# Patient Record
Sex: FEMALE | Race: BLACK OR AFRICAN AMERICAN | Employment: OTHER | URBAN - METROPOLITAN AREA
[De-identification: names, ages, dates, MRNs, and addresses within clinical notes are randomized per-mention and may not be internally consistent; named-entity substitution may affect disease eponyms.]

---

## 2017-01-10 ENCOUNTER — CLINICAL SUPPORT (OUTPATIENT)
Dept: ONCOLOGY | Age: 52
End: 2017-01-10

## 2017-01-10 ENCOUNTER — HOSPITAL ENCOUNTER (OUTPATIENT)
Dept: ONCOLOGY | Age: 52
Discharge: HOME | End: 2017-01-10

## 2017-01-10 ENCOUNTER — HOSPITAL ENCOUNTER (OUTPATIENT)
Dept: INFUSION THERAPY | Age: 52
Discharge: HOME | End: 2017-01-10
Payer: COMMERCIAL

## 2017-01-10 VITALS
RESPIRATION RATE: 16 BRPM | HEART RATE: 79 BPM | DIASTOLIC BLOOD PRESSURE: 88 MMHG | SYSTOLIC BLOOD PRESSURE: 158 MMHG | TEMPERATURE: 98.8 F

## 2017-01-10 DIAGNOSIS — N18.9 ANEMIA IN CKD (CHRONIC KIDNEY DISEASE): ICD-10-CM

## 2017-01-10 DIAGNOSIS — D63.1 ANEMIA IN CKD (CHRONIC KIDNEY DISEASE): ICD-10-CM

## 2017-01-10 DIAGNOSIS — N18.9 ANEMIA IN CKD (CHRONIC KIDNEY DISEASE): Primary | ICD-10-CM

## 2017-01-10 DIAGNOSIS — D63.1 ANEMIA IN CKD (CHRONIC KIDNEY DISEASE): Primary | ICD-10-CM

## 2017-01-10 LAB
BASO+EOS+MONOS # BLD AUTO: 0.3 K/UL (ref 0–2.3)
BASO+EOS+MONOS # BLD AUTO: 7 % (ref 0.1–17)
DIFFERENTIAL METHOD BLD: ABNORMAL
ERYTHROCYTE [DISTWIDTH] IN BLOOD BY AUTOMATED COUNT: 12.4 % (ref 11.5–14.5)
HCT VFR BLD AUTO: 30.8 % (ref 36–48)
HGB BLD-MCNC: 9.6 G/DL (ref 12–16)
LYMPHOCYTES # BLD AUTO: 16 % (ref 14–44)
LYMPHOCYTES # BLD: 0.8 K/UL (ref 1.1–5.9)
MCH RBC QN AUTO: 25.9 PG (ref 25–35)
MCHC RBC AUTO-ENTMCNC: 31.2 G/DL (ref 31–37)
MCV RBC AUTO: 83.2 FL (ref 78–102)
NEUTS SEG # BLD: 3.6 K/UL (ref 1.8–9.5)
NEUTS SEG NFR BLD AUTO: 77 % (ref 40–70)
PLATELET # BLD AUTO: 102 K/UL (ref 140–440)
RBC # BLD AUTO: 3.7 M/UL (ref 4.1–5.1)
WBC # BLD AUTO: 4.7 K/UL (ref 4.5–13)

## 2017-01-10 PROCEDURE — 36415 COLL VENOUS BLD VENIPUNCTURE: CPT

## 2017-01-10 NOTE — PROGRESS NOTES
[facility] Progress Note    Date: January 10, 2017    Name: Partha Jesus    MRN: [id number]         : 1965      Ms. Kendra Estrella arrived in the [facility] today, at 1010, in stable condition, here for Q 2 Week, CBC/Procrit injection. She was assessed and education was provided. Ms. Jodie Paulino vitals were reviewed. Visit Vitals    /88 (BP 1 Location: Left arm, BP Patient Position: At rest;Sitting)    Pulse 79    Temp 98.8 °F (37.1 °C)    Resp 16    Breastfeeding No           CBC was drawn from her right AC, at 1018, without incident. Lab results were obtained and reviewed, and preliminary platelet count was noted to be 102,000. Recent Results (from the past 12 hour(s))   CBC WITH 3 PART DIFF    Collection Time: 01/10/17 10:18 AM   Result Value Ref Range    WBC 4.7 4.5 - 13.0 K/uL    RBC 3.70 (L) 4.10 - 5.10 M/uL    HGB 9.6 (L) 12.0 - 16.0 g/dL    HCT 30.8 (L) 36 - 48 %    MCV 83.2 78 - 102 FL    MCH 25.9 25.0 - 35.0 PG    MCHC 31.2 31 - 37 g/dL    RDW 12.4 11.5 - 14.5 %    NEUTROPHILS 77 (H) 40 - 70 %    MIXED CELLS 7 0.1 - 17 %    LYMPHOCYTES 16 14 - 44 %    ABS. NEUTROPHILS 3.6 1.8 - 9.5 K/UL    ABS. MIXED CELLS 0.3 0.0 - 2.3 K/uL    ABS. LYMPHOCYTES 0.8 (L) 1.1 - 5.9 K/UL    DF AUTOMATED               Procrit was HELD today, per order, for HCT 30.8. Ms. Kendra Estrella tolerated well, and had no complaints. Ms. Kendra Estrella was discharged from [place] [address] in stable condition at 1035. Dolores Bolanosing She is to return on Tuesday, 17, at 1000, for her next appointment, for monthly IVIG, and CBC/Procrit injection.      Blease Curling, RN  January 10, 2017  10:27 AM

## 2017-01-31 ENCOUNTER — HOSPITAL ENCOUNTER (OUTPATIENT)
Dept: INFUSION THERAPY | Age: 52
Discharge: HOME | End: 2017-01-31
Payer: COMMERCIAL

## 2017-01-31 ENCOUNTER — CLINICAL SUPPORT (OUTPATIENT)
Dept: ONCOLOGY | Age: 52
End: 2017-01-31

## 2017-01-31 ENCOUNTER — HOSPITAL ENCOUNTER (OUTPATIENT)
Dept: ONCOLOGY | Age: 52
Discharge: HOME | End: 2017-01-31

## 2017-01-31 VITALS
HEIGHT: 67 IN | RESPIRATION RATE: 16 BRPM | HEART RATE: 73 BPM | SYSTOLIC BLOOD PRESSURE: 133 MMHG | WEIGHT: 205.5 LBS | BODY MASS INDEX: 32.25 KG/M2 | TEMPERATURE: 97.3 F | DIASTOLIC BLOOD PRESSURE: 74 MMHG

## 2017-01-31 DIAGNOSIS — D63.1 ANEMIA IN CKD (CHRONIC KIDNEY DISEASE): ICD-10-CM

## 2017-01-31 DIAGNOSIS — N18.9 ANEMIA IN CKD (CHRONIC KIDNEY DISEASE): Primary | ICD-10-CM

## 2017-01-31 DIAGNOSIS — D63.1 ANEMIA IN CKD (CHRONIC KIDNEY DISEASE): Primary | ICD-10-CM

## 2017-01-31 DIAGNOSIS — N18.9 ANEMIA IN CKD (CHRONIC KIDNEY DISEASE): ICD-10-CM

## 2017-01-31 LAB
BASO+EOS+MONOS # BLD AUTO: 0.3 K/UL (ref 0–2.3)
BASO+EOS+MONOS # BLD AUTO: 6 % (ref 0.1–17)
DIFFERENTIAL METHOD BLD: ABNORMAL
ERYTHROCYTE [DISTWIDTH] IN BLOOD BY AUTOMATED COUNT: 13.5 % (ref 11.5–14.5)
HCT VFR BLD AUTO: 23.9 % (ref 36–48)
HGB BLD-MCNC: 7.6 G/DL (ref 12–16)
LYMPHOCYTES # BLD AUTO: 18 % (ref 14–44)
LYMPHOCYTES # BLD: 0.9 K/UL (ref 1.1–5.9)
MCH RBC QN AUTO: 26.4 PG (ref 25–35)
MCHC RBC AUTO-ENTMCNC: 31.8 G/DL (ref 31–37)
MCV RBC AUTO: 83 FL (ref 78–102)
NEUTS SEG # BLD: 4 K/UL (ref 1.8–9.5)
NEUTS SEG NFR BLD AUTO: 76 % (ref 40–70)
PLATELET # BLD AUTO: 78 K/UL (ref 140–440)
RBC # BLD AUTO: 2.88 M/UL (ref 4.1–5.1)
WBC # BLD AUTO: 5.2 K/UL (ref 4.5–13)

## 2017-01-31 PROCEDURE — 96365 THER/PROPH/DIAG IV INF INIT: CPT

## 2017-01-31 PROCEDURE — 96366 THER/PROPH/DIAG IV INF ADDON: CPT

## 2017-01-31 PROCEDURE — 74011250637 HC RX REV CODE- 250/637: Performed by: INTERNAL MEDICINE

## 2017-01-31 PROCEDURE — 74011250636 HC RX REV CODE- 250/636: Performed by: INTERNAL MEDICINE

## 2017-01-31 PROCEDURE — 99211 OFF/OP EST MAY X REQ PHY/QHP: CPT

## 2017-01-31 PROCEDURE — 96367 TX/PROPH/DG ADDL SEQ IV INF: CPT

## 2017-01-31 PROCEDURE — 96375 TX/PRO/DX INJ NEW DRUG ADDON: CPT

## 2017-01-31 PROCEDURE — 74011000258 HC RX REV CODE- 258: Performed by: INTERNAL MEDICINE

## 2017-01-31 PROCEDURE — 96372 THER/PROPH/DIAG INJ SC/IM: CPT

## 2017-01-31 RX ORDER — ACETAMINOPHEN 325 MG/1
650 TABLET ORAL ONCE
Status: COMPLETED | OUTPATIENT
Start: 2017-01-31 | End: 2017-01-31

## 2017-01-31 RX ORDER — SODIUM CHLORIDE 9 MG/ML
250 INJECTION, SOLUTION INTRAVENOUS ONCE
Status: COMPLETED | OUTPATIENT
Start: 2017-01-31 | End: 2017-01-31

## 2017-01-31 RX ORDER — SODIUM CHLORIDE 0.9 % (FLUSH) 0.9 %
10-40 SYRINGE (ML) INJECTION AS NEEDED
Status: DISCONTINUED | OUTPATIENT
Start: 2017-01-31 | End: 2017-02-04 | Stop reason: HOSPADM

## 2017-01-31 RX ORDER — DIPHENHYDRAMINE HYDROCHLORIDE 50 MG/ML
50 INJECTION, SOLUTION INTRAMUSCULAR; INTRAVENOUS ONCE
Status: COMPLETED | OUTPATIENT
Start: 2017-01-31 | End: 2017-01-31

## 2017-01-31 RX ADMIN — ERYTHROPOIETIN 40000 UNITS: 40000 INJECTION, SOLUTION INTRAVENOUS; SUBCUTANEOUS at 11:55

## 2017-01-31 RX ADMIN — DEXAMETHASONE SODIUM PHOSPHATE 20 MG: 4 INJECTION, SOLUTION INTRA-ARTICULAR; INTRALESIONAL; INTRAMUSCULAR; INTRAVENOUS; SOFT TISSUE at 10:50

## 2017-01-31 RX ADMIN — IMMUNE GLOBULIN INTRAVENOUS (HUMAN) 20 G: 5 INJECTION, SOLUTION INTRAVENOUS at 11:45

## 2017-01-31 RX ADMIN — ACETAMINOPHEN 650 MG: 325 TABLET, FILM COATED ORAL at 10:45

## 2017-01-31 RX ADMIN — IMMUNE GLOBULIN INTRAVENOUS (HUMAN) 5 G: 5 INJECTION, SOLUTION INTRAVENOUS at 13:30

## 2017-01-31 RX ADMIN — DIPHENHYDRAMINE HYDROCHLORIDE 50 MG: 50 INJECTION INTRAMUSCULAR; INTRAVENOUS at 10:45

## 2017-01-31 RX ADMIN — ERYTHROPOIETIN 20000 UNITS: 20000 INJECTION, SOLUTION INTRAVENOUS; SUBCUTANEOUS at 11:55

## 2017-01-31 RX ADMIN — Medication 10 ML: at 10:25

## 2017-01-31 RX ADMIN — SODIUM CHLORIDE 250 ML: 9 INJECTION, SOLUTION INTRAVENOUS at 10:35

## 2017-01-31 NOTE — PROGRESS NOTES
[facility] Progress Note    Date: 2017    Name: Ashok Justice    MRN: [id number]         : 1965      Ms. Franc Richards arrived in the [address] [place] [address] today, at 1010, in stable condition, here for Monthly IVIG infusion, and Q 2 Week, CBC/Procrit injection. She was assessed and education was provided. Ms. Mio Koch vitals were reviewed. Visit Vitals    /86 (BP 1 Location: Left arm, BP Patient Position: At rest;Sitting)    Pulse 93    Temp 99.2 °F (37.3 °C)    Resp 16    Ht 5' 7\" (1.702 m)    Wt 93.2 kg (205 lb 8 oz)    BMI 32.19 kg/m2               PIV was started in her left AC, at 1025, without incident, and a CBC was drawn, per order. Lab results were obtained and reviewed, and were reported to Harley Young NP (especially the low H&H & platelet count results). Order was initially received from [place], to schedule Ms. Aguayo to receive 2 units of blood, however, Ms. Aguayo refused the blood transfusion for now. Ms. Franc Richards stated that she wanted to see first, if the Procrit injection received today, will bring up her H&H. Then she stated if her CBC results have not improved by the time she comes back for Procrit in 2 weeks, then, she will consent to a blood transfusion. Harley Young NP, was made aware of Ms. Aguayo' decision. Ms. Franc Richards voiced no complaints today, of fatigue or shortness of breath, however, she was instructed to report any onset of these symptoms immediately, and she verbalized understanding.          Recent Results (from the past 12 hour(s))   CBC WITH 3 PART DIFF    Collection Time: 17 10:25 AM   Result Value Ref Range    WBC 5.2 4.5 - 13.0 K/uL    RBC 2.88 (L) 4.10 - 5.10 M/uL    HGB 7.6 (L) 12.0 - 16.0 g/dL    HCT 23.9 (L) 36 - 48 %    MCV 83.0 78 - 102 FL    MCH 26.4 25.0 - 35.0 PG    MCHC 31.8 31 - 37 g/dL    RDW 13.5 11.5 - 14.5 %    PLATELET 78 (L) 960 - 440 K/uL    NEUTROPHILS 76 (H) 40 - 70 %    MIXED CELLS 6 0.1 - 17 %    LYMPHOCYTES 18 14 - 44 % ABS. NEUTROPHILS 4.0 1.8 - 9.5 K/UL    ABS. MIXED CELLS 0.3 0.0 - 2.3 K/uL    ABS. LYMPHOCYTES 0.9 (L) 1.1 - 5.9 K/UL    DF AUTOMATED               Procrit 60,000 units, was administered SQ, in her left arm, at 1155,  as ordered, and without incident. Pre-medications consisting of PO Tylenol 650 mg, IV Benadryl 50 mg, and Decadron 20 mg IV, were administered per order, and without incident. IVIG 25 grams (25,000 mg) was administered via the PIV, per order, and without incident. After completion of all ordered medications, the PIV was flushed well per protocol, and then, the PIV was removed and gauze/bandaid was applied. Ms. Aguayo tolerated well, and had no complaints. Ms. Fabiano Lau was discharged from [place] [address] in stable condition at [phone]. She is to return in 2 weeks, on Tuesday, 2-14-17, at 1600,  for her next appointment, for CBC/Procrit injection. And then, she is scheduled to return in 1 month, on Tuesday, 2-28-17, at 1000, for her next dose of IVIG.      Erica Philippe RN  January 31, 2017  11:00 AM

## 2017-02-14 ENCOUNTER — HOSPITAL ENCOUNTER (OUTPATIENT)
Dept: ONCOLOGY | Age: 52
Discharge: HOME | End: 2017-02-14

## 2017-02-14 ENCOUNTER — HOSPITAL ENCOUNTER (OUTPATIENT)
Dept: INFUSION THERAPY | Age: 52
Discharge: HOME | End: 2017-02-14
Payer: COMMERCIAL

## 2017-02-14 ENCOUNTER — CLINICAL SUPPORT (OUTPATIENT)
Dept: ONCOLOGY | Age: 52
End: 2017-02-14

## 2017-02-14 VITALS
HEART RATE: 88 BPM | TEMPERATURE: 98.7 F | DIASTOLIC BLOOD PRESSURE: 69 MMHG | SYSTOLIC BLOOD PRESSURE: 137 MMHG | RESPIRATION RATE: 16 BRPM

## 2017-02-14 DIAGNOSIS — N18.9 ANEMIA IN CKD (CHRONIC KIDNEY DISEASE): Primary | ICD-10-CM

## 2017-02-14 DIAGNOSIS — D63.1 ANEMIA IN CKD (CHRONIC KIDNEY DISEASE): Primary | ICD-10-CM

## 2017-02-14 DIAGNOSIS — D63.1 ANEMIA IN CKD (CHRONIC KIDNEY DISEASE): ICD-10-CM

## 2017-02-14 DIAGNOSIS — N18.9 ANEMIA IN CKD (CHRONIC KIDNEY DISEASE): ICD-10-CM

## 2017-02-14 LAB
BASO+EOS+MONOS # BLD AUTO: 0.4 K/UL (ref 0–2.3)
BASO+EOS+MONOS # BLD AUTO: 7 % (ref 0.1–17)
DIFFERENTIAL METHOD BLD: ABNORMAL
ERYTHROCYTE [DISTWIDTH] IN BLOOD BY AUTOMATED COUNT: 13.9 % (ref 11.5–14.5)
HCT VFR BLD AUTO: 23.8 % (ref 36–48)
HGB BLD-MCNC: 7.5 G/DL (ref 12–16)
LYMPHOCYTES # BLD AUTO: 20 % (ref 14–44)
LYMPHOCYTES # BLD: 1.1 K/UL (ref 1.1–5.9)
MCH RBC QN AUTO: 25.9 PG (ref 25–35)
MCHC RBC AUTO-ENTMCNC: 31.5 G/DL (ref 31–37)
MCV RBC AUTO: 82.1 FL (ref 78–102)
NEUTS SEG # BLD: 4.3 K/UL (ref 1.8–9.5)
NEUTS SEG NFR BLD AUTO: 73 % (ref 40–70)
PLATELET # BLD AUTO: 105 K/UL (ref 140–440)
RBC # BLD AUTO: 2.9 M/UL (ref 4.1–5.1)
WBC # BLD AUTO: 5.8 K/UL (ref 4.5–13)

## 2017-02-14 PROCEDURE — 99211 OFF/OP EST MAY X REQ PHY/QHP: CPT

## 2017-02-14 PROCEDURE — 74011250636 HC RX REV CODE- 250/636: Performed by: INTERNAL MEDICINE

## 2017-02-14 PROCEDURE — 96372 THER/PROPH/DIAG INJ SC/IM: CPT

## 2017-02-14 PROCEDURE — 36415 COLL VENOUS BLD VENIPUNCTURE: CPT

## 2017-02-14 RX ADMIN — ERYTHROPOIETIN 40000 UNITS: 40000 INJECTION, SOLUTION INTRAVENOUS; SUBCUTANEOUS at 16:50

## 2017-02-14 RX ADMIN — ERYTHROPOIETIN 20000 UNITS: 20000 INJECTION, SOLUTION INTRAVENOUS; SUBCUTANEOUS at 16:50

## 2017-02-14 NOTE — PROGRESS NOTES
[facility] Progress Note    Date: 2017    Name: Oumou Dennis    MRN: [id number]         : 1965      Ms. Carola Bhatti arrived in the [facility] today, at [phone], in stable condition, here for Q 2 Week, CBC/Procrit injection. She was assessed and education was provided. Upon assessment today, it was noted that Ms. Aguayo complained of feeling tired, but she contributed it to the fact that she is currently on her menstrual cycle, and has been flowing heavily. Ms. Dorcus Bloch vitals were reviewed. Visit Vitals    /69 (BP 1 Location: Right arm, BP Patient Position: At rest;Sitting)    Pulse 88    Temp 98.7 °F (37.1 °C)    Resp 16    Breastfeeding No             CBC was drawn from her left AC, at 1618, without incident. Lab results were obtained and reviewed, and low H&H result was reported to Paco Melendez NP. Sandy Patel was made aware, that Ms. Carola Bhatti stated that she is not completely opposed to having a blood transfusion, because she has had transfusions in the past, but she is NOT ready to agree to a transfusion yet. Therefore, Sandy Patel suggested that Ms. Carola Bhatti return in 1 week, just to have her CBC checked. However, Ms. Carola Bhatti stated that she would just rather wait, and return in 2 weeks, when she scheduled for her next IVIG infusion, and CBC/Procrit injection. Therefore, Ms. Carola Bhatti was instructed to report to the ED immediately, for any shortness of breath, or worsening symptoms, and she verbalized understanding, and agreed.      Recent Results (from the past 12 hour(s))   CBC WITH 3 PART DIFF    Collection Time: 17  4:18 PM   Result Value Ref Range    WBC 5.8 4.5 - 13.0 K/uL    RBC 2.90 (L) 4.10 - 5.10 M/uL    HGB 7.5 (L) 12.0 - 16.0 g/dL    HCT 23.8 (L) 36 - 48 %    MCV 82.1 78 - 102 FL    MCH 25.9 25.0 - 35.0 PG    MCHC 31.5 31 - 37 g/dL    RDW 13.9 11.5 - 14.5 %    PLATELET 893 (L) 198 - 440 K/uL    NEUTROPHILS 73 (H) 40 - 70 %    MIXED CELLS 7 0.1 - 17 %    LYMPHOCYTES 20 14 - 44 %    ABS. NEUTROPHILS 4.3 1.8 - 9.5 K/UL    ABS. MIXED CELLS 0.4 0.0 - 2.3 K/uL    ABS. LYMPHOCYTES 1.1 1.1 - 5.9 K/UL    DF AUTOMATED               Procrit 60,000 units, was administered SQ, in her right arm, at 1650, as ordered, and without incident. Ms. Aguayo tolerated well, and had no complaints. Ms. Bola Collins was discharged from [place] [address] in stable condition at [phone]. She is to return in 2 weeks, on Tuesday, 2-28-17, at 1000,  for her next appointment, for IVIG infusion, and Q 2 Week CBC/Procrit injection.      Marie Patel RN  February 14, 2017  4:28 PM

## 2017-02-28 ENCOUNTER — CLINICAL SUPPORT (OUTPATIENT)
Dept: ONCOLOGY | Age: 52
End: 2017-02-28

## 2017-02-28 ENCOUNTER — HOSPITAL ENCOUNTER (OUTPATIENT)
Dept: INFUSION THERAPY | Age: 52
Discharge: HOME | End: 2017-02-28
Payer: COMMERCIAL

## 2017-02-28 ENCOUNTER — OFFICE VISIT (OUTPATIENT)
Dept: ONCOLOGY | Age: 52
End: 2017-02-28

## 2017-02-28 ENCOUNTER — HOSPITAL ENCOUNTER (OUTPATIENT)
Dept: ONCOLOGY | Age: 52
Discharge: HOME | End: 2017-02-28

## 2017-02-28 VITALS
TEMPERATURE: 98.2 F | WEIGHT: 205 LBS | HEIGHT: 67 IN | HEART RATE: 76 BPM | SYSTOLIC BLOOD PRESSURE: 125 MMHG | DIASTOLIC BLOOD PRESSURE: 67 MMHG | BODY MASS INDEX: 32.18 KG/M2

## 2017-02-28 VITALS
SYSTOLIC BLOOD PRESSURE: 125 MMHG | TEMPERATURE: 98.2 F | HEIGHT: 67 IN | HEART RATE: 76 BPM | RESPIRATION RATE: 16 BRPM | BODY MASS INDEX: 32.18 KG/M2 | WEIGHT: 205 LBS | DIASTOLIC BLOOD PRESSURE: 67 MMHG

## 2017-02-28 DIAGNOSIS — N18.9 ANEMIA IN CKD (CHRONIC KIDNEY DISEASE): ICD-10-CM

## 2017-02-28 DIAGNOSIS — D63.1 ANEMIA IN CHRONIC KIDNEY DISEASE (CKD): ICD-10-CM

## 2017-02-28 DIAGNOSIS — N18.9 ANEMIA IN CHRONIC KIDNEY DISEASE (CKD): ICD-10-CM

## 2017-02-28 DIAGNOSIS — N92.0 MENORRHAGIA WITH REGULAR CYCLE: ICD-10-CM

## 2017-02-28 DIAGNOSIS — D63.1 ANEMIA IN CKD (CHRONIC KIDNEY DISEASE): Primary | ICD-10-CM

## 2017-02-28 DIAGNOSIS — N18.9 ANEMIA IN CKD (CHRONIC KIDNEY DISEASE): Primary | ICD-10-CM

## 2017-02-28 DIAGNOSIS — D63.1 ANEMIA IN CKD (CHRONIC KIDNEY DISEASE): ICD-10-CM

## 2017-02-28 DIAGNOSIS — D50.0 ANEMIA DUE TO BLOOD LOSS, CHRONIC: ICD-10-CM

## 2017-02-28 DIAGNOSIS — D69.6 THROMBOCYTOPENIA (HCC): Primary | ICD-10-CM

## 2017-02-28 LAB
BASO+EOS+MONOS # BLD AUTO: 0.3 K/UL (ref 0–2.3)
BASO+EOS+MONOS # BLD AUTO: 7 % (ref 0.1–17)
DIFFERENTIAL METHOD BLD: ABNORMAL
ERYTHROCYTE [DISTWIDTH] IN BLOOD BY AUTOMATED COUNT: 14.3 % (ref 11.5–14.5)
HCT VFR BLD AUTO: 25.3 % (ref 36–48)
HGB BLD-MCNC: 7.8 G/DL (ref 12–16)
LYMPHOCYTES # BLD AUTO: 19 % (ref 14–44)
LYMPHOCYTES # BLD: 0.8 K/UL (ref 1.1–5.9)
MCH RBC QN AUTO: 26 PG (ref 25–35)
MCHC RBC AUTO-ENTMCNC: 30.8 G/DL (ref 31–37)
MCV RBC AUTO: 84.3 FL (ref 78–102)
NEUTS SEG # BLD: 3.3 K/UL (ref 1.8–9.5)
NEUTS SEG NFR BLD AUTO: 73 % (ref 40–70)
PLATELET # BLD AUTO: 107 K/UL (ref 140–440)
RBC # BLD AUTO: 3 M/UL (ref 4.1–5.1)
WBC # BLD AUTO: 4.4 K/UL (ref 4.5–13)

## 2017-02-28 PROCEDURE — 74011000258 HC RX REV CODE- 258: Performed by: INTERNAL MEDICINE

## 2017-02-28 PROCEDURE — 96367 TX/PROPH/DG ADDL SEQ IV INF: CPT

## 2017-02-28 PROCEDURE — 96375 TX/PRO/DX INJ NEW DRUG ADDON: CPT

## 2017-02-28 PROCEDURE — 96372 THER/PROPH/DIAG INJ SC/IM: CPT

## 2017-02-28 PROCEDURE — 74011250636 HC RX REV CODE- 250/636: Performed by: INTERNAL MEDICINE

## 2017-02-28 PROCEDURE — 96365 THER/PROPH/DIAG IV INF INIT: CPT

## 2017-02-28 PROCEDURE — 74011250637 HC RX REV CODE- 250/637: Performed by: INTERNAL MEDICINE

## 2017-02-28 PROCEDURE — 96366 THER/PROPH/DIAG IV INF ADDON: CPT

## 2017-02-28 RX ORDER — ACETAMINOPHEN 325 MG/1
650 TABLET ORAL ONCE
Status: COMPLETED | OUTPATIENT
Start: 2017-02-28 | End: 2017-02-28

## 2017-02-28 RX ORDER — DIPHENHYDRAMINE HYDROCHLORIDE 50 MG/ML
50 INJECTION, SOLUTION INTRAMUSCULAR; INTRAVENOUS ONCE
Status: COMPLETED | OUTPATIENT
Start: 2017-02-28 | End: 2017-02-28

## 2017-02-28 RX ORDER — SODIUM CHLORIDE 0.9 % (FLUSH) 0.9 %
10-40 SYRINGE (ML) INJECTION AS NEEDED
Status: DISPENSED | OUTPATIENT
Start: 2017-02-28 | End: 2017-02-28

## 2017-02-28 RX ORDER — SODIUM CHLORIDE 9 MG/ML
250 INJECTION, SOLUTION INTRAVENOUS ONCE
Status: COMPLETED | OUTPATIENT
Start: 2017-02-28 | End: 2017-02-28

## 2017-02-28 RX ADMIN — Medication 10 ML: at 10:20

## 2017-02-28 RX ADMIN — IMMUNE GLOBULIN INTRAVENOUS (HUMAN) 5 G: 5 INJECTION, SOLUTION INTRAVENOUS at 13:30

## 2017-02-28 RX ADMIN — ACETAMINOPHEN 650 MG: 325 TABLET, FILM COATED ORAL at 10:40

## 2017-02-28 RX ADMIN — IMMUNE GLOBULIN INTRAVENOUS (HUMAN) 20 G: 5 INJECTION, SOLUTION INTRAVENOUS at 11:45

## 2017-02-28 RX ADMIN — ERYTHROPOIETIN 40000 UNITS: 40000 INJECTION, SOLUTION INTRAVENOUS; SUBCUTANEOUS at 11:05

## 2017-02-28 RX ADMIN — DEXAMETHASONE SODIUM PHOSPHATE 20 MG: 4 INJECTION, SOLUTION INTRA-ARTICULAR; INTRALESIONAL; INTRAMUSCULAR; INTRAVENOUS; SOFT TISSUE at 10:45

## 2017-02-28 RX ADMIN — ERYTHROPOIETIN 20000 UNITS: 20000 INJECTION, SOLUTION INTRAVENOUS; SUBCUTANEOUS at 11:05

## 2017-02-28 RX ADMIN — SODIUM CHLORIDE 250 ML: 9 INJECTION, SOLUTION INTRAVENOUS at 10:30

## 2017-02-28 RX ADMIN — DIPHENHYDRAMINE HYDROCHLORIDE 50 MG: 50 INJECTION INTRAMUSCULAR; INTRAVENOUS at 10:40

## 2017-02-28 NOTE — PROGRESS NOTES
Hematology/Oncology  Progress Note    Name: Alesha Risk  Date: 2016  : 1965    PCP: Yovany Gunderson MD     Ms. Memo Monsivais is a 54year old female who was seen for management of her underlying anemia. The patient also past chronic thrombocytopenia/ITP. She denies having any recent bruising or bleeding episodes. Current therapy: IVIG monthly, ferrous sulfate BID, and Venofer PRN. Subjective:     Ms. Memo Monsivais is a 46-year-old woman who has anemia and thrombocytopenia. The patient has previously tolerated her intravenous Venofer without significant untoward side effects. She received monthly IVIG and she is tolerating this well. She reports that she is experiencing some fatigue at this time. She has no other new physical complaints or concerns to report. The patient denies having bruising or bleeding. The patient states that her menstrual cycle has improved over the past several months. Past medical history, family history, and social history: these were reviewed and remains unchanged. Past Medical History:   Diagnosis Date    Anemia     Arthritis     Diabetes ([address]     Hypertension     ITP (idiopathic thrombocytopenic purpura)      Past Surgical History:   Procedure Laterality Date    HX GYN      c section      Social History     Social History    Marital status: SINGLE     Spouse name: N/A    Number of children: N/A    Years of education: N/A     Occupational History    Not on file. Social History Main Topics    Smoking status: Never Smoker    Smokeless tobacco: Not on file    Alcohol use No    Drug use: No    Sexual activity: Not on file     Other Topics Concern    Not on file     Social History Narrative     Family History   Problem Relation Age of Onset    Hypertension Mother     Cancer Father      Colon     Current Outpatient Prescriptions   Medication Sig Dispense Refill    metFORMIN (GLUCOPHAGE) 500 mg tablet Take 2 Tabs by mouth two (2) times daily (with meals). Indications: TYPE 2 DIABETES MELLITUS 120 Tab 2    lisinopril-hydrochlorothiazide (PRINZIDE, ZESTORETIC) 20-25 mg per tablet Take 1 Tab by mouth daily. Indications: HYPERTENSION      amLODIPine (NORVASC) 10 mg tablet Take 10 mg by mouth daily. Indications: HYPERTENSION      ferrous sulfate 325 mg (65 mg iron) tablet Take 325 mg by mouth two (2) times a day. Facility-Administered Medications Ordered in Other Visits   Medication Dose Route Frequency Provider Last Rate Last Dose    sodium chloride (NS) flush 10-40 mL  10-40 mL IntraVENous PRN Glendon Hammans, MD   10 mL at 02/28/17 1020    immune globulin 10% infusion 5 g  5 g IntraVENous ONCE TITR Glendon Hammans, MD   Stopped at 02/28/17 1350       Review of Systems  Constitutional: The patient complains of fatigue   HEENT: The patient denies recent head trauma, eye pain, blurred vision,  hearing deficit, oropharyngeal mucosal pain or lesions, and the patient denies throat pain or discomfort. Lymphatics: The patient denies palpable peripheral lymphadenopathy. Hematologic: The patient denies having bruising, bleeding, or progressive fatigue. Respiratory: Patient denies having shortness of breath, cough, sputum production, fever, or dyspnea on exertion. Cardiovascular: The patient denies having leg pain, leg swelling, heart palpitations, chest permit, chest pain, or lightheadedness. The patient denies having dyspnea on exertion. Gastrointestinal: The patient denies having nausea, emesis, or diarrhea. The patient denies having any hematemesis or blood in the stool. Genitourinary: Patient denies having urinary urgency, frequency, or dysuria. The patient denies having blood in the urine. Psychological: The patient denies having symptoms of nervousness, anxiety, depression, or thoughts of harming himself some of this. Skin: Patient denies having skin rashes, skin, ulcerations, or unexplained itching or pruritus.   Musculoskeletal: The patient denies having pain in the joints or bones. Objective:     Visit Vitals    /67    Pulse 76    Temp 98.2 °F (36.8 °C)    Ht 5' 7\" (1.702 m)    Wt 93 kg (205 lb)    BMI 32.11 kg/m2     ECOG 0  Physical Exam:   Gen. Appearance: The patient is in no acute distress. Skin: There is no bruise or rash. HEENT: The exam is unremarkable. Neck: Supple without lymphadenopathy or thyromegaly. Lungs: Clear to auscultation and percussion; there are no wheezes or rhonchi. Heart: Regular rate and rhythm; there are no murmurs, gallops, or rubs. Anterior chest wall and breasts: Deferred. Abdomen: Bowel sounds are present and normal.  There is no guarding, tenderness, or hepatosplenomegaly. Extremities: There is no clubbing, cyanosis, or edema. Neurologic: There are no focal neurologic deficits. Lymphatics: There is no palpable peripheral lymphadenopathy. Musculoskeletal: The patient has full range of motion at all joints. There is no evidence of joint deformity or effusions. There is no focal joint tenderness. Psychological/psychiatric: There is no clinical evidence of anxiety, depression, or melancholy. Lab data:      Results for orders placed or performed during the hospital encounter of 02/28/17   CBC WITH 3 PART DIFF     Status: Abnormal   Result Value Ref Range Status    WBC 4.4 (L) 4.5 - 13.0 K/uL Final    RBC 3.00 (L) 4.10 - 5.10 M/uL Final    HGB 7.8 (L) 12.0 - 16.0 g/dL Final    HCT 25.3 (L) 36 - 48 % Final    MCV 84.3 78 - 102 FL Final    MCH 26.0 25.0 - 35.0 PG Final    MCHC 30.8 (L) 31 - 37 g/dL Final    RDW 14.3 11.5 - 14.5 % Final    PLATELET 301 (L) 837 - 440 K/uL Final    NEUTROPHILS 73 (H) 40 - 70 % Final    MIXED CELLS 7 0.1 - 17 % Final    LYMPHOCYTES 19 14 - 44 % Final    ABS. NEUTROPHILS 3.3 1.8 - 9.5 K/UL Final    ABS. MIXED CELLS 0.3 0.0 - 2.3 K/uL Final    ABS. LYMPHOCYTES 0.8 (L) 1.1 - 5.9 K/UL Final     Comment: Test performed at [place] [address] Location.  Results Reviewed by Medical Director. DF AUTOMATED   Final         Assessment:     1. Thrombocytopenia ([address]    2. Anemia due to blood loss, chronic    3. Anemia in chronic kidney disease (CKD)    4. Menorrhagia with regular cycle      Plan:   Anemia due to blood loss/menorrhagia and chronic kidney disease: the patient has tolerated the intravenous iron deficient with Venofer reasonably well. CBC from today shows that her hemoglobin is currently 7.8 g/dL with hematocrit of 25.3%. We are continuing to provide Procrit every 2 weeks at a dose of 60,000 units. The most recent ferritin level from 12/24/2016 was 32 ng/mL. At this time am recommending that we provide the patient with 4 additional doses of Venofer in an effort to raise her ferritin level above 100 ng/mL. she will be scheduled to begin Venofer 250 mg every 2 weeks starting next week. 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 The platelets are continuing to respond to intravenous gamma globulin. The current CBC shows that her platelet count it is now 107,000. She is scheduled for her next IVIG infusion on 12/27/2016. Menorrhagia (progressive problem) the patient reports that her menstrual flow has significantly increased over the past couple of months. She does have known uterine fibroids. I have recommended that she follow up with the OB/GYN physician and that she should adhere to undergoing a hysterectomy to correct this problem. In the meantime, she will be scheduled for intravenous Venofer and I will see her back in clinic in 8 weeks. The patient will return to clinic for complete reassessment again in 8 weeks.   Orders Placed This Encounter    FERRITIN     Standing Status:   Future     Standing Expiration Date:   0/2/2857    METABOLIC PANEL, COMPREHENSIVE     Standing Status:   Future     Standing Expiration Date:   3/1/2018    IRON PROFILE     Standing Status:   Future     Standing Expiration Date: 3/1/2018       Ruthie Trevizo MD  2/28/2017

## 2017-02-28 NOTE — PROGRESS NOTES
[facility] Progress Note    Date: 2017    Name: Gabriella Mtz    MRN: [id number]         : 1965      Ms. Nuno Severino arrived in the [facility] today, at 1005, in stable condition, here for Monthly IVIG Infusion, and Q 2 Week, CBC/Procrit injection. She was assessed and education was provided. Ms. Kaylah Chang vitals were reviewed. Visit Vitals    /80 (BP 1 Location: Left arm, BP Patient Position: At rest;Sitting)    Pulse 92    Temp 98.4 °F (36.9 °C)    Resp 16    Ht 5' 7\" (1.702 m)    Wt 93 kg (205 lb)    BMI 32.11 kg/m2             PIV was established in her left AC, at 1020, and a CBC was drawn per order, along with an extra lavender top tube, and 2 SST tubes, in preparation for her office visit with Dr. Emerson Angel today, after the completion of this [facility] visit. Lab results were obtained and reviewed. Recent Results (from the past 12 hour(s))   CBC WITH 3 PART DIFF    Collection Time: 17 10:20 AM   Result Value Ref Range    WBC 4.4 (L) 4.5 - 13.0 K/uL    RBC 3.00 (L) 4.10 - 5.10 M/uL    HGB 7.8 (L) 12.0 - 16.0 g/dL    HCT 25.3 (L) 36 - 48 %    MCV 84.3 78 - 102 FL    MCH 26.0 25.0 - 35.0 PG    MCHC 30.8 (L) 31 - 37 g/dL    RDW 14.3 11.5 - 14.5 %    PLATELET 206 (L) 930 - 440 K/uL    NEUTROPHILS 73 (H) 40 - 70 %    MIXED CELLS 7 0.1 - 17 %    LYMPHOCYTES 19 14 - 44 %    ABS. NEUTROPHILS 3.3 1.8 - 9.5 K/UL    ABS. MIXED CELLS 0.3 0.0 - 2.3 K/uL    ABS. LYMPHOCYTES 0.8 (L) 1.1 - 5.9 K/UL    DF AUTOMATED             Low H&H result was reported to Rose Benitez NP. No new orders received. Ms. Nuno Severino still refused the option of a blood transfusion at this time. Procrit 60,000 units, was administered SQ, in her right arm, at 1105, as ordered, and without incident. Pre-medications consisting of PO Tylenol 650 mg, Benadryl 50 mg IV, and Decadron 20 mg IV, were all administered per order, and without incident.            IVIG 25 grams (25,000 mg), was administered per order, and without incident. After completion of the IVIG, the PIV was flushed very well per protocol, and then, the PIV was removed and gauze/bandaid was applied. Ms. Noa Andino tolerated well, and had no complaints. Ms. Noa Andino was discharged from [place] [address] in stable condition at 1430. She is to return in 2 weeks, on Tuesday, 3-14-17, at 1500, for her next appointment, for CBC/Procrit injection. And then, she will return in 1 month, on Tuesday, 3-28-17, at 1000, for her next dose of IVIG.      Ozzie Belle RN  February 28, 2017  10:52 AM

## 2017-02-28 NOTE — MR AVS SNAPSHOT
Visit Information Date & Time Provider Department Dept. Phone Encounter #  
 2/28/2017  2:15 PM Homar Olivares [address] Office [phone] [id number] Follow-up Instructions Return in about 8 weeks (around 4/25/2017). Follow-up and Disposition History Your Appointments 4/25/2017  3:00 PM  
Office Visit with Homar Olivares MD  
[facility] [address] [facility]) Appt Note: OV  
 [facility] [address] [facility] [address] [facility] [address]  
[phone]  
  
   
 [facility] [address] [place] [address] [place] Upcoming Health Maintenance Date Due HEMOGLOBIN A1C Q6M 1965 LIPID PANEL Q1 1965 FOOT EXAM Q1 6/16/1977 MICROALBUMIN Q1 6/16/1977 EYE EXAM RETINAL OR DILATED Q1 6/16/1977 Pneumococcal 19-64 Highest Risk (1 of 3 - PCV13) 6/16/1986 DTaP/Tdap/Td series (1 - Tdap) 6/16/1988 PAP AKA CERVICAL CYTOLOGY 6/16/1988 Allergies as of 2/28/2017  Review Complete On: 2/28/2017 By: Homar Olivares MD  
 No Known Allergies Current Immunizations  Reviewed on 2/28/2017 Name Date Influenza Vaccine 11/15/2016 Reviewed by Valentina Moody RN on 2/28/2017 at 10:48 AM  
You Were Diagnosed With   
  
 Codes Comments Thrombocytopenia [address] [address]    -  Primary ICD-10-CM: D69.6 ICD-9-CM: 287.5 Anemia due to blood loss, chronic     ICD-10-CM: D50.0 ICD-9-CM: 280.0 Anemia in chronic kidney disease (CKD)     ICD-10-CM: N18.9, D63.1 ICD-9-CM: 285.21, 585.9 Menorrhagia with regular cycle     ICD-10-CM: N92.0 ICD-9-CM: 626.2 Vitals BP  
  
  
  
  
  
 125/67 BMI and BSA Data Body Mass Index Body Surface Area  
 32.11 kg/m 2 2.1 m 2 Preferred Pharmacy Pharmacy Name Phone [facility] [address] [phone] Your Updated Medication List  
  
   
This list is accurate as of: 2/28/17  3:55 PM.  Always use your most recent med list.  
 amLODIPine 10 mg tablet Commonly known as:  Mago Milad Take 10 mg by mouth daily. Indications: HYPERTENSION  
  
 ferrous sulfate 325 mg (65 mg iron) tablet Take 325 mg by mouth two (2) times a day. lisinopril-hydroCHLOROthiazide 20-25 mg per tablet Commonly known as:  Peggy Edinger Take 1 Tab by mouth daily. Indications: HYPERTENSION  
  
 metFORMIN 500 mg tablet Commonly known as:  GLUCOPHAGE Take 2 Tabs by mouth two (2) times daily (with meals). Indications: TYPE 2 DIABETES MELLITUS Follow-up Instructions Return in about 8 weeks (around 4/25/2017). To-Do List   
 02/28/2017 Lab:  FERRITIN   
  
 02/28/2017 Lab:  IRON PROFILE   
  
 02/28/2017 Lab:  METABOLIC PANEL, COMPREHENSIVE   
  
 03/14/2017 3:00 PM  
  Appointment with [address] at [place] [address] ([phone])  
  
 03/28/2017 10:00 AM  
  Appointment with [address] at [place] [address] ([phone])  
  
 04/11/2017 4:00 PM  
  Appointment with [address] at [place] [address] ([phone])  
  
 04/25/2017 10:00 AM  
  Appointment with [address] at [place] [address] ([phone]) Introducing [facility] SERVICES! Yolie Johnson introduces [employer] patient portal. Now you can access parts of your medical record, email your doctor's office, and request medication refills online. 1. In your internet browser, go to [email or web address] 2. Click on the First Time User? Click Here link in the Sign In box. You will see the New Member Sign Up page. 3. Enter your [employer] Access Code exactly as it appears below. You will not need to use this code after youve completed the sign-up process. If you do not sign up before the expiration date, you must request a new code. · [employer] Access Code: [email or web address] Expires: 4/24/2017  4:17 PM 
 
 4. Enter the last four digits of your Social Security Number (xxxx) and Date of Birth (mm/dd/yyyy) as indicated and click Submit. You will be taken to the next sign-up page. 5. Create a [employer] ID. This will be your [employer] login ID and cannot be changed, so think of one that is secure and easy to remember. 6. Create a [employer] password. You can change your password at any time. 7. Enter your Password Reset Question and Answer. This can be used at a later time if you forget your password. 8. Enter your e-mail address. You will receive e-mail notification when new information is available in [address] 9. Click Sign Up. You can now view and download portions of your medical record. 10. Click the Download Summary menu link to download a portable copy of your medical information. If you have questions, please visit the Frequently Asked Questions section of the [employer] website. Remember, [employer] is NOT to be used for urgent needs. For medical emergencies, dial 911. Now available from your iPhone and Android! Please provide this summary of care documentation to your next provider. Your primary care clinician is listed as Yovany Gunderson. If you have any questions after today's visit, please call [phone].

## 2017-03-01 LAB
A-G RATIO,AGRAT: 1.3 RATIO (ref 1.1–2.6)
ALBUMIN SERPL-MCNC: 3.8 G/DL (ref 3.5–5)
ALP SERPL-CCNC: 72 U/L (ref 25–115)
ALT SERPL-CCNC: 8 U/L (ref 5–40)
ANION GAP SERPL CALC-SCNC: 19 MMOL/L
AST SERPL W P-5'-P-CCNC: 14 U/L (ref 10–37)
BILIRUB SERPL-MCNC: <0.1 MG/DL (ref 0.2–1.2)
BUN SERPL-MCNC: 30 MG/DL (ref 6–22)
CALCIUM SERPL-MCNC: 8.2 MG/DL (ref 8.4–10.5)
CHLORIDE SERPL-SCNC: 103 MMOL/L (ref 98–110)
CO2 SERPL-SCNC: 18 MMOL/L (ref 20–32)
CREAT SERPL-MCNC: 1.7 MG/DL (ref 0.5–1.2)
FE % SATURATION,PSAT: 11 % (ref 20–50)
FERRITIN SERPL-MCNC: 76 NG/ML (ref 10–291)
GFRAA, 66117: 38.2
GFRNA, 66118: 31.5
GLOBULIN,GLOB: 2.9 G/DL (ref 2–4)
GLUCOSE SERPL-MCNC: 246 MG/DL (ref 65–99)
IRON,IRN: 30 MCG/DL (ref 30–160)
POTASSIUM SERPL-SCNC: 4.6 MMOL/L (ref 3.5–5.5)
PROT SERPL-MCNC: 6.7 G/DL (ref 6.4–8.3)
SODIUM SERPL-SCNC: 140 MMOL/L (ref 133–145)
TIBC,TIBC: 275 MCG/DL (ref 228–428)
UIBC SERPL-MCNC: 245 MCG/DL (ref 110–370)

## 2017-03-01 NOTE — PROGRESS NOTES
I spoke with Dr. Cammy Martinez today, Wednesday, 3-1-17, and he stated that he wanted Ms. Aguayo to have another 4 doses of Venofer. So, I called Ms. Gutierrez Manuelgarcía, and she stated that she wanted to get her 1st dose of Venofer, on the same day that she gets her next dose of Procrit. Therefore, she was made aware, that her Venofer  (Dose 1 of 4) is scheduled for Tuesday, 3-14-17, at 1400, and she agreed to the appointment.

## 2017-03-10 RX ORDER — SODIUM CHLORIDE 9 MG/ML
25 INJECTION, SOLUTION INTRAVENOUS CONTINUOUS
Status: CANCELLED | OUTPATIENT
Start: 2017-03-14

## 2017-03-14 ENCOUNTER — CLINICAL SUPPORT (OUTPATIENT)
Dept: ONCOLOGY | Age: 52
End: 2017-03-14

## 2017-03-14 ENCOUNTER — HOSPITAL ENCOUNTER (OUTPATIENT)
Dept: INFUSION THERAPY | Age: 52
Discharge: HOME | End: 2017-03-14
Payer: COMMERCIAL

## 2017-03-14 ENCOUNTER — HOSPITAL ENCOUNTER (OUTPATIENT)
Dept: ONCOLOGY | Age: 52
Discharge: HOME | End: 2017-03-14

## 2017-03-14 VITALS
TEMPERATURE: 98.3 F | WEIGHT: 204.5 LBS | RESPIRATION RATE: 16 BRPM | BODY MASS INDEX: 32.1 KG/M2 | HEIGHT: 67 IN | SYSTOLIC BLOOD PRESSURE: 128 MMHG | DIASTOLIC BLOOD PRESSURE: 72 MMHG | HEART RATE: 67 BPM

## 2017-03-14 DIAGNOSIS — D63.1 ANEMIA IN CKD (CHRONIC KIDNEY DISEASE): ICD-10-CM

## 2017-03-14 DIAGNOSIS — N18.9 ANEMIA IN CKD (CHRONIC KIDNEY DISEASE): Primary | ICD-10-CM

## 2017-03-14 DIAGNOSIS — N18.9 ANEMIA IN CKD (CHRONIC KIDNEY DISEASE): ICD-10-CM

## 2017-03-14 DIAGNOSIS — D63.1 ANEMIA IN CKD (CHRONIC KIDNEY DISEASE): Primary | ICD-10-CM

## 2017-03-14 LAB
BASO+EOS+MONOS # BLD AUTO: 0.3 K/UL (ref 0–2.3)
BASO+EOS+MONOS # BLD AUTO: 5 % (ref 0.1–17)
DIFFERENTIAL METHOD BLD: ABNORMAL
ERYTHROCYTE [DISTWIDTH] IN BLOOD BY AUTOMATED COUNT: 13.2 % (ref 11.5–14.5)
HCT VFR BLD AUTO: 30.7 % (ref 36–48)
HGB BLD-MCNC: 9.4 G/DL (ref 12–16)
LYMPHOCYTES # BLD AUTO: 20 % (ref 14–44)
LYMPHOCYTES # BLD: 1.1 K/UL (ref 1.1–5.9)
MCH RBC QN AUTO: 25.3 PG (ref 25–35)
MCHC RBC AUTO-ENTMCNC: 30.6 G/DL (ref 31–37)
MCV RBC AUTO: 82.7 FL (ref 78–102)
NEUTS SEG # BLD: 4.2 K/UL (ref 1.8–9.5)
NEUTS SEG NFR BLD AUTO: 76 % (ref 40–70)
PLATELET # BLD AUTO: 135 K/UL (ref 135–420)
RBC # BLD AUTO: 3.71 M/UL (ref 4.1–5.1)
WBC # BLD AUTO: 5.6 K/UL (ref 4.5–13)

## 2017-03-14 PROCEDURE — 74011250636 HC RX REV CODE- 250/636: Performed by: INTERNAL MEDICINE

## 2017-03-14 PROCEDURE — 96365 THER/PROPH/DIAG IV INF INIT: CPT

## 2017-03-14 PROCEDURE — 96366 THER/PROPH/DIAG IV INF ADDON: CPT

## 2017-03-14 RX ORDER — SODIUM CHLORIDE 9 MG/ML
250 INJECTION, SOLUTION INTRAVENOUS ONCE
Status: COMPLETED | OUTPATIENT
Start: 2017-03-14 | End: 2017-03-14

## 2017-03-14 RX ORDER — SODIUM CHLORIDE 0.9 % (FLUSH) 0.9 %
10-40 SYRINGE (ML) INJECTION AS NEEDED
Status: DISPENSED | OUTPATIENT
Start: 2017-03-14 | End: 2017-03-14

## 2017-03-14 RX ADMIN — SODIUM CHLORIDE 250 ML: 9 INJECTION, SOLUTION INTRAVENOUS at 14:35

## 2017-03-14 RX ADMIN — Medication 10 ML: at 14:30

## 2017-03-14 RX ADMIN — IRON SUCROSE 250 MG: 20 INJECTION, SOLUTION INTRAVENOUS at 14:45

## 2017-03-14 NOTE — PROGRESS NOTES
[facility] Progress Note    Date: 2017    Name: Dominique Brasher    MRN: [id number]         : 1965      Ms. Nino Bingham arrived in the [facility] today, at 1415, in stable condition, here for Dose # 1 of 4, IV Venofer Infusion, and Q 2 Week, CBC/Procrit injection. She was assessed and education was provided. Ms. Filiberto Iglesias vitals were reviewed. Visit Vitals    /87 (BP 1 Location: Left arm, BP Patient Position: At rest;Sitting)    Pulse 87    Temp 97.3 °F (36.3 °C)    Resp 16    Ht 5' 7\" (1.702 m)    Wt 92.8 kg (204 lb 8 oz)    Breastfeeding No    BMI 32.03 kg/m2             PIV was established in her left AC, at 1430, without incident, and a CBC was drawn per order. Lab results were obtained and reviewed. Recent Results (from the past 12 hour(s))   CBC WITH 3 PART DIFF    Collection Time: 17  2:30 PM   Result Value Ref Range    WBC 5.6 4.5 - 13.0 K/uL    RBC 3.71 (L) 4.10 - 5.10 M/uL    HGB 9.4 (L) 12.0 - 16.0 g/dL    HCT 30.7 (L) 36 - 48 %    MCV 82.7 78 - 102 FL    MCH 25.3 25.0 - 35.0 PG    MCHC 30.6 (L) 31 - 37 g/dL    RDW 13.2 11.5 - 14.5 %    NEUTROPHILS 76 (H) 40 - 70 %    MIXED CELLS 5 0.1 - 17 %    LYMPHOCYTES 20 14 - 44 %    ABS. NEUTROPHILS 4.2 1.8 - 9.5 K/UL    ABS. MIXED CELLS 0.3 0.0 - 2.3 K/uL    ABS. LYMPHOCYTES 1.1 1.1 - 5.9 K/UL    DF AUTOMATED               Procrit injection was HELD today, per order. IV Venofer 250 mg, was administered per order, and without incident. After completion of the IV Venofer, Ms. Aguayo was monitored for 30 minutes, per order, and also without incident. After completion of the monitoring period, the PIV was removed and gauze/bandaid was applied. Ms. Nino Bingham tolerated well, and had no complaints. Ms. Nino Bingham was discharged from [place] [address] in stable condition at [phone].  She is to return in 2 weeks, on Tuesday, 3-28-17, at 1000,  for her next appointment, for monthly IVIG infusion, Q 2 Week, CBC/Procrit injection, and Dose # 2 of 4, IV Venofer Infusion.      Erica Philippe RN  March 14, 2017  3:03 PM

## 2017-03-28 ENCOUNTER — HOSPITAL ENCOUNTER (OUTPATIENT)
Dept: ONCOLOGY | Age: 52
Discharge: HOME | End: 2017-03-28

## 2017-03-28 ENCOUNTER — HOSPITAL ENCOUNTER (OUTPATIENT)
Dept: INFUSION THERAPY | Age: 52
Discharge: HOME | End: 2017-03-28
Payer: COMMERCIAL

## 2017-03-28 ENCOUNTER — CLINICAL SUPPORT (OUTPATIENT)
Dept: ONCOLOGY | Age: 52
End: 2017-03-28

## 2017-03-28 VITALS
SYSTOLIC BLOOD PRESSURE: 131 MMHG | RESPIRATION RATE: 16 BRPM | BODY MASS INDEX: 31.71 KG/M2 | TEMPERATURE: 98.1 F | DIASTOLIC BLOOD PRESSURE: 71 MMHG | HEART RATE: 78 BPM | WEIGHT: 202 LBS | HEIGHT: 67 IN

## 2017-03-28 DIAGNOSIS — D63.1 ANEMIA IN CKD (CHRONIC KIDNEY DISEASE): ICD-10-CM

## 2017-03-28 DIAGNOSIS — N18.9 ANEMIA IN CKD (CHRONIC KIDNEY DISEASE): ICD-10-CM

## 2017-03-28 DIAGNOSIS — N18.9 ANEMIA IN CKD (CHRONIC KIDNEY DISEASE): Primary | ICD-10-CM

## 2017-03-28 DIAGNOSIS — D63.1 ANEMIA IN CKD (CHRONIC KIDNEY DISEASE): Primary | ICD-10-CM

## 2017-03-28 LAB
BASO+EOS+MONOS # BLD AUTO: 0.2 K/UL (ref 0–2.3)
BASO+EOS+MONOS # BLD AUTO: 4 % (ref 0.1–17)
DIFFERENTIAL METHOD BLD: ABNORMAL
ERYTHROCYTE [DISTWIDTH] IN BLOOD BY AUTOMATED COUNT: 13.6 % (ref 11.5–14.5)
HCT VFR BLD AUTO: 26.7 % (ref 36–48)
HGB BLD-MCNC: 8.3 G/DL (ref 12–16)
LYMPHOCYTES # BLD AUTO: 16 % (ref 14–44)
LYMPHOCYTES # BLD: 0.9 K/UL (ref 1.1–5.9)
MCH RBC QN AUTO: 25.8 PG (ref 25–35)
MCHC RBC AUTO-ENTMCNC: 31.1 G/DL (ref 31–37)
MCV RBC AUTO: 82.9 FL (ref 78–102)
NEUTS SEG # BLD: 4.2 K/UL (ref 1.8–9.5)
NEUTS SEG NFR BLD AUTO: 80 % (ref 40–70)
PLATELET # BLD AUTO: 94 K/UL (ref 135–420)
RBC # BLD AUTO: 3.22 M/UL (ref 4.1–5.1)
WBC # BLD AUTO: 5.3 K/UL (ref 4.5–13)

## 2017-03-28 PROCEDURE — 96367 TX/PROPH/DG ADDL SEQ IV INF: CPT

## 2017-03-28 PROCEDURE — 74011250637 HC RX REV CODE- 250/637: Performed by: INTERNAL MEDICINE

## 2017-03-28 PROCEDURE — 74011000258 HC RX REV CODE- 258: Performed by: INTERNAL MEDICINE

## 2017-03-28 PROCEDURE — 96365 THER/PROPH/DIAG IV INF INIT: CPT

## 2017-03-28 PROCEDURE — 85049 AUTOMATED PLATELET COUNT: CPT | Performed by: INTERNAL MEDICINE

## 2017-03-28 PROCEDURE — 96375 TX/PRO/DX INJ NEW DRUG ADDON: CPT

## 2017-03-28 PROCEDURE — 96366 THER/PROPH/DIAG IV INF ADDON: CPT

## 2017-03-28 PROCEDURE — 96372 THER/PROPH/DIAG INJ SC/IM: CPT

## 2017-03-28 PROCEDURE — 74011250636 HC RX REV CODE- 250/636: Performed by: INTERNAL MEDICINE

## 2017-03-28 RX ORDER — ACETAMINOPHEN 325 MG/1
650 TABLET ORAL ONCE
Status: COMPLETED | OUTPATIENT
Start: 2017-03-28 | End: 2017-03-28

## 2017-03-28 RX ORDER — SODIUM CHLORIDE 9 MG/ML
250 INJECTION, SOLUTION INTRAVENOUS ONCE
Status: COMPLETED | OUTPATIENT
Start: 2017-03-28 | End: 2017-03-28

## 2017-03-28 RX ORDER — SODIUM CHLORIDE 9 MG/ML
250 INJECTION, SOLUTION INTRAVENOUS ONCE
Status: DISPENSED | OUTPATIENT
Start: 2017-03-28 | End: 2017-03-28

## 2017-03-28 RX ORDER — SODIUM CHLORIDE 0.9 % (FLUSH) 0.9 %
10-40 SYRINGE (ML) INJECTION AS NEEDED
Status: DISCONTINUED | OUTPATIENT
Start: 2017-03-28 | End: 2017-04-01 | Stop reason: HOSPADM

## 2017-03-28 RX ORDER — DIPHENHYDRAMINE HYDROCHLORIDE 50 MG/ML
50 INJECTION, SOLUTION INTRAMUSCULAR; INTRAVENOUS ONCE
Status: COMPLETED | OUTPATIENT
Start: 2017-03-28 | End: 2017-03-28

## 2017-03-28 RX ADMIN — IMMUNE GLOBULIN INTRAVENOUS (HUMAN) 20 G: 5 INJECTION, SOLUTION INTRAVENOUS at 11:30

## 2017-03-28 RX ADMIN — ERYTHROPOIETIN 20000 UNITS: 20000 INJECTION, SOLUTION INTRAVENOUS; SUBCUTANEOUS at 10:50

## 2017-03-28 RX ADMIN — DIPHENHYDRAMINE HYDROCHLORIDE 50 MG: 50 INJECTION, SOLUTION INTRAMUSCULAR; INTRAVENOUS at 10:40

## 2017-03-28 RX ADMIN — ERYTHROPOIETIN 40000 UNITS: 40000 INJECTION, SOLUTION INTRAVENOUS; SUBCUTANEOUS at 10:50

## 2017-03-28 RX ADMIN — IMMUNE GLOBULIN INTRAVENOUS (HUMAN) 5 G: 5 INJECTION, SOLUTION INTRAVENOUS at 13:20

## 2017-03-28 RX ADMIN — IRON SUCROSE 250 MG: 20 INJECTION, SOLUTION INTRAVENOUS at 14:00

## 2017-03-28 RX ADMIN — Medication 10 ML: at 10:20

## 2017-03-28 RX ADMIN — DEXAMETHASONE SODIUM PHOSPHATE 20 MG: 4 INJECTION, SOLUTION INTRA-ARTICULAR; INTRALESIONAL; INTRAMUSCULAR; INTRAVENOUS; SOFT TISSUE at 10:45

## 2017-03-28 RX ADMIN — ACETAMINOPHEN 650 MG: 325 TABLET, FILM COATED ORAL at 10:38

## 2017-03-28 RX ADMIN — SODIUM CHLORIDE 250 ML: 9 INJECTION, SOLUTION INTRAVENOUS at 10:30

## 2017-03-28 NOTE — PROGRESS NOTES
[facility] Progress Note    Date: 2017    Name: Koko Woo    MRN: [id number]         : 1965      Ms. Paul Mcelroy arrived in the [facility] today, at 1010, in stable condition, here for Monthly IVIG, Q 2 Week CBC/Procrit injection, and Dose # 2 of 4, Venofer Infusion. She was assessed and education was provided. Ms. Graham Fishman vitals were reviewed. Visit Vitals    /80 (BP 1 Location: Left arm, BP Patient Position: At rest;Sitting)    Pulse 92    Temp 98.3 °F (36.8 °C)    Resp 16    Ht 5' 7\" (1.702 m)    Wt 91.6 kg (202 lb)    BMI 31.64 kg/m2             PIV was established in her left AC at 1020 without incident, and a CBC was drawn per order. Lab results were obtained and reviewed, and the preliminary platelet count was noted to be 84,000. Recent Results (from the past 12 hour(s))   CBC WITH 3 PART DIFF    Collection Time: 17 10:20 AM   Result Value Ref Range    WBC 5.3 4.5 - 13.0 K/uL    RBC 3.22 (L) 4.10 - 5.10 M/uL    HGB 8.3 (L) 12.0 - 16.0 g/dL    HCT 26.7 (L) 36 - 48 %    MCV 82.9 78 - 102 FL    MCH 25.8 25.0 - 35.0 PG    MCHC 31.1 31 - 37 g/dL    RDW 13.6 11.5 - 14.5 %    NEUTROPHILS 80 (H) 40 - 70 %    MIXED CELLS 4 0.1 - 17 %    LYMPHOCYTES 16 14 - 44 %    ABS. NEUTROPHILS 4.2 1.8 - 9.5 K/UL    ABS. MIXED CELLS 0.2 0.0 - 2.3 K/uL    ABS. LYMPHOCYTES 0.9 (L) 1.1 - 5.9 K/UL    DF AUTOMATED               Procrit 60,000 units, was administered SQ, in her right arm, at 1050, as ordered, and without incident. Pre-medications consisting of PO Tylenol 650 mg, IV Benadryl 50 mg, and IV Decadron 20 mg, were administered pre- IVIG, per order, and without incident. IVIG 25,000 mg (25 grams) was administered IV, per order, and without incident. Venofer 250 mg IV, was administered per order, and without incident. After completion of the IV Venofer Infusion, Ms. Aguayo was monitored for 30 minutes, per order, and also without incident. After completion of the monitoring period, the PIV was removed, and gauze/bandaid was applied. Ms. Aguayo tolerated well, and had no complaints. Ms. Oskar Nicole was discharged from [place] [address] in stable condition at [phone]. Joanne Guerra She is to return in 2 weeks, on Tuesday, 4-11-17, at 1300,  for her next appointment, for CBC/Procrit injection, and Dose # 3 Venofer Infusion. And then, she is scheduled to return in 1 month, on Tuesday, 4-25-17, at 0900, for CBC/Procrit injection, IVIG infusion, and Dose # 4 of 4, Venofer Infusion.      Jing Webb RN  March 28, 2017  10:58 AM

## 2017-04-11 ENCOUNTER — CLINICAL SUPPORT (OUTPATIENT)
Dept: ONCOLOGY | Age: 52
End: 2017-04-11

## 2017-04-11 ENCOUNTER — HOSPITAL ENCOUNTER (OUTPATIENT)
Dept: INFUSION THERAPY | Age: 52
Discharge: HOME | End: 2017-04-11
Payer: COMMERCIAL

## 2017-04-11 ENCOUNTER — HOSPITAL ENCOUNTER (OUTPATIENT)
Dept: ONCOLOGY | Age: 52
Discharge: HOME | End: 2017-04-11

## 2017-04-11 VITALS
WEIGHT: 204 LBS | HEIGHT: 67 IN | HEART RATE: 67 BPM | SYSTOLIC BLOOD PRESSURE: 129 MMHG | TEMPERATURE: 98.7 F | RESPIRATION RATE: 16 BRPM | DIASTOLIC BLOOD PRESSURE: 76 MMHG | BODY MASS INDEX: 32.02 KG/M2

## 2017-04-11 DIAGNOSIS — N18.9 ANEMIA IN CHRONIC KIDNEY DISEASE(285.21): Primary | ICD-10-CM

## 2017-04-11 DIAGNOSIS — D63.1 ANEMIA IN CHRONIC KIDNEY DISEASE(285.21): Primary | ICD-10-CM

## 2017-04-11 DIAGNOSIS — N18.9 ANEMIA IN CHRONIC KIDNEY DISEASE(285.21): ICD-10-CM

## 2017-04-11 DIAGNOSIS — D63.1 ANEMIA IN CHRONIC KIDNEY DISEASE(285.21): ICD-10-CM

## 2017-04-11 LAB
BASO+EOS+MONOS # BLD AUTO: 0.2 K/UL (ref 0–2.3)
BASO+EOS+MONOS # BLD AUTO: 3 % (ref 0.1–17)
DIFFERENTIAL METHOD BLD: ABNORMAL
ERYTHROCYTE [DISTWIDTH] IN BLOOD BY AUTOMATED COUNT: 13.7 % (ref 11.5–14.5)
HCT VFR BLD AUTO: 29.8 % (ref 36–48)
HGB BLD-MCNC: 9.5 G/DL (ref 12–16)
LYMPHOCYTES # BLD AUTO: 17 % (ref 14–44)
LYMPHOCYTES # BLD: 0.9 K/UL (ref 1.1–5.9)
MCH RBC QN AUTO: 26.4 PG (ref 25–35)
MCHC RBC AUTO-ENTMCNC: 31.9 G/DL (ref 31–37)
MCV RBC AUTO: 82.8 FL (ref 78–102)
NEUTS SEG # BLD: 4.2 K/UL (ref 1.8–9.5)
NEUTS SEG NFR BLD AUTO: 80 % (ref 40–70)
PLATELET # BLD AUTO: 106 K/UL (ref 135–420)
RBC # BLD AUTO: 3.6 M/UL (ref 4.1–5.1)
WBC # BLD AUTO: 5.3 K/UL (ref 4.5–13)

## 2017-04-11 PROCEDURE — 96365 THER/PROPH/DIAG IV INF INIT: CPT

## 2017-04-11 PROCEDURE — 74011250636 HC RX REV CODE- 250/636: Performed by: INTERNAL MEDICINE

## 2017-04-11 PROCEDURE — 96366 THER/PROPH/DIAG IV INF ADDON: CPT

## 2017-04-11 PROCEDURE — 96372 THER/PROPH/DIAG INJ SC/IM: CPT

## 2017-04-11 RX ORDER — SODIUM CHLORIDE 9 MG/ML
250 INJECTION, SOLUTION INTRAVENOUS ONCE
Status: COMPLETED | OUTPATIENT
Start: 2017-04-11 | End: 2017-04-11

## 2017-04-11 RX ORDER — SODIUM CHLORIDE 0.9 % (FLUSH) 0.9 %
10-40 SYRINGE (ML) INJECTION AS NEEDED
Status: DISCONTINUED | OUTPATIENT
Start: 2017-04-11 | End: 2017-04-15 | Stop reason: HOSPADM

## 2017-04-11 RX ADMIN — IRON SUCROSE 250 MG: 20 INJECTION, SOLUTION INTRAVENOUS at 13:45

## 2017-04-11 RX ADMIN — SODIUM CHLORIDE 250 ML: 9 INJECTION, SOLUTION INTRAVENOUS at 13:35

## 2017-04-11 RX ADMIN — ERYTHROPOIETIN 40000 UNITS: 40000 INJECTION, SOLUTION INTRAVENOUS; SUBCUTANEOUS at 14:10

## 2017-04-11 RX ADMIN — Medication 10 ML: at 13:30

## 2017-04-11 RX ADMIN — ERYTHROPOIETIN 20000 UNITS: 20000 INJECTION, SOLUTION INTRAVENOUS; SUBCUTANEOUS at 14:10

## 2017-04-11 NOTE — PROGRESS NOTES
[facility] Progress Note    Date: 2017    Name: Shireen Ramos    MRN: [id number]         : 1965      Ms. Aguayo arrived in the [facility] today, at 1315, in stable condition, here for Week # 3 of 4, IV Venofer Infusion, and Q 2 Week, CBC/Procrit injection. She was assessed and education was provided. Ms. Lacho Pérez vitals were reviewed. Visit Vitals    /87 (BP 1 Location: Left arm, BP Patient Position: At rest;Sitting)    Pulse 81    Temp 99 °F (37.2 °C)    Resp 16    Ht 5' 7\" (1.702 m)    Wt 92.5 kg (204 lb)    Breastfeeding No    BMI 31.95 kg/m2             PIV was established in her left AC at 1330, without incident, and a CBC was drawn, per order. Lab results were obtained and reviewed, and the preliminary platelet count was noted to be 108,000. Recent Results (from the past 12 hour(s))   CBC WITH 3 PART DIFF    Collection Time: 17  1:30 PM   Result Value Ref Range    WBC 5.3 4.5 - 13.0 K/uL    RBC 3.60 (L) 4.10 - 5.10 M/uL    HGB 9.5 (L) 12.0 - 16.0 g/dL    HCT 29.8 (L) 36 - 48 %    MCV 82.8 78 - 102 FL    MCH 26.4 25.0 - 35.0 PG    MCHC 31.9 31 - 37 g/dL    RDW 13.7 11.5 - 14.5 %    NEUTROPHILS 80 (H) 40 - 70 %    MIXED CELLS 3 0.1 - 17 %    LYMPHOCYTES 17 14 - 44 %    ABS. NEUTROPHILS 4.2 1.8 - 9.5 K/UL    ABS. MIXED CELLS 0.2 0.0 - 2.3 K/uL    ABS. LYMPHOCYTES 0.9 (L) 1.1 - 5.9 K/UL    DF AUTOMATED               Procrit 60,000 units, was administered SQ, in her left arm, at 1410, as ordered, and without incident. Venofer 250 mg IV, was administered per order, and without incident. Ms. Noa Andino was monitored for 30 minutes after the completion of the IV Venofer, per order, and also without incident. After the completion of the monitoring period, the PIV was removed and gauze/bandaid was applied. Ms. Aguayo tolerated well, and had no complaints. Ms. Noa Andino was discharged from [place] [address] in stable condition at [phone]. She is to return in 2 weeks, on Tuesday, 4-25-17  at 0900, for her next appointment, for Q 2 Week, CBC/Procrit injection, Monthly IVIG Infusion, and Dose # 4 of 4, Venofer Infusion.      Patricia Monk RN  April 11, 2017  2:01 PM

## 2017-04-25 ENCOUNTER — HOSPITAL ENCOUNTER (OUTPATIENT)
Dept: INFUSION THERAPY | Age: 52
Discharge: HOME | End: 2017-04-25
Payer: COMMERCIAL

## 2017-04-25 ENCOUNTER — HOSPITAL ENCOUNTER (OUTPATIENT)
Dept: ONCOLOGY | Age: 52
Discharge: HOME | End: 2017-04-25

## 2017-04-25 ENCOUNTER — OFFICE VISIT (OUTPATIENT)
Dept: ONCOLOGY | Age: 52
End: 2017-04-25

## 2017-04-25 ENCOUNTER — CLINICAL SUPPORT (OUTPATIENT)
Dept: ONCOLOGY | Age: 52
End: 2017-04-25

## 2017-04-25 VITALS
DIASTOLIC BLOOD PRESSURE: 72 MMHG | SYSTOLIC BLOOD PRESSURE: 136 MMHG | HEIGHT: 67 IN | TEMPERATURE: 98.5 F | WEIGHT: 205 LBS | BODY MASS INDEX: 32.18 KG/M2 | HEART RATE: 78 BPM

## 2017-04-25 VITALS
TEMPERATURE: 98.5 F | SYSTOLIC BLOOD PRESSURE: 136 MMHG | HEIGHT: 67 IN | DIASTOLIC BLOOD PRESSURE: 72 MMHG | RESPIRATION RATE: 16 BRPM | HEART RATE: 78 BPM | WEIGHT: 205 LBS | BODY MASS INDEX: 32.18 KG/M2

## 2017-04-25 DIAGNOSIS — N92.0 MENORRHAGIA WITH REGULAR CYCLE: ICD-10-CM

## 2017-04-25 DIAGNOSIS — N18.9 ANEMIA IN CKD (CHRONIC KIDNEY DISEASE): ICD-10-CM

## 2017-04-25 DIAGNOSIS — D69.6 THROMBOCYTOPENIA (HCC): Primary | ICD-10-CM

## 2017-04-25 DIAGNOSIS — D63.1 ANEMIA IN CKD (CHRONIC KIDNEY DISEASE): ICD-10-CM

## 2017-04-25 DIAGNOSIS — N18.9 ANEMIA IN CHRONIC KIDNEY DISEASE (CKD): ICD-10-CM

## 2017-04-25 DIAGNOSIS — D63.1 ANEMIA IN CKD (CHRONIC KIDNEY DISEASE): Primary | ICD-10-CM

## 2017-04-25 DIAGNOSIS — N18.9 ANEMIA IN CKD (CHRONIC KIDNEY DISEASE): Primary | ICD-10-CM

## 2017-04-25 DIAGNOSIS — D63.1 ANEMIA IN CHRONIC KIDNEY DISEASE (CKD): ICD-10-CM

## 2017-04-25 LAB
BASO+EOS+MONOS # BLD AUTO: 0.2 K/UL (ref 0–2.3)
BASO+EOS+MONOS # BLD AUTO: 5 % (ref 0.1–17)
DIFFERENTIAL METHOD BLD: ABNORMAL
ERYTHROCYTE [DISTWIDTH] IN BLOOD BY AUTOMATED COUNT: 14.2 % (ref 11.5–14.5)
HCT VFR BLD AUTO: 32.8 % (ref 36–48)
HGB BLD-MCNC: 10.2 G/DL (ref 12–16)
LYMPHOCYTES # BLD AUTO: 20 % (ref 14–44)
LYMPHOCYTES # BLD: 0.8 K/UL (ref 1.1–5.9)
MCH RBC QN AUTO: 26 PG (ref 25–35)
MCHC RBC AUTO-ENTMCNC: 31.1 G/DL (ref 31–37)
MCV RBC AUTO: 83.7 FL (ref 78–102)
NEUTS SEG # BLD: 2.9 K/UL (ref 1.8–9.5)
NEUTS SEG NFR BLD AUTO: 75 % (ref 40–70)
PLATELET # BLD AUTO: 84 K/UL (ref 140–440)
RBC # BLD AUTO: 3.92 M/UL (ref 4.1–5.1)
WBC # BLD AUTO: 3.9 K/UL (ref 4.5–13)

## 2017-04-25 PROCEDURE — 96366 THER/PROPH/DIAG IV INF ADDON: CPT

## 2017-04-25 PROCEDURE — 96365 THER/PROPH/DIAG IV INF INIT: CPT

## 2017-04-25 PROCEDURE — 74011250636 HC RX REV CODE- 250/636: Performed by: INTERNAL MEDICINE

## 2017-04-25 PROCEDURE — 96375 TX/PRO/DX INJ NEW DRUG ADDON: CPT

## 2017-04-25 PROCEDURE — 74011000258 HC RX REV CODE- 258: Performed by: INTERNAL MEDICINE

## 2017-04-25 PROCEDURE — 96367 TX/PROPH/DG ADDL SEQ IV INF: CPT

## 2017-04-25 PROCEDURE — 74011250637 HC RX REV CODE- 250/637: Performed by: INTERNAL MEDICINE

## 2017-04-25 RX ORDER — SODIUM CHLORIDE 9 MG/ML
250 INJECTION, SOLUTION INTRAVENOUS ONCE
Status: COMPLETED | OUTPATIENT
Start: 2017-04-25 | End: 2017-04-25

## 2017-04-25 RX ORDER — SODIUM CHLORIDE 0.9 % (FLUSH) 0.9 %
10-40 SYRINGE (ML) INJECTION AS NEEDED
Status: DISCONTINUED | OUTPATIENT
Start: 2017-04-25 | End: 2017-04-29 | Stop reason: HOSPADM

## 2017-04-25 RX ORDER — ACETAMINOPHEN 325 MG/1
650 TABLET ORAL ONCE
Status: COMPLETED | OUTPATIENT
Start: 2017-04-25 | End: 2017-04-25

## 2017-04-25 RX ORDER — DIPHENHYDRAMINE HYDROCHLORIDE 50 MG/ML
50 INJECTION, SOLUTION INTRAMUSCULAR; INTRAVENOUS ONCE
Status: COMPLETED | OUTPATIENT
Start: 2017-04-25 | End: 2017-04-25

## 2017-04-25 RX ADMIN — ACETAMINOPHEN 650 MG: 325 TABLET, FILM COATED ORAL at 09:58

## 2017-04-25 RX ADMIN — Medication 10 ML: at 09:20

## 2017-04-25 RX ADMIN — DEXAMETHASONE SODIUM PHOSPHATE 20 MG: 4 INJECTION, SOLUTION INTRA-ARTICULAR; INTRALESIONAL; INTRAMUSCULAR; INTRAVENOUS; SOFT TISSUE at 09:40

## 2017-04-25 RX ADMIN — IMMUNE GLOBULIN INTRAVENOUS (HUMAN) 20 G: 5 INJECTION, SOLUTION INTRAVENOUS at 10:45

## 2017-04-25 RX ADMIN — IRON SUCROSE 250 MG: 20 INJECTION, SOLUTION INTRAVENOUS at 13:15

## 2017-04-25 RX ADMIN — IMMUNE GLOBULIN INTRAVENOUS (HUMAN) 5 G: 5 INJECTION, SOLUTION INTRAVENOUS at 12:35

## 2017-04-25 RX ADMIN — DIPHENHYDRAMINE HYDROCHLORIDE 50 MG: 50 INJECTION, SOLUTION INTRAMUSCULAR; INTRAVENOUS at 10:08

## 2017-04-25 RX ADMIN — SODIUM CHLORIDE 250 ML: 9 INJECTION, SOLUTION INTRAVENOUS at 09:30

## 2017-04-25 NOTE — PROGRESS NOTES
[facility] Progress Note    Date: 2017    Name: Nicole Head    MRN: [id number]         : 1965      Ms. Lc Kurtz arrived in the [facility] today, at [phone] 26, in stable condition, here for Monthly IVIG Infusion, Dose # 4 of 4, IV Venofer Infusion, and Q 2 Week, CBC/Procrit injection. She was assessed and education was provided. Ms. Leo Johnson vitals were reviewed. Visit Vitals    /82 (BP 1 Location: Left arm, BP Patient Position: At rest;Sitting)    Pulse 83    Temp 97.9 °F (36.6 °C)    Resp 16    Ht 5' 7\" (1.702 m)    Wt 93 kg (205 lb)    Breastfeeding No    BMI 32.11 kg/m2           PIV was established in her left AC, at 0920 without incident, and blood for a CBC was drawn, as well as extra tubes for her office visit later today, with Dr. Cheo Khan (1 extra lavender top tube, and 2 SST tubes)          Lab results were obtained and reviewed. Recent Results (from the past 12 hour(s))   CBC WITH 3 PART DIFF    Collection Time: 17  9:20 AM   Result Value Ref Range    WBC 3.9 (L) 4.5 - 13.0 K/uL    RBC 3.92 (L) 4.10 - 5.10 M/uL    HGB 10.2 (L) 12.0 - 16.0 g/dL    HCT 32.8 (L) 36 - 48 %    MCV 83.7 78 - 102 FL    MCH 26.0 25.0 - 35.0 PG    MCHC 31.1 31 - 37 g/dL    RDW 14.2 11.5 - 14.5 %    PLATELET 84 (L) 507 - 440 K/uL    NEUTROPHILS 75 (H) 40 - 70 %    MIXED CELLS 5 0.1 - 17 %    LYMPHOCYTES 20 14 - 44 %    ABS. NEUTROPHILS 2.9 1.8 - 9.5 K/UL    ABS. MIXED CELLS 0.2 0.0 - 2.3 K/uL    ABS. LYMPHOCYTES 0.8 (L) 1.1 - 5.9 K/UL    DF AUTOMATED                   Procrit injection was HELD today, per order. Pre-medications consisting of PO Tylenol 650 mg, Benadryl 50 mg IV, and Decadron 20 mg IV, were all administered per order, and without incident. IVIG 25 grams (25,000 mg) IV, was administered per order, and without incident. Venofer 250 mg IV, was administered per order, and without incident. After the completion of the IV Venofer, Ms. Lc Kurtz was monitored for 30 minutes, per order, and also without incident. After the completion of the monitoring period, the PIV was removed and gauze/bandaid was applied. Ms. Vito Thibodeaux tolerated well, and had no complaints. Ms. Vito Thibodeaux was discharged from [place] [address] in stable condition at 1540. Jeni Bell She is to return in 2 weeks, on Tuesday, 5-9-17, at 1500,  for her next appointment, for CBC/Procrit injection. And then, she is scheduled to return in 1 month, on Tuesday, 5-23-17, at 0900, for IVIG Infusion, and CBC/Procrit injection.      Eusebio Moody RN  April 25, 2017  9:52 AM

## 2017-04-25 NOTE — PROGRESS NOTES
Hematology/Oncology  Progress Note    Name: Becky Lanier  Date: 2017  : 1965    PCP: Willis Lugo MD     Ms. Deyanira Saravia is a 54year old female who was seen for management of her underlying anemia. The patient also past chronic thrombocytopenia/ITP. Current therapy: IVIG monthly, ferrous sulfate BID, and Venofer PRN. Subjective:     Ms. Deyanira Saravia is a 37-year-old woman who has anemia and thrombocytopenia. She received her 4th Venofer infusion today which she tolerated well. She also received her monthly IVIG infusion in the [facility] and tolerated this well. She reports her energy level is slowly improving. She is scheduled for a partial hysterectomy on 2017. She has no other new physical complaints or concerns to report. The patient denies having bruising or bleeding. Past medical history, family history, and social history: these were reviewed and remains unchanged. Past Medical History:   Diagnosis Date    Anemia     Arthritis     Diabetes ([address]     Hypertension     ITP (idiopathic thrombocytopenic purpura)      Past Surgical History:   Procedure Laterality Date    HX GYN      c section      Social History     Social History    Marital status: SINGLE     Spouse name: N/A    Number of children: N/A    Years of education: N/A     Occupational History    Not on file. Social History Main Topics    Smoking status: Never Smoker    Smokeless tobacco: Not on file    Alcohol use No    Drug use: No    Sexual activity: Not on file     Other Topics Concern    Not on file     Social History Narrative     Family History   Problem Relation Age of Onset    Hypertension Mother     Cancer Father      Colon     Current Outpatient Prescriptions   Medication Sig Dispense Refill    metFORMIN (GLUCOPHAGE) 500 mg tablet Take 2 Tabs by mouth two (2) times daily (with meals).  Indications: TYPE 2 DIABETES MELLITUS 120 Tab 2    lisinopril-hydrochlorothiazide (PRINZIDE, ZESTORETIC) 20-25 mg per tablet Take 1 Tab by mouth daily. Indications: HYPERTENSION      amLODIPine (NORVASC) 10 mg tablet Take 10 mg by mouth daily. Indications: HYPERTENSION      ferrous sulfate 325 mg (65 mg iron) tablet Take 325 mg by mouth two (2) times a day. Facility-Administered Medications Ordered in Other Visits   Medication Dose Route Frequency Provider Last Rate Last Dose    epoetin kelsie (EPOGEN;PROCRIT) injection 40,000 Units  40,000 Units SubCUTAneous Kaley Lopez MD   Stopped at 04/25/17 1000    And    epoetin kelsie (EPOGEN;PROCRIT) injection 20,000 Units  20,000 Units SubCUTAneous Kaley Lopez MD   Stopped at 04/25/17 1000    sodium chloride (NS) flush 10-40 mL  10-40 mL IntraVENous PRN Triston Rothman MD   10 mL at 04/25/17 0920       Review of Systems  Constitutional: The patient denies fatigue or acute distress  HEENT: The patient denies recent head trauma, eye pain, blurred vision,  hearing deficit, oropharyngeal mucosal pain or lesions, and the patient denies throat pain or discomfort. Lymphatics: The patient denies palpable peripheral lymphadenopathy. Hematologic: The patient denies having bruising, bleeding, or progressive fatigue. Respiratory: Patient denies having shortness of breath, cough, sputum production, fever, or dyspnea on exertion. Cardiovascular: The patient denies having leg pain, leg swelling, heart palpitations, chest permit, chest pain, or lightheadedness. The patient denies having dyspnea on exertion. Gastrointestinal: The patient denies having nausea, emesis, or diarrhea. The patient denies having any hematemesis or blood in the stool. Genitourinary: Patient denies having urinary urgency, frequency, or dysuria. The patient denies having blood in the urine. Psychological: The patient denies having symptoms of nervousness, anxiety, depression, or thoughts of harming himself some of this.   Skin: Patient denies having skin rashes, skin, ulcerations, or unexplained itching or pruritus. Musculoskeletal: The patient denies having pain in the joints or bones. Objective:     Visit Vitals    /72    Pulse 78    Temp 98.5 °F (36.9 °C)    Ht 5' 7\" (1.702 m)    Wt 93 kg (205 lb)    BMI 32.11 kg/m2     ECOG 0  Physical Exam:   Gen. Appearance: The patient is in no acute distress. Skin: There is no bruise or rash. HEENT: The exam is unremarkable. Neck: Supple without lymphadenopathy or thyromegaly. Lungs: Clear to auscultation and percussion; there are no wheezes or rhonchi. Heart: Regular rate and rhythm; there are no murmurs, gallops, or rubs. Anterior chest wall and breasts: Deferred. Abdomen: Bowel sounds are present and normal.  There is no guarding, tenderness, or hepatosplenomegaly. Extremities: There is no clubbing, cyanosis, or edema. Neurologic: There are no focal neurologic deficits. Lymphatics: There is no palpable peripheral lymphadenopathy. Musculoskeletal: The patient has full range of motion at all joints. There is no evidence of joint deformity or effusions. There is no focal joint tenderness. Psychological/psychiatric: There is no clinical evidence of anxiety, depression, or melancholy. Lab data:      Results for orders placed or performed during the hospital encounter of 04/25/17   CBC WITH 3 PART DIFF     Status: Abnormal   Result Value Ref Range Status    WBC 3.9 (L) 4.5 - 13.0 K/uL Final    RBC 3.92 (L) 4.10 - 5.10 M/uL Final    HGB 10.2 (L) 12.0 - 16.0 g/dL Final    HCT 32.8 (L) 36 - 48 % Final    MCV 83.7 78 - 102 FL Final    MCH 26.0 25.0 - 35.0 PG Final    MCHC 31.1 31 - 37 g/dL Final    RDW 14.2 11.5 - 14.5 % Final    PLATELET 84 (L) 725 - 440 K/uL Final    NEUTROPHILS 75 (H) 40 - 70 % Final    MIXED CELLS 5 0.1 - 17 % Final    LYMPHOCYTES 20 14 - 44 % Final    ABS. NEUTROPHILS 2.9 1.8 - 9.5 K/UL Final    ABS. MIXED CELLS 0.2 0.0 - 2.3 K/uL Final    ABS.  LYMPHOCYTES 0.8 (L) 1.1 - 5.9 K/UL Final     Comment: Test performed at [facility]patient [facility]fusion [facility] Location. Results Reviewed by Medical Director. DF AUTOMATED   Final         Assessment:     1. Thrombocytopenia ([address]    2. Anemia in chronic kidney disease (CKD)    3. Menorrhagia with regular cycle      Plan:   Anemia due to blood loss/menorrhagia and chronic kidney disease: the patient has tolerated the intravenous iron deficient with Venofer reasonably well. She completed infusion 4 of 4 today in the [facility]. CBC from today shows that her hemoglobin is currently 10.2g/dL with hematocrit of 32.8%. We are continuing to provide Procrit every 2 weeks at a dose of 60,000 units whenever H/H is less than 10/30. 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 The platelets are continuing to respond to intravenous gamma globulin. The current CBC shows that her platelet count it is now 212,000. She received her IVIG infusion today, this will continue monthly. Menorrhagia (progressive problem) the patient reports that her menstrual flow has significantly decreased over the past couple of months. She does have known uterine fibroids. She is scheduled for partial hysterectomy on 6/21/2017. The patient will return to clinic for complete reassessment again in 10 weeks.   Orders Placed This Encounter    METABOLIC PANEL, COMPREHENSIVE     Standing Status:   Future     Number of Occurrences:   1     Standing Expiration Date:   4/26/2018    IRON PROFILE     Standing Status:   Future     Number of Occurrences:   1     Standing Expiration Date:   4/26/2018    FERRITIN     Standing Status:   Future     Number of Occurrences:   1     Standing Expiration Date:   4/26/2018       Anneliese Gardiner MD  4/25/2017

## 2017-04-25 NOTE — MR AVS SNAPSHOT
Visit Information Date & Time Provider Department Dept. Phone Encounter #  
 4/25/2017  3:00 PM Bryn Nu[address]ez [address] Office [phone] Follow-up Instructions Return in about 8 weeks (around 6/20/2017). Your Appointments 7/11/2017  1:45 PM  
Office Visit with MD Rosemary Nuñez [address] [facility]) Appt Note: 10 week follow up appointment [facility] [address] [facility] [address] [place] [address]  
[phone]  
  
   
 [facility] [address] [place] [address] [address] Upcoming Health Maintenance Date Due HEMOGLOBIN A1C Q6M 1965 LIPID PANEL Q1 1965 FOOT EXAM Q1 6/16/1977 MICROALBUMIN Q1 6/16/1977 EYE EXAM RETINAL OR DILATED Q1 6/16/1977 Pneumococcal 19-64 Medium Risk (1 of 1 - PPSV23) 6/16/1986 DTaP/Tdap/Td series (1 - Tdap) 6/16/1988 PAP AKA CERVICAL CYTOLOGY 6/16/1988 Allergies as of 4/25/2017  Review Complete On: 4/25/2017 By: Joe Delgadillo RN No Known Allergies Current Immunizations  Reviewed on 4/25/2017 Name Date Influenza Vaccine 11/15/2016 Reviewed by Joe Delgadillo RN on 4/25/2017 at  9:46 AM  
You Were Diagnosed With   
  
 Codes Comments Thrombocytopenia [address] [address]    -  Primary ICD-10-CM: D69.6 ICD-9-CM: 287.5 Anemia in chronic kidney disease (CKD)     ICD-10-CM: N18.9, D63.1 ICD-9-CM: 285.21 Menorrhagia with regular cycle     ICD-10-CM: N92.0 ICD-9-CM: 626.2 Vitals BP Pulse Temp Height(growth percentile) Weight(growth percentile) BMI  
 136/72 78 98.5 °F (36.9 °C) 5' 7\" (1.702 m) 205 lb (93 kg) 32.11 kg/m2 OB Status Smoking Status Having regular periods Never Smoker Vitals History BMI and BSA Data Body Mass Index Body Surface Area  
 32.11 kg/m 2 2.1 m 2 Preferred Pharmacy Pharmacy Name Phone [phone] [address] [phone] Your Updated Medication List  
  
   
This list is accurate as of: 4/25/17  4:28 PM.  Always use your most recent med list. amLODIPine 10 mg tablet Commonly known as:  Opal Heladioymann Take 10 mg by mouth daily. Indications: HYPERTENSION  
  
 ferrous sulfate 325 mg (65 mg iron) tablet Take 325 mg by mouth two (2) times a day. lisinopril-hydroCHLOROthiazide 20-25 mg per tablet Commonly known as:  Melecio Mary Take 1 Tab by mouth daily. Indications: HYPERTENSION  
  
 metFORMIN 500 mg tablet Commonly known as:  GLUCOPHAGE Take 2 Tabs by mouth two (2) times daily (with meals). Indications: TYPE 2 DIABETES MELLITUS Follow-up Instructions Return in about 8 weeks (around 6/20/2017). To-Do List   
 04/26/2017 Lab:  FERRITIN   
  
 04/26/2017 Lab:  IRON PROFILE   
  
 04/26/2017 Lab:  METABOLIC PANEL, COMPREHENSIVE   
  
 05/09/2017 3:00 PM  
  Appointment with [address] at [place] [address] ([phone])  
  
 05/23/2017 9:00 AM  
  Appointment with [address] at [place] [address] ([phone]) 06/06/2017 3:00 PM  
  Appointment with [address] at [place] [address] ([phone])  
  
 06/20/2017 10:00 AM  
  Appointment with [address] at [place] [address] ([phone]) [facility] SERVICES! [facility] introduces PhysicianPortal patient portal. Now you can access parts of your medical record, email your doctor's office, and request medication refills online. 1. In your internet browser, go to [email or web address] 2. Click on the First Time User? Click Here link in the Sign In box. You will see the New Member Sign Up page. 3. Enter your PhysicianPortal Access Code exactly as it appears below. You will not need to use this code after youve completed the sign-up process.  If you do not sign up before the expiration date, you must request a new code. 
 
· [employer] Access Code: [email or web address] Expires: 7/24/2017  8:57 AM 
 
4. Enter the last four digits of your Social Security Number (xxxx) and Date of Birth (mm/dd/yyyy) as indicated and click Submit. You will be taken to the next sign-up page. 5. Create a [employer] ID. This will be your [employer] login ID and cannot be changed, so think of one that is secure and easy to remember. 6. Create a [employer] password. You can change your password at any time. 7. Enter your Password Reset Question and Answer. This can be used at a later time if you forget your password. 8. Enter your e-mail address. You will receive e-mail notification when new information is available in [address] 9. Click Sign Up. You can now view and download portions of your medical record. 10. Click the Download Summary menu link to download a portable copy of your medical information. If you have questions, please visit the Frequently Asked Questions section of the [employer] website. Remember, [employer] is NOT to be used for urgent needs. For medical emergencies, dial 911. Now available from your iPhone and Android! Please provide this summary of care documentation to your next provider. Your primary care clinician is listed as Margaret Rojo. If you have any questions after today's visit, please call [phone].

## 2017-04-26 LAB
A-G RATIO,AGRAT: 1.2 RATIO (ref 1.1–2.6)
ALBUMIN SERPL-MCNC: 3.6 G/DL (ref 3.5–5)
ALP SERPL-CCNC: 75 U/L (ref 25–115)
ALT SERPL-CCNC: 9 U/L (ref 5–40)
ANION GAP SERPL CALC-SCNC: 13 MMOL/L
AST SERPL W P-5'-P-CCNC: 15 U/L (ref 10–37)
BILIRUB SERPL-MCNC: <0.1 MG/DL (ref 0.2–1.2)
BUN SERPL-MCNC: 29 MG/DL (ref 6–22)
CALCIUM SERPL-MCNC: 8.4 MG/DL (ref 8.4–10.5)
CHLORIDE SERPL-SCNC: 105 MMOL/L (ref 98–110)
CO2 SERPL-SCNC: 21 MMOL/L (ref 20–32)
CREAT SERPL-MCNC: 1.5 MG/DL (ref 0.5–1.2)
FE % SATURATION,PSAT: 20 % (ref 20–50)
FERRITIN SERPL-MCNC: 150 NG/ML (ref 10–291)
GFRAA, 66117: 43.7
GFRNA, 66118: 36.1
GLOBULIN,GLOB: 3 G/DL (ref 2–4)
GLUCOSE SERPL-MCNC: 172 MG/DL (ref 65–99)
IRON,IRN: 45 MCG/DL (ref 30–160)
POTASSIUM SERPL-SCNC: 4.4 MMOL/L (ref 3.5–5.5)
PROT SERPL-MCNC: 6.6 G/DL (ref 6.4–8.3)
SODIUM SERPL-SCNC: 139 MMOL/L (ref 133–145)
TIBC,TIBC: 220 MCG/DL (ref 228–428)
UIBC SERPL-MCNC: 175 MCG/DL (ref 110–370)

## 2017-04-26 NOTE — PATIENT INSTRUCTIONS
Anemia From Chronic Disease: Care Instructions  Your Care Instructions    Anemia is a low level of red blood cells, which carry oxygen from your lungs to the rest of your body. Sometimes when you have a long-term (chronic) disease such as kidney disease, arthritis, diabetes, cancer, or an infection, your body does not make enough red blood cells. Follow-up care is a key part of your treatment and safety. Be sure to make and go to all appointments, and call your doctor if you are having problems. It's also a good idea to know your test results and keep a list of the medicines you take. How can you care for yourself at home? · Follow your doctor's instructions to treat the chronic condition that is causing the anemia. · Take your medicine to treat your chronic condition exactly as prescribed. Call your doctor if you think you are having a problem with your medicine. · Take your medicine for anemia exactly as prescribed. Call your doctor if you think you are having a problem with your medicine. Medicines to increase the number of red blood cells (such as epoetin or darbepoetin) may be given as an injection. ¨ If you miss a dose, take it as soon as you can, unless it is almost time for your next dose. In that case, get back on your regular schedule and take only one dose. ¨ Do not freeze this medicine. Store it in the refrigerator. Do not shake the bottle before you prepare the shot. · Keep all your appointments for blood tests to check on your hemoglobin levels. When should you call for help? Call 911 anytime you think you may need emergency care. For example, call if:  · You passed out (lost consciousness). · You have symptoms of a heart attack, such as:  ¨ Chest pain or pressure. ¨ Sweating. ¨ Shortness of breath. ¨ Nausea or vomiting. ¨ Pain that spreads from the chest to the neck, jaw, or one or both shoulders or arms. ¨ Dizziness or lightheadedness. ¨ A fast or uneven pulse.   After calling 911, chew 1 adult-strength aspirin. Wait for an ambulance. Do not try to drive yourself. · You have a seizure. Call your doctor now or seek immediate medical care if:  · You have side effects of epoetin and darbepoetin, such as:  ¨ A headache. ¨ A fever. ¨ Diarrhea, nausea, or vomiting. ¨ Fatigue. ¨ Muscle or joint pain. ¨ A skin rash. · Your fatigue and weakness continue or get worse. Watch closely for changes in your health, and be sure to contact your doctor if you have any problems. Where can you learn more? Go to http://michela-ivan.info/. Enter E502 in the search box to learn more about \"Anemia From Chronic Disease: Care Instructions. \"  Current as of: October 13, 2016  Content Version: 11.2  © 2417-7437 [employer]. Care instructions adapted under license by [employer] (which disclaims liability or warranty for this information). If you have questions about a medical condition or this instruction, always ask your healthcare professional. [place] [address] any warranty or liability for your use of this information.

## 2017-05-09 ENCOUNTER — HOSPITAL ENCOUNTER (OUTPATIENT)
Dept: INFUSION THERAPY | Age: 52
Discharge: HOME | End: 2017-05-09
Payer: COMMERCIAL

## 2017-05-09 ENCOUNTER — HOSPITAL ENCOUNTER (OUTPATIENT)
Dept: ONCOLOGY | Age: 52
Discharge: HOME | End: 2017-05-09

## 2017-05-09 ENCOUNTER — CLINICAL SUPPORT (OUTPATIENT)
Dept: ONCOLOGY | Age: 52
End: 2017-05-09

## 2017-05-09 VITALS
HEART RATE: 88 BPM | RESPIRATION RATE: 16 BRPM | SYSTOLIC BLOOD PRESSURE: 142 MMHG | DIASTOLIC BLOOD PRESSURE: 90 MMHG | TEMPERATURE: 98.5 F

## 2017-05-09 DIAGNOSIS — D63.1 ANEMIA IN CKD (CHRONIC KIDNEY DISEASE): ICD-10-CM

## 2017-05-09 DIAGNOSIS — D63.1 ANEMIA IN CKD (CHRONIC KIDNEY DISEASE): Primary | ICD-10-CM

## 2017-05-09 DIAGNOSIS — N18.9 ANEMIA IN CKD (CHRONIC KIDNEY DISEASE): Primary | ICD-10-CM

## 2017-05-09 DIAGNOSIS — N18.9 ANEMIA IN CKD (CHRONIC KIDNEY DISEASE): ICD-10-CM

## 2017-05-09 LAB
BASO+EOS+MONOS # BLD AUTO: 0.2 K/UL (ref 0–2.3)
BASO+EOS+MONOS # BLD AUTO: 3 % (ref 0.1–17)
DIFFERENTIAL METHOD BLD: ABNORMAL
ERYTHROCYTE [DISTWIDTH] IN BLOOD BY AUTOMATED COUNT: 13.1 % (ref 11.5–14.5)
HCT VFR BLD AUTO: 34.4 % (ref 36–48)
HGB BLD-MCNC: 11.2 G/DL (ref 12–16)
LYMPHOCYTES # BLD AUTO: 16 % (ref 14–44)
LYMPHOCYTES # BLD: 1.1 K/UL (ref 1.1–5.9)
MCH RBC QN AUTO: 26.6 PG (ref 25–35)
MCHC RBC AUTO-ENTMCNC: 32.6 G/DL (ref 31–37)
MCV RBC AUTO: 81.7 FL (ref 78–102)
NEUTS SEG # BLD: 5.6 K/UL (ref 1.8–9.5)
NEUTS SEG NFR BLD AUTO: 81 % (ref 40–70)
PLATELET # BLD AUTO: 74 K/UL (ref 135–420)
RBC # BLD AUTO: 4.21 M/UL (ref 4.1–5.1)
WBC # BLD AUTO: 6.9 K/UL (ref 4.5–13)

## 2017-05-09 PROCEDURE — 36415 COLL VENOUS BLD VENIPUNCTURE: CPT

## 2017-05-09 NOTE — PROGRESS NOTES
[facility] Progress Note    Date: May 9, 2017    Name: [place]    MRN: [id number]         : 1965      Ms. Bob Davies arrived in the [address] [place] [address] [phone], in stable condition, here for Q 2 Week, CBC/Procrit injection. She was assessed and education was provided. Ms. Oza Kocher vitals were reviewed. Visit Vitals    /90 (BP 1 Location: Right arm, BP Patient Position: At rest;Sitting)    Pulse 88    Temp 98.5 °F (36.9 °C)    Resp 16    Breastfeeding No           CBC was drawn from her right AC, at 1525, without incident. Lab results were obtained and reviewed, and the preliminary platelet count was noted to be 64,000. Recent Results (from the past 12 hour(s))   CBC WITH 3 PART DIFF    Collection Time: 17  3:25 PM   Result Value Ref Range    WBC 6.9 4.5 - 13.0 K/uL    RBC 4.21 4.10 - 5.10 M/uL    HGB 11.2 (L) 12.0 - 16.0 g/dL    HCT 34.4 (L) 36 - 48 %    MCV 81.7 78 - 102 FL    MCH 26.6 25.0 - 35.0 PG    MCHC 32.6 31 - 37 g/dL    RDW 13.1 11.5 - 14.5 %    NEUTROPHILS 81 (H) 40 - 70 %    MIXED CELLS 3 0.1 - 17 %    LYMPHOCYTES 16 14 - 44 %    ABS. NEUTROPHILS 5.6 1.8 - 9.5 K/UL    ABS. MIXED CELLS 0.2 0.0 - 2.3 K/uL    ABS. LYMPHOCYTES 1.1 1.1 - 5.9 K/UL    DF AUTOMATED             Procrit was HELD today, per order. Ms. Bob Davies tolerated well, and had no complaints. Ms. Bob Davies was discharged from [place] [address] in stable condition at 1540. Lion Stapleton She is to return in 2 weeks, on Tuesday, 17, at 0900,  for her next appointment, for Q 2 Week CBC/Procrit injection, and Monthly IVIG Infusion.      Zakiya Oneill RN  May 9, 2017  3:33 PM

## 2017-05-23 ENCOUNTER — HOSPITAL ENCOUNTER (OUTPATIENT)
Dept: INFUSION THERAPY | Age: 52
Discharge: HOME | End: 2017-05-23
Payer: COMMERCIAL

## 2017-05-23 ENCOUNTER — CLINICAL SUPPORT (OUTPATIENT)
Dept: ONCOLOGY | Age: 52
End: 2017-05-23

## 2017-05-23 ENCOUNTER — HOSPITAL ENCOUNTER (OUTPATIENT)
Dept: ONCOLOGY | Age: 52
Discharge: HOME | End: 2017-05-23

## 2017-05-23 VITALS
DIASTOLIC BLOOD PRESSURE: 71 MMHG | HEIGHT: 67 IN | TEMPERATURE: 97.6 F | WEIGHT: 203 LBS | RESPIRATION RATE: 16 BRPM | BODY MASS INDEX: 31.86 KG/M2 | SYSTOLIC BLOOD PRESSURE: 118 MMHG | HEART RATE: 73 BPM

## 2017-05-23 DIAGNOSIS — N18.9 ANEMIA IN CKD (CHRONIC KIDNEY DISEASE): Primary | ICD-10-CM

## 2017-05-23 DIAGNOSIS — N18.9 ANEMIA IN CKD (CHRONIC KIDNEY DISEASE): ICD-10-CM

## 2017-05-23 DIAGNOSIS — D63.1 ANEMIA IN CKD (CHRONIC KIDNEY DISEASE): Primary | ICD-10-CM

## 2017-05-23 DIAGNOSIS — D63.1 ANEMIA IN CKD (CHRONIC KIDNEY DISEASE): ICD-10-CM

## 2017-05-23 LAB
BASO+EOS+MONOS # BLD AUTO: 0.3 K/UL (ref 0–2.3)
BASO+EOS+MONOS # BLD AUTO: 5 % (ref 0.1–17)
DIFFERENTIAL METHOD BLD: ABNORMAL
ERYTHROCYTE [DISTWIDTH] IN BLOOD BY AUTOMATED COUNT: 13.5 % (ref 11.5–14.5)
HCT VFR BLD AUTO: 30.7 % (ref 36–48)
HGB BLD-MCNC: 10 G/DL (ref 12–16)
LYMPHOCYTES # BLD AUTO: 20 % (ref 14–44)
LYMPHOCYTES # BLD: 1 K/UL (ref 1.1–5.9)
MCH RBC QN AUTO: 26.5 PG (ref 25–35)
MCHC RBC AUTO-ENTMCNC: 32.6 G/DL (ref 31–37)
MCV RBC AUTO: 81.4 FL (ref 78–102)
NEUTS SEG # BLD: 3.6 K/UL (ref 1.8–9.5)
NEUTS SEG NFR BLD AUTO: 75 % (ref 40–70)
PLATELET # BLD AUTO: 68 K/UL (ref 140–440)
RBC # BLD AUTO: 3.77 M/UL (ref 4.1–5.1)
WBC # BLD AUTO: 4.9 K/UL (ref 4.5–13)

## 2017-05-23 PROCEDURE — 96365 THER/PROPH/DIAG IV INF INIT: CPT

## 2017-05-23 PROCEDURE — 96375 TX/PRO/DX INJ NEW DRUG ADDON: CPT

## 2017-05-23 PROCEDURE — 74011250636 HC RX REV CODE- 250/636: Performed by: INTERNAL MEDICINE

## 2017-05-23 PROCEDURE — 74011250637 HC RX REV CODE- 250/637: Performed by: INTERNAL MEDICINE

## 2017-05-23 PROCEDURE — 96367 TX/PROPH/DG ADDL SEQ IV INF: CPT

## 2017-05-23 PROCEDURE — 96366 THER/PROPH/DIAG IV INF ADDON: CPT

## 2017-05-23 PROCEDURE — 74011000258 HC RX REV CODE- 258: Performed by: INTERNAL MEDICINE

## 2017-05-23 RX ORDER — ACETAMINOPHEN 325 MG/1
650 TABLET ORAL ONCE
Status: COMPLETED | OUTPATIENT
Start: 2017-05-23 | End: 2017-05-23

## 2017-05-23 RX ORDER — DIPHENHYDRAMINE HYDROCHLORIDE 50 MG/ML
50 INJECTION, SOLUTION INTRAMUSCULAR; INTRAVENOUS ONCE
Status: COMPLETED | OUTPATIENT
Start: 2017-05-23 | End: 2017-05-23

## 2017-05-23 RX ORDER — SODIUM CHLORIDE 9 MG/ML
250 INJECTION, SOLUTION INTRAVENOUS ONCE
Status: COMPLETED | OUTPATIENT
Start: 2017-05-23 | End: 2017-05-23

## 2017-05-23 RX ORDER — SODIUM CHLORIDE 0.9 % (FLUSH) 0.9 %
10-40 SYRINGE (ML) INJECTION AS NEEDED
Status: DISCONTINUED | OUTPATIENT
Start: 2017-05-23 | End: 2017-05-27 | Stop reason: HOSPADM

## 2017-05-23 RX ADMIN — DEXAMETHASONE SODIUM PHOSPHATE 20 MG: 4 INJECTION, SOLUTION INTRA-ARTICULAR; INTRALESIONAL; INTRAMUSCULAR; INTRAVENOUS; SOFT TISSUE at 10:05

## 2017-05-23 RX ADMIN — IMMUNE GLOBULIN INTRAVENOUS (HUMAN) 20 G: 5 INJECTION, SOLUTION INTRAVENOUS at 11:00

## 2017-05-23 RX ADMIN — DIPHENHYDRAMINE HYDROCHLORIDE 50 MG: 50 INJECTION, SOLUTION INTRAMUSCULAR; INTRAVENOUS at 10:02

## 2017-05-23 RX ADMIN — IMMUNE GLOBULIN INTRAVENOUS (HUMAN) 5 G: 5 INJECTION, SOLUTION INTRAVENOUS at 12:50

## 2017-05-23 RX ADMIN — ACETAMINOPHEN 650 MG: 325 TABLET ORAL at 10:02

## 2017-05-23 RX ADMIN — SODIUM CHLORIDE 250 ML: 9 INJECTION, SOLUTION INTRAVENOUS at 09:55

## 2017-05-23 RX ADMIN — Medication 10 ML: at 09:30

## 2017-05-23 NOTE — PROGRESS NOTES
[facility] Progress Note    Date: May 23, 2017    Name: Nicole Head    MRN: [id number]         : 1965      Ms. Lc Kurtz arrived in the [facility] today, at 0915, in stable condition, here for Q 2 Week, CBC/Procrit injection, and Monthly IVIG Infusion. She was assessed and education was provided. Ms. Leo Johnson vitals were reviewed. Visit Vitals    /83 (BP 1 Location: Left arm, BP Patient Position: At rest;Sitting)    Pulse 83    Temp 98 °F (36.7 °C)    Resp 16    Ht 5' 7\" (1.702 m)    Wt 92.1 kg (203 lb)    BMI 31.79 kg/m2           PIV was established in her left AC, at 0930, without incident, and blood for a CBC was drawn, per order. Lab results were obtained and reviewed. Recent Results (from the past 12 hour(s))   CBC WITH 3 PART DIFF    Collection Time: 17  9:30 AM   Result Value Ref Range    WBC 4.9 4.5 - 13.0 K/uL    RBC 3.77 (L) 4.10 - 5.10 M/uL    HGB 10.0 (L) 12.0 - 16.0 g/dL    HCT 30.7 (L) 36 - 48 %    MCV 81.4 78 - 102 FL    MCH 26.5 25.0 - 35.0 PG    MCHC 32.6 31 - 37 g/dL    RDW 13.5 11.5 - 14.5 %    PLATELET 68 (L) 190 - 440 K/uL    NEUTROPHILS 75 (H) 40 - 70 %    MIXED CELLS 5 0.1 - 17 %    LYMPHOCYTES 20 14 - 44 %    ABS. NEUTROPHILS 3.6 1.8 - 9.5 K/UL    ABS. MIXED CELLS 0.3 0.0 - 2.3 K/uL    ABS. LYMPHOCYTES 1.0 (L) 1.1 - 5.9 K/UL    DF AUTOMATED               Procrit injection was HELD today, per order. Pre-medications consisting of PO Tylenol 650 mg, Benadryl 50 mg IV, and Decadron 20 mg IV, were all administered per order, and without incident.            IVIG 25 grams (25,000 mg) IV, was administered per order, and without incident.        After the completion of the IVIG, the PIV was flushed very well per protocol, and then, the PIV was removed and gauze/bandaid was applied. Ms. Lc Kurtz tolerated well, and had no complaints. Ms. Lc Kurtz was discharged from [place] [address] in stable condition at 1340. Mac Latonia  She is to return in 4 weeks, on Tuesday, 6-20-17,  at 1000,  for her next appointment, for CBC/Procrit injection, and Monthly IVIG Infusion.  (Please note that she will be out of town, in 2 weeks, on Tuesday, 6-6-17, when her next Procrit injection would actually be due.)    Blease Curling, RN  May 23, 2017  9:47 AM

## 2017-05-30 ENCOUNTER — HOSPITAL ENCOUNTER (OUTPATIENT)
Dept: ONCOLOGY | Age: 52
Discharge: HOME | End: 2017-05-30

## 2017-05-30 ENCOUNTER — HOSPITAL ENCOUNTER (OUTPATIENT)
Dept: INFUSION THERAPY | Age: 52
Discharge: HOME | End: 2017-05-30
Payer: COMMERCIAL

## 2017-05-30 ENCOUNTER — CLINICAL SUPPORT (OUTPATIENT)
Dept: ONCOLOGY | Age: 52
End: 2017-05-30

## 2017-05-30 VITALS
DIASTOLIC BLOOD PRESSURE: 71 MMHG | TEMPERATURE: 98.3 F | SYSTOLIC BLOOD PRESSURE: 127 MMHG | HEART RATE: 99 BPM | RESPIRATION RATE: 16 BRPM

## 2017-05-30 VITALS — TEMPERATURE: 98.6 F | DIASTOLIC BLOOD PRESSURE: 75 MMHG | SYSTOLIC BLOOD PRESSURE: 137 MMHG | HEART RATE: 106 BPM

## 2017-05-30 DIAGNOSIS — D50.0 ANEMIA DUE TO BLOOD LOSS, CHRONIC: ICD-10-CM

## 2017-05-30 DIAGNOSIS — N18.9 ANEMIA IN CHRONIC KIDNEY DISEASE (CKD): Primary | ICD-10-CM

## 2017-05-30 DIAGNOSIS — N18.9 ANEMIA IN CHRONIC KIDNEY DISEASE (CKD): ICD-10-CM

## 2017-05-30 DIAGNOSIS — D69.6 THROMBOCYTOPENIA (HCC): ICD-10-CM

## 2017-05-30 DIAGNOSIS — D63.1 ANEMIA IN CHRONIC KIDNEY DISEASE (CKD): Primary | ICD-10-CM

## 2017-05-30 DIAGNOSIS — D63.1 ANEMIA IN CHRONIC KIDNEY DISEASE (CKD): ICD-10-CM

## 2017-05-30 LAB
BASOPHILS # BLD AUTO: 0 K/UL (ref 0–0.1)
BASOPHILS # BLD: 0 % (ref 0–2)
DIFFERENTIAL METHOD BLD: ABNORMAL
EOSINOPHIL # BLD: 0.2 K/UL (ref 0–0.4)
EOSINOPHIL NFR BLD: 3 % (ref 0–5)
ERYTHROCYTE [DISTWIDTH] IN BLOOD BY AUTOMATED COUNT: 15.6 % (ref 11.6–14.5)
HCT VFR BLD AUTO: 19.5 % (ref 35–45)
HGB BLD-MCNC: 6.5 G/DL (ref 12–16)
LYMPHOCYTES # BLD AUTO: 19 % (ref 21–52)
LYMPHOCYTES # BLD: 1.6 K/UL (ref 0.9–3.6)
MCH RBC QN AUTO: 27.5 PG (ref 24–34)
MCHC RBC AUTO-ENTMCNC: 33.3 G/DL (ref 31–37)
MCV RBC AUTO: 82.6 FL (ref 74–97)
MONOCYTES # BLD: 0.4 K/UL (ref 0.05–1.2)
MONOCYTES NFR BLD AUTO: 5 % (ref 3–10)
NEUTS SEG # BLD: 6 K/UL (ref 1.8–8)
NEUTS SEG NFR BLD AUTO: 73 % (ref 40–73)
PLATELET # BLD AUTO: 138 K/UL (ref 135–420)
PLATELET COMMENTS,PCOM: ABNORMAL
PMV BLD AUTO: 13.6 FL (ref 9.2–11.8)
RBC # BLD AUTO: 2.36 M/UL (ref 4.2–5.3)
RBC MORPH BLD: ABNORMAL
WBC # BLD AUTO: 8.2 K/UL (ref 4.6–13.2)

## 2017-05-30 PROCEDURE — 96372 THER/PROPH/DIAG INJ SC/IM: CPT

## 2017-05-30 PROCEDURE — 85025 COMPLETE CBC W/AUTO DIFF WBC: CPT | Performed by: INTERNAL MEDICINE

## 2017-05-30 PROCEDURE — 74011250636 HC RX REV CODE- 250/636: Performed by: NURSE PRACTITIONER

## 2017-05-30 RX ADMIN — ERYTHROPOIETIN 40000 UNITS: 40000 INJECTION, SOLUTION INTRAVENOUS; SUBCUTANEOUS at 16:30

## 2017-05-30 RX ADMIN — ERYTHROPOIETIN 20000 UNITS: 20000 INJECTION, SOLUTION INTRAVENOUS; SUBCUTANEOUS at 16:30

## 2017-05-30 NOTE — PROGRESS NOTES
[facility] Progress Note    Date: May 30, 2017    Name: Angle Philippe    MRN: [id number]         : 1965      Ms. Lakshmi Jimenez arrived in the [facility] today, at 1600, in stable condition, just after a lab draw appointment on the office visit side of the clinic, to receive an Extra Dose Of Procrit. She was assessed and education was provided. Upon assessment today, Ms. Aguayo complained of feeling extremely tired, and stated that she was having a really heavy menstrual cycle, with a lot of clotting and abdominal cramping. She was noted to be sad and teary eyed. She was easily reassured. Ms. Rizwan Hanna vitals were reviewed. Visit Vitals    /71 (BP 1 Location: Right arm, BP Patient Position: At rest;Sitting)    Pulse 99    Temp 98.3 °F (36.8 °C)    Resp 16               Lab results were reviewed. (The CBC results from today, were as follows:)      WBC 8.4  ANC 6.3  HGB 6.4  HCT 19.8              Procrit 60,000 units, was administered SQ, in her right arm, at 1630, as ordered, and without incident. Ms. Lakshmi Jimenez tolerated well, and had no further complaints. Ms. Lakshmi Jimenez was discharged from [place] [address] in stable condition at [phone]. Korey Garcia She is to report to [facility] on Thursday, 17, at 1500, to be Type & Crossmatched for 2 units of PRBC. And then, she is to return to [facility], on Friday, 17,  at 0800,  for her next appointment, to receive the 2 units of Blood. And then, she is scheduled to return here, to the [facility], on Tuesday, 17, at 1000, for CBC/Procrit injection & IVIG Infusion.  (She is planning to be out of town, starting on , 17, for a cruise.)    Javier Pacheco RN  May 30, 2017  4:24 PM

## 2017-05-30 NOTE — PROGRESS NOTES
Patient in the office today for CBC due to increased menstrual bleeding and feeling fatigue, headaches, and dizzy. Patient states she was here for her IVIG on 5/23/2017 and at that time her H/H was 10/30.7 and she did not need her Procrit injection. H/H today is 6.4 g/dl and 19.8%. Patient will be given her Procrit injection,60,000 units SQ today in the office.  She will also be scheduled to receive 2 units of PRBC at [facility] on Friday, 6/2/2017 at 0800, type and cross to be done Thursday, 6/1/2017 at 3pm.

## 2017-05-31 ENCOUNTER — HOSPITAL ENCOUNTER (EMERGENCY)
Age: 52
Discharge: HOME | End: 2017-05-31
Attending: EMERGENCY MEDICINE
Payer: COMMERCIAL

## 2017-05-31 VITALS
OXYGEN SATURATION: 100 % | BODY MASS INDEX: 31.79 KG/M2 | WEIGHT: 203 LBS | DIASTOLIC BLOOD PRESSURE: 71 MMHG | SYSTOLIC BLOOD PRESSURE: 156 MMHG | TEMPERATURE: 97.9 F | RESPIRATION RATE: 15 BRPM | HEART RATE: 79 BPM

## 2017-05-31 DIAGNOSIS — N18.9 CHRONIC KIDNEY DISEASE, UNSPECIFIED STAGE: ICD-10-CM

## 2017-05-31 DIAGNOSIS — D50.0 IRON DEFICIENCY ANEMIA DUE TO CHRONIC BLOOD LOSS: Primary | ICD-10-CM

## 2017-05-31 DIAGNOSIS — N18.9 ANEMIA IN CHRONIC KIDNEY DISEASE (CKD): ICD-10-CM

## 2017-05-31 DIAGNOSIS — D63.1 ANEMIA IN CHRONIC KIDNEY DISEASE (CKD): ICD-10-CM

## 2017-05-31 LAB
A-G RATIO,AGRAT: 1.2 RATIO (ref 1.1–2.6)
ALBUMIN SERPL-MCNC: 3.5 G/DL (ref 3.5–5)
ALP SERPL-CCNC: 66 U/L (ref 25–115)
ALT SERPL-CCNC: 11 U/L (ref 5–40)
ANION GAP BLD CALC-SCNC: 7 MMOL/L (ref 3–18)
ANION GAP SERPL CALC-SCNC: 14 MMOL/L
AST SERPL W P-5'-P-CCNC: 12 U/L (ref 10–37)
BASOPHILS # BLD AUTO: 0 K/UL (ref 0–0.1)
BASOPHILS # BLD AUTO: 0 K/UL (ref 0–0.1)
BASOPHILS # BLD: 0 % (ref 0–2)
BASOPHILS # BLD: 0 % (ref 0–2)
BILIRUB SERPL-MCNC: <0.1 MG/DL (ref 0.2–1.2)
BUN SERPL-MCNC: 40 MG/DL (ref 6–22)
BUN SERPL-MCNC: 40 MG/DL (ref 7–18)
BUN/CREAT SERPL: 20 (ref 12–20)
CALCIUM SERPL-MCNC: 8.1 MG/DL (ref 8.4–10.5)
CALCIUM SERPL-MCNC: 8.2 MG/DL (ref 8.5–10.1)
CHLORIDE SERPL-SCNC: 102 MMOL/L (ref 98–110)
CHLORIDE SERPL-SCNC: 109 MMOL/L (ref 100–108)
CO2 SERPL-SCNC: 18 MMOL/L (ref 20–32)
CO2 SERPL-SCNC: 22 MMOL/L (ref 21–32)
CREAT SERPL-MCNC: 2.04 MG/DL (ref 0.6–1.3)
CREAT SERPL-MCNC: 2.2 MG/DL (ref 0.5–1.2)
DIFFERENTIAL METHOD BLD: ABNORMAL
DIFFERENTIAL METHOD BLD: ABNORMAL
EOSINOPHIL # BLD: 0.2 K/UL (ref 0–0.4)
EOSINOPHIL # BLD: 0.2 K/UL (ref 0–0.4)
EOSINOPHIL NFR BLD: 2 % (ref 0–5)
EOSINOPHIL NFR BLD: 3 % (ref 0–5)
ERYTHROCYTE [DISTWIDTH] IN BLOOD BY AUTOMATED COUNT: 14.9 % (ref 11.6–14.5)
ERYTHROCYTE [DISTWIDTH] IN BLOOD BY AUTOMATED COUNT: 15.8 % (ref 11.6–14.5)
FE % SATURATION,PSAT: 36 % (ref 20–50)
FERRITIN SERPL-MCNC: 110 NG/ML (ref 8–388)
FERRITIN SERPL-MCNC: 158 NG/ML (ref 10–291)
GFRAA, 66117: 28.6
GFRNA, 66118: 23.6
GLOBULIN,GLOB: 2.9 G/DL (ref 2–4)
GLUCOSE SERPL-MCNC: 177 MG/DL (ref 74–99)
GLUCOSE SERPL-MCNC: 220 MG/DL (ref 65–99)
HCT VFR BLD AUTO: 17.2 % (ref 35–45)
HCT VFR BLD AUTO: 25.6 % (ref 35–45)
HGB BLD-MCNC: 5.6 G/DL (ref 12–16)
HGB BLD-MCNC: 8.5 G/DL (ref 12–16)
IRON SATN MFR SERPL: 15 %
IRON SERPL-MCNC: 34 UG/DL (ref 50–175)
IRON,IRN: 79 MCG/DL (ref 30–160)
LYMPHOCYTES # BLD AUTO: 19 % (ref 21–52)
LYMPHOCYTES # BLD AUTO: 20 % (ref 21–52)
LYMPHOCYTES # BLD: 1.2 K/UL (ref 0.9–3.6)
LYMPHOCYTES # BLD: 1.3 K/UL (ref 0.9–3.6)
MCH RBC QN AUTO: 27.2 PG (ref 24–34)
MCH RBC QN AUTO: 27.8 PG (ref 24–34)
MCHC RBC AUTO-ENTMCNC: 32.6 G/DL (ref 31–37)
MCHC RBC AUTO-ENTMCNC: 33.2 G/DL (ref 31–37)
MCV RBC AUTO: 83.5 FL (ref 74–97)
MCV RBC AUTO: 83.7 FL (ref 74–97)
MONOCYTES # BLD: 0.4 K/UL (ref 0.05–1.2)
MONOCYTES # BLD: 0.4 K/UL (ref 0.05–1.2)
MONOCYTES NFR BLD AUTO: 5 % (ref 3–10)
MONOCYTES NFR BLD AUTO: 7 % (ref 3–10)
NEUTS SEG # BLD: 4.8 K/UL (ref 1.8–8)
NEUTS SEG # BLD: 4.9 K/UL (ref 1.8–8)
NEUTS SEG NFR BLD AUTO: 71 % (ref 40–73)
NEUTS SEG NFR BLD AUTO: 73 % (ref 40–73)
PLATELET # BLD AUTO: 116 K/UL (ref 135–420)
PLATELET # BLD AUTO: 93 K/UL (ref 135–420)
PMV BLD AUTO: 12.3 FL (ref 9.2–11.8)
PMV BLD AUTO: 12.4 FL (ref 9.2–11.8)
POTASSIUM SERPL-SCNC: 4.3 MMOL/L (ref 3.5–5.5)
POTASSIUM SERPL-SCNC: 4.6 MMOL/L (ref 3.5–5.5)
PROT SERPL-MCNC: 6.4 G/DL (ref 6.4–8.3)
RBC # BLD AUTO: 2.06 M/UL (ref 4.2–5.3)
RBC # BLD AUTO: 3.06 M/UL (ref 4.2–5.3)
SODIUM SERPL-SCNC: 134 MMOL/L (ref 133–145)
SODIUM SERPL-SCNC: 138 MMOL/L (ref 136–145)
TIBC SERPL-MCNC: 231 UG/DL (ref 250–450)
TIBC,TIBC: 217 MCG/DL (ref 228–428)
UIBC SERPL-MCNC: 138 MCG/DL (ref 110–370)
WBC # BLD AUTO: 6.6 K/UL (ref 4.6–13.2)
WBC # BLD AUTO: 6.8 K/UL (ref 4.6–13.2)

## 2017-05-31 PROCEDURE — 86920 COMPATIBILITY TEST SPIN: CPT | Performed by: EMERGENCY MEDICINE

## 2017-05-31 PROCEDURE — 74011250637 HC RX REV CODE- 250/637: Performed by: EMERGENCY MEDICINE

## 2017-05-31 PROCEDURE — 80048 BASIC METABOLIC PNL TOTAL CA: CPT | Performed by: EMERGENCY MEDICINE

## 2017-05-31 PROCEDURE — 99284 EMERGENCY DEPT VISIT MOD MDM: CPT

## 2017-05-31 PROCEDURE — 85025 COMPLETE CBC W/AUTO DIFF WBC: CPT | Performed by: EMERGENCY MEDICINE

## 2017-05-31 PROCEDURE — 83540 ASSAY OF IRON: CPT | Performed by: EMERGENCY MEDICINE

## 2017-05-31 PROCEDURE — P9016 RBC LEUKOCYTES REDUCED: HCPCS | Performed by: EMERGENCY MEDICINE

## 2017-05-31 PROCEDURE — 82728 ASSAY OF FERRITIN: CPT | Performed by: EMERGENCY MEDICINE

## 2017-05-31 PROCEDURE — 86900 BLOOD TYPING SEROLOGIC ABO: CPT | Performed by: EMERGENCY MEDICINE

## 2017-05-31 PROCEDURE — 36430 TRANSFUSION BLD/BLD COMPNT: CPT

## 2017-05-31 RX ORDER — SODIUM CHLORIDE 9 MG/ML
250 INJECTION, SOLUTION INTRAVENOUS AS NEEDED
Status: DISCONTINUED | OUTPATIENT
Start: 2017-05-31 | End: 2017-05-31 | Stop reason: HOSPADM

## 2017-05-31 RX ORDER — ACETAMINOPHEN 500 MG
1000 TABLET ORAL
Status: COMPLETED | OUTPATIENT
Start: 2017-05-31 | End: 2017-05-31

## 2017-05-31 RX ADMIN — ACETAMINOPHEN 1000 MG: 500 TABLET ORAL at 10:54

## 2017-05-31 NOTE — ED PROVIDER NOTES
HPI Comments: 7:40 AM Silvia Shay is a 54 y.o. female with a history of DM, HTN, Idiopathic Thrombocytopenic Pupura and Arthritis who presents to the emergency department c/o generalized malaise secondary to her chronic anemia due to heavy menstrual cycle. Pt explains that she had blood work completed that resulted with low hemoglobin levels. Yesterday she notes she began to feel fatigued and weak explaining that she was became short of breath while walking up stairs with bilateral leg weakness occurring most of the day. Pt has a scheduled hysterectomy on June 21st. She is currently taking Epogen with her last injection being yesterday. No other complaints or concerns were noted at this time. PCP: Meghan Pace MD      The history is provided by the patient. Past Medical History:   Diagnosis Date    Anemia     Arthritis     Diabetes ([address]     Hypertension     ITP (idiopathic thrombocytopenic purpura)        Past Surgical History:   Procedure Laterality Date    HX GYN      c section 1984         Family History:   Problem Relation Age of Onset    Hypertension Mother     Cancer Father      Colon       Social History     Social History    Marital status: SINGLE     Spouse name: N/A    Number of children: N/A    Years of education: N/A     Occupational History    Not on file. Social History Main Topics    Smoking status: Never Smoker    Smokeless tobacco: Not on file    Alcohol use No    Drug use: No    Sexual activity: Not on file     Other Topics Concern    Not on file     Social History Narrative         ALLERGIES: Review of patient's allergies indicates no known allergies. Review of Systems   Constitutional: Positive for fatigue. Negative for chills, diaphoresis, fever and unexpected weight change.    HENT: Negative for congestion, dental problem, ear discharge, ear pain, hearing loss, nosebleeds, postnasal drip, rhinorrhea, sinus pressure, sore throat, trouble swallowing and voice change. Eyes: Negative for photophobia, pain, discharge, redness and visual disturbance. Respiratory: Positive for shortness of breath. Negative for cough, chest tightness, wheezing and stridor. Cardiovascular: Negative for chest pain, palpitations and leg swelling. Gastrointestinal: Negative for abdominal distention, abdominal pain, anal bleeding, blood in stool, constipation, diarrhea, nausea and vomiting. Genitourinary: Negative for difficulty urinating, dyspareunia, dysuria, flank pain, frequency, genital sores, hematuria, menstrual problem, pelvic pain, urgency, vaginal bleeding, vaginal discharge and vaginal pain. Musculoskeletal: Negative for arthralgias, back pain, joint swelling, myalgias, neck pain and neck stiffness. Skin: Negative for color change, rash and wound. Neurological: Positive for weakness (generalized). Negative for dizziness, tremors, seizures, syncope, light-headedness, numbness and headaches. Hematological: Negative for adenopathy. Does not bruise/bleed easily. Psychiatric/Behavioral: Negative for agitation, confusion, decreased concentration, hallucinations, sleep disturbance and suicidal ideas. The patient is not nervous/anxious. All other systems reviewed and are negative. Vitals:    05/31/17 1530 05/31/17 1545 05/31/17 1600 05/31/17 1615   BP: 121/61 103/87 144/67    Pulse: 81 82 91 79   Resp: 14 10 22 15   Temp:       SpO2: 98% 99% 98% 100%   Weight:                Physical Exam   Constitutional: She is oriented to person, place, and time. She appears well-developed and well-nourished. No distress. HENT:   Head: Normocephalic and atraumatic. Right Ear: External ear normal.   Left Ear: External ear normal.   Nose: Nose normal.   Mouth/Throat: Oropharynx is clear and moist. Mucous membranes are pale. No oropharyngeal exudate. Eyes: EOM are normal. Pupils are equal, round, and reactive to light. Right eye exhibits no discharge.  Left eye exhibits no discharge. No scleral icterus. Pale Conjunctiva     Neck: No JVD present. No tracheal deviation present. No thyromegaly present. Cardiovascular: Normal rate, regular rhythm, normal heart sounds and intact distal pulses. Exam reveals no gallop and no friction rub. No murmur heard. Pulmonary/Chest: Effort normal and breath sounds normal. No stridor. No respiratory distress. She has no wheezes. She has no rales. She exhibits no tenderness. Abdominal: Soft. Bowel sounds are normal. She exhibits no distension and no mass. There is no tenderness. There is no rebound and no guarding. Genitourinary: Vagina normal and uterus normal. No vaginal discharge found. Musculoskeletal: Normal range of motion. She exhibits no edema or tenderness. Lymphadenopathy:     She has no cervical adenopathy. Neurological: She is alert and oriented to person, place, and time. She has normal reflexes. No cranial nerve deficit. Skin: Skin is warm and dry. No rash noted. She is not diaphoretic. No erythema. No pallor. Psychiatric: She has a normal mood and affect. Her behavior is normal. Judgment normal.   Nursing note and vitals reviewed. MDM  Number of Diagnoses or Management Options  Anemia in chronic kidney disease (CKD):   Chronic kidney disease, unspecified stage:   Iron deficiency anemia due to chronic blood loss:   Diagnosis management comments: Patient with symptomatic anemia secondary to dysfunctional uterine bleeding. She was transfused 3 units of pRBCs.   She's stable for discharge home       Amount and/or Complexity of Data Reviewed  Clinical lab tests: ordered and reviewed  Tests in the medicine section of CPT®: ordered and reviewed  Decide to obtain previous medical records or to obtain history from someone other than the patient: yes  Obtain history from someone other than the patient: yes  Review and summarize past medical records: yes  Independent visualization of images, tracings, or specimens: yes    Risk of Complications, Morbidity, and/or Mortality  Presenting problems: high  Diagnostic procedures: high  Management options: high    Critical Care  Total time providing critical care: 30-74 minutes    Patient Progress  Patient progress: improved    ED Course       Procedures  -------------------------------------------------------------------------------------------------------------------  PROGRESS NOTE:  4:15 PM Upon re-evaluation the patient's symptoms have improved. Pt has non-toxic appearance and condition is stable for discharge. She was informed of her results, instructed to f/u with her PCP and return to the ED upon worsening of symptoms. All questions and concerns were addressed. ORDERS:  Orders Placed This Encounter    CBC WITH AUTOMATED DIFF    METABOLIC PANEL, BASIC    IRON PROFILE    FERRITIN    CBC WITH AUTOMATED DIFF    DIET REGULAR    TRANSFUSE PACKED RBC'S    TYPE & SCREEN    0.9% sodium chloride infusion 250 mL    acetaminophen (TYLENOL) tablet 1,000 mg         LAB RESULTS:  Recent Results (from the past 12 hour(s))   CBC WITH AUTOMATED DIFF    Collection Time: 05/31/17  6:25 AM   Result Value Ref Range    WBC 6.6 4.6 - 13.2 K/uL    RBC 2.06 (L) 4.20 - 5.30 M/uL    HGB 5.6 (LL) 12.0 - 16.0 g/dL    HCT 17.2 (LL) 35.0 - 45.0 %    MCV 83.5 74.0 - 97.0 FL    MCH 27.2 24.0 - 34.0 PG    MCHC 32.6 31.0 - 37.0 g/dL    RDW 15.8 (H) 11.6 - 14.5 %    PLATELET 583 (L) 266 - 420 K/uL    MPV 12.4 (H) 9.2 - 11.8 FL    NEUTROPHILS 73 40 - 73 %    LYMPHOCYTES 19 (L) 21 - 52 %    MONOCYTES 5 3 - 10 %    EOSINOPHILS 3 0 - 5 %    BASOPHILS 0 0 - 2 %    ABS. NEUTROPHILS 4.8 1.8 - 8.0 K/UL    ABS. LYMPHOCYTES 1.2 0.9 - 3.6 K/UL    ABS. MONOCYTES 0.4 0.05 - 1.2 K/UL    ABS. EOSINOPHILS 0.2 0.0 - 0.4 K/UL    ABS.  BASOPHILS 0.0 0.0 - 0.1 K/UL    DF AUTOMATED     METABOLIC PANEL, BASIC    Collection Time: 05/31/17  6:25 AM   Result Value Ref Range    Sodium 138 136 - 145 mmol/L Potassium 4.3 3.5 - 5.5 mmol/L    Chloride 109 (H) 100 - 108 mmol/L    CO2 22 21 - 32 mmol/L    Anion gap 7 3.0 - 18 mmol/L    Glucose 177 (H) 74 - 99 mg/dL    BUN 40 (H) 7.0 - 18 MG/DL    Creatinine 2.04 (H) 0.6 - 1.3 MG/DL    BUN/Creatinine ratio 20 12 - 20      GFR est AA 31 (L) >60 ml/min/1.73m2    GFR est non-AA 26 (L) >60 ml/min/1.73m2    Calcium 8.2 (L) 8.5 - 10.1 MG/DL   TYPE & SCREEN    Collection Time: 05/31/17  6:25 AM   Result Value Ref Range    Crossmatch Expiration 06/03/2017     ABO/Rh(D) Tara Wilber POSITIVE     Antibody screen NEG     CALLED TO:  WILBER Mora, [id number] AT 7020 BY JNP. Unit number [id number]     Blood component type RC LR AS1     Unit division 00     Status of unit ISSUED     Crossmatch result Compatible     Unit number [id number]     Blood component type RC LR AS1     Unit division 00     Status of unit TRANSFUSED     Crossmatch result Compatible     Unit number [id number]     Blood component type RC LR AS1     Unit division 00     Status of unit ISSUED     Crossmatch result Compatible    IRON PROFILE    Collection Time: 05/31/17  6:25 AM   Result Value Ref Range    Iron 34 (L) 50 - 175 ug/dL    TIBC 231 (L) 250 - 450 ug/dL    Iron % saturation 15 %   FERRITIN    Collection Time: 05/31/17  6:25 AM   Result Value Ref Range    Ferritin 110 8 - 388 NG/ML   CBC WITH AUTOMATED DIFF    Collection Time: 05/31/17  3:44 PM   Result Value Ref Range    WBC 6.8 4.6 - 13.2 K/uL    RBC 3.06 (L) 4.20 - 5.30 M/uL    HGB 8.5 (L) 12.0 - 16.0 g/dL    HCT 25.6 (L) 35.0 - 45.0 %    MCV 83.7 74.0 - 97.0 FL    MCH 27.8 24.0 - 34.0 PG    MCHC 33.2 31.0 - 37.0 g/dL    RDW 14.9 (H) 11.6 - 14.5 %    PLATELET 93 (L) 428 - 420 K/uL    MPV 12.3 (H) 9.2 - 11.8 FL    NEUTROPHILS 71 40 - 73 %    LYMPHOCYTES 20 (L) 21 - 52 %    MONOCYTES 7 3 - 10 %    EOSINOPHILS 2 0 - 5 %    BASOPHILS 0 0 - 2 %    ABS. NEUTROPHILS 4.9 1.8 - 8.0 K/UL    ABS. LYMPHOCYTES 1.3 0.9 - 3.6 K/UL    ABS.  MONOCYTES 0.4 0.05 - 1.2 K/UL ABS. EOSINOPHILS 0.2 0.0 - 0.4 K/UL    ABS. BASOPHILS 0.0 0.0 - 0.1 K/UL    DF AUTOMATED          DISPOSITION:  Diagnosis:   1. Iron deficiency anemia due to chronic blood loss    2. Anemia in chronic kidney disease (CKD)    3. Chronic kidney disease, unspecified stage          Disposition: Discharge home    Follow-up Information     Follow up With Details Comments Contact Info    Allison Ibrahim MD Schedule an appointment as soon as possible for a visit in 1 day Return to the ED, If symptoms worsen [address] [place], [place] [id number] [facility]      [facility] EMERGENCY DEPT  As needed, If symptoms worsen [address] [facility] [address] [phone]          Current Discharge Medication List      CONTINUE these medications which have NOT CHANGED    Details   metFORMIN (GLUCOPHAGE) 500 mg tablet Take 2 Tabs by mouth two (2) times daily (with meals). Indications: TYPE 2 DIABETES MELLITUS  Qty: 120 Tab, Refills: 2      lisinopril-hydrochlorothiazide (PRINZIDE, ZESTORETIC) 20-25 mg per tablet Take 1 Tab by mouth daily. Indications: HYPERTENSION      amLODIPine (NORVASC) 10 mg tablet Take 10 mg by mouth daily. Indications: HYPERTENSION      ferrous sulfate 325 mg (65 mg iron) tablet Take 325 mg by mouth two (2) times a day.              -------------------------------------------------------------------------------------------------------------------  Scribe Attestation:     Fadi Galeana for and in the presence of Justa Manning DO May 31, 2017 at 4:24 PM     Signed by: Jerome Cerda May 31, 2017, 4:24 PM      Physician Attestation:   I personally performed the services described in this documentation, reviewed and edited the documentation which was dictated to the scribe in my presence, and it accurately records my words and actions.  Justa Manning DO  May 31, 2017 at 4:24 PM

## 2017-05-31 NOTE — ED TRIAGE NOTES
Pt presents to ED with c/o passing large amounts of clots of the past 4 weeks. PT states she has a history of anemia, but has never had this type of problem. Pt also states that the back of legs keep getting tight.   Pt states that she is more tired than normal.

## 2017-05-31 NOTE — DISCHARGE INSTRUCTIONS
Chronic Kidney Disease: Care Instructions  Your Care Instructions  Chronic kidney disease happens when your kidneys don't work as well as they should. Your kidneys have a few important jobs. They remove waste from your blood. This waste leaves your body in your urine. They also balance your body's fluids and chemicals. When your kidneys don't work well, extra waste and fluid can build up. This can poison the body and sometimes cause death. The most common causes of this disease are diabetes and high blood pressure. In some cases, the disease develops in 2 to 3 months. But it usually develops over many years. If you take medicine and make healthy changes to your lifestyle, you may be able to prevent the disease from getting worse. But if your kidney damage gets worse, you may need dialysis or a kidney transplant. Dialysis uses a machine to filter waste from the blood. A transplant is surgery to give you a healthy kidney from another person. Follow-up care is a key part of your treatment and safety. Be sure to make and go to all appointments, and call your doctor if you are having problems. It's also a good idea to know your test results and keep a list of the medicines you take. How can you care for yourself at home? Treatments and appointments  · Be safe with medicines. Take your medicines exactly as prescribed. Call your doctor if you have any problems with your medicine. You also may take medicine to control your blood pressure or to treat diabetes. Many people who have diabetes take blood pressure medicine. · If you have diabetes, do your best to keep your blood sugar in your target range. You may do this by eating healthy food and exercising. You may also take medicines. · Go to your dialysis appointments if you have this treatment. · Do not take ibuprofen (Advil, Motrin), naproxen (Aleve), or similar medicines, unless your doctor tells you to. These may make the disease worse.   · Do not take any vitamins, over-the-counter medicines, or herbal products without talking to your doctor first.  · Do not smoke or use other tobacco products. Smoking can reduce blood flow to the kidneys. If you need help quitting, talk to your doctor about stop-smoking programs and medicines. These can increase your chances of quitting for good. · Do not drink alcohol or use illegal drugs. · Talk to your doctor about an exercise plan. Exercise helps lower your blood pressure. It also makes you feel better. · If you have an advance directive, let your doctor know. It may include a living will and a durable power of  for health care. If you don't have one, you may want to prepare one. It lets your doctor and loved ones know your health care wishes if you become unable to speak for yourself. Diet  · Talk to a registered dietitian. He or she can help you make a meal plan that is right for you. Most people with kidney disease need to limit salt (sodium), fluids, and protein. Some also have to limit potassium and phosphorus. · You may have to give up many foods you like. But try to focus on the fact that this will help you stay healthy for as long as possible. · If you have a hard time eating enough, talk to your doctor or dietitian about ways to add calories to your diet. · Your diet may change as your disease changes. See your doctor for regular testing. And work with a dietitian to change your diet as needed. When should you call for help? Call 911 anytime you think you may need emergency care. For example, call if:  · You passed out (lost consciousness). Call your doctor now or seek immediate medical care if:  · You have less urine than normal or no urine. · You have trouble urinating or can urinate only very small amounts. · You are confused or have trouble thinking clearly. · You feel weaker or more tired than usual.  · You are very thirsty, lightheaded, or dizzy. · You have nausea and vomiting.   · You have new swelling of your arms or feet, or your swelling is worse. · You have blood in your urine. · You have new or worse trouble breathing. Watch closely for changes in your health, and be sure to contact your doctor if:  · You have any problems with your medicine or other treatment. Where can you learn more? Go to http://michela-ivan.info/. Enter N276 in the search box to learn more about \"Chronic Kidney Disease: Care Instructions. \"  Current as of: November 20, 2015  Content Version: 11.2  © 2670-0756 [employer]. Care instructions adapted under license by [employer] (which disclaims liability or warranty for this information). If you have questions about a medical condition or this instruction, always ask your healthcare professional. [address] any warranty or liability for your use of this information.

## 2017-06-01 ENCOUNTER — APPOINTMENT (OUTPATIENT)
Dept: INFUSION THERAPY | Age: 52
End: 2017-06-01
Payer: COMMERCIAL

## 2017-06-01 LAB
ABO + RH BLD: NORMAL
BLD PROD TYP BPU: NORMAL
BLOOD GROUP ANTIBODIES SERPL: NORMAL
BPU ID: NORMAL
CALLED TO:,BCALL1: NORMAL
CROSSMATCH RESULT,%XM: NORMAL
SPECIMEN EXP DATE BLD: NORMAL
STATUS OF UNIT,%ST: NORMAL
UNIT DIVISION, %UDIV: 0

## 2017-06-02 ENCOUNTER — APPOINTMENT (OUTPATIENT)
Dept: INFUSION THERAPY | Age: 52
End: 2017-06-02
Payer: COMMERCIAL

## 2017-06-13 ENCOUNTER — HOSPITAL ENCOUNTER (OUTPATIENT)
Dept: PREADMISSION TESTING | Age: 52
Discharge: HOME | End: 2017-06-13

## 2017-06-13 ENCOUNTER — HOSPITAL ENCOUNTER (OUTPATIENT)
Dept: LAB | Age: 52
Discharge: HOME | End: 2017-06-13

## 2017-06-13 DIAGNOSIS — N92.0 MENORRHAGIA: ICD-10-CM

## 2017-06-13 DIAGNOSIS — D64.9 ANEMIA: ICD-10-CM

## 2017-06-13 DIAGNOSIS — E11.9 DIABETES MELLITUS (HCC): ICD-10-CM

## 2017-06-13 DIAGNOSIS — D21.9 FIBROIDS: ICD-10-CM

## 2017-06-13 DIAGNOSIS — Z01.818 PREOP TESTING: ICD-10-CM

## 2017-06-13 DIAGNOSIS — I10 HTN (HYPERTENSION): ICD-10-CM

## 2017-06-13 DIAGNOSIS — N94.6 DYSMENORRHEA: ICD-10-CM

## 2017-06-13 LAB — SENTARA SPECIMEN COL,SENBCF: NORMAL

## 2017-06-13 PROCEDURE — 99001 SPECIMEN HANDLING PT-LAB: CPT | Performed by: OBSTETRICS & GYNECOLOGY

## 2017-06-20 ENCOUNTER — HOSPITAL ENCOUNTER (OUTPATIENT)
Dept: ONCOLOGY | Age: 52
Discharge: HOME | End: 2017-06-20

## 2017-06-20 ENCOUNTER — HOSPITAL ENCOUNTER (OUTPATIENT)
Dept: INFUSION THERAPY | Age: 52
Discharge: HOME | End: 2017-06-20
Payer: COMMERCIAL

## 2017-06-20 VITALS
HEIGHT: 67 IN | RESPIRATION RATE: 16 BRPM | BODY MASS INDEX: 31.71 KG/M2 | HEART RATE: 83 BPM | WEIGHT: 202 LBS | SYSTOLIC BLOOD PRESSURE: 106 MMHG | TEMPERATURE: 97.9 F | DIASTOLIC BLOOD PRESSURE: 62 MMHG

## 2017-06-20 DIAGNOSIS — D63.1 ANEMIA IN CKD (CHRONIC KIDNEY DISEASE): ICD-10-CM

## 2017-06-20 DIAGNOSIS — N18.9 ANEMIA IN CKD (CHRONIC KIDNEY DISEASE): ICD-10-CM

## 2017-06-20 LAB
BASOPHILS # BLD AUTO: 0 K/UL (ref 0–0.1)
BASOPHILS # BLD: 0 % (ref 0–2)
DIFFERENTIAL METHOD BLD: ABNORMAL
EOSINOPHIL # BLD: 0.1 K/UL (ref 0–0.4)
EOSINOPHIL NFR BLD: 2 % (ref 0–5)
ERYTHROCYTE [DISTWIDTH] IN BLOOD BY AUTOMATED COUNT: 14.6 % (ref 11.6–14.5)
HCT VFR BLD AUTO: 20.4 % (ref 35–45)
HGB BLD-MCNC: 6.6 G/DL (ref 12–16)
LYMPHOCYTES # BLD AUTO: 18 % (ref 21–52)
LYMPHOCYTES # BLD: 1 K/UL (ref 0.9–3.6)
MCH RBC QN AUTO: 28.1 PG (ref 24–34)
MCHC RBC AUTO-ENTMCNC: 32.4 G/DL (ref 31–37)
MCV RBC AUTO: 86.8 FL (ref 74–97)
MONOCYTES # BLD: 0.2 K/UL (ref 0.05–1.2)
MONOCYTES NFR BLD AUTO: 4 % (ref 3–10)
NEUTS SEG # BLD: 4.1 K/UL (ref 1.8–8)
NEUTS SEG NFR BLD AUTO: 76 % (ref 40–73)
PLATELET # BLD AUTO: 116 K/UL (ref 135–420)
PMV BLD AUTO: 14.5 FL (ref 9.2–11.8)
RBC # BLD AUTO: 2.35 M/UL (ref 4.2–5.3)
WBC # BLD AUTO: 5.5 K/UL (ref 4.6–13.2)

## 2017-06-20 PROCEDURE — 74011000258 HC RX REV CODE- 258: Performed by: INTERNAL MEDICINE

## 2017-06-20 PROCEDURE — 96366 THER/PROPH/DIAG IV INF ADDON: CPT

## 2017-06-20 PROCEDURE — 96365 THER/PROPH/DIAG IV INF INIT: CPT

## 2017-06-20 PROCEDURE — 96372 THER/PROPH/DIAG INJ SC/IM: CPT

## 2017-06-20 PROCEDURE — 74011250636 HC RX REV CODE- 250/636: Performed by: INTERNAL MEDICINE

## 2017-06-20 PROCEDURE — 96375 TX/PRO/DX INJ NEW DRUG ADDON: CPT

## 2017-06-20 PROCEDURE — 85025 COMPLETE CBC W/AUTO DIFF WBC: CPT | Performed by: INTERNAL MEDICINE

## 2017-06-20 PROCEDURE — 74011250637 HC RX REV CODE- 250/637: Performed by: INTERNAL MEDICINE

## 2017-06-20 PROCEDURE — 96367 TX/PROPH/DG ADDL SEQ IV INF: CPT

## 2017-06-20 PROCEDURE — 99211 OFF/OP EST MAY X REQ PHY/QHP: CPT

## 2017-06-20 RX ORDER — ACETAMINOPHEN 325 MG/1
650 TABLET ORAL ONCE
Status: COMPLETED | OUTPATIENT
Start: 2017-06-20 | End: 2017-06-20

## 2017-06-20 RX ORDER — SODIUM CHLORIDE 0.9 % (FLUSH) 0.9 %
10-40 SYRINGE (ML) INJECTION AS NEEDED
Status: DISCONTINUED | OUTPATIENT
Start: 2017-06-20 | End: 2017-06-24 | Stop reason: HOSPADM

## 2017-06-20 RX ORDER — SODIUM CHLORIDE 9 MG/ML
250 INJECTION, SOLUTION INTRAVENOUS ONCE
Status: COMPLETED | OUTPATIENT
Start: 2017-06-20 | End: 2017-06-20

## 2017-06-20 RX ORDER — DIPHENHYDRAMINE HYDROCHLORIDE 50 MG/ML
50 INJECTION, SOLUTION INTRAMUSCULAR; INTRAVENOUS ONCE
Status: COMPLETED | OUTPATIENT
Start: 2017-06-20 | End: 2017-06-20

## 2017-06-20 RX ADMIN — SODIUM CHLORIDE 250 ML: 9 INJECTION, SOLUTION INTRAVENOUS at 10:50

## 2017-06-20 RX ADMIN — IMMUNE GLOBULIN INTRAVENOUS (HUMAN) 10 G: 5 INJECTION, SOLUTION INTRAVENOUS at 12:00

## 2017-06-20 RX ADMIN — ACETAMINOPHEN 650 MG: 325 TABLET ORAL at 10:58

## 2017-06-20 RX ADMIN — ERYTHROPOIETIN 40000 UNITS: 40000 INJECTION, SOLUTION INTRAVENOUS; SUBCUTANEOUS at 11:05

## 2017-06-20 RX ADMIN — IMMUNE GLOBULIN INTRAVENOUS (HUMAN) 10 G: 5 INJECTION, SOLUTION INTRAVENOUS at 13:15

## 2017-06-20 RX ADMIN — DEXAMETHASONE SODIUM PHOSPHATE 20 MG: 4 INJECTION, SOLUTION INTRA-ARTICULAR; INTRALESIONAL; INTRAMUSCULAR; INTRAVENOUS; SOFT TISSUE at 11:05

## 2017-06-20 RX ADMIN — ERYTHROPOIETIN 20000 UNITS: 20000 INJECTION, SOLUTION INTRAVENOUS; SUBCUTANEOUS at 11:05

## 2017-06-20 RX ADMIN — Medication 10 ML: at 10:25

## 2017-06-20 RX ADMIN — IMMUNE GLOBULIN INTRAVENOUS (HUMAN) 5 G: 5 INJECTION, SOLUTION INTRAVENOUS at 13:45

## 2017-06-20 RX ADMIN — DIPHENHYDRAMINE HYDROCHLORIDE 50 MG: 50 INJECTION, SOLUTION INTRAMUSCULAR; INTRAVENOUS at 10:58

## 2017-06-20 NOTE — PROGRESS NOTES
[facility] Progress Note    Date: 2017    Name: Mannie Armijo    MRN: [id number]         : 1965      Ms. Fabiano Lau arrived in the [facility] today, at 1010, in stable condition, here for Q 2 Week, CBC/Procrit injection, and Monthly IVIG Infusion. She was assessed and education was provided. Upon assessment today, Ms. Aguayo complained of feeling very tired, and also complained of Shortness of Breath on exertion. She also stated that she is currently having a very heavy menstrual flow. Ms. Shay Jimenez vitals were reviewed. Visit Vitals    /64 (BP 1 Location: Left arm, BP Patient Position: At rest;Sitting)    Pulse 100    Temp 98.8 °F (37.1 °C)    Resp 16    Ht 5' 7\" (1.702 m)    Wt 91.6 kg (202 lb)    Breastfeeding No    BMI 31.64 kg/m2               PIV was established in her left AC, at 1025, without incident, and a CBC was drawn. Lab results were obtained and reviewed, and the preliminary CBC results revealed the following:      WBC 5.8  ANC 4.4  HGB 6.6  HCT 20.0  PLT 98      The above CBC results, especially the critical H&H results, were reported to Erika Foley NP, at 1045. Ms. Fabiano Lau stated that she was scheduled to have her surgery tomorrow, at [facility] (Hysterectomy). Therefore, Ms. Fabiano Lau called her surgeon's office (Dr. Eric Hernandez), to notify her of her low H&H results, to see what the next plan would be. Originally, after speaking with her surgeon, the plan was for Ms. Aguayo to be admitted to [facility] this evening, to be transfused, and then, still proceed with her surgery tomorrow. However, Dr. Bernardino Pace office called Ms. Aguayo back while she was still here in the [facility] today, and let her know that her surgery for tomorrow has been cancelled, due to her low H&H, and rescheduled to . Also, per Ms. Fabiano Lau, Dr. Jessica Cunningham would like Dr. Paulie Barroso, to handle the scheduling of the needed blood transfusion. Erika Foley NP, was made aware. Procrit 60,000 units, was administered SQ, in her right arm, at 1105, as ordered, and without incident. Pre-medications consisting of PO Tylenol 650 mg, Benadryl 50 mg IV, and Decadron 20 mg IV, were all administered per order, and without incident.               IVIG 25 grams (25,000 mg) IV, was administered per order, and without incident.          After the completion of the IVIG, the PIV was flushed very well per protocol, and then, the PIV was removed and gauze/bandaid was applied. Ms. Oskar Nicole tolerated well, and had no complaints. Ms. Oskar Nicole was discharged from [place] [address] in stable condition at 1440. She is to report to [facility], on Thursday, 6-22-17, at 0900,  for her next appointment, to be Type & Crossmatched for 2 units of Blood. And then, she is to return to the [facility] on Friday, 6-23-17, at 0800, to receive 2 units of Blood. And then, her next Health system appointment back here in the [facility], is in 1 month, on Tuesday, 7-18-17, at 1000, for CBC/Procrit injection and IVIG infusion.  (Please note that she will not return to the [facility] in 2 weeks, for CBC/Procrit injection, when she would be due, because, she is having her hysterectomy on Wednesday, 7-5-17.)    Jing Webb RN  June 20, 2017  10:41 AM

## 2017-06-22 ENCOUNTER — HOSPITAL ENCOUNTER (OUTPATIENT)
Dept: INFUSION THERAPY | Age: 52
Discharge: HOME | End: 2017-06-22
Payer: COMMERCIAL

## 2017-06-22 PROCEDURE — 86920 COMPATIBILITY TEST SPIN: CPT | Performed by: INTERNAL MEDICINE

## 2017-06-22 PROCEDURE — 36415 COLL VENOUS BLD VENIPUNCTURE: CPT | Performed by: INTERNAL MEDICINE

## 2017-06-22 PROCEDURE — 36415 COLL VENOUS BLD VENIPUNCTURE: CPT

## 2017-06-22 PROCEDURE — 86900 BLOOD TYPING SEROLOGIC ABO: CPT | Performed by: INTERNAL MEDICINE

## 2017-06-22 RX ORDER — SODIUM CHLORIDE 9 MG/ML
250 INJECTION, SOLUTION INTRAVENOUS AS NEEDED
Status: DISCONTINUED | OUTPATIENT
Start: 2017-06-22 | End: 2017-06-26 | Stop reason: HOSPADM

## 2017-06-23 ENCOUNTER — HOSPITAL ENCOUNTER (OUTPATIENT)
Dept: INFUSION THERAPY | Age: 52
Discharge: HOME | End: 2017-06-23
Payer: COMMERCIAL

## 2017-06-23 VITALS
OXYGEN SATURATION: 100 % | TEMPERATURE: 98.2 F | DIASTOLIC BLOOD PRESSURE: 82 MMHG | RESPIRATION RATE: 20 BRPM | HEART RATE: 78 BPM | SYSTOLIC BLOOD PRESSURE: 137 MMHG

## 2017-06-23 PROCEDURE — 36430 TRANSFUSION BLD/BLD COMPNT: CPT

## 2017-06-23 PROCEDURE — 74011250637 HC RX REV CODE- 250/637: Performed by: INTERNAL MEDICINE

## 2017-06-23 PROCEDURE — 74011250636 HC RX REV CODE- 250/636: Performed by: INTERNAL MEDICINE

## 2017-06-23 PROCEDURE — 77030013169 SET IV BLD ICUM -A

## 2017-06-23 PROCEDURE — P9016 RBC LEUKOCYTES REDUCED: HCPCS | Performed by: INTERNAL MEDICINE

## 2017-06-23 RX ORDER — SODIUM CHLORIDE 9 MG/ML
250 INJECTION, SOLUTION INTRAVENOUS AS NEEDED
Status: DISCONTINUED | OUTPATIENT
Start: 2017-06-23 | End: 2017-06-27 | Stop reason: HOSPADM

## 2017-06-23 RX ORDER — DIPHENHYDRAMINE HYDROCHLORIDE 50 MG/ML
50 INJECTION, SOLUTION INTRAMUSCULAR; INTRAVENOUS ONCE
Status: DISCONTINUED | OUTPATIENT
Start: 2017-06-23 | End: 2017-06-23

## 2017-06-23 RX ORDER — DIPHENHYDRAMINE HCL 25 MG
25 CAPSULE ORAL ONCE
Status: COMPLETED | OUTPATIENT
Start: 2017-06-23 | End: 2017-06-23

## 2017-06-23 RX ORDER — ACETAMINOPHEN 325 MG/1
650 TABLET ORAL ONCE
Status: COMPLETED | OUTPATIENT
Start: 2017-06-23 | End: 2017-06-23

## 2017-06-23 RX ORDER — SODIUM CHLORIDE 0.9 % (FLUSH) 0.9 %
10-40 SYRINGE (ML) INJECTION AS NEEDED
Status: DISCONTINUED | OUTPATIENT
Start: 2017-06-23 | End: 2017-06-27 | Stop reason: HOSPADM

## 2017-06-23 RX ADMIN — DIPHENHYDRAMINE HYDROCHLORIDE 25 MG: 25 CAPSULE ORAL at 08:47

## 2017-06-23 RX ADMIN — ACETAMINOPHEN 650 MG: 325 TABLET ORAL at 08:34

## 2017-06-23 RX ADMIN — SODIUM CHLORIDE 250 ML: 900 INJECTION, SOLUTION INTRAVENOUS at 08:30

## 2017-06-23 RX ADMIN — Medication 10 ML: at 08:29

## 2017-06-23 NOTE — PROGRESS NOTES
OUTPATIENT INFUSION CENTER    DISCHARGE INSTRUCTIONS FOR:  BLOOD TRANSFUSION    We hope you are feeling better after your blood transfusion. Some mild tenderness or slight bruising at your IV site is normal.  Avoid lifting or heavy use of that extremity for the rest of the day. Drink plenty of fluids, eat a normal diet and get some rest.    There are some important signs that you need to watch for in case you experience a delayed reaction to the blood you have received. Call your physician immediately if you develop any of the following symptoms:    1. Severe headache or backache;    2. Fever above 100 degrees;    3. Chills;    4. Difficulty breathing;    5.  Blood or red color in urine;    6. The feeling of weakness or constant fatigue;    7. Yellowing of the whites of your eyes or skin (jaundice). If your physician is not available, call or go to the nearest emergency room, or dial 911.     Inder Rodriguez, Signature: ___________________________ 6/23/2017  Daniel Tellez RN

## 2017-06-23 NOTE — PROGRESS NOTES
[facility] Progress Note    Date: 2017    Name: Helene Rowland    MRN: [id number]         : 1965      Ms. Lorenzo Epps was assessed and education was provided. Blood consent obtained. Ms. Narinder Madera vitals were reviewed and patient was observed for 5 minutes prior to treatment. Patient Vitals for the past 24 hrs:   Temp Pulse Resp BP SpO2   17 1341 98.2 °F (36.8 °C) 78 20 137/82 100 %   17 1238 97.9 °F (36.6 °C) 72 18 107/70 100 %   17 1207 98.5 °F (36.9 °C) 79 20 122/82 100 %   17 1152 98.3 °F (36.8 °C) 78 20 114/75 100 %   17 1130 98.4 °F (36.9 °C) 81 20 105/68 100 %   17 0948 98.1 °F (36.7 °C) 77 20 117/74 100 %   17 0920 98.1 °F (36.7 °C) 77 20 131/79 100 %   17 0817 98 °F (36.7 °C) 89 20 126/74 100 %       Visit Vitals    /82 (BP 1 Location: Left arm, BP Patient Position: At rest)    Pulse 78    Temp 98.2 °F (36.8 °C)    Resp 20    SpO2 100%   #22 jelco started in right ac x1 attempt. NS infusing at [facility]. Pre-medications were administered as ordered and First unit of PRBC was initiated. Each unit of PRBC was infused over 2 hours. Upon completion of blood line flushed with 50cc NS. Pt refused to stay for observation period. Blood discharge instruction read to patient and copy sent home with patient. PIV dc'd 2x2 and coban applied. Ms. Lorenzo Epps tolerated the infusion, and had no complaints. Patient armband removed and shredded. Ms. Lorenzo Epps was discharged from [place] [address] in stable condition at 1400. She has no further appointments here at [facility]. Will follow up with PCP.     Fawad Loo RN  2017  3:59 PM

## 2017-06-24 LAB
ABO + RH BLD: NORMAL
BLD PROD TYP BPU: NORMAL
BLD PROD TYP BPU: NORMAL
BLOOD GROUP ANTIBODIES SERPL: NORMAL
BPU ID: NORMAL
BPU ID: NORMAL
CROSSMATCH RESULT,%XM: NORMAL
CROSSMATCH RESULT,%XM: NORMAL
SPECIMEN EXP DATE BLD: NORMAL
STATUS OF UNIT,%ST: NORMAL
STATUS OF UNIT,%ST: NORMAL
UNIT DIVISION, %UDIV: 0
UNIT DIVISION, %UDIV: 0

## 2017-06-30 ENCOUNTER — HOSPITAL ENCOUNTER (OUTPATIENT)
Dept: LAB | Age: 52
Discharge: HOME | End: 2017-06-30

## 2017-06-30 ENCOUNTER — ANESTHESIA EVENT (OUTPATIENT)
Dept: SURGERY | Age: 52
End: 2017-06-30
Payer: COMMERCIAL

## 2017-06-30 ENCOUNTER — HOSPITAL ENCOUNTER (OUTPATIENT)
Dept: PREADMISSION TESTING | Age: 52
Discharge: HOME | End: 2017-06-30

## 2017-06-30 DIAGNOSIS — Z01.818 PRE-OP EXAM: ICD-10-CM

## 2017-06-30 LAB — SENTARA SPECIMEN COL,SENBCF: NORMAL

## 2017-06-30 PROCEDURE — 99001 SPECIMEN HANDLING PT-LAB: CPT | Performed by: OBSTETRICS & GYNECOLOGY

## 2017-07-03 ENCOUNTER — HOSPITAL ENCOUNTER (OUTPATIENT)
Dept: LAB | Age: 52
Discharge: HOME | End: 2017-07-03
Payer: COMMERCIAL

## 2017-07-03 LAB
ABO + RH BLD: NORMAL
BLOOD GROUP ANTIBODIES SERPL: NORMAL
SPECIMEN EXP DATE BLD: NORMAL

## 2017-07-03 PROCEDURE — 36415 COLL VENOUS BLD VENIPUNCTURE: CPT | Performed by: OBSTETRICS & GYNECOLOGY

## 2017-07-03 PROCEDURE — 86900 BLOOD TYPING SEROLOGIC ABO: CPT | Performed by: OBSTETRICS & GYNECOLOGY

## 2017-07-05 ENCOUNTER — HOSPITAL ENCOUNTER (OUTPATIENT)
Age: 52
Setting detail: OBSERVATION
Discharge: HOME | End: 2017-07-06
Attending: OBSTETRICS & GYNECOLOGY | Admitting: OBSTETRICS & GYNECOLOGY
Payer: COMMERCIAL

## 2017-07-05 ENCOUNTER — ANESTHESIA (OUTPATIENT)
Dept: SURGERY | Age: 52
End: 2017-07-05
Payer: COMMERCIAL

## 2017-07-05 LAB
ANION GAP BLD CALC-SCNC: 9 MMOL/L (ref 3–18)
BUN SERPL-MCNC: 31 MG/DL (ref 7–18)
BUN/CREAT SERPL: 15 (ref 12–20)
CALCIUM SERPL-MCNC: 8.5 MG/DL (ref 8.5–10.1)
CHLORIDE SERPL-SCNC: 108 MMOL/L (ref 100–108)
CO2 SERPL-SCNC: 22 MMOL/L (ref 21–32)
CREAT SERPL-MCNC: 2.09 MG/DL (ref 0.6–1.3)
ERYTHROCYTE [DISTWIDTH] IN BLOOD BY AUTOMATED COUNT: 14.8 % (ref 11.6–14.5)
GLUCOSE BLD STRIP.AUTO-MCNC: 149 MG/DL (ref 70–110)
GLUCOSE BLD STRIP.AUTO-MCNC: 166 MG/DL (ref 70–110)
GLUCOSE BLD STRIP.AUTO-MCNC: 203 MG/DL (ref 70–110)
GLUCOSE BLD STRIP.AUTO-MCNC: 240 MG/DL (ref 70–110)
GLUCOSE SERPL-MCNC: 169 MG/DL (ref 74–99)
HCG SERPL QL: NEGATIVE
HCT VFR BLD AUTO: 30.2 % (ref 35–45)
HGB BLD-MCNC: 9.9 G/DL (ref 12–16)
MCH RBC QN AUTO: 28.5 PG (ref 24–34)
MCHC RBC AUTO-ENTMCNC: 32.8 G/DL (ref 31–37)
MCV RBC AUTO: 87 FL (ref 74–97)
PLATELET # BLD AUTO: 92 K/UL (ref 135–420)
PMV BLD AUTO: 13.9 FL (ref 9.2–11.8)
POTASSIUM SERPL-SCNC: 4.6 MMOL/L (ref 3.5–5.5)
RBC # BLD AUTO: 3.47 M/UL (ref 4.2–5.3)
SODIUM SERPL-SCNC: 139 MMOL/L (ref 136–145)
WBC # BLD AUTO: 7.5 K/UL (ref 4.6–13.2)

## 2017-07-05 PROCEDURE — 77030034850: Performed by: OBSTETRICS & GYNECOLOGY

## 2017-07-05 PROCEDURE — 77030008756 HC TU IRR SUC STRY -B: Performed by: OBSTETRICS & GYNECOLOGY

## 2017-07-05 PROCEDURE — 74011250637 HC RX REV CODE- 250/637: Performed by: ANESTHESIOLOGY

## 2017-07-05 PROCEDURE — 74011000258 HC RX REV CODE- 258: Performed by: OBSTETRICS & GYNECOLOGY

## 2017-07-05 PROCEDURE — 84703 CHORIONIC GONADOTROPIN ASSAY: CPT | Performed by: OBSTETRICS & GYNECOLOGY

## 2017-07-05 PROCEDURE — 74011250636 HC RX REV CODE- 250/636: Performed by: OBSTETRICS & GYNECOLOGY

## 2017-07-05 PROCEDURE — 80048 BASIC METABOLIC PNL TOTAL CA: CPT | Performed by: ANESTHESIOLOGY

## 2017-07-05 PROCEDURE — 82962 GLUCOSE BLOOD TEST: CPT

## 2017-07-05 PROCEDURE — 77030021454 HC SUT VLOC ABS COVD -B: Performed by: OBSTETRICS & GYNECOLOGY

## 2017-07-05 PROCEDURE — 77030012012 HC AIRWY OP SFT TELE -A: Performed by: ANESTHESIOLOGY

## 2017-07-05 PROCEDURE — 77030032490 HC SLV COMPR SCD KNE COVD -B: Performed by: OBSTETRICS & GYNECOLOGY

## 2017-07-05 PROCEDURE — 74011636637 HC RX REV CODE- 636/637: Performed by: OBSTETRICS & GYNECOLOGY

## 2017-07-05 PROCEDURE — 77030020782 HC GWN BAIR PAWS FLX 3M -B: Performed by: OBSTETRICS & GYNECOLOGY

## 2017-07-05 PROCEDURE — 77030026438 HC STYL ET INTUB CARD -A: Performed by: ANESTHESIOLOGY

## 2017-07-05 PROCEDURE — 77030035277 HC OBTRTR BLDELSS DISP INTU -B: Performed by: OBSTETRICS & GYNECOLOGY

## 2017-07-05 PROCEDURE — 76210000016 HC OR PH I REC 1 TO 1.5 HR: Performed by: OBSTETRICS & GYNECOLOGY

## 2017-07-05 PROCEDURE — 77030011640 HC PAD GRND REM COVD -A: Performed by: OBSTETRICS & GYNECOLOGY

## 2017-07-05 PROCEDURE — 74011250636 HC RX REV CODE- 250/636

## 2017-07-05 PROCEDURE — 85027 COMPLETE CBC AUTOMATED: CPT | Performed by: ANESTHESIOLOGY

## 2017-07-05 PROCEDURE — 99218 HC RM OBSERVATION: CPT

## 2017-07-05 PROCEDURE — 76060000035 HC ANESTHESIA 2 TO 2.5 HR: Performed by: OBSTETRICS & GYNECOLOGY

## 2017-07-05 PROCEDURE — 74011000250 HC RX REV CODE- 250: Performed by: OBSTETRICS & GYNECOLOGY

## 2017-07-05 PROCEDURE — 77030020268 HC MISC GENERAL SUPPLY: Performed by: OBSTETRICS & GYNECOLOGY

## 2017-07-05 PROCEDURE — 77030013288 HC BLN OCCL PERITNM COOP -B: Performed by: OBSTETRICS & GYNECOLOGY

## 2017-07-05 PROCEDURE — 77030008683 HC TU ET CUF COVD -A: Performed by: ANESTHESIOLOGY

## 2017-07-05 PROCEDURE — 88307 TISSUE EXAM BY PATHOLOGIST: CPT | Performed by: OBSTETRICS & GYNECOLOGY

## 2017-07-05 PROCEDURE — 77030020703 HC SEAL CANN DISP INTU -B: Performed by: OBSTETRICS & GYNECOLOGY

## 2017-07-05 PROCEDURE — 74011636637 HC RX REV CODE- 636/637: Performed by: NURSE ANESTHETIST, CERTIFIED REGISTERED

## 2017-07-05 PROCEDURE — 77030011256 HC DRSG MEPILEX <16IN NO BORD MOLN -A: Performed by: OBSTETRICS & GYNECOLOGY

## 2017-07-05 PROCEDURE — 77030002933 HC SUT MCRYL J&J -A: Performed by: OBSTETRICS & GYNECOLOGY

## 2017-07-05 PROCEDURE — 74011000250 HC RX REV CODE- 250

## 2017-07-05 PROCEDURE — 76010000876 HC OR TIME 2 TO 2.5HR INTENSV - TIER 2: Performed by: OBSTETRICS & GYNECOLOGY

## 2017-07-05 PROCEDURE — 74011250636 HC RX REV CODE- 250/636: Performed by: ANESTHESIOLOGY

## 2017-07-05 PROCEDURE — 77030018836 HC SOL IRR NACL ICUM -A: Performed by: OBSTETRICS & GYNECOLOGY

## 2017-07-05 PROCEDURE — 77030031139 HC SUT VCRL2 J&J -A: Performed by: OBSTETRICS & GYNECOLOGY

## 2017-07-05 PROCEDURE — 77030020255 HC SOL INJ LR 1000ML BG: Performed by: OBSTETRICS & GYNECOLOGY

## 2017-07-05 PROCEDURE — 77030014063 HC TIP MANIP UTER COOP -B: Performed by: OBSTETRICS & GYNECOLOGY

## 2017-07-05 PROCEDURE — 74011250637 HC RX REV CODE- 250/637: Performed by: OBSTETRICS & GYNECOLOGY

## 2017-07-05 RX ORDER — OXYCODONE AND ACETAMINOPHEN 5; 325 MG/1; MG/1
2 TABLET ORAL
Status: DISCONTINUED | OUTPATIENT
Start: 2017-07-05 | End: 2017-07-06 | Stop reason: HOSPADM

## 2017-07-05 RX ORDER — SODIUM CHLORIDE 0.9 % (FLUSH) 0.9 %
5-10 SYRINGE (ML) INJECTION AS NEEDED
Status: DISCONTINUED | OUTPATIENT
Start: 2017-07-05 | End: 2017-07-06 | Stop reason: HOSPADM

## 2017-07-05 RX ORDER — ROCURONIUM BROMIDE 10 MG/ML
INJECTION, SOLUTION INTRAVENOUS AS NEEDED
Status: DISCONTINUED | OUTPATIENT
Start: 2017-07-05 | End: 2017-07-05 | Stop reason: HOSPADM

## 2017-07-05 RX ORDER — AMLODIPINE BESYLATE 10 MG/1
10 TABLET ORAL DAILY
Status: DISCONTINUED | OUTPATIENT
Start: 2017-07-05 | End: 2017-07-06 | Stop reason: HOSPADM

## 2017-07-05 RX ORDER — DEXTROSE 50 % IN WATER (D50W) INTRAVENOUS SYRINGE
25-50 AS NEEDED
Status: DISCONTINUED | OUTPATIENT
Start: 2017-07-05 | End: 2017-07-05 | Stop reason: HOSPADM

## 2017-07-05 RX ORDER — MAGNESIUM SULFATE 100 %
16 CRYSTALS MISCELLANEOUS AS NEEDED
Status: DISCONTINUED | OUTPATIENT
Start: 2017-07-05 | End: 2017-07-05 | Stop reason: SDUPTHER

## 2017-07-05 RX ORDER — SODIUM CHLORIDE, SODIUM LACTATE, POTASSIUM CHLORIDE, CALCIUM CHLORIDE 600; 310; 30; 20 MG/100ML; MG/100ML; MG/100ML; MG/100ML
75 INJECTION, SOLUTION INTRAVENOUS CONTINUOUS
Status: DISCONTINUED | OUTPATIENT
Start: 2017-07-05 | End: 2017-07-05 | Stop reason: HOSPADM

## 2017-07-05 RX ORDER — INSULIN LISPRO 100 [IU]/ML
INJECTION, SOLUTION INTRAVENOUS; SUBCUTANEOUS ONCE
Status: COMPLETED | OUTPATIENT
Start: 2017-07-05 | End: 2017-07-05

## 2017-07-05 RX ORDER — FAMOTIDINE 20 MG/1
20 TABLET, FILM COATED ORAL ONCE
Status: COMPLETED | OUTPATIENT
Start: 2017-07-05 | End: 2017-07-05

## 2017-07-05 RX ORDER — HYDROMORPHONE HYDROCHLORIDE 2 MG/ML
0.5 INJECTION, SOLUTION INTRAMUSCULAR; INTRAVENOUS; SUBCUTANEOUS
Status: COMPLETED | OUTPATIENT
Start: 2017-07-05 | End: 2017-07-05

## 2017-07-05 RX ORDER — BUPIVACAINE HYDROCHLORIDE AND EPINEPHRINE 2.5; 5 MG/ML; UG/ML
INJECTION, SOLUTION EPIDURAL; INFILTRATION; INTRACAUDAL; PERINEURAL AS NEEDED
Status: DISCONTINUED | OUTPATIENT
Start: 2017-07-05 | End: 2017-07-05 | Stop reason: HOSPADM

## 2017-07-05 RX ORDER — KETOROLAC TROMETHAMINE 30 MG/ML
30 INJECTION, SOLUTION INTRAMUSCULAR; INTRAVENOUS
Status: ACTIVE | OUTPATIENT
Start: 2017-07-05 | End: 2017-07-06

## 2017-07-05 RX ORDER — NEOSTIGMINE METHYLSULFATE 5 MG/5 ML
SYRINGE (ML) INTRAVENOUS AS NEEDED
Status: DISCONTINUED | OUTPATIENT
Start: 2017-07-05 | End: 2017-07-05 | Stop reason: HOSPADM

## 2017-07-05 RX ORDER — ONDANSETRON 2 MG/ML
INJECTION INTRAMUSCULAR; INTRAVENOUS AS NEEDED
Status: DISCONTINUED | OUTPATIENT
Start: 2017-07-05 | End: 2017-07-05 | Stop reason: HOSPADM

## 2017-07-05 RX ORDER — MAGNESIUM SULFATE 100 %
4 CRYSTALS MISCELLANEOUS AS NEEDED
Status: DISCONTINUED | OUTPATIENT
Start: 2017-07-05 | End: 2017-07-05 | Stop reason: HOSPADM

## 2017-07-05 RX ORDER — MIDAZOLAM HYDROCHLORIDE 1 MG/ML
INJECTION, SOLUTION INTRAMUSCULAR; INTRAVENOUS AS NEEDED
Status: DISCONTINUED | OUTPATIENT
Start: 2017-07-05 | End: 2017-07-05 | Stop reason: HOSPADM

## 2017-07-05 RX ORDER — KETOROLAC TROMETHAMINE 30 MG/ML
INJECTION, SOLUTION INTRAMUSCULAR; INTRAVENOUS
Status: DISPENSED
Start: 2017-07-05 | End: 2017-07-05

## 2017-07-05 RX ORDER — DEXTROSE 50 % IN WATER (D50W) INTRAVENOUS SYRINGE
25-50 AS NEEDED
Status: DISCONTINUED | OUTPATIENT
Start: 2017-07-05 | End: 2017-07-05 | Stop reason: SDUPTHER

## 2017-07-05 RX ORDER — DIPHENHYDRAMINE HCL 25 MG
25 CAPSULE ORAL
Status: DISCONTINUED | OUTPATIENT
Start: 2017-07-05 | End: 2017-07-06 | Stop reason: HOSPADM

## 2017-07-05 RX ORDER — OXYCODONE AND ACETAMINOPHEN 5; 325 MG/1; MG/1
1 TABLET ORAL
Status: DISCONTINUED | OUTPATIENT
Start: 2017-07-05 | End: 2017-07-06 | Stop reason: HOSPADM

## 2017-07-05 RX ORDER — SIMETHICONE 80 MG
80 TABLET,CHEWABLE ORAL
Status: DISCONTINUED | OUTPATIENT
Start: 2017-07-05 | End: 2017-07-06 | Stop reason: HOSPADM

## 2017-07-05 RX ORDER — FENTANYL CITRATE 50 UG/ML
INJECTION, SOLUTION INTRAMUSCULAR; INTRAVENOUS AS NEEDED
Status: DISCONTINUED | OUTPATIENT
Start: 2017-07-05 | End: 2017-07-05 | Stop reason: HOSPADM

## 2017-07-05 RX ORDER — SODIUM CHLORIDE 0.9 % (FLUSH) 0.9 %
5-10 SYRINGE (ML) INJECTION AS NEEDED
Status: DISCONTINUED | OUTPATIENT
Start: 2017-07-05 | End: 2017-07-05 | Stop reason: HOSPADM

## 2017-07-05 RX ORDER — HYDROMORPHONE HYDROCHLORIDE 2 MG/ML
INJECTION, SOLUTION INTRAMUSCULAR; INTRAVENOUS; SUBCUTANEOUS
Status: COMPLETED
Start: 2017-07-05 | End: 2017-07-05

## 2017-07-05 RX ORDER — INSULIN LISPRO 100 [IU]/ML
INJECTION, SOLUTION INTRAVENOUS; SUBCUTANEOUS
Status: DISCONTINUED | OUTPATIENT
Start: 2017-07-05 | End: 2017-07-06 | Stop reason: HOSPADM

## 2017-07-05 RX ORDER — PROPOFOL 10 MG/ML
INJECTION, EMULSION INTRAVENOUS AS NEEDED
Status: DISCONTINUED | OUTPATIENT
Start: 2017-07-05 | End: 2017-07-05 | Stop reason: HOSPADM

## 2017-07-05 RX ORDER — INSULIN LISPRO 100 [IU]/ML
INJECTION, SOLUTION INTRAVENOUS; SUBCUTANEOUS ONCE
Status: DISCONTINUED | OUTPATIENT
Start: 2017-07-05 | End: 2017-07-05 | Stop reason: HOSPADM

## 2017-07-05 RX ORDER — DIPHENHYDRAMINE HYDROCHLORIDE 50 MG/ML
12.5 INJECTION, SOLUTION INTRAMUSCULAR; INTRAVENOUS
Status: DISCONTINUED | OUTPATIENT
Start: 2017-07-05 | End: 2017-07-06 | Stop reason: HOSPADM

## 2017-07-05 RX ORDER — SODIUM CHLORIDE 0.9 % (FLUSH) 0.9 %
5-10 SYRINGE (ML) INJECTION EVERY 8 HOURS
Status: DISCONTINUED | OUTPATIENT
Start: 2017-07-05 | End: 2017-07-06 | Stop reason: HOSPADM

## 2017-07-05 RX ORDER — LIDOCAINE HYDROCHLORIDE 20 MG/ML
INJECTION, SOLUTION EPIDURAL; INFILTRATION; INTRACAUDAL; PERINEURAL AS NEEDED
Status: DISCONTINUED | OUTPATIENT
Start: 2017-07-05 | End: 2017-07-05 | Stop reason: HOSPADM

## 2017-07-05 RX ORDER — NALOXONE HYDROCHLORIDE 0.4 MG/ML
0.4 INJECTION, SOLUTION INTRAMUSCULAR; INTRAVENOUS; SUBCUTANEOUS AS NEEDED
Status: DISCONTINUED | OUTPATIENT
Start: 2017-07-05 | End: 2017-07-06 | Stop reason: HOSPADM

## 2017-07-05 RX ORDER — GLYCOPYRROLATE 0.2 MG/ML
INJECTION INTRAMUSCULAR; INTRAVENOUS AS NEEDED
Status: DISCONTINUED | OUTPATIENT
Start: 2017-07-05 | End: 2017-07-05 | Stop reason: HOSPADM

## 2017-07-05 RX ORDER — SUCCINYLCHOLINE CHLORIDE 20 MG/ML
INJECTION INTRAMUSCULAR; INTRAVENOUS AS NEEDED
Status: DISCONTINUED | OUTPATIENT
Start: 2017-07-05 | End: 2017-07-05 | Stop reason: HOSPADM

## 2017-07-05 RX ORDER — FAMOTIDINE 20 MG/1
20 TABLET, FILM COATED ORAL ONCE
Status: DISCONTINUED | OUTPATIENT
Start: 2017-07-05 | End: 2017-07-05 | Stop reason: HOSPADM

## 2017-07-05 RX ORDER — ONDANSETRON 2 MG/ML
4 INJECTION INTRAMUSCULAR; INTRAVENOUS
Status: DISCONTINUED | OUTPATIENT
Start: 2017-07-05 | End: 2017-07-06 | Stop reason: HOSPADM

## 2017-07-05 RX ADMIN — FAMOTIDINE 20 MG: 20 TABLET ORAL at 06:16

## 2017-07-05 RX ADMIN — GLYCOPYRROLATE 0.8 MG: 0.2 INJECTION INTRAMUSCULAR; INTRAVENOUS at 09:32

## 2017-07-05 RX ADMIN — SODIUM CHLORIDE, SODIUM LACTATE, POTASSIUM CHLORIDE, AND CALCIUM CHLORIDE: 600; 310; 30; 20 INJECTION, SOLUTION INTRAVENOUS at 07:41

## 2017-07-05 RX ADMIN — Medication 4 MG: at 09:32

## 2017-07-05 RX ADMIN — HYDROMORPHONE HYDROCHLORIDE 0.5 MG: 2 INJECTION, SOLUTION INTRAMUSCULAR; INTRAVENOUS; SUBCUTANEOUS at 10:17

## 2017-07-05 RX ADMIN — PROPOFOL 30 MG: 10 INJECTION, EMULSION INTRAVENOUS at 08:15

## 2017-07-05 RX ADMIN — HYDROMORPHONE HYDROCHLORIDE 0.5 MG: 2 INJECTION, SOLUTION INTRAMUSCULAR; INTRAVENOUS; SUBCUTANEOUS at 10:07

## 2017-07-05 RX ADMIN — ONDANSETRON 4 MG: 2 INJECTION INTRAMUSCULAR; INTRAVENOUS at 09:30

## 2017-07-05 RX ADMIN — HYDROMORPHONE HYDROCHLORIDE 0.5 MG: 2 INJECTION, SOLUTION INTRAMUSCULAR; INTRAVENOUS; SUBCUTANEOUS at 09:57

## 2017-07-05 RX ADMIN — OXYCODONE AND ACETAMINOPHEN 1 TABLET: 5; 325 TABLET ORAL at 22:10

## 2017-07-05 RX ADMIN — CEFOTETAN DISODIUM 2 G: 2 INJECTION, POWDER, FOR SOLUTION INTRAMUSCULAR; INTRAVENOUS at 07:41

## 2017-07-05 RX ADMIN — SUCCINYLCHOLINE CHLORIDE 120 MG: 20 INJECTION INTRAMUSCULAR; INTRAVENOUS at 07:47

## 2017-07-05 RX ADMIN — FENTANYL CITRATE 50 MCG: 50 INJECTION, SOLUTION INTRAMUSCULAR; INTRAVENOUS at 08:22

## 2017-07-05 RX ADMIN — PROPOFOL 170 MG: 10 INJECTION, EMULSION INTRAVENOUS at 07:47

## 2017-07-05 RX ADMIN — OXYCODONE AND ACETAMINOPHEN 1 TABLET: 5; 325 TABLET ORAL at 17:38

## 2017-07-05 RX ADMIN — SODIUM CHLORIDE, SODIUM LACTATE, POTASSIUM CHLORIDE, AND CALCIUM CHLORIDE: 600; 310; 30; 20 INJECTION, SOLUTION INTRAVENOUS at 08:30

## 2017-07-05 RX ADMIN — LIDOCAINE HYDROCHLORIDE 60 MG: 20 INJECTION, SOLUTION EPIDURAL; INFILTRATION; INTRACAUDAL; PERINEURAL at 07:47

## 2017-07-05 RX ADMIN — FENTANYL CITRATE 50 MCG: 50 INJECTION, SOLUTION INTRAMUSCULAR; INTRAVENOUS at 08:15

## 2017-07-05 RX ADMIN — INSULIN LISPRO 6 UNITS: 100 INJECTION, SOLUTION INTRAVENOUS; SUBCUTANEOUS at 10:03

## 2017-07-05 RX ADMIN — HYDROMORPHONE HYDROCHLORIDE 0.5 MG: 2 INJECTION, SOLUTION INTRAMUSCULAR; INTRAVENOUS; SUBCUTANEOUS at 10:27

## 2017-07-05 RX ADMIN — SIMETHICONE CHEW TAB 80 MG 80 MG: 80 TABLET ORAL at 22:10

## 2017-07-05 RX ADMIN — KETOROLAC TROMETHAMINE 30 MG: 30 INJECTION, SOLUTION INTRAMUSCULAR at 10:23

## 2017-07-05 RX ADMIN — ROCURONIUM BROMIDE 25 MG: 10 INJECTION, SOLUTION INTRAVENOUS at 07:52

## 2017-07-05 RX ADMIN — MIDAZOLAM HYDROCHLORIDE 2 MG: 1 INJECTION, SOLUTION INTRAMUSCULAR; INTRAVENOUS at 07:41

## 2017-07-05 RX ADMIN — FENTANYL CITRATE 100 MCG: 50 INJECTION, SOLUTION INTRAMUSCULAR; INTRAVENOUS at 07:47

## 2017-07-05 RX ADMIN — ROCURONIUM BROMIDE 5 MG: 10 INJECTION, SOLUTION INTRAVENOUS at 07:47

## 2017-07-05 RX ADMIN — OXYCODONE AND ACETAMINOPHEN 1 TABLET: 5; 325 TABLET ORAL at 13:40

## 2017-07-05 RX ADMIN — INSULIN LISPRO 4 UNITS: 100 INJECTION, SOLUTION INTRAVENOUS; SUBCUTANEOUS at 18:04

## 2017-07-05 RX ADMIN — FENTANYL CITRATE 50 MCG: 50 INJECTION, SOLUTION INTRAMUSCULAR; INTRAVENOUS at 08:23

## 2017-07-05 NOTE — PROGRESS NOTES
conducted a pre-surgery visit with Dianne West, who is a 50 y.o.,female. The  provided the following Interventions:  Initiated a relationship of care and support. Plan:  Chaplains will continue to follow and will provide pastoral care on an as needed/requested basis.  recommends bedside caregivers page  on duty if patient shows signs of acute spiritual or emotional distress.     [facility] Certified [address] [place] [address]   [phone]

## 2017-07-05 NOTE — ROUTINE PROCESS
TRANSFER - IN REPORT:    Verbal report received from [address] Coffey (name) on Migdalia Saenz  being received from [employer]) for routine progression of care      Report consisted of patients Situation, Background, Assessment and   Recommendations(SBAR). Information from the following report(s) SBAR, MAR and Recent Results was reviewed with the receiving nurse. Opportunity for questions and clarification was provided. Assessment completed upon patients arrival to unit and care assumed. 1145- Patient assessment completed, no distress noted will continue to monitor. 1700- patient up walking in the walls, no complaints,     1845- Dr Rosaura Mancera called about the patient blood pressure, (167/80). Order to continue to monitor and call if its increases. Patient trying to void, call Dr Rosaura Mancera when patient voids.

## 2017-07-05 NOTE — H&P
Gynecology History and Physical    Name: Lester Melissa MRN: [id number] SSN: [id number]    YOB: 1965  Age: 50 y.o. Sex: female       Subjective:      Chief complaint:  Fibroids and Menorrhagia    Vinayak Sharma is a 50 y.o.  female with a history of fibroids and menorrhagia. Previous workup included Ultrasound which revealed fibroid(s). Previous treatment measures included iron supplements, nonsteroidal anti-inflammatory drugs (NSAIDs) and multiple blood transfusions. She is admitted for Procedure(s) (LRB):  ROBOTIC ASSISTED TOTAL LAPAROSCOPIC HYSTERECTOMY/BILATERAL SALPINGECTOMY (N/A). The current method of family planning is tubal ligation. OB History     No data available        Past Medical History:   Diagnosis Date    Anemia     Arthritis     Diabetes ([address]     Hypertension     ITP (idiopathic thrombocytopenic purpura)      Past Surgical History:   Procedure Laterality Date    HX GYN      c section 1984    HX TUBAL LIGATION       Social History     Occupational History    Not on file. Social History Main Topics    Smoking status: Never Smoker    Smokeless tobacco: Never Used    Alcohol use No    Drug use: No    Sexual activity: Not on file     Family History   Problem Relation Age of Onset    Hypertension Mother     Cancer Father      Colon        No Known Allergies  Prior to Admission medications    Medication Sig Start Date End Date Taking? Authorizing Provider   metFORMIN (GLUCOPHAGE) 500 mg tablet Take 2 Tabs by mouth two (2) times daily (with meals). Indications: TYPE 2 DIABETES MELLITUS 7/31/16  Yes Steven Lee MD   lisinopril-hydrochlorothiazide (PRINZIDE, ZESTORETIC) 20-25 mg per tablet Take 1 Tab by mouth daily. Indications: HYPERTENSION   Yes Historical Provider   amLODIPine (NORVASC) 10 mg tablet Take 10 mg by mouth daily.  Indications: HYPERTENSION   Yes Historical Provider   ferrous sulfate 325 mg (65 mg iron) tablet Take 325 mg by mouth two (2) times a day. Yes Historical Provider        Review of Systems:  Pertinent items are noted in the History of Present Illness. Objective:     Vitals:    06/13/17 1235 07/05/17 0639   BP:  (!) 169/91   Pulse:  94   Resp:  16   Temp:  99.1 °F (37.3 °C)   SpO2:  100%   Weight: 93 kg (205 lb) 91.8 kg (202 lb 6 oz)   Height: 5' 8\" (1.727 m) 5' 7.5\" (1.715 m)       Physical Exam:  Deferred    Assessment:     Active Problems:    * No active hospital problems. *     Menorrhagia with fibroids    Plan:     Procedure(s) (LRB):  ROBOTIC ASSISTED TOTAL LAPAROSCOPIC HYSTERECTOMY/BILATERAL SALPINGECTOMY (N/A)  Discussed the risks of surgery including the risks of bleeding, infection, deep vein thrombosis, and surgical injuries to internal organs including but not limited to the bowels, bladder, rectum, and female reproductive organs. The patient understands the risks; any and all questions were answered to the patient's satisfaction.     Signed By:  Madeleine Abdul MD     July 5, 2017

## 2017-07-05 NOTE — ROUTINE PROCESS
Patient rounds    [phone]- patient in bed resting, family in room  [phone]- patient resting, family in room

## 2017-07-05 NOTE — ANESTHESIA POSTPROCEDURE EVALUATION
Post-Anesthesia Evaluation and Assessment    Patient: Loistine Boast MRN: [id number]  SSN: [id number]    YOB: 1965  Age: 50 y.o. Sex: female       Cardiovascular Function/Vital Signs  Visit Vitals    /73    Pulse 80    Temp 36.9 °C (98.4 °F)    Resp 12    Ht 5' 7.5\" (1.715 m)    Wt 91.8 kg (202 lb 6 oz)    SpO2 99%    BMI 31.23 kg/m2       Patient is status post general anesthesia for Procedure(s):  ROBOTIC ASSISTED TOTAL LAPAROSCOPIC HYSTERECTOMY/BILATERAL SALPINGECTOMY/LEFT OVARIAN CYSTECTOMY. Nausea/Vomiting: None    Postoperative hydration reviewed and adequate. Pain:  Pain Scale 1: Visual (07/05/17 1116)  Pain Intensity 1: 0 (07/05/17 1116)   Managed    Neurological Status:   Neuro (WDL): Within Defined Limits (07/05/17 0955)   At baseline    Mental Status and Level of Consciousness: Arousable    Pulmonary Status:   O2 Device: Room air (07/05/17 1010)   Adequate oxygenation and airway patent    Complications related to anesthesia: None    Post-anesthesia assessment completed.  No concerns    Signed By: Terry Cantor MD     July 5, 2017

## 2017-07-05 NOTE — ANESTHESIA PREPROCEDURE EVALUATION
Anesthetic History               Review of Systems / Medical History  Patient summary reviewed and pertinent labs reviewed    Pulmonary  Within defined limits                 Neuro/Psych   Within defined limits           Cardiovascular    Hypertension: well controlled              Exercise tolerance: <4 METS  Comments: ITP   GI/Hepatic/Renal         Renal disease: CRI       Endo/Other    Diabetes: poorly controlled, type 2    Obesity, arthritis and anemia  Pertinent negatives: No morbid obesity   Other Findings   Comments:   Risk Factors for Postoperative nausea/vomiting:       History of postoperative nausea/vomiting? NO       Female? YES       Motion sickness? NO       Intended opioid administration for postoperative analgesia? YES      Smoking Abstinence  Current Smoker? NO  Elective Surgery? YES  Seen preoperatively by anesthesiologist or proxy prior to day of surgery? YES  Pt abstained from smoking 24 hours prior to anesthesia?  N/A           Physical Exam    Airway  Mallampati: III  TM Distance: 4 - 6 cm  Neck ROM: normal range of motion   Mouth opening: Normal     Cardiovascular  Regular rate and rhythm,  S1 and S2 normal,  no murmur, click, rub, or gallop             Dental    Dentition: Upper partial plate and Lower partial plate     Pulmonary  Breath sounds clear to auscultation               Abdominal  GI exam deferred       Other Findings            Anesthetic Plan    ASA: 3  Anesthesia type: general          Induction: Intravenous  Anesthetic plan and risks discussed with: Patient

## 2017-07-05 NOTE — OP NOTES
[address]    Name:  Carmelo Rader  MR#:  [id number]  :  1965  Account #:  [de-identified]  Date of Adm:  2017  Date of Surgery:      PREOPERATIVE DIAGNOSIS:  Menorrhagia with fibroids. POSTOPERATIVE DIAGNOSIS:  Menorrhagia with fibroids. PHYSICIAN: Niharika Harrison MD    ASSISTANT: Saeid Bartholomew    PROCEDURES PERFORMED:    ESTIMATED BLOOD LOSS:  50 mL. SPECIMENS REMOVED:  Uterus, bilateral tubes and left ovarian cyst  wall. ANESTHESIA:  General.    FINDINGS: An 8-week size uterus with multiple fibroids. Normal  appearing right ovary. Left ovary with 2 hemorrhagic cysts, one  approximately 2-3 cm. The second one, 1-2 cm. COMPLICATIONS: None. PROCEDURE: After risks and benefits of the procedure was discussed  in detail with the patient, consent was signed and on the chart. She  went to the operating room, she was placed under general anesthesia. She was prepped and draped in the normal sterile fashion in the dorsal  lithotomy position and the adjustable Yellofin stirrups. A weighted  speculum was placed in the patient's vagina. Cervix visualized,  grasped the anterior lip with single toothed tenaculum. The uterus was  sounded to 8 cm. A figure-of-eight of Vicryl suture was then placed at 3  o'clock and 9 o'clock for cervical manipulation. The cervix was  sequentially dilated. Single toothed tenaculum was removed. An 8 cm  BLANKA uterine manipulator with a medium sized cup was inserted into  the endometrial cavity. The endometrial balloon was infiltrated without  difficulty. A Griffith catheter was placed and the vaginal occlude was  then inflated. Operators gloves were changed and attention was turned  to the patient's abdomen. An 11-blade scalpel was used to make an  incision at the umbilicus. A Veress needle was inserted,  pneumoperitoneum created without difficulty. An 8 mm trocar was then  directly inserted through this incision.  The laparoscope was then  inserted and the patient was placed in steep Trendelenburg. An 8 mm  trocar was then placed in both the right and the left lower quadrants  under direct visualization after 0.25% Marcaine with epinephrine had  been injected with spinal needle. A 5 mm trocar was then placed in the  right upper quadrant, again after 0.25% Marcaine with epinephrine had  been injected with spinal needle. Bowels were swept cephalad. The [employer] robot brought up to the patient and docked without difficulty. Surgeon then went to the operators console. Operators assistant then  grasped the right fallopian tube with an atraumatic grasper. Bipolar  cautery and monopolar scissors were used to cauterize and excise the  right tube. The left tube was excised in a similar fashion and both tubes  were removed from the right lower quadrant trocar. Attention was then  turned to the right ovary. Both of her cysts on the enlarged right ovary  were incised, noted to be hemorrhagic cyst. The cyst wall of both were  grasped with the atraumatic grasper and the cyst wall removed in its  entirety. Both cyst walls were then placed in an Endobag and left in the  posterior cul-de-sac until the end of the procedure. Bipolar cautery was  used to cauterize the left utero-ovarian ligament and vessels and round  ligament. These were then cut with monopolar scissors. The bladder  flap was created using the monopolar scissors. The left uterine artery  was identified, cauterized with bipolar cautery and cut with monopolar  scissors. Attention was then turned to the right side. In a similar fashion  the right utero-ovarian ligament vessels and broad and round ligament  were cauterized with the bipolar cautery and cut with the monopolar  scissors. Bladder flap was created from this opposing side. The bladder  flap was pushed down further and an anterior colpotomy was  performed using the monopolar scissors. This was then extended  laterally.  The uterus was then significantly anteverted. The posterior  colpotomy was performed until the blue BLANKA cup was easily visualized. This  was then extended laterally. Bipolar cautery was then used to cauterize  the cervical bridge of the uterine artery. These were then cut with  monopolar scissors and completion of the colpotomy was  accomplished. The uterus and the Endobag with both ovarian cyst  walls were then removed through the vaginal. Vaginal occlude was  then reinserted. The vaginal cuff was then closed in a running manner  with V-Loc suture. Excellent hemostasis was noted. The pelvis was  cupped, easily irrigated. Attention was also turned against the left  ovary to make sure there was no further bleeding from the base of the  hemorrhagic cyst. Bipolar cautery was used to cauterize one additional  part on the ovary and it was then noted to be hemostatic. The pelvis  was then copiously irrigated and there was no significant bleeding. Urine was noted to be clear at the end of the procedure. All  laparoscopic instruments were removed. The [employer] robot was  undocked from the patient, pushed away. Pneumoperitoneum was  reduced. Trocars were removed. The skin was closed with Monocryl. Sponge, lap and needle counts were correct x2 and the patient went to  the recovery room in stable condition.         MD JOCELYN Birmingham / JUAN C  D:  07/05/2017   09:35  T:  07/05/2017   11:54  Job #:  [id number]

## 2017-07-05 NOTE — PERIOP NOTES
TRANSFER - OUT REPORT:    Verbal report given to [facility]) on Guera Nugent  being transferred to [facility] (unit) for routine post - op       Report consisted of patients Situation, Background, Assessment and   Recommendations(SBAR). Information from the following report(s) SBAR, Kardex, OR Summary, Procedure Summary, Intake/Output, MAR, Recent Results and Med Rec Status was reviewed with the receiving nurse. Lines:   Peripheral IV 07/05/17 Left Wrist (Active)   Site Assessment Clean, dry, & intact 7/5/2017  9:55 AM   Phlebitis Assessment 0 7/5/2017  9:55 AM   Infiltration Assessment 0 7/5/2017  9:55 AM   Dressing Status Clean, dry, & intact 7/5/2017  9:55 AM   Dressing Type Tape;Transparent 7/5/2017  9:55 AM   Hub Color/Line Status Pink;Capped 7/5/2017  9:55 AM       Peripheral IV 07/05/17 Right Hand (Active)   Site Assessment Clean, dry, & intact 7/5/2017  9:55 AM   Phlebitis Assessment 0 7/5/2017  9:55 AM   Infiltration Assessment 0 7/5/2017  9:55 AM   Dressing Status Clean, dry, & intact 7/5/2017  9:55 AM   Dressing Type Tape;Transparent 7/5/2017  9:55 AM   Hub Color/Line Status Pink; Infusing 7/5/2017  9:55 AM   Action Taken Blood drawn 7/5/2017  6:42 AM        Opportunity for questions and clarification was provided.       Patient transported with:   SANDOW

## 2017-07-05 NOTE — PERIOP NOTES
Per Dr. Chaz Nguyen, do not give insulin coverage for pre-op Accucheck of 161g/dL. Patient has not ever taken insulin before.

## 2017-07-06 VITALS
WEIGHT: 202.38 LBS | SYSTOLIC BLOOD PRESSURE: 129 MMHG | HEIGHT: 68 IN | TEMPERATURE: 98.3 F | DIASTOLIC BLOOD PRESSURE: 75 MMHG | OXYGEN SATURATION: 98 % | RESPIRATION RATE: 18 BRPM | HEART RATE: 70 BPM | BODY MASS INDEX: 30.67 KG/M2

## 2017-07-06 LAB
ANION GAP BLD CALC-SCNC: 7 MMOL/L (ref 3–18)
BUN SERPL-MCNC: 28 MG/DL (ref 7–18)
BUN/CREAT SERPL: 13 (ref 12–20)
CALCIUM SERPL-MCNC: 7.8 MG/DL (ref 8.5–10.1)
CHLORIDE SERPL-SCNC: 106 MMOL/L (ref 100–108)
CO2 SERPL-SCNC: 25 MMOL/L (ref 21–32)
CREAT SERPL-MCNC: 2.24 MG/DL (ref 0.6–1.3)
ERYTHROCYTE [DISTWIDTH] IN BLOOD BY AUTOMATED COUNT: 14.5 % (ref 11.6–14.5)
GLUCOSE BLD STRIP.AUTO-MCNC: 126 MG/DL (ref 70–110)
GLUCOSE SERPL-MCNC: 118 MG/DL (ref 74–99)
HCT VFR BLD AUTO: 25 % (ref 35–45)
HGB BLD-MCNC: 8 G/DL (ref 12–16)
MCH RBC QN AUTO: 28.2 PG (ref 24–34)
MCHC RBC AUTO-ENTMCNC: 32 G/DL (ref 31–37)
MCV RBC AUTO: 88 FL (ref 74–97)
PLATELET # BLD AUTO: 76 K/UL (ref 135–420)
PMV BLD AUTO: 13 FL (ref 9.2–11.8)
POTASSIUM SERPL-SCNC: 4.7 MMOL/L (ref 3.5–5.5)
RBC # BLD AUTO: 2.84 M/UL (ref 4.2–5.3)
SODIUM SERPL-SCNC: 138 MMOL/L (ref 136–145)
WBC # BLD AUTO: 9.7 K/UL (ref 4.6–13.2)

## 2017-07-06 PROCEDURE — 36415 COLL VENOUS BLD VENIPUNCTURE: CPT | Performed by: OBSTETRICS & GYNECOLOGY

## 2017-07-06 PROCEDURE — 74011250637 HC RX REV CODE- 250/637: Performed by: OBSTETRICS & GYNECOLOGY

## 2017-07-06 PROCEDURE — 99218 HC RM OBSERVATION: CPT

## 2017-07-06 PROCEDURE — 80048 BASIC METABOLIC PNL TOTAL CA: CPT | Performed by: OBSTETRICS & GYNECOLOGY

## 2017-07-06 PROCEDURE — 85027 COMPLETE CBC AUTOMATED: CPT | Performed by: OBSTETRICS & GYNECOLOGY

## 2017-07-06 PROCEDURE — 82962 GLUCOSE BLOOD TEST: CPT

## 2017-07-06 RX ADMIN — OXYCODONE AND ACETAMINOPHEN 1 TABLET: 5; 325 TABLET ORAL at 06:03

## 2017-07-06 RX ADMIN — AMLODIPINE BESYLATE 10 MG: 10 TABLET ORAL at 08:38

## 2017-07-06 RX ADMIN — OXYCODONE AND ACETAMINOPHEN 1 TABLET: 5; 325 TABLET ORAL at 10:15

## 2017-07-06 NOTE — ROUTINE PROCESS
Bedside and Verbal shift change report given to IAM Talbot RN (oncoming nurse) by Hayley Munson RN (offgoing nurse). Report included the following information SBAR, Kardex and [facility]     2005- Dr. Shanti Matthews informed of BP and Pt voiding. No new orders. 0567- Dr. Shanti Matthews given progress report. No orders given.

## 2017-07-06 NOTE — PROGRESS NOTES
Post-Operative Day Number 1 Progress Note    Patient doing well post-op day 1 from a [place] [address] with bilateral salpingectomy without significant complaints. Pain controlled on current medication. Voiding without difficulty- multiple times during the night. Positive flatus and BM. No vaginal bleeding. Vitals:  Patient Vitals for the past 8 hrs:   BP Temp Pulse Resp SpO2   17 0721 129/75 98.3 °F (36.8 °C) 70 18 98 %   17 0400 121/69 98.7 °F (37.1 °C) 76 18 99 %     Temp (24hrs), Av.2 °F (36.8 °C), Min:97.8 °F (36.6 °C), Max:98.7 °F (37.1 °C)      Vital signs stable, afebrile. Exam:  Patient without distress. Abdomen soft, appropriately tender. Incisions dry and clean without erythema. Lower extremities are negative for swelling, cords or tenderness. Lab/Data Review: All lab results for the last 24 hours reviewed. Assessment and Plan:  Patient appears to be having uncomplicated post-op course. We discussed her baseline platlet count- she states that her plts usually run in the 70s and she get IVIG every month and has a f/u with hematology in the next 2 weeks. Discharge home- f/u in 2 weeks.

## 2017-07-06 NOTE — DISCHARGE INSTRUCTIONS
Laparoscopic Hysterectomy: What to Expect at [facility]    A hysterectomy is surgery to take out the uterus. In some cases, the ovaries and fallopian tubes also are taken out at the same time. You can expect to feel better and stronger each day, but you may need pain medicine for a week or two. You may get tired easily or have less energy than usual. The tiredness may last for several weeks after surgery. You will probably notice that your belly is swollen and puffy. This is common. The swelling will take several weeks to go down. You may take about 4 to 6 weeks to fully recover. It is important to avoid lifting while you are recovering so that you can heal.  This care sheet gives you a general idea about how long it will take for you to recover. But each person recovers at a different pace. Follow the steps below to get better as quickly as possible. How can you care for yourself at home? Activity  · Rest when you feel tired. · Be active. Walking is a good choice. · Allow the area to heal. Don't move quickly or lift anything heavy until you are feeling better. · You may shower 24 to 48 hours after surgery, if your doctor okays it. Pat the incision dry. Do not take a bath for the first 2 weeks, or until your doctor tells you it is okay. · Ask your doctor when it is okay for you to have sex. Diet  · You can eat your normal diet. If your stomach is upset, try bland, low-fat foods like plain rice, broiled chicken, toast, and yogurt. · If your bowel movements are not regular right after surgery, try to avoid constipation and straining. Drink plenty of water. Your doctor may suggest fiber, a stool softener, or a mild laxative. Medicines  · Your doctor will tell you if and when you can restart your medicines. He or she will also give you instructions about taking any new medicines.   · If you take blood thinners, such as warfarin (Coumadin), clopidogrel (Plavix), or aspirin, be sure to talk to your doctor. He or she will tell you if and when to start taking those medicines again. Make sure that you understand exactly what your doctor wants you to do. · Be safe with medicines. Read and follow all instructions on the label. ¨ If the doctor gave you a prescription medicine for pain, take it as prescribed. ¨ If you are not taking a prescription pain medicine, ask your doctor if you can take an over-the-counter medicine. · If your doctor prescribed antibiotics, take them as directed. Do not stop taking them just because you feel better. You need to take the full course of antibiotics. Incision care  · You may have stitches over the cuts (incisions) the doctor made in your belly. · If you have strips of tape on the cut (incision) the doctor made, leave the tape on for a week or until it falls off. · Wash the area daily with warm, soapy water, and pat it dry. Don't use hydrogen peroxide or alcohol. They can slow healing. · You may cover the area with a gauze bandage if it oozes fluid or rubs against clothing. · Change the bandage every day. Other instructions  · You may have some light vaginal bleeding. Wear sanitary pads if needed. Do not douche or use tampons. · Don't have sex until the doctor says it is okay. Follow-up care is a key part of your treatment and safety. Be sure to make and go to all appointments, and call your doctor if you are having problems. It's also a good idea to know your test results and keep a list of the medicines you take. When should you call for help? Call 911 anytime you think you may need emergency care. For example, call if:  · You passed out (lost consciousness). · You have sudden chest pain and shortness of breath, or you cough up blood. Call your doctor now or seek immediate medical care if:  · You have severe vaginal bleeding. This means that you are soaking through your usual pads every hour for 2 or more hours.   · You have sudden, severe pain in your belly or pelvis. · You have loose stitches, or your incision comes open. · You have signs of infection, such as:  ¨ Increased pain, swelling, warmth, or redness. ¨ Red streaks leading from the incision. ¨ Pus draining from the incision. ¨ Swollen lymph nodes in your neck, armpits, or groin. ¨ A fever. Watch closely for changes in your health, and be sure to contact your doctor if:  · You do not get better as expected. Where can you learn more? Go to http://michela-ivan.info/. Enter Q131 in the search box to learn more about \"Laparoscopic Hysterectomy: What to Expect at Home. \"  Current as of: October 13, 2016  Content Version: 11.3  © 8475-6461 [employer]. Care instructions adapted under license by [employer] (which disclaims liability or warranty for this information). If you have questions about a medical condition or this instruction, always ask your healthcare professional. [place] [address] any warranty or liability for your use of this information.

## 2017-07-18 ENCOUNTER — HOSPITAL ENCOUNTER (OUTPATIENT)
Dept: ONCOLOGY | Age: 52
Discharge: HOME | End: 2017-07-18

## 2017-07-18 ENCOUNTER — HOSPITAL ENCOUNTER (OUTPATIENT)
Dept: INFUSION THERAPY | Age: 52
Discharge: HOME | End: 2017-07-18
Payer: COMMERCIAL

## 2017-07-18 ENCOUNTER — CLINICAL SUPPORT (OUTPATIENT)
Dept: ONCOLOGY | Age: 52
End: 2017-07-18

## 2017-07-18 VITALS
BODY MASS INDEX: 32.18 KG/M2 | TEMPERATURE: 97 F | RESPIRATION RATE: 16 BRPM | DIASTOLIC BLOOD PRESSURE: 71 MMHG | SYSTOLIC BLOOD PRESSURE: 118 MMHG | HEART RATE: 75 BPM | HEIGHT: 67 IN | WEIGHT: 205 LBS

## 2017-07-18 DIAGNOSIS — D63.1 ANEMIA IN CKD (CHRONIC KIDNEY DISEASE): Primary | ICD-10-CM

## 2017-07-18 DIAGNOSIS — D63.1 ANEMIA IN CKD (CHRONIC KIDNEY DISEASE): ICD-10-CM

## 2017-07-18 DIAGNOSIS — N18.9 ANEMIA IN CKD (CHRONIC KIDNEY DISEASE): ICD-10-CM

## 2017-07-18 DIAGNOSIS — N18.9 ANEMIA IN CKD (CHRONIC KIDNEY DISEASE): Primary | ICD-10-CM

## 2017-07-18 LAB
BASO+EOS+MONOS # BLD AUTO: 0.4 K/UL (ref 0–2.3)
BASO+EOS+MONOS # BLD AUTO: 6 % (ref 0.1–17)
DIFFERENTIAL METHOD BLD: ABNORMAL
ERYTHROCYTE [DISTWIDTH] IN BLOOD BY AUTOMATED COUNT: 12.3 % (ref 11.5–14.5)
HCT VFR BLD AUTO: 24 % (ref 36–48)
HGB BLD-MCNC: 7.9 G/DL (ref 12–16)
LYMPHOCYTES # BLD AUTO: 13 % (ref 14–44)
LYMPHOCYTES # BLD: 1 K/UL (ref 1.1–5.9)
MCH RBC QN AUTO: 27.5 PG (ref 25–35)
MCHC RBC AUTO-ENTMCNC: 32.9 G/DL (ref 31–37)
MCV RBC AUTO: 83.6 FL (ref 78–102)
NEUTS SEG # BLD: 6.1 K/UL (ref 1.8–9.5)
NEUTS SEG NFR BLD AUTO: 81 % (ref 40–70)
PLATELET # BLD AUTO: 177 K/UL (ref 140–440)
RBC # BLD AUTO: 2.87 M/UL (ref 4.1–5.1)
WBC # BLD AUTO: 7.5 K/UL (ref 4.5–13)

## 2017-07-18 PROCEDURE — 74011250637 HC RX REV CODE- 250/637: Performed by: INTERNAL MEDICINE

## 2017-07-18 PROCEDURE — 99211 OFF/OP EST MAY X REQ PHY/QHP: CPT

## 2017-07-18 PROCEDURE — 96372 THER/PROPH/DIAG INJ SC/IM: CPT

## 2017-07-18 PROCEDURE — 96365 THER/PROPH/DIAG IV INF INIT: CPT

## 2017-07-18 PROCEDURE — 96367 TX/PROPH/DG ADDL SEQ IV INF: CPT

## 2017-07-18 PROCEDURE — 96366 THER/PROPH/DIAG IV INF ADDON: CPT

## 2017-07-18 PROCEDURE — 74011250636 HC RX REV CODE- 250/636: Performed by: INTERNAL MEDICINE

## 2017-07-18 PROCEDURE — 96375 TX/PRO/DX INJ NEW DRUG ADDON: CPT

## 2017-07-18 RX ORDER — DEXAMETHASONE SODIUM PHOSPHATE 4 MG/ML
20 INJECTION, SOLUTION INTRA-ARTICULAR; INTRALESIONAL; INTRAMUSCULAR; INTRAVENOUS; SOFT TISSUE ONCE
Status: COMPLETED | OUTPATIENT
Start: 2017-07-18 | End: 2017-07-18

## 2017-07-18 RX ORDER — ACETAMINOPHEN 325 MG/1
650 TABLET ORAL ONCE
Status: COMPLETED | OUTPATIENT
Start: 2017-07-18 | End: 2017-07-18

## 2017-07-18 RX ORDER — SODIUM CHLORIDE 9 MG/ML
250 INJECTION, SOLUTION INTRAVENOUS ONCE
Status: COMPLETED | OUTPATIENT
Start: 2017-07-18 | End: 2017-07-18

## 2017-07-18 RX ORDER — DIPHENHYDRAMINE HYDROCHLORIDE 50 MG/ML
50 INJECTION, SOLUTION INTRAMUSCULAR; INTRAVENOUS ONCE
Status: COMPLETED | OUTPATIENT
Start: 2017-07-18 | End: 2017-07-18

## 2017-07-18 RX ORDER — SODIUM CHLORIDE 0.9 % (FLUSH) 0.9 %
10-40 SYRINGE (ML) INJECTION AS NEEDED
Status: DISCONTINUED | OUTPATIENT
Start: 2017-07-18 | End: 2017-07-22 | Stop reason: HOSPADM

## 2017-07-18 RX ADMIN — IMMUNE GLOBULIN (HUMAN) 5 G: 10 INJECTION INTRAVENOUS; SUBCUTANEOUS at 14:30

## 2017-07-18 RX ADMIN — Medication 10 ML: at 10:20

## 2017-07-18 RX ADMIN — ERYTHROPOIETIN 40000 UNITS: 40000 INJECTION, SOLUTION INTRAVENOUS; SUBCUTANEOUS at 11:25

## 2017-07-18 RX ADMIN — IMMUNE GLOBULIN (HUMAN) 10 G: 10 INJECTION INTRAVENOUS; SUBCUTANEOUS at 14:00

## 2017-07-18 RX ADMIN — SODIUM CHLORIDE 250 ML: 0.9 INJECTION, SOLUTION INTRAVENOUS at 10:50

## 2017-07-18 RX ADMIN — ACETAMINOPHEN 650 MG: 325 TABLET, FILM COATED ORAL at 11:15

## 2017-07-18 RX ADMIN — DEXAMETHASONE SODIUM PHOSPHATE 20 MG: 4 INJECTION, SOLUTION INTRA-ARTICULAR; INTRALESIONAL; INTRAMUSCULAR; INTRAVENOUS; SOFT TISSUE at 11:20

## 2017-07-18 RX ADMIN — IMMUNE GLOBULIN (HUMAN) 10 G: 10 INJECTION INTRAVENOUS; SUBCUTANEOUS at 12:30

## 2017-07-18 RX ADMIN — ERYTHROPOIETIN 20000 UNITS: 20000 INJECTION, SOLUTION INTRAVENOUS; SUBCUTANEOUS at 11:25

## 2017-07-18 RX ADMIN — DIPHENHYDRAMINE HYDROCHLORIDE 50 MG: 50 INJECTION, SOLUTION INTRAMUSCULAR; INTRAVENOUS at 11:15

## 2017-07-18 NOTE — PROGRESS NOTES
[facility] Progress Note    Date: 2017    Name: Alexander [place]    MRN: [id number]         : 1965      Ms. Josue Schmidt arrived in the [facility] today, at 1030, in stable condition, here for Q 2 Week CBC/Procrit injection and Monthly IVIG Infusion. She was assessed and education was provided. Ms. Raymond Nickerson vitals were reviewed. Visit Vitals    /75 (BP 1 Location: Left arm, BP Patient Position: At rest;Sitting)    Pulse 87    Temp 98.5 °F (36.9 °C)    Resp 16    Ht 5' 7\" (1.702 m)    Wt 93 kg (205 lb)    Breastfeeding No    BMI 32.11 kg/m2           PIV was established in her left AC at 1040, without incident, and a CBC was drawn, per order. Lab results were obtained and reviewed, and the CBC results (especially the low H&H results) were reported to Fito Rendon NP. No new orders were received. Recent Results (from the past 12 hour(s))   CBC WITH 3 PART DIFF    Collection Time: 17 10:40 AM   Result Value Ref Range    WBC 7.5 4.5 - 13.0 K/uL    RBC 2.87 (L) 4.10 - 5.10 M/uL    HGB 7.9 (L) 12.0 - 16.0 g/dL    HCT 24.0 (L) 36 - 48 %    MCV 83.6 78 - 102 FL    MCH 27.5 25.0 - 35.0 PG    MCHC 32.9 31 - 37 g/dL    RDW 12.3 11.5 - 14.5 %    PLATELET 684 088 - 815 K/uL    NEUTROPHILS 81 (H) 40 - 70 %    MIXED CELLS 6 0.1 - 17 %    LYMPHOCYTES 13 (L) 14 - 44 %    ABS. NEUTROPHILS 6.1 1.8 - 9.5 K/UL    ABS. MIXED CELLS 0.4 0.0 - 2.3 K/uL    ABS. LYMPHOCYTES 1.0 (L) 1.1 - 5.9 K/UL    DF AUTOMATED               Procrit 60,000 units, was administered SQ, in her right arm at 1125,  as ordered, and without incident.            Pre-medications consisting of PO Tylenol 650 mg, Benadryl 50 mg IV, and Decadron 20 mg IV, were all administered per order, and without incident.               IVIG 25 grams (25,000 mg) IV, was administered per order, and without incident.           After the completion of the IVIG, the PIV was flushed very well per protocol, and then, the PIV was removed and gauze/bandaid was applied. Ms. Aguayo tolerated well, and had no complaints. Ms. Lc Kurtz was discharged from [place] [address] in stable condition at 1530. Mac Lincoln She is to return in 2 weeks, on Tuesday, 8-1-17,  at 1000, for her next appointment, for CBC/Procrit injection. And then, she is scheduled to return in 4 weeks, on Tuesday, 8-15-17, at 1000, for CBC/Procrit injection and Monthly IVIG infusion.      Evelyn Manuel RN  July 18, 2017  11:08 AM

## 2017-08-01 ENCOUNTER — HOSPITAL ENCOUNTER (OUTPATIENT)
Dept: INFUSION THERAPY | Age: 52
Discharge: HOME | End: 2017-08-01
Payer: COMMERCIAL

## 2017-08-01 ENCOUNTER — CLINICAL SUPPORT (OUTPATIENT)
Dept: ONCOLOGY | Age: 52
End: 2017-08-01

## 2017-08-01 ENCOUNTER — HOSPITAL ENCOUNTER (OUTPATIENT)
Dept: ONCOLOGY | Age: 52
Discharge: HOME | End: 2017-08-01

## 2017-08-01 VITALS
HEART RATE: 95 BPM | TEMPERATURE: 98.9 F | RESPIRATION RATE: 16 BRPM | SYSTOLIC BLOOD PRESSURE: 160 MMHG | DIASTOLIC BLOOD PRESSURE: 91 MMHG

## 2017-08-01 DIAGNOSIS — N18.9 ANEMIA IN CKD (CHRONIC KIDNEY DISEASE): Primary | ICD-10-CM

## 2017-08-01 DIAGNOSIS — D63.1 ANEMIA IN CKD (CHRONIC KIDNEY DISEASE): Primary | ICD-10-CM

## 2017-08-01 DIAGNOSIS — N18.9 ANEMIA IN CKD (CHRONIC KIDNEY DISEASE): ICD-10-CM

## 2017-08-01 DIAGNOSIS — D63.1 ANEMIA IN CKD (CHRONIC KIDNEY DISEASE): ICD-10-CM

## 2017-08-01 LAB
BASO+EOS+MONOS # BLD AUTO: 0.6 K/UL (ref 0–2.3)
BASO+EOS+MONOS # BLD AUTO: 7 % (ref 0.1–17)
DIFFERENTIAL METHOD BLD: ABNORMAL
ERYTHROCYTE [DISTWIDTH] IN BLOOD BY AUTOMATED COUNT: 13.3 % (ref 11.5–14.5)
HCT VFR BLD AUTO: 28.5 % (ref 36–48)
HGB BLD-MCNC: 9.2 G/DL (ref 12–16)
LYMPHOCYTES # BLD AUTO: 13 % (ref 14–44)
LYMPHOCYTES # BLD: 1.1 K/UL (ref 1.1–5.9)
MCH RBC QN AUTO: 26.8 PG (ref 25–35)
MCHC RBC AUTO-ENTMCNC: 32.3 G/DL (ref 31–37)
MCV RBC AUTO: 83.1 FL (ref 78–102)
NEUTS SEG # BLD: 6.5 K/UL (ref 1.8–9.5)
NEUTS SEG NFR BLD AUTO: 80 % (ref 40–70)
PLATELET # BLD AUTO: 104 K/UL (ref 140–440)
RBC # BLD AUTO: 3.43 M/UL (ref 4.1–5.1)
WBC # BLD AUTO: 8.2 K/UL (ref 4.5–13)

## 2017-08-01 PROCEDURE — 36415 COLL VENOUS BLD VENIPUNCTURE: CPT

## 2017-08-01 PROCEDURE — 74011250636 HC RX REV CODE- 250/636: Performed by: INTERNAL MEDICINE

## 2017-08-01 PROCEDURE — 96372 THER/PROPH/DIAG INJ SC/IM: CPT

## 2017-08-01 RX ADMIN — ERYTHROPOIETIN 40000 UNITS: 40000 INJECTION, SOLUTION INTRAVENOUS; SUBCUTANEOUS at 11:05

## 2017-08-01 RX ADMIN — ERYTHROPOIETIN 20000 UNITS: 20000 INJECTION, SOLUTION INTRAVENOUS; SUBCUTANEOUS at 11:05

## 2017-08-01 NOTE — PROGRESS NOTES
[facility] Progress Note    Date: 2017    Name: Gallito Ortiz    MRN: [id number]         : 1965      Ms. Ada Ohara arrived in the [facility] today, at 1025, in stable condition, here for Q 2 Week, CBC/Procrit injection. She was assessed and education was provided. Ms. Olaf Sharma vitals were reviewed. Visit Vitals    BP (!) 160/91 (BP 1 Location: Right arm, BP Patient Position: At rest;Sitting)    Pulse 95    Temp 98.9 °F (37.2 °C)    Resp 16    Breastfeeding No           CBC was drawn from her right AC at 1038, per order, and without incident. Lab results were obtained and reviewed. Recent Results (from the past 12 hour(s))   CBC WITH 3 PART DIFF    Collection Time: 17 10:38 AM   Result Value Ref Range    WBC 8.2 4.5 - 13.0 K/uL    RBC 3.43 (L) 4.10 - 5.10 M/uL    HGB 9.2 (L) 12.0 - 16.0 g/dL    HCT 28.5 (L) 36 - 48 %    MCV 83.1 78 - 102 FL    MCH 26.8 25.0 - 35.0 PG    MCHC 32.3 31 - 37 g/dL    RDW 13.3 11.5 - 14.5 %    PLATELET 092 (L) 459 - 440 K/uL    NEUTROPHILS 80 (H) 40 - 70 %    MIXED CELLS 7 0.1 - 17 %    LYMPHOCYTES 13 (L) 14 - 44 %    ABS. NEUTROPHILS 6.5 1.8 - 9.5 K/UL    ABS. MIXED CELLS 0.6 0.0 - 2.3 K/uL    ABS. LYMPHOCYTES 1.1 1.1 - 5.9 K/UL    DF AUTOMATED                 Procrit 60,000 units, was administered SQ, in her left arm, at 1105, as ordered, and without incident. Ms. Ada Ohara tolerated well, and had no complaints. Ms. Ada Ohara was discharged from [place] [address] in stable condition at 1110. Ar Moran She is to return in 2 weeks, on Tuesday, 8-15-17,  at 1000,  for her next appointment, for CBC/Procrit injection, and IVIG Infusion.      Bj Zavaleta RN  2017  11:00 AM

## 2017-08-15 ENCOUNTER — CLINICAL SUPPORT (OUTPATIENT)
Dept: ONCOLOGY | Age: 52
End: 2017-08-15

## 2017-08-15 ENCOUNTER — HOSPITAL ENCOUNTER (OUTPATIENT)
Dept: INFUSION THERAPY | Age: 52
Discharge: HOME | End: 2017-08-15
Payer: COMMERCIAL

## 2017-08-15 ENCOUNTER — HOSPITAL ENCOUNTER (OUTPATIENT)
Dept: ONCOLOGY | Age: 52
Discharge: HOME | End: 2017-08-15

## 2017-08-15 VITALS
WEIGHT: 203 LBS | SYSTOLIC BLOOD PRESSURE: 136 MMHG | BODY MASS INDEX: 31.86 KG/M2 | DIASTOLIC BLOOD PRESSURE: 81 MMHG | RESPIRATION RATE: 18 BRPM | TEMPERATURE: 96.9 F | OXYGEN SATURATION: 99 % | HEIGHT: 67 IN | HEART RATE: 79 BPM

## 2017-08-15 DIAGNOSIS — D69.6 THROMBOCYTOPENIA (HCC): ICD-10-CM

## 2017-08-15 DIAGNOSIS — D69.6 THROMBOCYTOPENIA (HCC): Primary | ICD-10-CM

## 2017-08-15 LAB
BASO+EOS+MONOS # BLD AUTO: 0.3 K/UL (ref 0–2.3)
BASO+EOS+MONOS # BLD AUTO: 7 % (ref 0.1–17)
DIFFERENTIAL METHOD BLD: ABNORMAL
ERYTHROCYTE [DISTWIDTH] IN BLOOD BY AUTOMATED COUNT: 13.4 % (ref 11.5–14.5)
HCT VFR BLD AUTO: 31.8 % (ref 36–48)
HGB BLD-MCNC: 10.2 G/DL (ref 12–16)
LYMPHOCYTES # BLD AUTO: 20 % (ref 14–44)
LYMPHOCYTES # BLD: 0.9 K/UL (ref 1.1–5.9)
MCH RBC QN AUTO: 26.5 PG (ref 25–35)
MCHC RBC AUTO-ENTMCNC: 32.1 G/DL (ref 31–37)
MCV RBC AUTO: 82.6 FL (ref 78–102)
NEUTS SEG # BLD: 3.3 K/UL (ref 1.8–9.5)
NEUTS SEG NFR BLD AUTO: 73 % (ref 40–70)
PLATELET # BLD AUTO: 102 K/UL (ref 140–440)
RBC # BLD AUTO: 3.85 M/UL (ref 4.1–5.1)
WBC # BLD AUTO: 4.5 K/UL (ref 4.5–13)

## 2017-08-15 PROCEDURE — 74011250636 HC RX REV CODE- 250/636: Performed by: INTERNAL MEDICINE

## 2017-08-15 PROCEDURE — 74011000258 HC RX REV CODE- 258: Performed by: INTERNAL MEDICINE

## 2017-08-15 PROCEDURE — 96365 THER/PROPH/DIAG IV INF INIT: CPT

## 2017-08-15 PROCEDURE — 36415 COLL VENOUS BLD VENIPUNCTURE: CPT

## 2017-08-15 PROCEDURE — 96375 TX/PRO/DX INJ NEW DRUG ADDON: CPT

## 2017-08-15 PROCEDURE — 74011250637 HC RX REV CODE- 250/637: Performed by: INTERNAL MEDICINE

## 2017-08-15 PROCEDURE — 96366 THER/PROPH/DIAG IV INF ADDON: CPT

## 2017-08-15 RX ORDER — SODIUM CHLORIDE 0.9 % (FLUSH) 0.9 %
10-40 SYRINGE (ML) INJECTION AS NEEDED
Status: DISCONTINUED | OUTPATIENT
Start: 2017-08-15 | End: 2017-08-19 | Stop reason: HOSPADM

## 2017-08-15 RX ORDER — SODIUM CHLORIDE 9 MG/ML
250 INJECTION, SOLUTION INTRAVENOUS CONTINUOUS
Status: DISPENSED | OUTPATIENT
Start: 2017-08-15 | End: 2017-08-16

## 2017-08-15 RX ORDER — DIPHENHYDRAMINE HYDROCHLORIDE 50 MG/ML
50 INJECTION, SOLUTION INTRAMUSCULAR; INTRAVENOUS ONCE
Status: COMPLETED | OUTPATIENT
Start: 2017-08-15 | End: 2017-08-15

## 2017-08-15 RX ORDER — ACETAMINOPHEN 325 MG/1
650 TABLET ORAL ONCE
Status: COMPLETED | OUTPATIENT
Start: 2017-08-15 | End: 2017-08-15

## 2017-08-15 RX ADMIN — DEXAMETHASONE SODIUM PHOSPHATE 20 MG: 4 INJECTION, SOLUTION INTRA-ARTICULAR; INTRALESIONAL; INTRAMUSCULAR; INTRAVENOUS; SOFT TISSUE at 10:25

## 2017-08-15 RX ADMIN — IMMUNE GLOBULIN INTRAVENOUS (HUMAN) 5 G: 5 INJECTION, SOLUTION INTRAVENOUS at 13:07

## 2017-08-15 RX ADMIN — SODIUM CHLORIDE 250 ML/HR: 0.9 INJECTION, SOLUTION INTRAVENOUS at 10:15

## 2017-08-15 RX ADMIN — ACETAMINOPHEN 650 MG: 325 TABLET ORAL at 10:19

## 2017-08-15 RX ADMIN — DIPHENHYDRAMINE HYDROCHLORIDE 50 MG: 50 INJECTION, SOLUTION INTRAMUSCULAR; INTRAVENOUS at 10:20

## 2017-08-15 RX ADMIN — Medication 10 ML: at 10:10

## 2017-08-15 RX ADMIN — IMMUNE GLOBULIN INTRAVENOUS (HUMAN) 20 G: 5 INJECTION, SOLUTION INTRAVENOUS at 11:15

## 2017-08-15 RX ADMIN — Medication 10 ML: at 13:25

## 2017-08-15 NOTE — PROGRESS NOTES
[facility] Progress Note    Date: August 15, 2017    Name: Mannie Armijo    MRN: [id number]         : 1965      Ms. Fabiano Lau arrived in the [facility] today, at 1005 in stable condition, here for Q 2 Week CBC/Procrit injection and Monthly IVIG Infusion. She was assessed and education was provided. Ms. Shay Jimenez vitals were reviewed. Visit Vitals    /81 (BP 1 Location: Right arm, BP Patient Position: Sitting)    Pulse 91    Temp 98.1 °F (36.7 °C)    Resp 18    Ht 5' 7\" (1.702 m)    Wt 92.1 kg (203 lb)    SpO2 100%    BMI 31.79 kg/m2       #22 gauge PIV was established in her left AC without incident, and a CBC was drawn, per order. Lab results were obtained and reviewed. Recent Results (from the past 12 hour(s))   CBC WITH 3 PART DIFF    Collection Time: 08/15/17 10:10 AM   Result Value Ref Range    WBC 4.5 4.5 - 13.0 K/uL    RBC 3.85 (L) 4.10 - 5.10 M/uL    HGB 10.2 (L) 12.0 - 16.0 g/dL    HCT 31.8 (L) 36 - 48 %    MCV 82.6 78 - 102 FL    MCH 26.5 25.0 - 35.0 PG    MCHC 32.1 31 - 37 g/dL    RDW 13.4 11.5 - 14.5 %    PLATELET 122 (L) 056 - 440 K/uL    NEUTROPHILS 73 (H) 40 - 70 %    MIXED CELLS 7 0.1 - 17 %    LYMPHOCYTES 20 14 - 44 %    ABS. NEUTROPHILS 3.3 1.8 - 9.5 K/UL    ABS. MIXED CELLS 0.3 0.0 - 2.3 K/uL    ABS. LYMPHOCYTES 0.9 (L) 1.1 - 5.9 K/UL    DF AUTOMATED         Procrit 60,000 units, was HELD. Pre-medications (PO Tylenol 650 mg, Benadryl 50 mg IV, and Decadron 20 mg IV) were all administered per order, and without incident.       IVIG 25 grams  IV, was administered per order, and without incident.       After the completion of the IVIG, the PIV was flushed very well per protocol, and then, the PIV was removed and gauze/bandaid was applied. Ms. Fabiano Lau tolerated well, and had no complaints. Ms. Fabiano Lau was discharged from [place] [address] in stable condition at 1340.  She is to return in 2 weeks, on Tuesday, 17,  at 1400, for her next appointment, for CBC/Procrit injection. And then, she is scheduled to return in 4 weeks, on Tuesday, 9-12-17, at 1000, for CBC/Procrit injection and Monthly IVIG infusion.      Elissa Garcia  August 15, 2017  11:08 AM

## 2017-08-29 ENCOUNTER — HOSPITAL ENCOUNTER (OUTPATIENT)
Dept: ONCOLOGY | Age: 52
Discharge: HOME | End: 2017-08-29

## 2017-08-29 ENCOUNTER — CLINICAL SUPPORT (OUTPATIENT)
Dept: ONCOLOGY | Age: 52
End: 2017-08-29

## 2017-08-29 ENCOUNTER — HOSPITAL ENCOUNTER (OUTPATIENT)
Dept: INFUSION THERAPY | Age: 52
Discharge: HOME | End: 2017-08-29
Payer: COMMERCIAL

## 2017-08-29 VITALS
HEART RATE: 102 BPM | RESPIRATION RATE: 16 BRPM | DIASTOLIC BLOOD PRESSURE: 90 MMHG | SYSTOLIC BLOOD PRESSURE: 158 MMHG | TEMPERATURE: 98.4 F

## 2017-08-29 DIAGNOSIS — N18.9 ANEMIA IN CKD (CHRONIC KIDNEY DISEASE): Primary | ICD-10-CM

## 2017-08-29 DIAGNOSIS — N18.9 ANEMIA IN CKD (CHRONIC KIDNEY DISEASE): ICD-10-CM

## 2017-08-29 DIAGNOSIS — D63.1 ANEMIA IN CKD (CHRONIC KIDNEY DISEASE): ICD-10-CM

## 2017-08-29 DIAGNOSIS — D63.1 ANEMIA IN CKD (CHRONIC KIDNEY DISEASE): Primary | ICD-10-CM

## 2017-08-29 LAB
BASO+EOS+MONOS # BLD AUTO: 0.3 K/UL (ref 0–2.3)
BASO+EOS+MONOS # BLD AUTO: 3 % (ref 0.1–17)
DIFFERENTIAL METHOD BLD: ABNORMAL
ERYTHROCYTE [DISTWIDTH] IN BLOOD BY AUTOMATED COUNT: 12.9 % (ref 11.5–14.5)
HCT VFR BLD AUTO: 30.1 % (ref 36–48)
HGB BLD-MCNC: 9.7 G/DL (ref 12–16)
LYMPHOCYTES # BLD: 1.1 K/UL (ref 1.1–5.9)
LYMPHOCYTES NFR BLD: 13 % (ref 14–44)
MCH RBC QN AUTO: 25.8 PG (ref 25–35)
MCHC RBC AUTO-ENTMCNC: 32.2 G/DL (ref 31–37)
MCV RBC AUTO: 80.1 FL (ref 78–102)
NEUTS SEG # BLD: 6.5 K/UL (ref 1.8–9.5)
NEUTS SEG NFR BLD: 83 % (ref 40–70)
PLATELET # BLD AUTO: 45 K/UL (ref 135–420)
RBC # BLD AUTO: 3.76 M/UL (ref 4.1–5.1)
WBC # BLD AUTO: 7.9 K/UL (ref 4.5–13)

## 2017-08-29 PROCEDURE — 99211 OFF/OP EST MAY X REQ PHY/QHP: CPT

## 2017-08-29 PROCEDURE — 36415 COLL VENOUS BLD VENIPUNCTURE: CPT

## 2017-08-29 NOTE — PROGRESS NOTES
[facility] Progress Note    Date: 2017    Name: Ashok Justice    MRN: [id number]         : 1965      Ms. Franc Richards arrived in the [facility] today, at 1410, in stable condition, here for Q 2 Week CBC/Procrit injection. She was assessed and education was provided. Ms. Mio Koch vitals were reviewed. Visit Vitals    /90 (BP 1 Location: Right arm, BP Patient Position: At rest;Sitting)    Pulse (!) 102    Temp 98.4 °F (36.9 °C)    Resp 16             CBC was drawn from her right AC at 1420, per order, and without incident. Lab results were obtained and reviewed, and the preliminary platelet count was noted to 43,000. Recent Results (from the past 12 hour(s))   CBC WITH 3 PART DIFF    Collection Time: 17  2:20 PM   Result Value Ref Range    WBC 7.9 4.5 - 13.0 K/uL    RBC 3.76 (L) 4.10 - 5.10 M/uL    HGB 9.7 (L) 12.0 - 16.0 g/dL    HCT 30.1 (L) 36 - 48 %    MCV 80.1 78 - 102 FL    MCH 25.8 25.0 - 35.0 PG    MCHC 32.2 31 - 37 g/dL    RDW 12.9 11.5 - 14.5 %    NEUTROPHILS 83 (H) 40 - 70 %    MIXED CELLS 3 0.1 - 17 %    LYMPHOCYTES 13 (L) 14 - 44 %    ABS. NEUTROPHILS 6.5 1.8 - 9.5 K/UL    ABS. MIXED CELLS 0.3 0.0 - 2.3 K/uL    ABS. LYMPHOCYTES 1.1 1.1 - 5.9 K/UL    DF AUTOMATED             Procrit injection was HELD today per order, for HCT 30.1. Josh Spicer NP, was made aware of the low platelet count as listed above. No new orders were received. Ms. Franc Richards however, was instructed on thrombocytopenic precautions, and verbalized understanding. Ms. Franc Richards tolerated well, and had no complaints. Ms. Franc Richards was discharged from [place] [address] in stable condition at 1440. Debo Torres She is to return in 2 weeks, on Tuesday, 17,  at 1000,  for her next appointment, for CBC/Procrit injection, and IVIG infusion.      Jimmy Khan RN  2017  2:31 PM

## 2017-09-12 ENCOUNTER — HOSPITAL ENCOUNTER (OUTPATIENT)
Dept: ONCOLOGY | Age: 52
Discharge: HOME | End: 2017-09-12

## 2017-09-12 ENCOUNTER — HOSPITAL ENCOUNTER (OUTPATIENT)
Dept: INFUSION THERAPY | Age: 52
Discharge: HOME | End: 2017-09-12
Payer: COMMERCIAL

## 2017-09-12 ENCOUNTER — CLINICAL SUPPORT (OUTPATIENT)
Dept: ONCOLOGY | Age: 52
End: 2017-09-12

## 2017-09-12 VITALS
DIASTOLIC BLOOD PRESSURE: 77 MMHG | SYSTOLIC BLOOD PRESSURE: 131 MMHG | TEMPERATURE: 98.5 F | BODY MASS INDEX: 32.18 KG/M2 | WEIGHT: 205 LBS | RESPIRATION RATE: 16 BRPM | HEIGHT: 67 IN | HEART RATE: 74 BPM

## 2017-09-12 DIAGNOSIS — N18.9 ANEMIA IN CKD (CHRONIC KIDNEY DISEASE): Primary | ICD-10-CM

## 2017-09-12 DIAGNOSIS — N18.9 ANEMIA IN CKD (CHRONIC KIDNEY DISEASE): ICD-10-CM

## 2017-09-12 DIAGNOSIS — D63.1 ANEMIA IN CKD (CHRONIC KIDNEY DISEASE): ICD-10-CM

## 2017-09-12 DIAGNOSIS — D63.1 ANEMIA IN CKD (CHRONIC KIDNEY DISEASE): Primary | ICD-10-CM

## 2017-09-12 LAB
BASO+EOS+MONOS # BLD AUTO: 0.3 K/UL (ref 0–2.3)
BASO+EOS+MONOS # BLD AUTO: 7 % (ref 0.1–17)
DIFFERENTIAL METHOD BLD: ABNORMAL
ERYTHROCYTE [DISTWIDTH] IN BLOOD BY AUTOMATED COUNT: 13.6 % (ref 11.5–14.5)
HCT VFR BLD AUTO: 28.3 % (ref 36–48)
HGB BLD-MCNC: 9.4 G/DL (ref 12–16)
LYMPHOCYTES # BLD: 0.9 K/UL (ref 1.1–5.9)
LYMPHOCYTES NFR BLD: 19 % (ref 14–44)
MCH RBC QN AUTO: 26.2 PG (ref 25–35)
MCHC RBC AUTO-ENTMCNC: 33.2 G/DL (ref 31–37)
MCV RBC AUTO: 78.8 FL (ref 78–102)
NEUTS SEG # BLD: 3.8 K/UL (ref 1.8–9.5)
NEUTS SEG NFR BLD: 75 % (ref 40–70)
PLATELET # BLD AUTO: 72 K/UL (ref 135–420)
RBC # BLD AUTO: 3.59 M/UL (ref 4.1–5.1)
WBC # BLD AUTO: 5 K/UL (ref 4.5–13)

## 2017-09-12 PROCEDURE — 96366 THER/PROPH/DIAG IV INF ADDON: CPT

## 2017-09-12 PROCEDURE — 74011250637 HC RX REV CODE- 250/637: Performed by: INTERNAL MEDICINE

## 2017-09-12 PROCEDURE — 96375 TX/PRO/DX INJ NEW DRUG ADDON: CPT

## 2017-09-12 PROCEDURE — 74011250636 HC RX REV CODE- 250/636: Performed by: INTERNAL MEDICINE

## 2017-09-12 PROCEDURE — 74011000258 HC RX REV CODE- 258: Performed by: INTERNAL MEDICINE

## 2017-09-12 PROCEDURE — 96372 THER/PROPH/DIAG INJ SC/IM: CPT

## 2017-09-12 PROCEDURE — 96365 THER/PROPH/DIAG IV INF INIT: CPT

## 2017-09-12 PROCEDURE — 96367 TX/PROPH/DG ADDL SEQ IV INF: CPT

## 2017-09-12 RX ORDER — ACETAMINOPHEN 325 MG/1
650 TABLET ORAL ONCE
Status: COMPLETED | OUTPATIENT
Start: 2017-09-12 | End: 2017-09-12

## 2017-09-12 RX ORDER — SODIUM CHLORIDE 0.9 % (FLUSH) 0.9 %
10-40 SYRINGE (ML) INJECTION AS NEEDED
Status: DISCONTINUED | OUTPATIENT
Start: 2017-09-12 | End: 2017-09-16 | Stop reason: HOSPADM

## 2017-09-12 RX ORDER — SODIUM CHLORIDE 9 MG/ML
250 INJECTION, SOLUTION INTRAVENOUS ONCE
Status: COMPLETED | OUTPATIENT
Start: 2017-09-12 | End: 2017-09-12

## 2017-09-12 RX ORDER — DIPHENHYDRAMINE HYDROCHLORIDE 50 MG/ML
50 INJECTION, SOLUTION INTRAMUSCULAR; INTRAVENOUS ONCE
Status: COMPLETED | OUTPATIENT
Start: 2017-09-12 | End: 2017-09-12

## 2017-09-12 RX ADMIN — DIPHENHYDRAMINE HYDROCHLORIDE 50 MG: 50 INJECTION, SOLUTION INTRAMUSCULAR; INTRAVENOUS at 10:55

## 2017-09-12 RX ADMIN — ERYTHROPOIETIN 20000 UNITS: 20000 INJECTION, SOLUTION INTRAVENOUS; SUBCUTANEOUS at 11:00

## 2017-09-12 RX ADMIN — Medication 10 ML: at 10:30

## 2017-09-12 RX ADMIN — DEXAMETHASONE SODIUM PHOSPHATE 20 MG: 4 INJECTION, SOLUTION INTRAMUSCULAR; INTRAVENOUS at 11:00

## 2017-09-12 RX ADMIN — IMMUNE GLOBULIN INTRAVENOUS (HUMAN) 20 G: 5 INJECTION, SOLUTION INTRAVENOUS at 12:00

## 2017-09-12 RX ADMIN — ERYTHROPOIETIN 40000 UNITS: 40000 INJECTION, SOLUTION INTRAVENOUS; SUBCUTANEOUS at 11:00

## 2017-09-12 RX ADMIN — SODIUM CHLORIDE 250 ML: 0.9 INJECTION, SOLUTION INTRAVENOUS at 10:35

## 2017-09-12 RX ADMIN — ACETAMINOPHEN 650 MG: 325 TABLET ORAL at 10:52

## 2017-09-12 RX ADMIN — IMMUNE GLOBULIN INTRAVENOUS (HUMAN) 5 G: 5 INJECTION, SOLUTION INTRAVENOUS at 13:45

## 2017-09-12 NOTE — PROGRESS NOTES
[facility] Progress Note    Date: 2017    Name: Dudley Rosas    MRN: [id number]         : 1965      Ms. Cuong Peraza arrived in the [facility] today, at 21 , in stable condition, here for Q 2 Week CBC/Procrit injection, and Monthly IVIG Infusion. She was assessed and education was provided. Ms. Kayce Putnam vitals were reviewed. Visit Vitals    /90 (BP 1 Location: Left arm, BP Patient Position: At rest;Sitting)    Pulse 92    Temp 97.7 °F (36.5 °C)    Resp 16    Ht 5' 7\" (1.702 m)    Wt 93 kg (205 lb)    Breastfeeding No    BMI 32.11 kg/m2             PIV was established in her left AC at 1030, without incident, and blood for a CBC was drawn, per order. Lab results were obtained and reviewed, and the preliminary platelet count was noted to be 58,000. Recent Results (from the past 12 hour(s))   CBC WITH 3 PART DIFF    Collection Time: 17 10:30 AM   Result Value Ref Range    WBC 5.0 4.5 - 13.0 K/uL    RBC 3.59 (L) 4.10 - 5.10 M/uL    HGB 9.4 (L) 12.0 - 16.0 g/dL    HCT 28.3 (L) 36 - 48 %    MCV 78.8 78 - 102 FL    MCH 26.2 25.0 - 35.0 PG    MCHC 33.2 31 - 37 g/dL    RDW 13.6 11.5 - 14.5 %    NEUTROPHILS 75 (H) 40 - 70 %    MIXED CELLS 7 0.1 - 17 %    LYMPHOCYTES 19 14 - 44 %    ABS. NEUTROPHILS 3.8 1.8 - 9.5 K/UL    ABS. MIXED CELLS 0.3 0.0 - 2.3 K/uL    ABS. LYMPHOCYTES 0.9 (L) 1.1 - 5.9 K/UL    DF AUTOMATED             Procrit 60,000 units, was administered SQ, in her right arm, at 1100, as ordered, and without incident. Pre-medications consisting of PO Tylenol 650 mg, Benadryl 50 mg IV, and Decadron 20 mg IV, were all administered per order, and without incident.               IVIG 25 grams (25,000 mg) IV, was administered per order, and without incident.           After the completion of the IVIG, the PIV was flushed very well per protocol, and then, the PIV was removed and gauze/bandaid was applied.                Ms. Cuong Peraza tolerated well, and had no complaints. Ms. Nuno Severino was discharged from [place] [address] in stable condition at 1430. Shannon Hale She is to return in 2 weeks, on Tuesday, 9-26-17, at 1500,  for her next appointment, for CBC/Procrit injection. And then, she is scheduled to return in 1 month, on Tuesday, 10-10-17, at 1000, for her next IVIG infusion, and CBC/Procrit injection.      Rolf Easton RN  September 12, 2017  10:47 AM

## 2017-09-26 ENCOUNTER — HOSPITAL ENCOUNTER (OUTPATIENT)
Dept: INFUSION THERAPY | Age: 52
Discharge: HOME | End: 2017-09-26
Payer: COMMERCIAL

## 2017-09-26 ENCOUNTER — HOSPITAL ENCOUNTER (OUTPATIENT)
Dept: ONCOLOGY | Age: 52
Discharge: HOME | End: 2017-09-26

## 2017-09-26 ENCOUNTER — CLINICAL SUPPORT (OUTPATIENT)
Dept: ONCOLOGY | Age: 52
End: 2017-09-26

## 2017-09-26 VITALS
TEMPERATURE: 97.9 F | SYSTOLIC BLOOD PRESSURE: 160 MMHG | HEART RATE: 85 BPM | DIASTOLIC BLOOD PRESSURE: 89 MMHG | RESPIRATION RATE: 16 BRPM

## 2017-09-26 DIAGNOSIS — D63.1 ANEMIA IN CKD (CHRONIC KIDNEY DISEASE): ICD-10-CM

## 2017-09-26 DIAGNOSIS — N18.9 ANEMIA IN CKD (CHRONIC KIDNEY DISEASE): Primary | ICD-10-CM

## 2017-09-26 DIAGNOSIS — D63.1 ANEMIA IN CKD (CHRONIC KIDNEY DISEASE): Primary | ICD-10-CM

## 2017-09-26 DIAGNOSIS — N18.9 ANEMIA IN CKD (CHRONIC KIDNEY DISEASE): ICD-10-CM

## 2017-09-26 LAB
BASO+EOS+MONOS # BLD AUTO: 0.2 K/UL (ref 0–2.3)
BASO+EOS+MONOS # BLD AUTO: 4 % (ref 0.1–17)
DIFFERENTIAL METHOD BLD: ABNORMAL
ERYTHROCYTE [DISTWIDTH] IN BLOOD BY AUTOMATED COUNT: 14.1 % (ref 11.5–14.5)
HCT VFR BLD AUTO: 29.4 % (ref 36–48)
HGB BLD-MCNC: 9.5 G/DL (ref 12–16)
LYMPHOCYTES # BLD: 1 K/UL (ref 1.1–5.9)
LYMPHOCYTES NFR BLD: 18 % (ref 14–44)
MCH RBC QN AUTO: 25.5 PG (ref 25–35)
MCHC RBC AUTO-ENTMCNC: 32.3 G/DL (ref 31–37)
MCV RBC AUTO: 79 FL (ref 78–102)
NEUTS SEG # BLD: 4.6 K/UL (ref 1.8–9.5)
NEUTS SEG NFR BLD: 78 % (ref 40–70)
PLATELET # BLD AUTO: 87 K/UL (ref 140–440)
RBC # BLD AUTO: 3.72 M/UL (ref 4.1–5.1)
WBC # BLD AUTO: 5.8 K/UL (ref 4.5–13)

## 2017-09-26 PROCEDURE — 74011250636 HC RX REV CODE- 250/636: Performed by: INTERNAL MEDICINE

## 2017-09-26 PROCEDURE — 36415 COLL VENOUS BLD VENIPUNCTURE: CPT

## 2017-09-26 PROCEDURE — 96372 THER/PROPH/DIAG INJ SC/IM: CPT

## 2017-09-26 RX ADMIN — ERYTHROPOIETIN 40000 UNITS: 40000 INJECTION, SOLUTION INTRAVENOUS; SUBCUTANEOUS at 15:50

## 2017-09-26 RX ADMIN — ERYTHROPOIETIN 20000 UNITS: 20000 INJECTION, SOLUTION INTRAVENOUS; SUBCUTANEOUS at 15:50

## 2017-09-26 NOTE — PROGRESS NOTES
[facility] Progress Note    Date: 2017    Name: Logan Gu    MRN: [id number]         : 1965      Ms. Federica Vigil arrived in the [facility] today, at 1510, in stable condition, here for Q 2 Week, CBC/Procrit injection. She was assessed and education was provided. Ms. Harjit Church vitals were reviewed. Visit Vitals    /89 (BP 1 Location: Left arm, BP Patient Position: At rest;Sitting)    Pulse 85    Temp 97.9 °F (36.6 °C)    Resp 16             CBC was drawn from her left AC at 1525, per order, and without incident. Lab results were obtained and reviewed. Recent Results (from the past 12 hour(s))   CBC WITH 3 PART DIFF    Collection Time: 17  3:25 PM   Result Value Ref Range    WBC 5.8 4.5 - 13.0 K/uL    RBC 3.72 (L) 4.10 - 5.10 M/uL    HGB 9.5 (L) 12.0 - 16.0 g/dL    HCT 29.4 (L) 36 - 48 %    MCV 79.0 78 - 102 FL    MCH 25.5 25.0 - 35.0 PG    MCHC 32.3 31 - 37 g/dL    RDW 14.1 11.5 - 14.5 %    PLATELET 87 (L) 479 - 440 K/uL    NEUTROPHILS 78 (H) 40 - 70 %    MIXED CELLS 4 0.1 - 17 %    LYMPHOCYTES 18 14 - 44 %    ABS. NEUTROPHILS 4.6 1.8 - 9.5 K/UL    ABS. MIXED CELLS 0.2 0.0 - 2.3 K/uL    ABS. LYMPHOCYTES 1.0 (L) 1.1 - 5.9 K/UL    DF AUTOMATED               Procrit 60,000 units, was administered SQ, in her right arm, at 1550,  as ordered, and without incident. Ms. Aguayo tolerated well, and had no complaints. Ms. Federica Vigil was discharged from [place] [address] in stable condition at [phone]. Talon Angeles She is to return in 2 weeks, on Tuesday, 10-10-17,  at 1000,  for her next appointment, for Monthly IVIG infusion, and CBC/Procrit injection.      Sameer Shelley RN  2017  3:39 PM

## 2017-10-10 ENCOUNTER — CLINICAL SUPPORT (OUTPATIENT)
Dept: ONCOLOGY | Age: 52
End: 2017-10-10

## 2017-10-10 ENCOUNTER — HOSPITAL ENCOUNTER (OUTPATIENT)
Dept: INFUSION THERAPY | Age: 52
Discharge: HOME | End: 2017-10-10
Payer: COMMERCIAL

## 2017-10-10 ENCOUNTER — HOSPITAL ENCOUNTER (OUTPATIENT)
Dept: ONCOLOGY | Age: 52
Discharge: HOME | End: 2017-10-10

## 2017-10-10 VITALS
RESPIRATION RATE: 16 BRPM | SYSTOLIC BLOOD PRESSURE: 137 MMHG | HEIGHT: 67 IN | TEMPERATURE: 97.9 F | BODY MASS INDEX: 32.18 KG/M2 | HEART RATE: 80 BPM | DIASTOLIC BLOOD PRESSURE: 80 MMHG | WEIGHT: 205 LBS

## 2017-10-10 DIAGNOSIS — N18.9 ANEMIA IN CHRONIC KIDNEY DISEASE, UNSPECIFIED CKD STAGE: Primary | ICD-10-CM

## 2017-10-10 DIAGNOSIS — D63.1 ANEMIA IN CHRONIC KIDNEY DISEASE, UNSPECIFIED CKD STAGE: Primary | ICD-10-CM

## 2017-10-10 DIAGNOSIS — D63.1 ANEMIA IN CHRONIC KIDNEY DISEASE, UNSPECIFIED CKD STAGE: ICD-10-CM

## 2017-10-10 DIAGNOSIS — N18.9 ANEMIA IN CHRONIC KIDNEY DISEASE, UNSPECIFIED CKD STAGE: ICD-10-CM

## 2017-10-10 LAB
BASO+EOS+MONOS # BLD AUTO: 0.2 K/UL (ref 0–2.3)
BASO+EOS+MONOS # BLD AUTO: 4 % (ref 0.1–17)
DIFFERENTIAL METHOD BLD: ABNORMAL
ERYTHROCYTE [DISTWIDTH] IN BLOOD BY AUTOMATED COUNT: 14.6 % (ref 11.5–14.5)
HCT VFR BLD AUTO: 32.4 % (ref 36–48)
HGB BLD-MCNC: 10.3 G/DL (ref 12–16)
LYMPHOCYTES # BLD: 0.9 K/UL (ref 1.1–5.9)
LYMPHOCYTES NFR BLD: 20 % (ref 14–44)
MCH RBC QN AUTO: 25.3 PG (ref 25–35)
MCHC RBC AUTO-ENTMCNC: 31.8 G/DL (ref 31–37)
MCV RBC AUTO: 79.6 FL (ref 78–102)
NEUTS SEG # BLD: 3.4 K/UL (ref 1.8–9.5)
NEUTS SEG NFR BLD: 77 % (ref 40–70)
PLATELET # BLD AUTO: 77 K/UL (ref 135–420)
RBC # BLD AUTO: 4.07 M/UL (ref 4.1–5.1)
WBC # BLD AUTO: 4.5 K/UL (ref 4.5–13)

## 2017-10-10 PROCEDURE — 96367 TX/PROPH/DG ADDL SEQ IV INF: CPT

## 2017-10-10 PROCEDURE — 96365 THER/PROPH/DIAG IV INF INIT: CPT

## 2017-10-10 PROCEDURE — 74011250636 HC RX REV CODE- 250/636: Performed by: INTERNAL MEDICINE

## 2017-10-10 PROCEDURE — 96366 THER/PROPH/DIAG IV INF ADDON: CPT

## 2017-10-10 PROCEDURE — 74011250637 HC RX REV CODE- 250/637: Performed by: INTERNAL MEDICINE

## 2017-10-10 PROCEDURE — 96375 TX/PRO/DX INJ NEW DRUG ADDON: CPT

## 2017-10-10 RX ORDER — SODIUM CHLORIDE 0.9 % (FLUSH) 0.9 %
10-40 SYRINGE (ML) INJECTION AS NEEDED
Status: DISCONTINUED | OUTPATIENT
Start: 2017-10-10 | End: 2017-10-14 | Stop reason: HOSPADM

## 2017-10-10 RX ORDER — DIPHENHYDRAMINE HYDROCHLORIDE 50 MG/ML
50 INJECTION, SOLUTION INTRAMUSCULAR; INTRAVENOUS ONCE
Status: COMPLETED | OUTPATIENT
Start: 2017-10-10 | End: 2017-10-10

## 2017-10-10 RX ORDER — DEXAMETHASONE SODIUM PHOSPHATE 4 MG/ML
20 INJECTION, SOLUTION INTRA-ARTICULAR; INTRALESIONAL; INTRAMUSCULAR; INTRAVENOUS; SOFT TISSUE ONCE
Status: COMPLETED | OUTPATIENT
Start: 2017-10-10 | End: 2017-10-10

## 2017-10-10 RX ORDER — SODIUM CHLORIDE 9 MG/ML
250 INJECTION, SOLUTION INTRAVENOUS ONCE
Status: COMPLETED | OUTPATIENT
Start: 2017-10-10 | End: 2017-10-10

## 2017-10-10 RX ORDER — ACETAMINOPHEN 325 MG/1
650 TABLET ORAL ONCE
Status: COMPLETED | OUTPATIENT
Start: 2017-10-10 | End: 2017-10-10

## 2017-10-10 RX ADMIN — DIPHENHYDRAMINE HYDROCHLORIDE 50 MG: 50 INJECTION, SOLUTION INTRAMUSCULAR; INTRAVENOUS at 10:45

## 2017-10-10 RX ADMIN — Medication 10 ML: at 10:30

## 2017-10-10 RX ADMIN — IMMUNE GLOBULIN INTRAVENOUS (HUMAN) 5 G: 5 INJECTION, SOLUTION INTRAVENOUS at 13:45

## 2017-10-10 RX ADMIN — DEXAMETHASONE SODIUM PHOSPHATE 20 MG: 4 INJECTION, SOLUTION INTRA-ARTICULAR; INTRALESIONAL; INTRAMUSCULAR; INTRAVENOUS; SOFT TISSUE at 10:55

## 2017-10-10 RX ADMIN — SODIUM CHLORIDE 250 ML: 0.9 INJECTION, SOLUTION INTRAVENOUS at 10:35

## 2017-10-10 RX ADMIN — ACETAMINOPHEN 650 MG: 325 TABLET ORAL at 10:45

## 2017-10-10 RX ADMIN — IMMUNE GLOBULIN INTRAVENOUS (HUMAN) 20 G: 5 INJECTION, SOLUTION INTRAVENOUS at 12:00

## 2017-10-10 NOTE — PROGRESS NOTES
[facility] Progress Note    Date: October 10, 2017    Name: Darnell Key    MRN: [id number]         : 1965      Ms. Vielka Ye arrived in the [facility] today, at 21 , in stable condition, here for Q 2 Week, CBC/Procrit injection, and Monthly IVIG Infusion. She was assessed and education was provided. Ms. Barboza Faxon vitals were reviewed. Visit Vitals    /87 (BP 1 Location: Right arm, BP Patient Position: At rest;Sitting)    Pulse 91    Temp 98.3 °F (36.8 °C)    Resp 16    Ht 5' 7\" (1.702 m)    Wt 93 kg (205 lb)    Breastfeeding No    BMI 32.11 kg/m2               PIV was established in her left AC without incident, at 1030, and blood for a CBC was drawn, per order. Lab results were obtained and reviewed, and the preliminary platelet count was noted to be 90,000. Recent Results (from the past 12 hour(s))   CBC WITH 3 PART DIFF    Collection Time: 10/10/17 10:30 AM   Result Value Ref Range    WBC 4.5 4.5 - 13.0 K/uL    RBC 4.07 (L) 4.10 - 5.10 M/uL    HGB 10.3 (L) 12.0 - 16.0 g/dL    HCT 32.4 (L) 36 - 48 %    MCV 79.6 78 - 102 FL    MCH 25.3 25.0 - 35.0 PG    MCHC 31.8 31 - 37 g/dL    RDW 14.6 (H) 11.5 - 14.5 %    NEUTROPHILS 77 (H) 40 - 70 %    MIXED CELLS 4 0.1 - 17 %    LYMPHOCYTES 20 14 - 44 %    ABS. NEUTROPHILS 3.4 1.8 - 9.5 K/UL    ABS. MIXED CELLS 0.2 0.0 - 2.3 K/uL    ABS. LYMPHOCYTES 0.9 (L) 1.1 - 5.9 K/UL    DF AUTOMATED               Procrit injection was HELD today, per order. Pre-medications consisting of PO Tylenol 650 mg, Benadryl 50 mg IV, and Decadron 20 mg IV, were all administered per order, and without incident.               IVIG 25 grams (25,000 mg) IV, was administered per order, and without incident.           After the completion of the IVIG, the PIV was flushed very well per protocol, and then, the PIV was removed and gauze/bandaid was applied. Ms. Aguayo tolerated well, and had no complaints.     [facility]. Vielka [facility] at 1435. Jordon Waller She is to return in 2 weeks, on Tuesday, 10-24-17,  at 1600, for her next appointment, for CBC/Procrit injection. And then, she is scheduled to return in 4 weeks, on Tuesday, 11-7-17, at 0900, for her next IVIG infusion.      Sanjeev Leonard RN  October 10, 2017  12:33 PM

## 2017-10-24 ENCOUNTER — HOSPITAL ENCOUNTER (OUTPATIENT)
Dept: INFUSION THERAPY | Age: 52
Discharge: HOME | End: 2017-10-24
Payer: COMMERCIAL

## 2017-10-24 ENCOUNTER — HOSPITAL ENCOUNTER (OUTPATIENT)
Dept: ONCOLOGY | Age: 52
Discharge: HOME | End: 2017-10-24

## 2017-10-24 ENCOUNTER — CLINICAL SUPPORT (OUTPATIENT)
Dept: ONCOLOGY | Age: 52
End: 2017-10-24

## 2017-10-24 VITALS
DIASTOLIC BLOOD PRESSURE: 96 MMHG | HEART RATE: 92 BPM | TEMPERATURE: 98.3 F | SYSTOLIC BLOOD PRESSURE: 166 MMHG | RESPIRATION RATE: 16 BRPM

## 2017-10-24 DIAGNOSIS — N18.9 ANEMIA IN CHRONIC KIDNEY DISEASE, UNSPECIFIED CKD STAGE: Primary | ICD-10-CM

## 2017-10-24 DIAGNOSIS — D63.1 ANEMIA IN CHRONIC KIDNEY DISEASE, UNSPECIFIED CKD STAGE: ICD-10-CM

## 2017-10-24 DIAGNOSIS — N18.9 ANEMIA IN CHRONIC KIDNEY DISEASE, UNSPECIFIED CKD STAGE: ICD-10-CM

## 2017-10-24 DIAGNOSIS — D63.1 ANEMIA IN CHRONIC KIDNEY DISEASE, UNSPECIFIED CKD STAGE: Primary | ICD-10-CM

## 2017-10-24 LAB
BASO+EOS+MONOS # BLD AUTO: 0.2 K/UL (ref 0–2.3)
BASO+EOS+MONOS # BLD AUTO: 3 % (ref 0.1–17)
DIFFERENTIAL METHOD BLD: ABNORMAL
ERYTHROCYTE [DISTWIDTH] IN BLOOD BY AUTOMATED COUNT: 13.6 % (ref 11.5–14.5)
HCT VFR BLD AUTO: 30.8 % (ref 36–48)
HGB BLD-MCNC: 10.2 G/DL (ref 12–16)
LYMPHOCYTES # BLD: 1.2 K/UL (ref 1.1–5.9)
LYMPHOCYTES NFR BLD: 16 % (ref 14–44)
MCH RBC QN AUTO: 25.8 PG (ref 25–35)
MCHC RBC AUTO-ENTMCNC: 33.1 G/DL (ref 31–37)
MCV RBC AUTO: 77.8 FL (ref 78–102)
NEUTS SEG # BLD: 5.7 K/UL (ref 1.8–9.5)
NEUTS SEG NFR BLD: 80 % (ref 40–70)
PLATELET # BLD AUTO: 48 K/UL (ref 135–420)
RBC # BLD AUTO: 3.96 M/UL (ref 4.1–5.1)
WBC # BLD AUTO: 7.1 K/UL (ref 4.5–13)

## 2017-10-24 PROCEDURE — 36415 COLL VENOUS BLD VENIPUNCTURE: CPT

## 2017-10-24 NOTE — PROGRESS NOTES
[facility] Progress Note    Date: 2017    Name: Philip Deal    MRN: [id number]         : 1965      Ms. Daryl Jeans was assessed and education was provided. Ms. Tunde King vitals were reviewed and patient was observed for 5 minutes prior to treatment. Visit Vitals    BP (!) 166/96 (BP 1 Location: Left arm, BP Patient Position: At rest;Sitting)    Pulse 92    Temp 98.3 °F (36.8 °C)    Resp 16       Lab results were obtained and reviewed. Recent Results (from the past 12 hour(s))   CBC WITH 3 PART DIFF    Collection Time: 10/24/17  4:04 PM   Result Value Ref Range    WBC 7.1 4.5 - 13.0 K/uL    RBC 3.96 (L) 4.10 - 5.10 M/uL    HGB 10.2 (L) 12.0 - 16.0 g/dL    HCT 30.8 (L) 36 - 48 %    MCV 77.8 (L) 78 - 102 FL    MCH 25.8 25.0 - 35.0 PG    MCHC 33.1 31 - 37 g/dL    RDW 13.6 11.5 - 14.5 %    NEUTROPHILS 80 (H) 40 - 70 %    MIXED CELLS 3 0.1 - 17 %    LYMPHOCYTES 16 14 - 44 %    ABS. NEUTROPHILS 5.7 1.8 - 9.5 K/UL    ABS. MIXED CELLS 0.2 0.0 - 2.3 K/uL    ABS. LYMPHOCYTES 1.2 1.1 - 5.9 K/UL    DF AUTOMATED         Procrit held for H&H 10.2/30.8. Dr Nichole White aware of platelet count of 47. Patient armband removed and shredded. Ms. Daryl Jeans was discharged from [place] [address] in stable condition at [phone]. She is to return on 17 at 0900 for her next appointment for CBC/Procrit Q 2 wks and monthly IVIG.     Latasha Ritter RN  2017

## 2017-11-07 ENCOUNTER — HOSPITAL ENCOUNTER (OUTPATIENT)
Dept: ONCOLOGY | Age: 52
Discharge: HOME | End: 2017-11-07

## 2017-11-07 ENCOUNTER — HOSPITAL ENCOUNTER (OUTPATIENT)
Dept: INFUSION THERAPY | Age: 52
Discharge: HOME | End: 2017-11-07
Payer: COMMERCIAL

## 2017-11-07 ENCOUNTER — CLINICAL SUPPORT (OUTPATIENT)
Dept: ONCOLOGY | Age: 52
End: 2017-11-07

## 2017-11-07 VITALS
BODY MASS INDEX: 32.02 KG/M2 | TEMPERATURE: 98.6 F | HEART RATE: 73 BPM | RESPIRATION RATE: 16 BRPM | DIASTOLIC BLOOD PRESSURE: 77 MMHG | HEIGHT: 67 IN | SYSTOLIC BLOOD PRESSURE: 139 MMHG | WEIGHT: 204 LBS

## 2017-11-07 DIAGNOSIS — D63.1 ANEMIA IN CHRONIC KIDNEY DISEASE, UNSPECIFIED CKD STAGE: Primary | ICD-10-CM

## 2017-11-07 DIAGNOSIS — N18.9 ANEMIA IN CHRONIC KIDNEY DISEASE, UNSPECIFIED CKD STAGE: ICD-10-CM

## 2017-11-07 DIAGNOSIS — N18.9 ANEMIA IN CHRONIC KIDNEY DISEASE, UNSPECIFIED CKD STAGE: Primary | ICD-10-CM

## 2017-11-07 DIAGNOSIS — D63.1 ANEMIA IN CHRONIC KIDNEY DISEASE, UNSPECIFIED CKD STAGE: ICD-10-CM

## 2017-11-07 LAB
BASO+EOS+MONOS # BLD AUTO: 0.2 K/UL (ref 0–2.3)
BASO+EOS+MONOS # BLD AUTO: 5 % (ref 0.1–17)
DIFFERENTIAL METHOD BLD: ABNORMAL
ERYTHROCYTE [DISTWIDTH] IN BLOOD BY AUTOMATED COUNT: 13.6 % (ref 11.5–14.5)
HCT VFR BLD AUTO: 29.6 % (ref 36–48)
HGB BLD-MCNC: 9.8 G/DL (ref 12–16)
LYMPHOCYTES # BLD: 0.8 K/UL (ref 1.1–5.9)
LYMPHOCYTES NFR BLD: 21 % (ref 14–44)
MCH RBC QN AUTO: 25.7 PG (ref 25–35)
MCHC RBC AUTO-ENTMCNC: 33.1 G/DL (ref 31–37)
MCV RBC AUTO: 77.5 FL (ref 78–102)
NEUTS SEG # BLD: 3 K/UL (ref 1.8–9.5)
NEUTS SEG NFR BLD: 74 % (ref 40–70)
PLATELET # BLD AUTO: 52 K/UL (ref 135–420)
RBC # BLD AUTO: 3.82 M/UL (ref 4.1–5.1)
WBC # BLD AUTO: 4 K/UL (ref 4.5–13)

## 2017-11-07 PROCEDURE — 96375 TX/PRO/DX INJ NEW DRUG ADDON: CPT

## 2017-11-07 PROCEDURE — 96367 TX/PROPH/DG ADDL SEQ IV INF: CPT

## 2017-11-07 PROCEDURE — 96366 THER/PROPH/DIAG IV INF ADDON: CPT

## 2017-11-07 PROCEDURE — 74011000258 HC RX REV CODE- 258: Performed by: INTERNAL MEDICINE

## 2017-11-07 PROCEDURE — 96365 THER/PROPH/DIAG IV INF INIT: CPT

## 2017-11-07 PROCEDURE — 96372 THER/PROPH/DIAG INJ SC/IM: CPT

## 2017-11-07 PROCEDURE — 74011250637 HC RX REV CODE- 250/637: Performed by: INTERNAL MEDICINE

## 2017-11-07 PROCEDURE — 74011250636 HC RX REV CODE- 250/636: Performed by: INTERNAL MEDICINE

## 2017-11-07 RX ORDER — DIPHENHYDRAMINE HYDROCHLORIDE 50 MG/ML
50 INJECTION, SOLUTION INTRAMUSCULAR; INTRAVENOUS ONCE
Status: COMPLETED | OUTPATIENT
Start: 2017-11-07 | End: 2017-11-07

## 2017-11-07 RX ORDER — ACETAMINOPHEN 325 MG/1
650 TABLET ORAL ONCE
Status: COMPLETED | OUTPATIENT
Start: 2017-11-07 | End: 2017-11-07

## 2017-11-07 RX ORDER — SODIUM CHLORIDE 9 MG/ML
250 INJECTION, SOLUTION INTRAVENOUS ONCE
Status: COMPLETED | OUTPATIENT
Start: 2017-11-07 | End: 2017-11-07

## 2017-11-07 RX ORDER — SODIUM CHLORIDE 0.9 % (FLUSH) 0.9 %
10-40 SYRINGE (ML) INJECTION AS NEEDED
Status: DISCONTINUED | OUTPATIENT
Start: 2017-11-07 | End: 2017-11-10 | Stop reason: HOSPADM

## 2017-11-07 RX ADMIN — SODIUM CHLORIDE 250 ML: 900 INJECTION, SOLUTION INTRAVENOUS at 09:55

## 2017-11-07 RX ADMIN — IMMUNE GLOBULIN INTRAVENOUS (HUMAN) 20 G: 5 INJECTION, SOLUTION INTRAVENOUS at 11:00

## 2017-11-07 RX ADMIN — ERYTHROPOIETIN 40000 UNITS: 40000 INJECTION, SOLUTION INTRAVENOUS; SUBCUTANEOUS at 12:55

## 2017-11-07 RX ADMIN — ERYTHROPOIETIN 20000 UNITS: 20000 INJECTION, SOLUTION INTRAVENOUS; SUBCUTANEOUS at 12:55

## 2017-11-07 RX ADMIN — ACETAMINOPHEN 650 MG: 325 TABLET ORAL at 10:02

## 2017-11-07 RX ADMIN — Medication 10 ML: at 09:30

## 2017-11-07 RX ADMIN — IMMUNE GLOBULIN INTRAVENOUS (HUMAN) 5 G: 5 INJECTION, SOLUTION INTRAVENOUS at 12:45

## 2017-11-07 RX ADMIN — DIPHENHYDRAMINE HYDROCHLORIDE 50 MG: 50 INJECTION, SOLUTION INTRAMUSCULAR; INTRAVENOUS at 10:03

## 2017-11-07 RX ADMIN — DEXAMETHASONE SODIUM PHOSPHATE 20 MG: 4 INJECTION, SOLUTION INTRA-ARTICULAR; INTRALESIONAL; INTRAMUSCULAR; INTRAVENOUS; SOFT TISSUE at 10:10

## 2017-11-07 NOTE — PROGRESS NOTES
[facility] Progress Note    Date: 2017    Name: Logan Gu    MRN: [id number]         : 1965      Ms. Federica Vigil arrived in the [facility] today, at 0915, in stable condition, here for Q 2 Week, CBC/Procrit injection, and Monthly IVIG Infusion. She was assessed and education was provided. Ms. Harjit Church vitals were reviewed. Visit Vitals    /88 (BP 1 Location: Left arm, BP Patient Position: At rest;Sitting)    Pulse 84    Temp 98.9 °F (37.2 °C)    Resp 16    Ht 5' 7\" (1.702 m)    Wt 92.5 kg (204 lb)    Breastfeeding No    BMI 31.95 kg/m2           PIV was established in her left AC at 0930, without incident, and blood for a CBC was drawn, per order. Lab results were obtained and reviewed. Recent Results (from the past 12 hour(s))   CBC WITH 3 PART DIFF    Collection Time: 17  9:16 AM   Result Value Ref Range    WBC 4.0 (L) 4.5 - 13.0 K/uL    RBC 3.82 (L) 4.10 - 5.10 M/uL    HGB 9.8 (L) 12.0 - 16.0 g/dL    HCT 29.6 (L) 36 - 48 %    MCV 77.5 (L) 78 - 102 FL    MCH 25.7 25.0 - 35.0 PG    MCHC 33.1 31 - 37 g/dL    RDW 13.6 11.5 - 14.5 %    NEUTROPHILS 74 (H) 40 - 70 %    MIXED CELLS 5 0.1 - 17 %    LYMPHOCYTES 21 14 - 44 %    ABS. NEUTROPHILS 3.0 1.8 - 9.5 K/UL    ABS. MIXED CELLS 0.2 0.0 - 2.3 K/uL    ABS. LYMPHOCYTES 0.8 (L) 1.1 - 5.9 K/UL    DF AUTOMATED                 Procrit 60,000 units, was administered SQ, in her left arm, at 1255,  as ordered, and without incident.             Pre-medications consisting of PO Tylenol 650 mg, Benadryl 50 mg IV, and Decadron 20 mg IV, were all administered per order, and without incident.               IVIG 25 grams (25,000 mg) IV, was administered per order, and without incident.           After the completion of the IVIG, the PIV was flushed very well per protocol, and then, the PIV was removed and gauze/bandaid was applied. Ms. Federica Vigil tolerated well, and had no complaints.     Ms. Fed[facility]ica [facility] at 1345. Monserrat Daley She is to return in 2 weeks, on Tuesday, 11-21-17,  at 1500,  for her next appointment, for CBC/Procrit injection. And then, she will return in 1 month, on Tuesday, 12-5-17, at 0900, for her next IVIG infusion and CBC/Procrit injection.      Alhaji Dobbs RN  November 7, 2017  9:52 AM

## 2017-11-21 ENCOUNTER — HOSPITAL ENCOUNTER (OUTPATIENT)
Dept: INFUSION THERAPY | Age: 52
Discharge: HOME | End: 2017-11-21
Payer: COMMERCIAL

## 2017-11-21 ENCOUNTER — CLINICAL SUPPORT (OUTPATIENT)
Dept: ONCOLOGY | Age: 52
End: 2017-11-21

## 2017-11-21 ENCOUNTER — HOSPITAL ENCOUNTER (OUTPATIENT)
Dept: ONCOLOGY | Age: 52
Discharge: HOME | End: 2017-11-21

## 2017-11-21 VITALS
DIASTOLIC BLOOD PRESSURE: 90 MMHG | TEMPERATURE: 98.3 F | RESPIRATION RATE: 16 BRPM | HEART RATE: 91 BPM | SYSTOLIC BLOOD PRESSURE: 143 MMHG

## 2017-11-21 DIAGNOSIS — D63.1 ANEMIA IN CHRONIC KIDNEY DISEASE, UNSPECIFIED CKD STAGE: ICD-10-CM

## 2017-11-21 DIAGNOSIS — D63.1 ANEMIA IN CHRONIC KIDNEY DISEASE, UNSPECIFIED CKD STAGE: Primary | ICD-10-CM

## 2017-11-21 DIAGNOSIS — N18.9 ANEMIA IN CHRONIC KIDNEY DISEASE, UNSPECIFIED CKD STAGE: ICD-10-CM

## 2017-11-21 DIAGNOSIS — N18.9 ANEMIA IN CHRONIC KIDNEY DISEASE, UNSPECIFIED CKD STAGE: Primary | ICD-10-CM

## 2017-11-21 LAB
BASO+EOS+MONOS # BLD AUTO: 0.3 K/UL (ref 0–2.3)
BASO+EOS+MONOS # BLD AUTO: 5 % (ref 0.1–17)
DIFFERENTIAL METHOD BLD: ABNORMAL
ERYTHROCYTE [DISTWIDTH] IN BLOOD BY AUTOMATED COUNT: 15 % (ref 11.5–14.5)
HCT VFR BLD AUTO: 30.8 % (ref 36–48)
HGB BLD-MCNC: 10.1 G/DL (ref 12–16)
LYMPHOCYTES # BLD: 0.9 K/UL (ref 1.1–5.9)
LYMPHOCYTES NFR BLD: 17 % (ref 14–44)
MCH RBC QN AUTO: 26.2 PG (ref 25–35)
MCHC RBC AUTO-ENTMCNC: 32.8 G/DL (ref 31–37)
MCV RBC AUTO: 80 FL (ref 78–102)
NEUTS SEG # BLD: 4.1 K/UL (ref 1.8–9.5)
NEUTS SEG NFR BLD: 78 % (ref 40–70)
RBC # BLD AUTO: 3.85 M/UL (ref 4.1–5.1)
WBC # BLD AUTO: 5.3 K/UL (ref 4.5–13)

## 2017-11-21 PROCEDURE — 36415 COLL VENOUS BLD VENIPUNCTURE: CPT

## 2017-11-21 NOTE — PROGRESS NOTES
[facility] Progress Note    Date: 2017    Name: Martin Kramer    MRN: [id number]         : 1965      Ms. Fawn Preston arrived in the [facility] today, at 1510, in stable condition, here for Q 2 Week, CBC/Procrit injection. She was assessed and education was provided. Ms. Godwin Durant vitals were reviewed. Visit Vitals    /90 (BP 1 Location: Left arm, BP Patient Position: At rest;Sitting)    Pulse 91    Temp 98.3 °F (36.8 °C)    Resp 16           CBC was drawn from her left AC at 1523, per order and without incident. Lab results were obtained and reviewed, and the preliminary platelet count was noted to be 49,000. Recent Results (from the past 12 hour(s))   CBC WITH 3 PART DIFF    Collection Time: 17  3:23 PM   Result Value Ref Range    WBC 5.3 4.5 - 13.0 K/uL    RBC 3.85 (L) 4.10 - 5.10 M/uL    HGB 10.1 (L) 12.0 - 16.0 g/dL    HCT 30.8 (L) 36 - 48 %    MCV 80.0 78 - 102 FL    MCH 26.2 25.0 - 35.0 PG    MCHC 32.8 31 - 37 g/dL    RDW 15.0 (H) 11.5 - 14.5 %    NEUTROPHILS 78 (H) 40 - 70 %    MIXED CELLS 5 0.1 - 17 %    LYMPHOCYTES 17 14 - 44 %    ABS. NEUTROPHILS 4.1 1.8 - 9.5 K/UL    ABS. MIXED CELLS 0.3 0.0 - 2.3 K/uL    ABS. LYMPHOCYTES 0.9 (L) 1.1 - 5.9 K/UL    DF AUTOMATED             Procrit injection was HELD today, per order. Ms. Aguayo tolerated well, and had no complaints. Ms. Fawn Preston was discharged from [place] [address] in stable condition at 1540. Jae Anderson She is to return in 2 weeks, on Tuesday, 17, at 0900,  for her next appointment, for Monthly IVIG infusion & Q 2 Week CBC/Procrit injection.      Nancy Mitchell RN  2017  3:36 PM

## 2017-12-05 ENCOUNTER — HOSPITAL ENCOUNTER (OUTPATIENT)
Dept: INFUSION THERAPY | Age: 52
Discharge: HOME | End: 2017-12-05
Payer: COMMERCIAL

## 2017-12-05 ENCOUNTER — HOSPITAL ENCOUNTER (OUTPATIENT)
Dept: ONCOLOGY | Age: 52
Discharge: HOME | End: 2017-12-05

## 2017-12-05 ENCOUNTER — CLINICAL SUPPORT (OUTPATIENT)
Dept: ONCOLOGY | Age: 52
End: 2017-12-05

## 2017-12-05 VITALS
DIASTOLIC BLOOD PRESSURE: 72 MMHG | RESPIRATION RATE: 16 BRPM | TEMPERATURE: 98.6 F | HEART RATE: 79 BPM | BODY MASS INDEX: 31.39 KG/M2 | SYSTOLIC BLOOD PRESSURE: 123 MMHG | HEIGHT: 67 IN | WEIGHT: 200 LBS

## 2017-12-05 DIAGNOSIS — N18.9 ANEMIA IN CHRONIC KIDNEY DISEASE, UNSPECIFIED CKD STAGE: ICD-10-CM

## 2017-12-05 DIAGNOSIS — D63.1 ANEMIA IN CHRONIC KIDNEY DISEASE, UNSPECIFIED CKD STAGE: ICD-10-CM

## 2017-12-05 DIAGNOSIS — D63.1 ANEMIA IN CHRONIC KIDNEY DISEASE, UNSPECIFIED CKD STAGE: Primary | ICD-10-CM

## 2017-12-05 DIAGNOSIS — N18.9 ANEMIA IN CHRONIC KIDNEY DISEASE, UNSPECIFIED CKD STAGE: Primary | ICD-10-CM

## 2017-12-05 LAB
BASO+EOS+MONOS # BLD AUTO: 0.3 K/UL (ref 0–2.3)
BASO+EOS+MONOS # BLD AUTO: 6 % (ref 0.1–17)
DIFFERENTIAL METHOD BLD: ABNORMAL
ERYTHROCYTE [DISTWIDTH] IN BLOOD BY AUTOMATED COUNT: 13.4 % (ref 11.5–14.5)
HCT VFR BLD AUTO: 29.7 % (ref 36–48)
HGB BLD-MCNC: 10 G/DL (ref 12–16)
LYMPHOCYTES # BLD: 0.8 K/UL (ref 1.1–5.9)
LYMPHOCYTES NFR BLD: 18 % (ref 14–44)
MCH RBC QN AUTO: 26.2 PG (ref 25–35)
MCHC RBC AUTO-ENTMCNC: 33.7 G/DL (ref 31–37)
MCV RBC AUTO: 78 FL (ref 78–102)
NEUTS SEG # BLD: 3.7 K/UL (ref 1.8–9.5)
NEUTS SEG NFR BLD: 77 % (ref 40–70)
PLATELET # BLD AUTO: 55 K/UL (ref 135–420)
RBC # BLD AUTO: 3.81 M/UL (ref 4.1–5.1)
WBC # BLD AUTO: 4.8 K/UL (ref 4.5–13)

## 2017-12-05 PROCEDURE — 74011250636 HC RX REV CODE- 250/636: Performed by: INTERNAL MEDICINE

## 2017-12-05 PROCEDURE — 74011250637 HC RX REV CODE- 250/637: Performed by: INTERNAL MEDICINE

## 2017-12-05 PROCEDURE — 96367 TX/PROPH/DG ADDL SEQ IV INF: CPT

## 2017-12-05 PROCEDURE — 96366 THER/PROPH/DIAG IV INF ADDON: CPT

## 2017-12-05 PROCEDURE — 74011000258 HC RX REV CODE- 258: Performed by: INTERNAL MEDICINE

## 2017-12-05 PROCEDURE — 96375 TX/PRO/DX INJ NEW DRUG ADDON: CPT

## 2017-12-05 PROCEDURE — 96365 THER/PROPH/DIAG IV INF INIT: CPT

## 2017-12-05 RX ORDER — DIPHENHYDRAMINE HYDROCHLORIDE 50 MG/ML
50 INJECTION, SOLUTION INTRAMUSCULAR; INTRAVENOUS ONCE
Status: COMPLETED | OUTPATIENT
Start: 2017-12-05 | End: 2017-12-05

## 2017-12-05 RX ORDER — SODIUM CHLORIDE 0.9 % (FLUSH) 0.9 %
10-40 SYRINGE (ML) INJECTION AS NEEDED
Status: DISCONTINUED | OUTPATIENT
Start: 2017-12-05 | End: 2017-12-09 | Stop reason: HOSPADM

## 2017-12-05 RX ORDER — SODIUM CHLORIDE 9 MG/ML
250 INJECTION, SOLUTION INTRAVENOUS ONCE
Status: COMPLETED | OUTPATIENT
Start: 2017-12-05 | End: 2017-12-05

## 2017-12-05 RX ORDER — ACETAMINOPHEN 325 MG/1
650 TABLET ORAL ONCE
Status: COMPLETED | OUTPATIENT
Start: 2017-12-05 | End: 2017-12-05

## 2017-12-05 RX ADMIN — SODIUM CHLORIDE 250 ML: 0.9 INJECTION, SOLUTION INTRAVENOUS at 09:50

## 2017-12-05 RX ADMIN — Medication 10 ML: at 09:25

## 2017-12-05 RX ADMIN — IMMUNE GLOBULIN INTRAVENOUS (HUMAN) 5 G: 5 INJECTION, SOLUTION INTRAVENOUS at 12:45

## 2017-12-05 RX ADMIN — DEXAMETHASONE SODIUM PHOSPHATE 20 MG: 4 INJECTION, SOLUTION INTRA-ARTICULAR; INTRALESIONAL; INTRAMUSCULAR; INTRAVENOUS; SOFT TISSUE at 10:00

## 2017-12-05 RX ADMIN — DIPHENHYDRAMINE HYDROCHLORIDE 50 MG: 50 INJECTION, SOLUTION INTRAMUSCULAR; INTRAVENOUS at 09:55

## 2017-12-05 RX ADMIN — ACETAMINOPHEN 650 MG: 325 TABLET ORAL at 09:55

## 2017-12-05 RX ADMIN — IMMUNE GLOBULIN INTRAVENOUS (HUMAN) 20 G: 5 INJECTION, SOLUTION INTRAVENOUS at 11:00

## 2017-12-05 NOTE — PROGRESS NOTES
[facility] Progress Note    Date: 2017    Name: Brigette Finley    MRN: [id number]         : 1965      Ms. Braxton Staff arrived in the [facility] today, at 0910, in stable condition, here for Q 2 Week, CBC/Procrit injection & Monthly IVIG Infusion. She was assessed and education was provided. Ms. Sharifa Ramirez vitals were reviewed. Visit Vitals    /89 (BP 1 Location: Left arm, BP Patient Position: At rest;Sitting)    Pulse 85    Temp 97.4 °F (36.3 °C)    Resp 16    Ht 5' 7\" (1.702 m)    Wt 90.7 kg (200 lb)    Breastfeeding No    BMI 31.32 kg/m2           PIV was established in her left AC at 0925 without incident, and blood for a CBC was drawn per order. Lab results were obtained and reviewed. Recent Results (from the past 12 hour(s))   CBC WITH 3 PART DIFF    Collection Time: 17  9:25 AM   Result Value Ref Range    WBC 4.8 4.5 - 13.0 K/uL    RBC 3.81 (L) 4.10 - 5.10 M/uL    HGB 10.0 (L) 12.0 - 16.0 g/dL    HCT 29.7 (L) 36 - 48 %    MCV 78.0 78 - 102 FL    MCH 26.2 25.0 - 35.0 PG    MCHC 33.7 31 - 37 g/dL    RDW 13.4 11.5 - 14.5 %    NEUTROPHILS 77 (H) 40 - 70 %    MIXED CELLS 6 0.1 - 17 %    LYMPHOCYTES 18 14 - 44 %    ABS. NEUTROPHILS 3.7 1.8 - 9.5 K/UL    ABS. MIXED CELLS 0.3 0.0 - 2.3 K/uL    ABS. LYMPHOCYTES 0.8 (L) 1.1 - 5.9 K/UL    DF AUTOMATED                 Procrit injection was HELD today per order, for HGB 10.0.           Pre-medications consisting of PO Tylenol 650 mg, Benadryl 50 mg IV, and Decadron 20 mg IV, were all administered per order, and without incident.               IVIG 25 grams (25,000 mg) IV, was administered per order, and without incident.           After the completion of the IVIG, the PIV was flushed very well per protocol, and then, the PIV was removed and gauze/bandaid was applied. Ms. Aguayo tolerated well, and had no complaints      Ms. Braxton Staff was discharged from [place] [address] in stable condition at 1330. Debora Bell  She is to return in 2 weeks, on Tuesday, 12-19-17,  at 1500,  for her next appointment, for CBC/Procrit injection. And then, she is to return in 1 month, on Tuesday, 1-2-18, at 0900, for her next IVIG Infusion, and CBC/Procrit injection.      Rolf Easton RN  December 5, 2017  9:41 AM

## 2017-12-19 ENCOUNTER — CLINICAL SUPPORT (OUTPATIENT)
Dept: ONCOLOGY | Age: 52
End: 2017-12-19

## 2017-12-19 ENCOUNTER — HOSPITAL ENCOUNTER (OUTPATIENT)
Dept: ONCOLOGY | Age: 52
Discharge: HOME | End: 2017-12-19

## 2017-12-19 ENCOUNTER — HOSPITAL ENCOUNTER (OUTPATIENT)
Dept: INFUSION THERAPY | Age: 52
Discharge: HOME | End: 2017-12-19
Payer: COMMERCIAL

## 2017-12-19 VITALS
SYSTOLIC BLOOD PRESSURE: 139 MMHG | TEMPERATURE: 98.1 F | HEART RATE: 97 BPM | RESPIRATION RATE: 16 BRPM | DIASTOLIC BLOOD PRESSURE: 82 MMHG

## 2017-12-19 DIAGNOSIS — N18.9 ANEMIA IN CHRONIC KIDNEY DISEASE, UNSPECIFIED CKD STAGE: Primary | ICD-10-CM

## 2017-12-19 DIAGNOSIS — D63.1 ANEMIA IN CHRONIC KIDNEY DISEASE, UNSPECIFIED CKD STAGE: Primary | ICD-10-CM

## 2017-12-19 DIAGNOSIS — N18.9 ANEMIA IN CHRONIC KIDNEY DISEASE, UNSPECIFIED CKD STAGE: ICD-10-CM

## 2017-12-19 DIAGNOSIS — D63.1 ANEMIA IN CHRONIC KIDNEY DISEASE, UNSPECIFIED CKD STAGE: ICD-10-CM

## 2017-12-19 LAB
BASO+EOS+MONOS # BLD AUTO: 0.2 K/UL (ref 0–2.3)
BASO+EOS+MONOS # BLD AUTO: 3 % (ref 0.1–17)
DIFFERENTIAL METHOD BLD: ABNORMAL
ERYTHROCYTE [DISTWIDTH] IN BLOOD BY AUTOMATED COUNT: 13.4 % (ref 11.5–14.5)
HCT VFR BLD AUTO: 27.5 % (ref 36–48)
HGB BLD-MCNC: 9.6 G/DL (ref 12–16)
LYMPHOCYTES # BLD: 1.1 K/UL (ref 1.1–5.9)
LYMPHOCYTES NFR BLD: 16 % (ref 14–44)
MCH RBC QN AUTO: 27.2 PG (ref 25–35)
MCHC RBC AUTO-ENTMCNC: 34.9 G/DL (ref 31–37)
MCV RBC AUTO: 77.9 FL (ref 78–102)
NEUTS SEG # BLD: 5.4 K/UL (ref 1.8–9.5)
NEUTS SEG NFR BLD: 81 % (ref 40–70)
PLATELET # BLD AUTO: 46 K/UL (ref 135–420)
RBC # BLD AUTO: 3.53 M/UL (ref 4.1–5.1)
WBC # BLD AUTO: 6.7 K/UL (ref 4.5–13)

## 2017-12-19 PROCEDURE — 74011250636 HC RX REV CODE- 250/636: Performed by: INTERNAL MEDICINE

## 2017-12-19 PROCEDURE — 36415 COLL VENOUS BLD VENIPUNCTURE: CPT

## 2017-12-19 PROCEDURE — 96372 THER/PROPH/DIAG INJ SC/IM: CPT

## 2017-12-19 RX ADMIN — ERYTHROPOIETIN 40000 UNITS: 40000 INJECTION, SOLUTION INTRAVENOUS; SUBCUTANEOUS at 15:45

## 2017-12-19 RX ADMIN — ERYTHROPOIETIN 20000 UNITS: 20000 INJECTION, SOLUTION INTRAVENOUS; SUBCUTANEOUS at 15:45

## 2017-12-19 NOTE — PROGRESS NOTES
[facility] Progress Note    Date: 2017    Name: Koko Woo    MRN: [id number]         : 1965      Ms. Paul Mcelroy arrived in the [facility] today, at 1510, in stable condition, here for Q 2 Week, CBC/Procrit injection. She was assessed and education was provided. Ms. Graham Fishman vitals were reviewed. Visit Vitals    /82 (BP 1 Location: Left arm, BP Patient Position: At rest;Sitting)    Pulse 97    Temp 98.1 °F (36.7 °C)    Resp 16           CBC was drawn from her left AC at 1515, per order, and without incident. Lab results were obtained and reviewed, and the preliminary platelet count was noted to be 37,000. Recent Results (from the past 12 hour(s))   CBC WITH 3 PART DIFF    Collection Time: 17  3:15 PM   Result Value Ref Range    WBC 6.7 4.5 - 13.0 K/uL    RBC 3.53 (L) 4.10 - 5.10 M/uL    HGB 9.6 (L) 12.0 - 16.0 g/dL    HCT 27.5 (L) 36 - 48 %    MCV 77.9 (L) 78 - 102 FL    MCH 27.2 25.0 - 35.0 PG    MCHC 34.9 31 - 37 g/dL    RDW 13.4 11.5 - 14.5 %    NEUTROPHILS 81 (H) 40 - 70 %    MIXED CELLS 3 0.1 - 17 %    LYMPHOCYTES 16 14 - 44 %    ABS. NEUTROPHILS 5.4 1.8 - 9.5 K/UL    ABS. MIXED CELLS 0.2 0.0 - 2.3 K/uL    ABS. LYMPHOCYTES 1.1 1.1 - 5.9 K/UL    DF AUTOMATED               Procrit 60,000 units, was administered SQ, in her right arm, at 1545, as ordered, and without incident. Ms. Aguayo tolerated well, and had no complaints. Ms. Paul Mcelroy was discharged from [place] [address] in stable condition at 1550. Ruslan Sahu She is to return in 2 weeks, on Tuesday, 18, at 0900,  for her next appointment, for CBC/Procrit injection, and monthly IVIG Infusion.      Sylvia Ye RN  2017  3:37 PM

## 2018-01-02 ENCOUNTER — HOSPITAL ENCOUNTER (OUTPATIENT)
Dept: ONCOLOGY | Age: 53
Discharge: HOME | End: 2018-01-02

## 2018-01-02 ENCOUNTER — HOSPITAL ENCOUNTER (OUTPATIENT)
Dept: INFUSION THERAPY | Age: 53
Discharge: HOME | End: 2018-01-02
Payer: COMMERCIAL

## 2018-01-02 ENCOUNTER — CLINICAL SUPPORT (OUTPATIENT)
Dept: ONCOLOGY | Age: 53
End: 2018-01-02

## 2018-01-02 VITALS
RESPIRATION RATE: 16 BRPM | BODY MASS INDEX: 31.47 KG/M2 | DIASTOLIC BLOOD PRESSURE: 78 MMHG | SYSTOLIC BLOOD PRESSURE: 131 MMHG | WEIGHT: 200.5 LBS | HEART RATE: 78 BPM | TEMPERATURE: 98.3 F | HEIGHT: 67 IN

## 2018-01-02 DIAGNOSIS — D63.1 ANEMIA IN CHRONIC KIDNEY DISEASE, UNSPECIFIED CKD STAGE: Primary | ICD-10-CM

## 2018-01-02 DIAGNOSIS — N18.9 ANEMIA IN CHRONIC KIDNEY DISEASE, UNSPECIFIED CKD STAGE: ICD-10-CM

## 2018-01-02 DIAGNOSIS — D63.1 ANEMIA IN CHRONIC KIDNEY DISEASE, UNSPECIFIED CKD STAGE: ICD-10-CM

## 2018-01-02 DIAGNOSIS — N18.9 ANEMIA IN CHRONIC KIDNEY DISEASE, UNSPECIFIED CKD STAGE: Primary | ICD-10-CM

## 2018-01-02 LAB
BASO+EOS+MONOS # BLD AUTO: 0.2 K/UL (ref 0–2.3)
BASO+EOS+MONOS # BLD AUTO: 3 % (ref 0.1–17)
DIFFERENTIAL METHOD BLD: ABNORMAL
ERYTHROCYTE [DISTWIDTH] IN BLOOD BY AUTOMATED COUNT: 14.5 % (ref 11.5–14.5)
HCT VFR BLD AUTO: 33 % (ref 36–48)
HGB BLD-MCNC: 10.9 G/DL (ref 12–16)
LYMPHOCYTES # BLD: 0.9 K/UL (ref 1.1–5.9)
LYMPHOCYTES NFR BLD: 17 % (ref 14–44)
MCH RBC QN AUTO: 26.6 PG (ref 25–35)
MCHC RBC AUTO-ENTMCNC: 33 G/DL (ref 31–37)
MCV RBC AUTO: 80.5 FL (ref 78–102)
NEUTS SEG # BLD: 4 K/UL (ref 1.8–9.5)
NEUTS SEG NFR BLD: 80 % (ref 40–70)
PLATELET # BLD AUTO: 77 K/UL (ref 140–440)
RBC # BLD AUTO: 4.1 M/UL (ref 4.1–5.1)
WBC # BLD AUTO: 5.1 K/UL (ref 4.5–13)

## 2018-01-02 PROCEDURE — 96375 TX/PRO/DX INJ NEW DRUG ADDON: CPT

## 2018-01-02 PROCEDURE — 74011000258 HC RX REV CODE- 258: Performed by: INTERNAL MEDICINE

## 2018-01-02 PROCEDURE — 74011250636 HC RX REV CODE- 250/636: Performed by: INTERNAL MEDICINE

## 2018-01-02 PROCEDURE — 74011250637 HC RX REV CODE- 250/637: Performed by: INTERNAL MEDICINE

## 2018-01-02 PROCEDURE — 96367 TX/PROPH/DG ADDL SEQ IV INF: CPT

## 2018-01-02 PROCEDURE — 96365 THER/PROPH/DIAG IV INF INIT: CPT

## 2018-01-02 PROCEDURE — 96366 THER/PROPH/DIAG IV INF ADDON: CPT

## 2018-01-02 RX ORDER — DIPHENHYDRAMINE HYDROCHLORIDE 50 MG/ML
50 INJECTION, SOLUTION INTRAMUSCULAR; INTRAVENOUS ONCE
Status: COMPLETED | OUTPATIENT
Start: 2018-01-02 | End: 2018-01-02

## 2018-01-02 RX ORDER — SODIUM CHLORIDE 9 MG/ML
250 INJECTION, SOLUTION INTRAVENOUS ONCE
Status: COMPLETED | OUTPATIENT
Start: 2018-01-02 | End: 2018-01-02

## 2018-01-02 RX ORDER — SODIUM CHLORIDE 0.9 % (FLUSH) 0.9 %
10-40 SYRINGE (ML) INJECTION AS NEEDED
Status: DISCONTINUED | OUTPATIENT
Start: 2018-01-02 | End: 2018-01-06 | Stop reason: HOSPADM

## 2018-01-02 RX ORDER — ACETAMINOPHEN 325 MG/1
650 TABLET ORAL ONCE
Status: COMPLETED | OUTPATIENT
Start: 2018-01-02 | End: 2018-01-02

## 2018-01-02 RX ADMIN — SODIUM CHLORIDE 250 ML: 900 INJECTION, SOLUTION INTRAVENOUS at 09:50

## 2018-01-02 RX ADMIN — ACETAMINOPHEN 650 MG: 325 TABLET ORAL at 09:56

## 2018-01-02 RX ADMIN — IMMUNE GLOBULIN INTRAVENOUS (HUMAN) 20 G: 5 INJECTION, SOLUTION INTRAVENOUS at 11:00

## 2018-01-02 RX ADMIN — DIPHENHYDRAMINE HYDROCHLORIDE 50 MG: 50 INJECTION, SOLUTION INTRAMUSCULAR; INTRAVENOUS at 09:58

## 2018-01-02 RX ADMIN — Medication 10 ML: at 09:30

## 2018-01-02 RX ADMIN — IMMUNE GLOBULIN INTRAVENOUS (HUMAN) 5 G: 5 INJECTION, SOLUTION INTRAVENOUS at 12:50

## 2018-01-02 RX ADMIN — DEXAMETHASONE SODIUM PHOSPHATE 20 MG: 4 INJECTION, SOLUTION INTRAMUSCULAR; INTRAVENOUS at 10:00

## 2018-01-02 NOTE — PROGRESS NOTES
[facility] Progress Note    Date: 2018    Name: Jazlyn Ye    MRN: [id number]         : 1965      Ms. Bernice Malone arrived in the [facility] today, at 0915, in stable condition, here for Q 2 Week, CBC/Procrit injection, and Monthly IVIG Infusion. She was assessed and education was provided. Ms. Tre Velasquez vitals were reviewed. Visit Vitals    /90 (BP 1 Location: Left arm, BP Patient Position: At rest;Sitting)    Pulse 86    Temp 97.5 °F (36.4 °C)    Resp 16    Ht 5' 7\" (1.702 m)    Wt 90.9 kg (200 lb 8 oz)    Breastfeeding No    BMI 31.4 kg/m2           PIV was established in her right AC at 0930 without incident, and blood for a CBC was drawn, per order. Lab results were obtained and reviewed. Recent Results (from the past 12 hour(s))   CBC WITH 3 PART DIFF    Collection Time: 18  9:30 AM   Result Value Ref Range    WBC 5.1 4.5 - 13.0 K/uL    RBC 4.10 4. 10 - 5.10 M/uL    HGB 10.9 (L) 12.0 - 16.0 g/dL    HCT 33.0 (L) 36 - 48 %    MCV 80.5 78 - 102 FL    MCH 26.6 25.0 - 35.0 PG    MCHC 33.0 31 - 37 g/dL    RDW 14.5 11.5 - 14.5 %    PLATELET 77 (L) 431 - 440 K/uL    NEUTROPHILS 80 (H) 40 - 70 %    MIXED CELLS 3 0.1 - 17 %    LYMPHOCYTES 17 14 - 44 %    ABS. NEUTROPHILS 4.0 1.8 - 9.5 K/UL    ABS. MIXED CELLS 0.2 0.0 - 2.3 K/uL    ABS. LYMPHOCYTES 0.9 (L) 1.1 - 5.9 K/UL    DF AUTOMATED             Procrit injection was HELD today, per order.            Pre-medications consisting of PO Tylenol 650 mg, Benadryl 50 mg IV, and Decadron 20 mg IV, were all administered per order, and without incident.               IVIG 25 grams (25,000 mg) IV, was administered per order, and without incident.           After the completion of the IVIG, the PIV was flushed very well per protocol, and then, the PIV was removed and gauze/bandaid was applied. Ms. Aguayo tolerated well, and had no complaints.     Ms. Bernice Malone was discharged from [place] [address] in stable condition at 1340.. She is to return in 2 weeks, on Tuesday, 1-16-18,  at 1500,  for her next appointment, for CBC/Procrit injection. And then, she is scheduled to return in 1 month, on Tuesday, 2-6-18, at 0900, for her next IVIG Infusion.      Konstantin Umanzor RN  January 2, 2018  9:44 AM

## 2018-01-16 ENCOUNTER — HOSPITAL ENCOUNTER (OUTPATIENT)
Dept: INFUSION THERAPY | Age: 53
Discharge: HOME | End: 2018-01-16
Payer: COMMERCIAL

## 2018-01-16 ENCOUNTER — HOSPITAL ENCOUNTER (EMERGENCY)
Age: 53
Discharge: HOME | End: 2018-01-16
Attending: EMERGENCY MEDICINE
Payer: COMMERCIAL

## 2018-01-16 ENCOUNTER — HOSPITAL ENCOUNTER (OUTPATIENT)
Dept: ONCOLOGY | Age: 53
Discharge: HOME | End: 2018-01-16

## 2018-01-16 ENCOUNTER — CLINICAL SUPPORT (OUTPATIENT)
Dept: ONCOLOGY | Age: 53
End: 2018-01-16

## 2018-01-16 VITALS
DIASTOLIC BLOOD PRESSURE: 98 MMHG | TEMPERATURE: 98.1 F | RESPIRATION RATE: 13 BRPM | HEART RATE: 95 BPM | OXYGEN SATURATION: 100 % | SYSTOLIC BLOOD PRESSURE: 119 MMHG

## 2018-01-16 VITALS
DIASTOLIC BLOOD PRESSURE: 90 MMHG | TEMPERATURE: 98.3 F | RESPIRATION RATE: 16 BRPM | SYSTOLIC BLOOD PRESSURE: 156 MMHG | HEART RATE: 105 BPM

## 2018-01-16 DIAGNOSIS — R04.0 EPISTAXIS: Primary | ICD-10-CM

## 2018-01-16 DIAGNOSIS — R73.9 HYPERGLYCEMIA: ICD-10-CM

## 2018-01-16 DIAGNOSIS — D63.1 ANEMIA IN CHRONIC KIDNEY DISEASE, UNSPECIFIED CKD STAGE: Primary | ICD-10-CM

## 2018-01-16 DIAGNOSIS — D63.1 ANEMIA IN CHRONIC KIDNEY DISEASE, UNSPECIFIED CKD STAGE: ICD-10-CM

## 2018-01-16 DIAGNOSIS — N18.9 ANEMIA IN CHRONIC KIDNEY DISEASE, UNSPECIFIED CKD STAGE: ICD-10-CM

## 2018-01-16 DIAGNOSIS — N18.9 ANEMIA IN CHRONIC KIDNEY DISEASE, UNSPECIFIED CKD STAGE: Primary | ICD-10-CM

## 2018-01-16 LAB
ANION GAP SERPL CALC-SCNC: 11 MMOL/L (ref 3–18)
APTT PPP: 26.3 SEC (ref 23–36.4)
BASO+EOS+MONOS # BLD AUTO: 0.3 K/UL (ref 0–2.3)
BASO+EOS+MONOS # BLD AUTO: 3 % (ref 0.1–17)
BASOPHILS # BLD: 0 K/UL (ref 0–0.06)
BASOPHILS NFR BLD: 0 % (ref 0–2)
BUN SERPL-MCNC: 36 MG/DL (ref 7–18)
BUN/CREAT SERPL: 15 (ref 12–20)
CALCIUM SERPL-MCNC: 8.8 MG/DL (ref 8.5–10.1)
CHLORIDE SERPL-SCNC: 96 MMOL/L (ref 100–108)
CO2 SERPL-SCNC: 24 MMOL/L (ref 21–32)
CREAT SERPL-MCNC: 2.4 MG/DL (ref 0.6–1.3)
DIFFERENTIAL METHOD BLD: ABNORMAL
DIFFERENTIAL METHOD BLD: ABNORMAL
EOSINOPHIL # BLD: 0.2 K/UL (ref 0–0.4)
EOSINOPHIL NFR BLD: 2 % (ref 0–5)
ERYTHROCYTE [DISTWIDTH] IN BLOOD BY AUTOMATED COUNT: 12.9 % (ref 11.5–14.5)
ERYTHROCYTE [DISTWIDTH] IN BLOOD BY AUTOMATED COUNT: 13.5 % (ref 11.6–14.5)
GLUCOSE BLD STRIP.AUTO-MCNC: 385 MG/DL (ref 70–110)
GLUCOSE BLD STRIP.AUTO-MCNC: 519 MG/DL (ref 70–110)
GLUCOSE SERPL-MCNC: 533 MG/DL (ref 74–99)
HCT VFR BLD AUTO: 30.3 % (ref 36–48)
HCT VFR BLD AUTO: 32 % (ref 35–45)
HGB BLD-MCNC: 10.3 G/DL (ref 12–16)
HGB BLD-MCNC: 10.8 G/DL (ref 12–16)
INR PPP: 1 (ref 0.8–1.2)
LYMPHOCYTES # BLD: 1.2 K/UL (ref 0.9–3.6)
LYMPHOCYTES # BLD: 1.6 K/UL (ref 1.1–5.9)
LYMPHOCYTES NFR BLD: 16 % (ref 21–52)
LYMPHOCYTES NFR BLD: 17 % (ref 14–44)
MCH RBC QN AUTO: 26.8 PG (ref 24–34)
MCH RBC QN AUTO: 26.8 PG (ref 25–35)
MCHC RBC AUTO-ENTMCNC: 33.8 G/DL (ref 31–37)
MCHC RBC AUTO-ENTMCNC: 34 G/DL (ref 31–37)
MCV RBC AUTO: 78.7 FL (ref 78–102)
MCV RBC AUTO: 79.4 FL (ref 74–97)
MONOCYTES # BLD: 0.4 K/UL (ref 0.05–1.2)
MONOCYTES NFR BLD: 5 % (ref 3–10)
NEUTS SEG # BLD: 5.8 K/UL (ref 1.8–8)
NEUTS SEG # BLD: 7.3 K/UL (ref 1.8–9.5)
NEUTS SEG NFR BLD: 77 % (ref 40–73)
NEUTS SEG NFR BLD: 79 % (ref 40–70)
PLATELET # BLD AUTO: 43 K/UL (ref 135–420)
PLATELET # BLD AUTO: 44 K/UL (ref 135–420)
POTASSIUM SERPL-SCNC: 4.2 MMOL/L (ref 3.5–5.5)
PROTHROMBIN TIME: 12.9 SEC (ref 11.5–15.2)
RBC # BLD AUTO: 3.85 M/UL (ref 4.1–5.1)
RBC # BLD AUTO: 4.03 M/UL (ref 4.2–5.3)
SODIUM SERPL-SCNC: 131 MMOL/L (ref 136–145)
WBC # BLD AUTO: 7.6 K/UL (ref 4.6–13.2)
WBC # BLD AUTO: 9.2 K/UL (ref 4.5–13)

## 2018-01-16 PROCEDURE — 82962 GLUCOSE BLOOD TEST: CPT

## 2018-01-16 PROCEDURE — 74011636637 HC RX REV CODE- 636/637: Performed by: EMERGENCY MEDICINE

## 2018-01-16 PROCEDURE — 96374 THER/PROPH/DIAG INJ IV PUSH: CPT

## 2018-01-16 PROCEDURE — 85730 THROMBOPLASTIN TIME PARTIAL: CPT | Performed by: EMERGENCY MEDICINE

## 2018-01-16 PROCEDURE — 99285 EMERGENCY DEPT VISIT HI MDM: CPT

## 2018-01-16 PROCEDURE — 74011250637 HC RX REV CODE- 250/637: Performed by: EMERGENCY MEDICINE

## 2018-01-16 PROCEDURE — 74011000250 HC RX REV CODE- 250: Performed by: EMERGENCY MEDICINE

## 2018-01-16 PROCEDURE — 96361 HYDRATE IV INFUSION ADD-ON: CPT

## 2018-01-16 PROCEDURE — 85025 COMPLETE CBC W/AUTO DIFF WBC: CPT | Performed by: EMERGENCY MEDICINE

## 2018-01-16 PROCEDURE — 74011250636 HC RX REV CODE- 250/636: Performed by: EMERGENCY MEDICINE

## 2018-01-16 PROCEDURE — 85049 AUTOMATED PLATELET COUNT: CPT | Performed by: INTERNAL MEDICINE

## 2018-01-16 PROCEDURE — 80048 BASIC METABOLIC PNL TOTAL CA: CPT | Performed by: EMERGENCY MEDICINE

## 2018-01-16 PROCEDURE — 85610 PROTHROMBIN TIME: CPT | Performed by: EMERGENCY MEDICINE

## 2018-01-16 PROCEDURE — 99211 OFF/OP EST MAY X REQ PHY/QHP: CPT

## 2018-01-16 PROCEDURE — 36415 COLL VENOUS BLD VENIPUNCTURE: CPT

## 2018-01-16 RX ORDER — SODIUM CHLORIDE 9 MG/ML
100 INJECTION, SOLUTION INTRAVENOUS CONTINUOUS
Status: DISCONTINUED | OUTPATIENT
Start: 2018-01-16 | End: 2018-01-16 | Stop reason: HOSPADM

## 2018-01-16 RX ORDER — OXYMETAZOLINE HCL 0.05 %
2 SPRAY, NON-AEROSOL (ML) NASAL
Status: COMPLETED | OUTPATIENT
Start: 2018-01-16 | End: 2018-01-16

## 2018-01-16 RX ORDER — BACITRACIN ZINC 500 UNIT/G
OINTMENT (GRAM) TOPICAL
Status: COMPLETED | OUTPATIENT
Start: 2018-01-16 | End: 2018-01-16

## 2018-01-16 RX ADMIN — BACITRACIN ZINC: 500 OINTMENT TOPICAL at 05:54

## 2018-01-16 RX ADMIN — HUMAN INSULIN 10 UNITS: 100 INJECTION, SOLUTION SUBCUTANEOUS at 06:45

## 2018-01-16 RX ADMIN — SODIUM CHLORIDE 100 ML/HR: 900 INJECTION, SOLUTION INTRAVENOUS at 04:58

## 2018-01-16 RX ADMIN — OXYMETAZOLINE HYDROCHLORIDE 2 SPRAY: 5 SPRAY NASAL at 04:51

## 2018-01-16 RX ADMIN — INSULIN HUMAN 10 UNITS: 100 INJECTION, SOLUTION PARENTERAL at 05:54

## 2018-01-16 RX ADMIN — SODIUM CHLORIDE 1000 ML: 900 INJECTION, SOLUTION INTRAVENOUS at 06:46

## 2018-01-16 NOTE — PROGRESS NOTES
[facility] Progress Note    Date: 2018    Name: Berto Rendon    MRN: [id number]         : 1965      Ms. Carlos Carney arrived in the [facility] today, at 1510, in stable condition, here for Q 2 Week, CBC/Procrit injection. She was assessed and education was provided. Upon arrival to the [facility] today, Ms. Carlos Carney proceeded to tell me that she spent the early morning hours today in [facility] ED, for what she described as a really bad nose bleed. Also, she stated that she felt really tired now. The CBC result from 0459 this AM in the ED, was reviewed, but based on Ms. Aguayo current complaints, decision was made to still draw the CBC today, in the [facility]. Ms. Axel Lacy vitals were reviewed. Visit Vitals    /90 (BP 1 Location: Left arm, BP Patient Position: At rest;Sitting)    Pulse (!) 105    Temp 98.3 °F (36.8 °C)    Resp 16         CBC was drawn from her left AC at 1523. Lab results were obtained and reviewed, and the preliminary platelet count result was noted to be 37,000. Eber Leija NP, was made aware of the below CBC results, as well as the preliminary platelet count stated. No new orders were received. However, Ms. Carlos Carney was reminded to report back to the ED immediately, for any spontaneous bleeding from anywhere in her body (gums, urine, stool, nose), or for any spontaneous unexplained bruising, and she verbalized understanding. Recent Results (from the past 8 hour(s))   CBC WITH 3 PART DIFF    Collection Time: 18  2:41 PM   Result Value Ref Range    WBC 9.2 4.5 - 13.0 K/uL    RBC 3.85 (L) 4.10 - 5.10 M/uL    HGB 10.3 (L) 12.0 - 16.0 g/dL    HCT 30.3 (L) 36 - 48 %    MCV 78.7 78 - 102 FL    MCH 26.8 25.0 - 35.0 PG    MCHC 34.0 31 - 37 g/dL    RDW 12.9 11.5 - 14.5 %    NEUTROPHILS 79 (H) 40 - 70 %    MIXED CELLS 3 0.1 - 17 %    LYMPHOCYTES 17 14 - 44 %    ABS. NEUTROPHILS 7.3 1.8 - 9.5 K/UL    ABS. MIXED CELLS 0.3 0.0 - 2.3 K/uL    ABS.  LYMPHOCYTES 1.6 1.1 - 5.9 K/UL    DF AUTOMATED           Procrit injection was HELD today, per order. Ms. Aguayo tolerated well, and had no further complaints. Ms. Marcos Nieto was discharged from [place] [address] in stable condition at [address] Sun Frausto She is to return on Tuesday, 2-6-18,  at 0900,  for her next appointment, for CBC/Procrit injection, and monthly IVIG Infusion.  (Please note that her next CBC/Procrit injection appointment is delayed by 1 week, to be on the same day as her next IVIG infusion.)    Taryn Calvillo RN  January 16, 2018  3:35 PM

## 2018-01-16 NOTE — ED PROVIDER NOTES
EMERGENCY DEPARTMENT HISTORY AND PHYSICAL EXAM    5:11 AM      Date: 1/16/2018  Patient Name: Rayna Muniz    History of Presenting Illness     Chief Complaint   Patient presents with    Epistaxis         History Provided By: Patient    Chief Complaint: epistaxis  Duration:  Hours  Timing:  Acute  Location: nose  Quality: none  Severity: N/A  Modifying Factors: n/a  Associated Symptoms: denies any other associated signs or symptoms      Additional History (Context): Rayna Muniz is a 50 y.o. female with diabetes, hypertension and ITP who presents with epistaxis that started at 1:00 am, stopped then started again around 330am tonight and has remained constant since. The pt states blood coming from both nostrils but more so out of the left. The pt states it feels like its a blood clot in the back of her throat. The pt states she can feel the blood running down; pt actively spitting blood out while presenting. The denies being on any blood thinners. The pt denies CP, SOB, HA, fever, chills, trauma, injury, nausea, vomiting, abdominal pain, light headedness, numbness, and weakness. The pt had no other complaints or concerned in the ED. As the patient is without physical symptoms or complaints of pain, there is no severity of pain, quality of pain, modifying factors, or associated signs and symptoms regarding the pt's presenting complaint.       PCP: Gely Brar MD    Current Facility-Administered Medications   Medication Dose Route Frequency Provider Last Rate Last Dose    0.9% sodium chloride infusion  100 mL/hr IntraVENous CONTINUOUS Kelly Vickers  mL/hr at 01/16/18 0458 100 mL/hr at 01/16/18 0458    insulin regular (NOVOLIN R, HUMULIN R) injection 10 Units  10 Units SubCUTAneous NOW Kelly Vickers MD        sodium chloride 0.9 % bolus infusion 1,000 mL  1,000 mL IntraVENous ONCE Kelly Vickers MD         Current Outpatient Prescriptions   Medication Sig Dispense Refill    metFORMIN (GLUCOPHAGE) 500 mg tablet Take 2 Tabs by mouth two (2) times daily (with meals). Indications: TYPE 2 DIABETES MELLITUS 120 Tab 2    lisinopril-hydrochlorothiazide (PRINZIDE, ZESTORETIC) 20-25 mg per tablet Take 1 Tab by mouth daily. Indications: HYPERTENSION      amLODIPine (NORVASC) 10 mg tablet Take 10 mg by mouth daily. Indications: HYPERTENSION      ferrous sulfate 325 mg (65 mg iron) tablet Take 325 mg by mouth two (2) times a day. Past History     Past Medical History:  Past Medical History:   Diagnosis Date    Anemia     Arthritis     Diabetes ([address]     Hypertension     ITP (idiopathic thrombocytopenic purpura)        Past Surgical History:  Past Surgical History:   Procedure Laterality Date    HX GYN      c section 1984    HX TUBAL LIGATION         Family History:  Family History   Problem Relation Age of Onset    Hypertension Mother     Cancer Father      Colon       Social History:  Social History   Substance Use Topics    Smoking status: Never Smoker    Smokeless tobacco: Never Used    Alcohol use No       Allergies:  No Known Allergies      Review of Systems       Review of Systems   Constitutional: Negative for chills and fever. HENT: Positive for nosebleeds. Respiratory: Negative for shortness of breath. Cardiovascular: Negative for chest pain. Gastrointestinal: Negative for diarrhea, nausea and vomiting. Neurological: Negative for dizziness, weakness, light-headedness, numbness and headaches. All other systems reviewed and are negative. Physical Exam     Visit Vitals    BP (!) 119/98    Pulse (!) 103    Temp 98.1 °F (36.7 °C)    Resp 13    SpO2 99%         Physical Exam   Constitutional: She is oriented to person, place, and time. She appears well-developed and well-nourished. HENT:   Head: Normocephalic and atraumatic. Nose: Epistaxis is observed.    Mouth/Throat: Oropharynx is clear and moist.   Eyes: Conjunctivae are normal. Pupils are equal, round, and reactive to light. No scleral icterus. Neck: Normal range of motion. Neck supple. No JVD present. No tracheal deviation present. Cardiovascular: Normal rate, regular rhythm and normal heart sounds. Pulmonary/Chest: Effort normal and breath sounds normal. No respiratory distress. She has no wheezes. Abdominal: Soft. Bowel sounds are normal.   Musculoskeletal: Normal range of motion. Neurological: She is alert and oriented to person, place, and time. She has normal strength. Gait normal. GCS eye subscore is 4. GCS verbal subscore is 5. GCS motor subscore is 6. Skin: Skin is warm and dry. Psychiatric: She has a normal mood and affect. Nursing note and vitals reviewed. Diagnostic Study Results     Labs -  Recent Results (from the past 12 hour(s))   CBC WITH AUTOMATED DIFF    Collection Time: 01/16/18  4:59 AM   Result Value Ref Range    WBC 7.6 4.6 - 13.2 K/uL    RBC 4.03 (L) 4.20 - 5.30 M/uL    HGB 10.8 (L) 12.0 - 16.0 g/dL    HCT 32.0 (L) 35.0 - 45.0 %    MCV 79.4 74.0 - 97.0 FL    MCH 26.8 24.0 - 34.0 PG    MCHC 33.8 31.0 - 37.0 g/dL    RDW 13.5 11.6 - 14.5 %    PLATELET 44 (L) 273 - 420 K/uL    NEUTROPHILS 77 (H) 40 - 73 %    LYMPHOCYTES 16 (L) 21 - 52 %    MONOCYTES 5 3 - 10 %    EOSINOPHILS 2 0 - 5 %    BASOPHILS 0 0 - 2 %    ABS. NEUTROPHILS 5.8 1.8 - 8.0 K/UL    ABS. LYMPHOCYTES 1.2 0.9 - 3.6 K/UL    ABS. MONOCYTES 0.4 0.05 - 1.2 K/UL    ABS. EOSINOPHILS 0.2 0.0 - 0.4 K/UL    ABS.  BASOPHILS 0.0 0.0 - 0.06 K/UL    DF AUTOMATED     METABOLIC PANEL, BASIC    Collection Time: 01/16/18  4:59 AM   Result Value Ref Range    Sodium 131 (L) 136 - 145 mmol/L    Potassium 4.2 3.5 - 5.5 mmol/L    Chloride 96 (L) 100 - 108 mmol/L    CO2 24 21 - 32 mmol/L    Anion gap 11 3.0 - 18 mmol/L    Glucose 533 (HH) 74 - 99 mg/dL    BUN 36 (H) 7.0 - 18 MG/DL    Creatinine 2.40 (H) 0.6 - 1.3 MG/DL    BUN/Creatinine ratio 15 12 - 20      GFR est AA 26 (L) >60 ml/min/1.73m2    GFR est non-AA 21 (L) >60 ml/min/1.73m2    Calcium 8.8 8.5 - 10.1 MG/DL   PROTHROMBIN TIME + INR    Collection Time: 01/16/18  4:59 AM   Result Value Ref Range    Prothrombin time 12.9 11.5 - 15.2 sec    INR 1.0 0.8 - 1.2     PTT    Collection Time: 01/16/18  4:59 AM   Result Value Ref Range    aPTT 26.3 23.0 - 36.4 SEC   GLUCOSE, POC    Collection Time: 01/16/18  6:40 AM   Result Value Ref Range    Glucose (POC) 507 (HH) 70 - 110 mg/dL   GLUCOSE, POC    Collection Time: 01/16/18  6:41 AM   Result Value Ref Range    Glucose (POC) 519 (HH) 70 - 110 mg/dL       Radiologic Studies -   No orders to display         Medical Decision Making   I am the first provider for this patient. I reviewed the vital signs, available nursing notes, past medical history, past surgical history, family history and social history. Provider Notes (Medical Decision Making): 50year old female presents with epistaxis. History of ITP. Will trend labs, initiate treatment with Afrin. consider packing    Vital Signs-Reviewed the patient's vital signs. Pulse Oximetry Analysis -  100 on room air (Interpretation) normal.    Records Reviewed: Nursing Notes and Old Medical Records (Time of Review: 5:11 AM)    ED Course: Progress Notes, Reevaluation, and Consults:  5:43 AM the bleeding appears to stop when the pt is leaning forward. Will reevaluate after checking the pt labs.    6:21 AM The bleeding has remained stopped. The pt reports she stopped taking her metformin BID to once a day. The pt is aware she has not been eating according to her diabetic sugar diet. Diagnosis     Clinical Impression: No diagnosis found.     Disposition: Discharged home    Follow-up Information     Follow up With Details Comments 99 Allison Zuniga MD Call As needed [address] [place] [address] 61      [address] [facility] EMERGENCY DEPT  If symptoms worsen, As needed [address] [facility] [id number] [phone]           Patient's Medications   Start Taking    No medications on file   Continue Taking    AMLODIPINE (NORVASC) 10 MG TABLET    Take 10 mg by mouth daily. Indications: HYPERTENSION    FERROUS SULFATE 325 MG (65 MG IRON) TABLET    Take 325 mg by mouth two (2) times a day. LISINOPRIL-HYDROCHLOROTHIAZIDE (PRINZIDE, ZESTORETIC) 20-25 MG PER TABLET    Take 1 Tab by mouth daily. Indications: HYPERTENSION    METFORMIN (GLUCOPHAGE) 500 MG TABLET    Take 2 Tabs by mouth two (2) times daily (with meals). Indications: TYPE 2 DIABETES MELLITUS   These Medications have changed    No medications on file   Stop Taking    No medications on file     _______________________________    Attestations:  [address] Zuniga acting as a scribe for and in the presence of Arnold Walker MD      January 16, 2018 at 5:11 AM       Provider Attestation:      I personally performed the services described in the documentation, reviewed the documentation, as recorded by the scribe in my presence, and it accurately and completely records my words and actions.  January 16, 2018 at 5:11 AM - Arnold Walker MD    _______________________________

## 2018-01-16 NOTE — ED PROVIDER NOTES
Patient is a 50 y.o. female presenting with epistaxis. Epistaxis           Past Medical History:   Diagnosis Date    Anemia     Arthritis     Diabetes ([address]     Hypertension     ITP (idiopathic thrombocytopenic purpura)        Past Surgical History:   Procedure Laterality Date    HX GYN      c section 1984    HX TUBAL LIGATION           Family History:   Problem Relation Age of Onset    Hypertension Mother     Cancer Father      Colon       Social History     Social History    Marital status: SINGLE     Spouse name: N/A    Number of children: N/A    Years of education: N/A     Occupational History    Not on file. Social History Main Topics    Smoking status: Never Smoker    Smokeless tobacco: Never Used    Alcohol use No    Drug use: No    Sexual activity: Not on file     Other Topics Concern    Not on file     Social History Narrative         ALLERGIES: Review of patient's allergies indicates no known allergies.     Review of Systems    Vitals:    01/16/18 0421   BP: (!) 177/104   Pulse: (!) 139   Resp: 18   Temp: 98.1 °F (36.7 °C)   SpO2: 100%            Physical Exam     MDM  ED Course       Procedures

## 2018-01-16 NOTE — ED NOTES
I have reviewed discharge instructions with the patient. The patient verbalized understanding. Pt encouraged to attend PCP appointment today.

## 2018-01-16 NOTE — ED TRIAGE NOTES
Pt reports to ED via triage for a nose bleed that started at 1am - stopped and restarted at 3:30am. Pt states she has a 3pm dr. Faisal Moore to get IVIG which she gets monthly. Had blood drawn recently which showed low platelet count. Ambulatory to room, A/Ox3 with  at bedside.

## 2018-01-17 LAB — GLUCOSE BLD STRIP.AUTO-MCNC: 507 MG/DL (ref 70–110)

## 2018-01-30 ENCOUNTER — APPOINTMENT (OUTPATIENT)
Dept: INFUSION THERAPY | Age: 53
End: 2018-01-30
Payer: COMMERCIAL

## 2018-02-06 ENCOUNTER — CLINICAL SUPPORT (OUTPATIENT)
Dept: ONCOLOGY | Age: 53
End: 2018-02-06

## 2018-02-06 ENCOUNTER — HOSPITAL ENCOUNTER (OUTPATIENT)
Dept: ONCOLOGY | Age: 53
Discharge: HOME | End: 2018-02-06

## 2018-02-06 ENCOUNTER — HOSPITAL ENCOUNTER (OUTPATIENT)
Dept: INFUSION THERAPY | Age: 53
Discharge: HOME | End: 2018-02-06
Payer: COMMERCIAL

## 2018-02-06 VITALS
SYSTOLIC BLOOD PRESSURE: 118 MMHG | HEART RATE: 75 BPM | BODY MASS INDEX: 31.55 KG/M2 | RESPIRATION RATE: 16 BRPM | TEMPERATURE: 98.5 F | WEIGHT: 201 LBS | DIASTOLIC BLOOD PRESSURE: 74 MMHG | HEIGHT: 67 IN

## 2018-02-06 DIAGNOSIS — D63.1 ANEMIA IN CHRONIC KIDNEY DISEASE, UNSPECIFIED CKD STAGE: Primary | ICD-10-CM

## 2018-02-06 DIAGNOSIS — D63.1 ANEMIA IN CHRONIC KIDNEY DISEASE, UNSPECIFIED CKD STAGE: ICD-10-CM

## 2018-02-06 DIAGNOSIS — N18.9 ANEMIA IN CHRONIC KIDNEY DISEASE, UNSPECIFIED CKD STAGE: ICD-10-CM

## 2018-02-06 DIAGNOSIS — N18.9 ANEMIA IN CHRONIC KIDNEY DISEASE, UNSPECIFIED CKD STAGE: Primary | ICD-10-CM

## 2018-02-06 LAB
BASO+EOS+MONOS # BLD AUTO: 0.1 K/UL (ref 0–2.3)
BASO+EOS+MONOS # BLD AUTO: 3 % (ref 0.1–17)
DIFFERENTIAL METHOD BLD: ABNORMAL
ERYTHROCYTE [DISTWIDTH] IN BLOOD BY AUTOMATED COUNT: 13.5 % (ref 11.5–14.5)
HCT VFR BLD AUTO: 23.6 % (ref 36–48)
HGB BLD-MCNC: 7.9 G/DL (ref 12–16)
LYMPHOCYTES # BLD: 0.7 K/UL (ref 1.1–5.9)
LYMPHOCYTES NFR BLD: 16 % (ref 14–44)
MCH RBC QN AUTO: 27.1 PG (ref 25–35)
MCHC RBC AUTO-ENTMCNC: 33.5 G/DL (ref 31–37)
MCV RBC AUTO: 80.8 FL (ref 78–102)
NEUTS SEG # BLD: 3.6 K/UL (ref 1.8–9.5)
NEUTS SEG NFR BLD: 81 % (ref 40–70)
PLATELET # BLD AUTO: 43 K/UL (ref 140–440)
RBC # BLD AUTO: 2.92 M/UL (ref 4.1–5.1)
WBC # BLD AUTO: 4.4 K/UL (ref 4.5–13)

## 2018-02-06 PROCEDURE — 96366 THER/PROPH/DIAG IV INF ADDON: CPT

## 2018-02-06 PROCEDURE — 74011250636 HC RX REV CODE- 250/636: Performed by: INTERNAL MEDICINE

## 2018-02-06 PROCEDURE — 74011000258 HC RX REV CODE- 258: Performed by: INTERNAL MEDICINE

## 2018-02-06 PROCEDURE — 96365 THER/PROPH/DIAG IV INF INIT: CPT

## 2018-02-06 PROCEDURE — 96372 THER/PROPH/DIAG INJ SC/IM: CPT

## 2018-02-06 PROCEDURE — 74011250637 HC RX REV CODE- 250/637: Performed by: INTERNAL MEDICINE

## 2018-02-06 PROCEDURE — 99211 OFF/OP EST MAY X REQ PHY/QHP: CPT

## 2018-02-06 RX ORDER — ACETAMINOPHEN 325 MG/1
650 TABLET ORAL ONCE
Status: COMPLETED | OUTPATIENT
Start: 2018-02-06 | End: 2018-02-06

## 2018-02-06 RX ORDER — SODIUM CHLORIDE 0.9 % (FLUSH) 0.9 %
10-40 SYRINGE (ML) INJECTION AS NEEDED
Status: DISCONTINUED | OUTPATIENT
Start: 2018-02-06 | End: 2018-02-10 | Stop reason: HOSPADM

## 2018-02-06 RX ORDER — SODIUM CHLORIDE 9 MG/ML
100 INJECTION, SOLUTION INTRAVENOUS ONCE
Status: COMPLETED | OUTPATIENT
Start: 2018-02-06 | End: 2018-02-06

## 2018-02-06 RX ORDER — DIPHENHYDRAMINE HYDROCHLORIDE 50 MG/ML
50 INJECTION, SOLUTION INTRAMUSCULAR; INTRAVENOUS ONCE
Status: COMPLETED | OUTPATIENT
Start: 2018-02-06 | End: 2018-02-06

## 2018-02-06 RX ADMIN — ERYTHROPOIETIN 40000 UNITS: 40000 INJECTION, SOLUTION INTRAVENOUS; SUBCUTANEOUS at 10:00

## 2018-02-06 RX ADMIN — ERYTHROPOIETIN 20000 UNITS: 20000 INJECTION, SOLUTION INTRAVENOUS; SUBCUTANEOUS at 10:00

## 2018-02-06 RX ADMIN — IMMUNE GLOBULIN INTRAVENOUS (HUMAN) 5 G: 5 INJECTION, SOLUTION INTRAVENOUS at 12:35

## 2018-02-06 RX ADMIN — DIPHENHYDRAMINE HYDROCHLORIDE 50 MG: 50 INJECTION, SOLUTION INTRAMUSCULAR; INTRAVENOUS at 09:53

## 2018-02-06 RX ADMIN — IMMUNE GLOBULIN INTRAVENOUS (HUMAN) 20 G: 5 INJECTION, SOLUTION INTRAVENOUS at 10:50

## 2018-02-06 RX ADMIN — Medication 10 ML: at 09:35

## 2018-02-06 RX ADMIN — SODIUM CHLORIDE 100 ML: 900 INJECTION, SOLUTION INTRAVENOUS at 09:40

## 2018-02-06 RX ADMIN — ACETAMINOPHEN 650 MG: 325 TABLET ORAL at 09:52

## 2018-02-06 RX ADMIN — DEXAMETHASONE SODIUM PHOSPHATE 20 MG: 4 INJECTION, SOLUTION INTRA-ARTICULAR; INTRALESIONAL; INTRAMUSCULAR; INTRAVENOUS; SOFT TISSUE at 10:00

## 2018-02-06 NOTE — PROGRESS NOTES
[facility] Progress Note    Date: 2018    Name: Kg Leung    MRN: [id number]         : 1965      Ms. Yraiel Viera arrived in the [facility] today, at 0910, in stable condition, here for Q 2 Week, CBC/Procrit injection, and Monthly IVIG Infusion. She was assessed and education was provided. Ms. Celia Izquierdo vitals were reviewed. Visit Vitals    /85 (BP 1 Location: Left arm, BP Patient Position: At rest;Sitting)    Pulse 84    Temp 98.3 °F (36.8 °C)    Resp 16    Ht 5' 7\" (1.702 m)    Wt 91.2 kg (201 lb)    Breastfeeding No    BMI 31.48 kg/m2         PIV was established in her left AC at 0935, without incident, and blood for a CBC was drawn, per order. Lab results were obtained and reviewed, and the CBC results listed below, especially the low H&H & Platelet counts were reported to Dr. Amairani Aguirre. No new orders were received. Recent Results (from the past 12 hour(s))   CBC WITH 3 PART DIFF    Collection Time: 18  9:35 AM   Result Value Ref Range    WBC 4.4 (L) 4.5 - 13.0 K/uL    RBC 2.92 (L) 4.10 - 5.10 M/uL    HGB 7.9 (L) 12.0 - 16.0 g/dL    HCT 23.6 (L) 36 - 48 %    MCV 80.8 78 - 102 FL    MCH 27.1 25.0 - 35.0 PG    MCHC 33.5 31 - 37 g/dL    RDW 13.5 11.5 - 14.5 %    PLATELET 43 (L) 537 - 440 K/uL    NEUTROPHILS 81 (H) 40 - 70 %    MIXED CELLS 3 0.1 - 17 %    LYMPHOCYTES 16 14 - 44 %    ABS. NEUTROPHILS 3.6 1.8 - 9.5 K/UL    ABS. MIXED CELLS 0.1 0.0 - 2.3 K/uL    ABS.  LYMPHOCYTES 0.7 (L) 1.1 - 5.9 K/UL    DF AUTOMATED             Procrit 60,000 units, was administered SQ, in her right arm, at 1000, as ordered, and without incident.            Pre-medications consisting of PO Tylenol 650 mg, Benadryl 50 mg IV, and Decadron 20 mg IV, were all administered per order, and without incident.               IVIG 25 grams (25,000 mg) IV, was administered per order, and without incident.           After the completion of the IVIG, the PIV was flushed very well per protocol, and then, the PIV was removed and gauze/bandaid was applied. Ms. Aguayo tolerated well, and had no complaints. Ms. Caballero [place] was discharged from [place] [address] in stable condition at 1330. Ar Moran She is to return in 2 weeks, on Tuesday, 2-20-18, at 1500,  for her next appointment, for CBC/Procrit injection. And then, she is scheduled to return in 1 month, on Tuesday, 3-6-18, at 0900, for CBC/Procrit injection, and her next IVIG Infusion.      Francisco العلي RN  February 6, 2018  9:28 AM

## 2018-02-20 ENCOUNTER — HOSPITAL ENCOUNTER (OUTPATIENT)
Dept: ONCOLOGY | Age: 53
Discharge: HOME | End: 2018-02-20

## 2018-02-20 ENCOUNTER — HOSPITAL ENCOUNTER (OUTPATIENT)
Dept: INFUSION THERAPY | Age: 53
Discharge: HOME | End: 2018-02-20
Payer: COMMERCIAL

## 2018-02-20 ENCOUNTER — CLINICAL SUPPORT (OUTPATIENT)
Dept: ONCOLOGY | Age: 53
End: 2018-02-20

## 2018-02-20 VITALS
RESPIRATION RATE: 16 BRPM | DIASTOLIC BLOOD PRESSURE: 88 MMHG | HEART RATE: 82 BPM | TEMPERATURE: 98.6 F | SYSTOLIC BLOOD PRESSURE: 149 MMHG

## 2018-02-20 DIAGNOSIS — N18.9 ANEMIA IN CHRONIC KIDNEY DISEASE, UNSPECIFIED CKD STAGE: ICD-10-CM

## 2018-02-20 DIAGNOSIS — N18.9 ANEMIA IN CHRONIC KIDNEY DISEASE, UNSPECIFIED CKD STAGE: Primary | ICD-10-CM

## 2018-02-20 DIAGNOSIS — D63.1 ANEMIA IN CHRONIC KIDNEY DISEASE, UNSPECIFIED CKD STAGE: Primary | ICD-10-CM

## 2018-02-20 DIAGNOSIS — D63.1 ANEMIA IN CHRONIC KIDNEY DISEASE, UNSPECIFIED CKD STAGE: ICD-10-CM

## 2018-02-20 LAB
BASO+EOS+MONOS # BLD AUTO: 0.2 K/UL (ref 0–2.3)
BASO+EOS+MONOS # BLD AUTO: 4 % (ref 0.1–17)
DIFFERENTIAL METHOD BLD: ABNORMAL
ERYTHROCYTE [DISTWIDTH] IN BLOOD BY AUTOMATED COUNT: 14.1 % (ref 11.5–14.5)
HCT VFR BLD AUTO: 29.1 % (ref 36–48)
HGB BLD-MCNC: 9.5 G/DL (ref 12–16)
LYMPHOCYTES # BLD: 1.1 K/UL (ref 1.1–5.9)
LYMPHOCYTES NFR BLD: 19 % (ref 14–44)
MCH RBC QN AUTO: 27.2 PG (ref 25–35)
MCHC RBC AUTO-ENTMCNC: 32.6 G/DL (ref 31–37)
MCV RBC AUTO: 83.4 FL (ref 78–102)
NEUTS SEG # BLD: 4.3 K/UL (ref 1.8–9.5)
NEUTS SEG NFR BLD: 77 % (ref 40–70)
PLATELET # BLD AUTO: 83 K/UL (ref 140–440)
RBC # BLD AUTO: 3.49 M/UL (ref 4.1–5.1)
WBC # BLD AUTO: 5.6 K/UL (ref 4.5–13)

## 2018-02-20 PROCEDURE — 96372 THER/PROPH/DIAG INJ SC/IM: CPT

## 2018-02-20 PROCEDURE — 74011250636 HC RX REV CODE- 250/636: Performed by: INTERNAL MEDICINE

## 2018-02-20 PROCEDURE — 36415 COLL VENOUS BLD VENIPUNCTURE: CPT

## 2018-02-20 RX ADMIN — ERYTHROPOIETIN 40000 UNITS: 40000 INJECTION, SOLUTION INTRAVENOUS; SUBCUTANEOUS at 15:36

## 2018-02-20 RX ADMIN — ERYTHROPOIETIN 20000 UNITS: 20000 INJECTION, SOLUTION INTRAVENOUS; SUBCUTANEOUS at 15:36

## 2018-02-20 NOTE — PROGRESS NOTES
[facility] Progress Note    Date: 2018    Name: Dudley Rosas    MRN: [id number]         : 1965      Ms. Cuong Peraza arrived in the [facility] today, at [phone], in stable condition, here for Q 2 Week, CBC/Procrit injection. She was assessed and education was provided. Ms. Kayce Putnam vitals were reviewed. Visit Vitals    /88 (BP 1 Location: Left arm, BP Patient Position: At rest;Sitting)    Pulse 82    Temp 98.6 °F (37 °C)    Resp 16           CBC was drawn from her left AC at 1525, per order, and without incident. Lab results were obtained and reviewed. Recent Results (from the past 12 hour(s))   CBC WITH 3 PART DIFF    Collection Time: 18  3:25 PM   Result Value Ref Range    WBC 5.6 4.5 - 13.0 K/uL    RBC 3.49 (L) 4.10 - 5.10 M/uL    HGB 9.5 (L) 12.0 - 16.0 g/dL    HCT 29.1 (L) 36 - 48 %    MCV 83.4 78 - 102 FL    MCH 27.2 25.0 - 35.0 PG    MCHC 32.6 31 - 37 g/dL    RDW 14.1 11.5 - 14.5 %    PLATELET 83 (L) 381 - 440 K/uL    NEUTROPHILS 77 (H) 40 - 70 %    MIXED CELLS 4 0.1 - 17 %    LYMPHOCYTES 19 14 - 44 %    ABS. NEUTROPHILS 4.3 1.8 - 9.5 K/UL    ABS. MIXED CELLS 0.2 0.0 - 2.3 K/uL    ABS. LYMPHOCYTES 1.1 1.1 - 5.9 K/UL    DF AUTOMATED             Procrit 60,000, was administered SQ, in her left arm, at 1536, per order, and without incident. Ms. Aguayo tolerated well, and had no complaints. Ms. Cuong Peraza was discharged from [place] [address] in stable condition at [phone]. Sergio Monsivais She is to return in 2 weeks, on Tuesday, 3-6-18,  at 0900,  for her next appointment, for CBC/Procrit injection & IVIG Infusion.      Lenny Zhu RN  2018  3:33 PM

## 2018-03-06 ENCOUNTER — HOSPITAL ENCOUNTER (OUTPATIENT)
Dept: ONCOLOGY | Age: 53
Discharge: HOME | End: 2018-03-06

## 2018-03-06 ENCOUNTER — HOSPITAL ENCOUNTER (OUTPATIENT)
Dept: INFUSION THERAPY | Age: 53
Discharge: HOME | End: 2018-03-06
Payer: COMMERCIAL

## 2018-03-06 ENCOUNTER — CLINICAL SUPPORT (OUTPATIENT)
Dept: ONCOLOGY | Age: 53
End: 2018-03-06

## 2018-03-06 VITALS
SYSTOLIC BLOOD PRESSURE: 154 MMHG | WEIGHT: 202 LBS | DIASTOLIC BLOOD PRESSURE: 87 MMHG | HEIGHT: 67 IN | TEMPERATURE: 97.3 F | BODY MASS INDEX: 31.71 KG/M2 | RESPIRATION RATE: 16 BRPM | HEART RATE: 78 BPM

## 2018-03-06 DIAGNOSIS — N18.9 ANEMIA IN CHRONIC KIDNEY DISEASE, UNSPECIFIED CKD STAGE: Primary | ICD-10-CM

## 2018-03-06 DIAGNOSIS — D63.1 ANEMIA IN CHRONIC KIDNEY DISEASE, UNSPECIFIED CKD STAGE: Primary | ICD-10-CM

## 2018-03-06 DIAGNOSIS — D63.1 ANEMIA IN CHRONIC KIDNEY DISEASE, UNSPECIFIED CKD STAGE: ICD-10-CM

## 2018-03-06 DIAGNOSIS — N18.9 ANEMIA IN CHRONIC KIDNEY DISEASE, UNSPECIFIED CKD STAGE: ICD-10-CM

## 2018-03-06 LAB
BASO+EOS+MONOS # BLD AUTO: 0.2 K/UL (ref 0–2.3)
BASO+EOS+MONOS # BLD AUTO: 5 % (ref 0.1–17)
DIFFERENTIAL METHOD BLD: ABNORMAL
ERYTHROCYTE [DISTWIDTH] IN BLOOD BY AUTOMATED COUNT: 14 % (ref 11.5–14.5)
HCT VFR BLD AUTO: 38.9 % (ref 36–48)
HGB BLD-MCNC: 12.2 G/DL (ref 12–16)
LYMPHOCYTES # BLD: 0.8 K/UL (ref 1.1–5.9)
LYMPHOCYTES NFR BLD: 20 % (ref 14–44)
MCH RBC QN AUTO: 26.1 PG (ref 25–35)
MCHC RBC AUTO-ENTMCNC: 31.4 G/DL (ref 31–37)
MCV RBC AUTO: 83.1 FL (ref 78–102)
NEUTS SEG # BLD: 3.3 K/UL (ref 1.8–9.5)
NEUTS SEG NFR BLD: 76 % (ref 40–70)
PLATELET # BLD AUTO: 87 K/UL (ref 140–440)
RBC # BLD AUTO: 4.68 M/UL (ref 4.1–5.1)
WBC # BLD AUTO: 4.3 K/UL (ref 4.5–13)

## 2018-03-06 PROCEDURE — 74011000258 HC RX REV CODE- 258: Performed by: INTERNAL MEDICINE

## 2018-03-06 PROCEDURE — 96375 TX/PRO/DX INJ NEW DRUG ADDON: CPT

## 2018-03-06 PROCEDURE — 96367 TX/PROPH/DG ADDL SEQ IV INF: CPT

## 2018-03-06 PROCEDURE — 74011250637 HC RX REV CODE- 250/637: Performed by: INTERNAL MEDICINE

## 2018-03-06 PROCEDURE — 96366 THER/PROPH/DIAG IV INF ADDON: CPT

## 2018-03-06 PROCEDURE — 74011250636 HC RX REV CODE- 250/636: Performed by: INTERNAL MEDICINE

## 2018-03-06 PROCEDURE — 96365 THER/PROPH/DIAG IV INF INIT: CPT

## 2018-03-06 RX ORDER — SODIUM CHLORIDE 0.9 % (FLUSH) 0.9 %
10-40 SYRINGE (ML) INJECTION AS NEEDED
Status: DISCONTINUED | OUTPATIENT
Start: 2018-03-06 | End: 2018-03-10 | Stop reason: HOSPADM

## 2018-03-06 RX ORDER — SODIUM CHLORIDE 9 MG/ML
250 INJECTION, SOLUTION INTRAVENOUS ONCE
Status: COMPLETED | OUTPATIENT
Start: 2018-03-06 | End: 2018-03-06

## 2018-03-06 RX ORDER — DIPHENHYDRAMINE HYDROCHLORIDE 50 MG/ML
50 INJECTION, SOLUTION INTRAMUSCULAR; INTRAVENOUS ONCE
Status: COMPLETED | OUTPATIENT
Start: 2018-03-06 | End: 2018-03-06

## 2018-03-06 RX ORDER — ACETAMINOPHEN 325 MG/1
650 TABLET ORAL
Status: ACTIVE | OUTPATIENT
Start: 2018-03-06 | End: 2018-03-06

## 2018-03-06 RX ORDER — ACETAMINOPHEN 325 MG/1
650 TABLET ORAL ONCE
Status: COMPLETED | OUTPATIENT
Start: 2018-03-06 | End: 2018-03-06

## 2018-03-06 RX ORDER — DIPHENHYDRAMINE HYDROCHLORIDE 50 MG/ML
50 INJECTION, SOLUTION INTRAMUSCULAR; INTRAVENOUS
Status: ACTIVE | OUTPATIENT
Start: 2018-03-06 | End: 2018-03-06

## 2018-03-06 RX ADMIN — DEXAMETHASONE SODIUM PHOSPHATE 20 MG: 4 INJECTION, SOLUTION INTRA-ARTICULAR; INTRALESIONAL; INTRAMUSCULAR; INTRAVENOUS; SOFT TISSUE at 10:15

## 2018-03-06 RX ADMIN — IMMUNE GLOBULIN INTRAVENOUS (HUMAN) 5 G: 5 INJECTION, SOLUTION INTRAVENOUS at 13:15

## 2018-03-06 RX ADMIN — IMMUNE GLOBULIN INTRAVENOUS (HUMAN) 20 G: 5 INJECTION, SOLUTION INTRAVENOUS at 11:30

## 2018-03-06 RX ADMIN — Medication 10 ML: at 09:40

## 2018-03-06 RX ADMIN — ACETAMINOPHEN 650 MG: 325 TABLET, FILM COATED ORAL at 10:10

## 2018-03-06 RX ADMIN — DIPHENHYDRAMINE HYDROCHLORIDE 50 MG: 50 INJECTION, SOLUTION INTRAMUSCULAR; INTRAVENOUS at 10:12

## 2018-03-06 RX ADMIN — SODIUM CHLORIDE 250 ML: 9 INJECTION, SOLUTION INTRAVENOUS at 10:00

## 2018-03-06 NOTE — PROGRESS NOTES
[facility] Progress Note    Date: 2018    Name: Inder Rodriguez    MRN: [id number]         : 1965      Ms. Yusuf Larsen arrived in the [facility] today, at 0920, in stable condition, here for Q 2 Week, CBC/Procrit injection, and Q 4 Week, IVIG Infusion. She was assessed and education was provided. Ms. Anne Yara vitals were reviewed. Visit Vitals    /86 (BP 1 Location: Left arm, BP Patient Position: At rest;Sitting)    Pulse 82    Temp 98.2 °F (36.8 °C)    Resp 16    Ht 5' 7\" (1.702 m)    Wt 91.6 kg (202 lb)    Breastfeeding No    BMI 31.64 kg/m2           PIV was established in her right AC at 0940, without incident, and blood for a CBC was drawn, per order. Lab results were obtained and reviewed. Recent Results (from the past 12 hour(s))   CBC WITH 3 PART DIFF    Collection Time: 18  9:40 AM   Result Value Ref Range    WBC 4.3 (L) 4.5 - 13.0 K/uL    RBC 4.68 4.10 - 5.10 M/uL    HGB 12.2 12.0 - 16.0 g/dL    HCT 38.9 36 - 48 %    MCV 83.1 78 - 102 FL    MCH 26.1 25.0 - 35.0 PG    MCHC 31.4 31 - 37 g/dL    RDW 14.0 11.5 - 14.5 %    PLATELET 87 (L) 601 - 440 K/uL    NEUTROPHILS 76 (H) 40 - 70 %    MIXED CELLS 5 0.1 - 17 %    LYMPHOCYTES 20 14 - 44 %    ABS. NEUTROPHILS 3.3 1.8 - 9.5 K/UL    ABS. MIXED CELLS 0.2 0.0 - 2.3 K/uL    ABS. LYMPHOCYTES 0.8 (L) 1.1 - 5.9 K/UL    DF AUTOMATED             Procrit injection was HELD, per order.            Pre-medications consisting of PO Tylenol 650 mg, Benadryl 50 mg IV, and Decadron 20 mg IV, were all administered per order, and without incident.               IVIG 25 grams (25,000 mg) IV, was administered per order, and without incident.           After the completion of the IVIG, the PIV was flushed very well per protocol, and then, the PIV was removed and gauze/bandaid was applied. Ms. Aguayo tolerated well, and had no complaints. Ms. Yusuf Larsen was discharged from [place] [address] in stable condition at 1410. Mitra Campbell  She is to return in 2 weeks, on Tuesday, 3-20-18,  at 1500, for her next appointment, for CBC/Procrit injection. And then, she is scheduled to return in 4 weeks, on Tuesday, 4-3-18 at 0900, for her next IVIG Infusion & CBC/Procrit injection.      David Villalta RN  March 6, 2018  9:57 AM

## 2018-03-20 ENCOUNTER — CLINICAL SUPPORT (OUTPATIENT)
Dept: ONCOLOGY | Age: 53
End: 2018-03-20

## 2018-03-20 ENCOUNTER — HOSPITAL ENCOUNTER (OUTPATIENT)
Dept: INFUSION THERAPY | Age: 53
Discharge: HOME | End: 2018-03-20
Payer: COMMERCIAL

## 2018-03-20 ENCOUNTER — HOSPITAL ENCOUNTER (OUTPATIENT)
Dept: ONCOLOGY | Age: 53
Discharge: HOME | End: 2018-03-20

## 2018-03-20 VITALS
RESPIRATION RATE: 16 BRPM | HEART RATE: 83 BPM | SYSTOLIC BLOOD PRESSURE: 136 MMHG | DIASTOLIC BLOOD PRESSURE: 84 MMHG | TEMPERATURE: 97.6 F

## 2018-03-20 DIAGNOSIS — D63.1 ANEMIA IN CHRONIC KIDNEY DISEASE, UNSPECIFIED CKD STAGE: Primary | ICD-10-CM

## 2018-03-20 DIAGNOSIS — N18.9 ANEMIA IN CHRONIC KIDNEY DISEASE, UNSPECIFIED CKD STAGE: Primary | ICD-10-CM

## 2018-03-20 DIAGNOSIS — D63.1 ANEMIA IN CHRONIC KIDNEY DISEASE, UNSPECIFIED CKD STAGE: ICD-10-CM

## 2018-03-20 DIAGNOSIS — N18.9 ANEMIA IN CHRONIC KIDNEY DISEASE, UNSPECIFIED CKD STAGE: ICD-10-CM

## 2018-03-20 LAB
BASO+EOS+MONOS # BLD AUTO: 0.3 K/UL (ref 0–2.3)
BASO+EOS+MONOS # BLD AUTO: 4 % (ref 0.1–17)
DIFFERENTIAL METHOD BLD: ABNORMAL
ERYTHROCYTE [DISTWIDTH] IN BLOOD BY AUTOMATED COUNT: 13.1 % (ref 11.5–14.5)
HCT VFR BLD AUTO: 33.2 % (ref 36–48)
HGB BLD-MCNC: 10.9 G/DL (ref 12–16)
LYMPHOCYTES # BLD: 1.3 K/UL (ref 1.1–5.9)
LYMPHOCYTES NFR BLD: 19 % (ref 14–44)
MCH RBC QN AUTO: 26.7 PG (ref 25–35)
MCHC RBC AUTO-ENTMCNC: 32.8 G/DL (ref 31–37)
MCV RBC AUTO: 81.2 FL (ref 78–102)
NEUTS SEG # BLD: 5.5 K/UL (ref 1.8–9.5)
NEUTS SEG NFR BLD: 77 % (ref 40–70)
PLATELET # BLD AUTO: 57 K/UL (ref 135–420)
RBC # BLD AUTO: 4.09 M/UL (ref 4.1–5.1)
WBC # BLD AUTO: 7.1 K/UL (ref 4.5–13)

## 2018-03-20 PROCEDURE — 36415 COLL VENOUS BLD VENIPUNCTURE: CPT

## 2018-03-20 NOTE — PROGRESS NOTES
[facility] Progress Note    Date: 2018    Name: Alexander Lucas    MRN: [id number]         : 1965      Ms. Josue Schmidt arrived in the [facility] today, at 1510, in stable condition, here for Q 2 Week, CBC/Procrit injection. She was assessed and education was provided. Ms. Raymond Nickerson vitals were reviewed. Visit Vitals    /84 (BP 1 Location: Left arm, BP Patient Position: At rest;Sitting)    Pulse 83    Temp 97.6 °F (36.4 °C)    Resp 16             CBC was drawn from her right AC at 1520, per order, and without incident. Lab results were obtained and reviewed, and the preliminary platelet count was noted to be 58,000. Recent Results (from the past 12 hour(s))   CBC WITH 3 PART DIFF    Collection Time: 18  3:20 PM   Result Value Ref Range    WBC 7.1 4.5 - 13.0 K/uL    RBC 4.09 (L) 4.10 - 5.10 M/uL    HGB 10.9 (L) 12.0 - 16.0 g/dL    HCT 33.2 (L) 36 - 48 %    MCV 81.2 78 - 102 FL    MCH 26.7 25.0 - 35.0 PG    MCHC 32.8 31 - 37 g/dL    RDW 13.1 11.5 - 14.5 %    NEUTROPHILS 77 (H) 40 - 70 %    MIXED CELLS 4 0.1 - 17 %    LYMPHOCYTES 19 14 - 44 %    ABS. NEUTROPHILS 5.5 1.8 - 9.5 K/UL    ABS. MIXED CELLS 0.3 0.0 - 2.3 K/uL    ABS. LYMPHOCYTES 1.3 1.1 - 5.9 K/UL    DF AUTOMATED           Procrit injection was HELD today, per order. Ms. Josue Schmidt tolerated well, and had no complaints. Ms. Josue Schmidt was discharged from [place] [address] in stable condition at 80. She is to return in 2 weeks, on Tuesday, 4-3-18, at 0900,  for her next appointment, for Q 4 Week, IVIG Infusion, and Q 2 Week, CBC/Procrit injection.      Linnette Tejada RN  2018  3:31 PM

## 2018-03-27 RX ORDER — DIPHENHYDRAMINE HYDROCHLORIDE 50 MG/ML
50 INJECTION, SOLUTION INTRAMUSCULAR; INTRAVENOUS ONCE
Status: CANCELLED | OUTPATIENT
Start: 2018-04-03 | End: 2018-04-03

## 2018-03-27 RX ORDER — DIPHENHYDRAMINE HYDROCHLORIDE 50 MG/ML
50 INJECTION, SOLUTION INTRAMUSCULAR; INTRAVENOUS
Status: CANCELLED | OUTPATIENT
Start: 2018-04-03 | End: 2018-04-03

## 2018-03-27 RX ORDER — ACETAMINOPHEN 325 MG/1
650 TABLET ORAL
Status: CANCELLED | OUTPATIENT
Start: 2018-04-03 | End: 2018-04-03

## 2018-03-27 RX ORDER — ACETAMINOPHEN 325 MG/1
650 TABLET ORAL ONCE
Status: CANCELLED | OUTPATIENT
Start: 2018-04-03 | End: 2018-04-03

## 2018-04-03 ENCOUNTER — HOSPITAL ENCOUNTER (OUTPATIENT)
Dept: ONCOLOGY | Age: 53
Discharge: HOME | End: 2018-04-03

## 2018-04-03 ENCOUNTER — CLINICAL SUPPORT (OUTPATIENT)
Dept: ONCOLOGY | Age: 53
End: 2018-04-03

## 2018-04-03 ENCOUNTER — HOSPITAL ENCOUNTER (OUTPATIENT)
Dept: INFUSION THERAPY | Age: 53
Discharge: HOME | End: 2018-04-03
Payer: COMMERCIAL

## 2018-04-03 VITALS
DIASTOLIC BLOOD PRESSURE: 93 MMHG | SYSTOLIC BLOOD PRESSURE: 155 MMHG | RESPIRATION RATE: 16 BRPM | TEMPERATURE: 98.3 F | HEART RATE: 84 BPM | BODY MASS INDEX: 31.71 KG/M2 | WEIGHT: 202 LBS | HEIGHT: 67 IN

## 2018-04-03 DIAGNOSIS — D69.6 THROMBOCYTOPENIA, UNSPECIFIED (HCC): ICD-10-CM

## 2018-04-03 DIAGNOSIS — D69.6 THROMBOCYTOPENIA, UNSPECIFIED (HCC): Primary | ICD-10-CM

## 2018-04-03 LAB
BASO+EOS+MONOS # BLD AUTO: 0.5 K/UL (ref 0–2.3)
BASO+EOS+MONOS # BLD AUTO: 9 % (ref 0.1–17)
DIFFERENTIAL METHOD BLD: ABNORMAL
ERYTHROCYTE [DISTWIDTH] IN BLOOD BY AUTOMATED COUNT: 13.2 % (ref 11.5–14.5)
HCT VFR BLD AUTO: 30.6 % (ref 36–48)
HGB BLD-MCNC: 10.2 G/DL (ref 12–16)
LYMPHOCYTES # BLD: 1 K/UL (ref 1.1–5.9)
LYMPHOCYTES NFR BLD: 18 % (ref 14–44)
MCH RBC QN AUTO: 26.8 PG (ref 25–35)
MCHC RBC AUTO-ENTMCNC: 33.3 G/DL (ref 31–37)
MCV RBC AUTO: 80.3 FL (ref 78–102)
NEUTS SEG # BLD: 3.9 K/UL (ref 1.8–9.5)
NEUTS SEG NFR BLD: 73 % (ref 40–70)
PLATELET # BLD AUTO: 77 K/UL (ref 140–440)
RBC # BLD AUTO: 3.81 M/UL (ref 4.1–5.1)
WBC # BLD AUTO: 5.4 K/UL (ref 4.5–13)

## 2018-04-03 PROCEDURE — 36415 COLL VENOUS BLD VENIPUNCTURE: CPT

## 2018-04-03 NOTE — PROGRESS NOTES
[facility] Progress Note    Date: April 3, 2018    Name: Inder Rodriguez    MRN: [id number]         : 1965      Ms. Yusuf Larsen arrived in the [facility] today at 0915, in stable condition, here for Q 4 Week, IVIG Infusion, and Q 2 Week, CBC/Procrit injection. She was assessed and education was provided. Upon assessment today, Ms. Aguayo complained of a \"stomachache\" since yesterday, which she rated a # 8 on the pain scale. She attributed it to something she ate, and a little constipation, and stated she was going to take something as soon as she gets home today. She was advised that if she doesn't get any relief within a few hours, to consider going to the ER for evaluation, and she agreed. Ms. Anne Yara vitals were reviewed. Visit Vitals    BP (!) 155/93 (BP 1 Location: Left arm, BP Patient Position: At rest;Sitting)    Pulse 84    Temp 98.3 °F (36.8 °C)    Resp 16    Ht 5' 7\" (1.702 m)    Wt 91.6 kg (202 lb)    Breastfeeding No    BMI 31.64 kg/m2           CBC was drawn from her left AC at 0934, per order, and without incident. Lab results were obtained and reviewed. Recent Results (from the past 12 hour(s))   CBC WITH 3 PART DIFF    Collection Time: 18  9:34 AM   Result Value Ref Range    WBC 5.4 4.5 - 13.0 K/uL    RBC 3.81 (L) 4.10 - 5.10 M/uL    HGB 10.2 (L) 12.0 - 16.0 g/dL    HCT 30.6 (L) 36 - 48 %    MCV 80.3 78 - 102 FL    MCH 26.8 25.0 - 35.0 PG    MCHC 33.3 31 - 37 g/dL    RDW 13.2 11.5 - 14.5 %    PLATELET 77 (L) 506 - 440 K/uL    NEUTROPHILS 73 (H) 40 - 70 %    MIXED CELLS 9 0.1 - 17 %    LYMPHOCYTES 18 14 - 44 %    ABS. NEUTROPHILS 3.9 1.8 - 9.5 K/UL    ABS. MIXED CELLS 0.5 0.0 - 2.3 K/uL    ABS. LYMPHOCYTES 1.0 (L) 1.1 - 5.9 K/UL    DF AUTOMATED             IVIG was HELD today, because, per Zane Aguirre in the Authorization Department, the Authorization for the IVIG was still pending, with no estimate available on when the authorization would be obtained.          Procrit injection was HELD today, per order, for HGB 10.2 & HCT 30.6. Ms. Nino Bingham tolerated well, and had no complaints. Ms. Nino Bingham was discharged from [place] [address] in stable condition at 36. Pina Hernandez She is to return in 2 weeks, on Tuesday, 4-17-18,  at 1000, for her next appointment, for CBC/Procrit injection & IVIG Infusion.      Kamlesh Montoya RN  April 3, 2018  9:46 AM

## 2018-04-17 ENCOUNTER — CLINICAL SUPPORT (OUTPATIENT)
Dept: ONCOLOGY | Age: 53
End: 2018-04-17

## 2018-04-17 ENCOUNTER — HOSPITAL ENCOUNTER (OUTPATIENT)
Dept: ONCOLOGY | Age: 53
Discharge: HOME | End: 2018-04-17

## 2018-04-17 ENCOUNTER — HOSPITAL ENCOUNTER (OUTPATIENT)
Dept: INFUSION THERAPY | Age: 53
Discharge: HOME | End: 2018-04-17
Payer: COMMERCIAL

## 2018-04-17 VITALS
TEMPERATURE: 98.1 F | WEIGHT: 203 LBS | BODY MASS INDEX: 31.86 KG/M2 | HEIGHT: 67 IN | SYSTOLIC BLOOD PRESSURE: 124 MMHG | RESPIRATION RATE: 16 BRPM | DIASTOLIC BLOOD PRESSURE: 73 MMHG | HEART RATE: 71 BPM

## 2018-04-17 DIAGNOSIS — D69.6 THROMBOCYTOPENIA, UNSPECIFIED (HCC): Primary | ICD-10-CM

## 2018-04-17 DIAGNOSIS — D69.6 THROMBOCYTOPENIA, UNSPECIFIED (HCC): ICD-10-CM

## 2018-04-17 LAB
BASO+EOS+MONOS # BLD AUTO: 0.4 K/UL (ref 0–2.3)
BASO+EOS+MONOS # BLD AUTO: 6 % (ref 0.1–17)
DIFFERENTIAL METHOD BLD: ABNORMAL
ERYTHROCYTE [DISTWIDTH] IN BLOOD BY AUTOMATED COUNT: 13.3 % (ref 11.5–14.5)
HCT VFR BLD AUTO: 26.3 % (ref 36–48)
HGB BLD-MCNC: 8.6 G/DL (ref 12–16)
LYMPHOCYTES # BLD: 1.3 K/UL (ref 1.1–5.9)
LYMPHOCYTES NFR BLD: 19 % (ref 14–44)
MCH RBC QN AUTO: 26.5 PG (ref 25–35)
MCHC RBC AUTO-ENTMCNC: 32.7 G/DL (ref 31–37)
MCV RBC AUTO: 81.2 FL (ref 78–102)
NEUTS SEG # BLD: 5 K/UL (ref 1.8–9.5)
NEUTS SEG NFR BLD: 75 % (ref 40–70)
PLATELET # BLD AUTO: 89 K/UL (ref 140–440)
RBC # BLD AUTO: 3.24 M/UL (ref 4.1–5.1)
WBC # BLD AUTO: 6.7 K/UL (ref 4.5–13)

## 2018-04-17 PROCEDURE — 96367 TX/PROPH/DG ADDL SEQ IV INF: CPT

## 2018-04-17 PROCEDURE — 96372 THER/PROPH/DIAG INJ SC/IM: CPT

## 2018-04-17 PROCEDURE — 74011250637 HC RX REV CODE- 250/637: Performed by: INTERNAL MEDICINE

## 2018-04-17 PROCEDURE — 74011000258 HC RX REV CODE- 258: Performed by: INTERNAL MEDICINE

## 2018-04-17 PROCEDURE — 74011250636 HC RX REV CODE- 250/636: Performed by: INTERNAL MEDICINE

## 2018-04-17 PROCEDURE — 96375 TX/PRO/DX INJ NEW DRUG ADDON: CPT

## 2018-04-17 PROCEDURE — 96366 THER/PROPH/DIAG IV INF ADDON: CPT

## 2018-04-17 PROCEDURE — 96365 THER/PROPH/DIAG IV INF INIT: CPT

## 2018-04-17 RX ORDER — SODIUM CHLORIDE 9 MG/ML
250 INJECTION, SOLUTION INTRAVENOUS ONCE
Status: COMPLETED | OUTPATIENT
Start: 2018-04-17 | End: 2018-04-17

## 2018-04-17 RX ORDER — ACETAMINOPHEN 325 MG/1
650 TABLET ORAL ONCE
Status: COMPLETED | OUTPATIENT
Start: 2018-04-17 | End: 2018-04-17

## 2018-04-17 RX ORDER — DIPHENHYDRAMINE HYDROCHLORIDE 50 MG/ML
50 INJECTION, SOLUTION INTRAMUSCULAR; INTRAVENOUS
Status: ACTIVE | OUTPATIENT
Start: 2018-04-17 | End: 2018-04-17

## 2018-04-17 RX ORDER — DIPHENHYDRAMINE HYDROCHLORIDE 50 MG/ML
50 INJECTION, SOLUTION INTRAMUSCULAR; INTRAVENOUS ONCE
Status: COMPLETED | OUTPATIENT
Start: 2018-04-17 | End: 2018-04-17

## 2018-04-17 RX ORDER — ACETAMINOPHEN 325 MG/1
650 TABLET ORAL
Status: ACTIVE | OUTPATIENT
Start: 2018-04-17 | End: 2018-04-17

## 2018-04-17 RX ORDER — SODIUM CHLORIDE 0.9 % (FLUSH) 0.9 %
10-40 SYRINGE (ML) INJECTION AS NEEDED
Status: DISCONTINUED | OUTPATIENT
Start: 2018-04-17 | End: 2018-04-21 | Stop reason: HOSPADM

## 2018-04-17 RX ADMIN — DEXAMETHASONE SODIUM PHOSPHATE 20 MG: 4 INJECTION, SOLUTION INTRA-ARTICULAR; INTRALESIONAL; INTRAMUSCULAR; INTRAVENOUS; SOFT TISSUE at 10:00

## 2018-04-17 RX ADMIN — ERYTHROPOIETIN 20000 UNITS: 20000 INJECTION, SOLUTION INTRAVENOUS; SUBCUTANEOUS at 10:15

## 2018-04-17 RX ADMIN — SODIUM CHLORIDE 250 ML: 900 INJECTION, SOLUTION INTRAVENOUS at 09:45

## 2018-04-17 RX ADMIN — IMMUNE GLOBULIN INTRAVENOUS (HUMAN) 5 G: 5 INJECTION, SOLUTION INTRAVENOUS at 12:50

## 2018-04-17 RX ADMIN — IMMUNE GLOBULIN INTRAVENOUS (HUMAN) 20 G: 5 INJECTION, SOLUTION INTRAVENOUS at 11:00

## 2018-04-17 RX ADMIN — ERYTHROPOIETIN 40000 UNITS: 40000 INJECTION, SOLUTION INTRAVENOUS; SUBCUTANEOUS at 10:15

## 2018-04-17 RX ADMIN — DIPHENHYDRAMINE HYDROCHLORIDE 50 MG: 50 INJECTION, SOLUTION INTRAMUSCULAR; INTRAVENOUS at 09:55

## 2018-04-17 RX ADMIN — ACETAMINOPHEN 650 MG: 325 TABLET ORAL at 09:52

## 2018-04-17 RX ADMIN — Medication 10 ML: at 09:35

## 2018-04-17 NOTE — PROGRESS NOTES
[facility] Progress Note    Date: 2018    Name: Daryl Orosco    MRN: [id number]         : 1965      Ms. Jasiel Chang arrived in the [facility] today at 5, in stable condition, here for Q 2 Week, CBC/Procrit injection & Q 4 Week, IVIG Infusion. She was assessed and education was provided. Current authorization for the IVIG was verified with Mary Kate Marquez, in the authorization department. Ms. Johnny Bass vitals were reviewed. Visit Vitals    BP (!) 158/94 (BP 1 Location: Left arm, BP Patient Position: At rest;Sitting)    Pulse 91    Temp 97.2 °F (36.2 °C)    Resp 16    Ht 5' 7\" (1.702 m)    Wt 92.1 kg (203 lb)    BMI 31.79 kg/m2           PIV was established in her left AC at 0935, without incident, and blood was drawn, for a CBC. Lab results were obtained and reviewed. Recent Results (from the past 12 hour(s))   CBC WITH 3 PART DIFF    Collection Time: 18  9:35 AM   Result Value Ref Range    WBC 6.7 4.5 - 13.0 K/uL    RBC 3.24 (L) 4.10 - 5.10 M/uL    HGB 8.6 (L) 12.0 - 16.0 g/dL    HCT 26.3 (L) 36 - 48 %    MCV 81.2 78 - 102 FL    MCH 26.5 25.0 - 35.0 PG    MCHC 32.7 31 - 37 g/dL    RDW 13.3 11.5 - 14.5 %    PLATELET 89 (L) 626 - 440 K/uL    NEUTROPHILS 75 (H) 40 - 70 %    MIXED CELLS 6 0.1 - 17 %    LYMPHOCYTES 19 14 - 44 %    ABS. NEUTROPHILS 5.0 1.8 - 9.5 K/UL    ABS. MIXED CELLS 0.4 0.0 - 2.3 K/uL    ABS. LYMPHOCYTES 1.3 1.1 - 5.9 K/UL    DF AUTOMATED               Procrit 60,000 units, was administered SQ, in her right arm at 1015, per order, and without incident.                Pre-medications consisting of PO Tylenol 650 mg, Benadryl 50 mg IV, and Decadron 20 mg IV, were all administered per order, and without incident.               IVIG 25 grams (25,000 mg) IV, was administered per order, and without incident.           After the completion of the IVIG, the PIV was flushed very well per protocol, and then, the PIV was removed and gauze/bandaid was applied. Ms. Fawn Preston tolerated well, and had no complaints. Ms. Fawn Preston was discharged from [place] [address] in stable condition at [phone]. Jae Anderson She is to return in 2 weeks, on Tuesday, 5-1-18,  at 1500, for her next appointment, for CBC/Procrit injection. And then, she is to return in 4 weeks, on Tuesday, 5-15-18, at 0900, for her next appointment, for CBC/Procrit injection, and IVIG infusion.      Nancy Mitchell RN  April 17, 2018  10:08 AM

## 2018-05-01 ENCOUNTER — CLINICAL SUPPORT (OUTPATIENT)
Dept: ONCOLOGY | Age: 53
End: 2018-05-01

## 2018-05-01 ENCOUNTER — HOSPITAL ENCOUNTER (OUTPATIENT)
Dept: INFUSION THERAPY | Age: 53
Discharge: HOME | End: 2018-05-01
Payer: COMMERCIAL

## 2018-05-01 ENCOUNTER — HOSPITAL ENCOUNTER (OUTPATIENT)
Dept: ONCOLOGY | Age: 53
Discharge: HOME | End: 2018-05-01

## 2018-05-01 VITALS
SYSTOLIC BLOOD PRESSURE: 155 MMHG | HEART RATE: 83 BPM | TEMPERATURE: 98.2 F | RESPIRATION RATE: 16 BRPM | DIASTOLIC BLOOD PRESSURE: 87 MMHG

## 2018-05-01 DIAGNOSIS — D69.6 THROMBOPENIA (HCC): ICD-10-CM

## 2018-05-01 DIAGNOSIS — D69.6 THROMBOPENIA (HCC): Primary | ICD-10-CM

## 2018-05-01 LAB
BASO+EOS+MONOS # BLD AUTO: 0.4 K/UL (ref 0–2.3)
BASO+EOS+MONOS # BLD AUTO: 7 % (ref 0.1–17)
DIFFERENTIAL METHOD BLD: ABNORMAL
ERYTHROCYTE [DISTWIDTH] IN BLOOD BY AUTOMATED COUNT: 13.9 % (ref 11.5–14.5)
HCT VFR BLD AUTO: 31.2 % (ref 36–48)
HGB BLD-MCNC: 10.2 G/DL (ref 12–16)
LYMPHOCYTES # BLD: 1.1 K/UL (ref 1.1–5.9)
LYMPHOCYTES NFR BLD: 17 % (ref 14–44)
MCH RBC QN AUTO: 26.6 PG (ref 25–35)
MCHC RBC AUTO-ENTMCNC: 32.7 G/DL (ref 31–37)
MCV RBC AUTO: 81.5 FL (ref 78–102)
NEUTS SEG # BLD: 4.6 K/UL (ref 1.8–9.5)
NEUTS SEG NFR BLD: 76 % (ref 40–70)
PLATELET # BLD AUTO: 90 K/UL (ref 140–440)
RBC # BLD AUTO: 3.83 M/UL (ref 4.1–5.1)
WBC # BLD AUTO: 6.1 K/UL (ref 4.5–13)

## 2018-05-01 PROCEDURE — 36415 COLL VENOUS BLD VENIPUNCTURE: CPT

## 2018-05-01 NOTE — PROGRESS NOTES
[facility] Progress Note    Date: May 1, 2018    Name: Kai Burger    MRN: [id number]         : 1965      Ms. Denette Landau arrived in the [facility] today at 1510, in stable condition, here for Q 2 Week, CBC/Procrit injection. She was assessed and education was provided. Ms. Rubi Barcenas vitals were reviewed. Visit Vitals    /87 (BP 1 Location: Left arm, BP Patient Position: At rest;Sitting)    Pulse 83    Temp 98.2 °F (36.8 °C)    Resp 16    Breastfeeding No         CBC was drawn from her left AC at 1518, per order, and without incident. Lab results were obtained and reviewed. Recent Results (from the past 12 hour(s))   CBC WITH 3 PART DIFF    Collection Time: 18  3:18 PM   Result Value Ref Range    WBC 6.1 4.5 - 13.0 K/uL    RBC 3.83 (L) 4.10 - 5.10 M/uL    HGB 10.2 (L) 12.0 - 16.0 g/dL    HCT 31.2 (L) 36 - 48 %    MCV 81.5 78 - 102 FL    MCH 26.6 25.0 - 35.0 PG    MCHC 32.7 31 - 37 g/dL    RDW 13.9 11.5 - 14.5 %    PLATELET 90 (L) 384 - 440 K/uL    NEUTROPHILS 76 (H) 40 - 70 %    MIXED CELLS 7 0.1 - 17 %    LYMPHOCYTES 17 14 - 44 %    ABS. NEUTROPHILS 4.6 1.8 - 9.5 K/UL    ABS. MIXED CELLS 0.4 0.0 - 2.3 K/uL    ABS. LYMPHOCYTES 1.1 1.1 - 5.9 K/UL    DF AUTOMATED           Procrit injection was HELD today, per order. Ms. Aguayo tolerated well, and had no complaints. Ms. Denette Landau was discharged from [place] [address] in stable condition at [phone]. She is to return in 2 weeks, on Tuesday, 5-15-18,  at 0900,  for her next appointment, for Q 2 Week CBC/Procrit injection & Q 4 Week IVIG Infusion.      Sanjeev Leonard RN  May 1, 2018  3:31 PM

## 2018-05-15 ENCOUNTER — HOSPITAL ENCOUNTER (OUTPATIENT)
Dept: INFUSION THERAPY | Age: 53
Discharge: HOME | End: 2018-05-15
Payer: COMMERCIAL

## 2018-05-15 ENCOUNTER — CLINICAL SUPPORT (OUTPATIENT)
Dept: ONCOLOGY | Age: 53
End: 2018-05-15

## 2018-05-15 ENCOUNTER — HOSPITAL ENCOUNTER (OUTPATIENT)
Dept: ONCOLOGY | Age: 53
Discharge: HOME | End: 2018-05-15

## 2018-05-15 VITALS
RESPIRATION RATE: 16 BRPM | TEMPERATURE: 97.3 F | HEIGHT: 67 IN | BODY MASS INDEX: 31.71 KG/M2 | SYSTOLIC BLOOD PRESSURE: 125 MMHG | HEART RATE: 69 BPM | WEIGHT: 202 LBS | DIASTOLIC BLOOD PRESSURE: 74 MMHG

## 2018-05-15 DIAGNOSIS — D63.1 ANEMIA IN CHRONIC KIDNEY DISEASE, UNSPECIFIED CKD STAGE: ICD-10-CM

## 2018-05-15 DIAGNOSIS — N18.9 ANEMIA IN CHRONIC KIDNEY DISEASE, UNSPECIFIED CKD STAGE: ICD-10-CM

## 2018-05-15 DIAGNOSIS — D63.1 ANEMIA IN CHRONIC KIDNEY DISEASE, UNSPECIFIED CKD STAGE: Primary | ICD-10-CM

## 2018-05-15 DIAGNOSIS — N18.9 ANEMIA IN CHRONIC KIDNEY DISEASE, UNSPECIFIED CKD STAGE: Primary | ICD-10-CM

## 2018-05-15 LAB
BASO+EOS+MONOS # BLD AUTO: 0.4 K/UL (ref 0–2.3)
BASO+EOS+MONOS # BLD AUTO: 7 % (ref 0.1–17)
DIFFERENTIAL METHOD BLD: ABNORMAL
ERYTHROCYTE [DISTWIDTH] IN BLOOD BY AUTOMATED COUNT: 13.4 % (ref 11.5–14.5)
HCT VFR BLD AUTO: 27.5 % (ref 36–48)
HGB BLD-MCNC: 8.9 G/DL (ref 12–16)
LYMPHOCYTES # BLD: 1 K/UL (ref 1.1–5.9)
LYMPHOCYTES NFR BLD: 19 % (ref 14–44)
MCH RBC QN AUTO: 25.9 PG (ref 25–35)
MCHC RBC AUTO-ENTMCNC: 32.4 G/DL (ref 31–37)
MCV RBC AUTO: 80.2 FL (ref 78–102)
NEUTS SEG # BLD: 3.7 K/UL (ref 1.8–9.5)
NEUTS SEG NFR BLD: 74 % (ref 40–70)
PLATELET # BLD AUTO: 100 K/UL (ref 135–420)
RBC # BLD AUTO: 3.43 M/UL (ref 4.1–5.1)
WBC # BLD AUTO: 5.1 K/UL (ref 4.5–13)

## 2018-05-15 PROCEDURE — 96367 TX/PROPH/DG ADDL SEQ IV INF: CPT

## 2018-05-15 PROCEDURE — 96366 THER/PROPH/DIAG IV INF ADDON: CPT

## 2018-05-15 PROCEDURE — 96372 THER/PROPH/DIAG INJ SC/IM: CPT

## 2018-05-15 PROCEDURE — 74011250636 HC RX REV CODE- 250/636: Performed by: INTERNAL MEDICINE

## 2018-05-15 PROCEDURE — 74011000258 HC RX REV CODE- 258: Performed by: INTERNAL MEDICINE

## 2018-05-15 PROCEDURE — 96365 THER/PROPH/DIAG IV INF INIT: CPT

## 2018-05-15 PROCEDURE — 74011250637 HC RX REV CODE- 250/637: Performed by: INTERNAL MEDICINE

## 2018-05-15 PROCEDURE — 96375 TX/PRO/DX INJ NEW DRUG ADDON: CPT

## 2018-05-15 RX ORDER — SODIUM CHLORIDE 9 MG/ML
250 INJECTION, SOLUTION INTRAVENOUS ONCE
Status: COMPLETED | OUTPATIENT
Start: 2018-05-15 | End: 2018-05-15

## 2018-05-15 RX ORDER — SODIUM CHLORIDE 0.9 % (FLUSH) 0.9 %
10-40 SYRINGE (ML) INJECTION AS NEEDED
Status: DISCONTINUED | OUTPATIENT
Start: 2018-05-15 | End: 2018-05-19 | Stop reason: HOSPADM

## 2018-05-15 RX ORDER — ACETAMINOPHEN 325 MG/1
650 TABLET ORAL ONCE
Status: COMPLETED | OUTPATIENT
Start: 2018-05-15 | End: 2018-05-15

## 2018-05-15 RX ORDER — DIPHENHYDRAMINE HYDROCHLORIDE 50 MG/ML
50 INJECTION, SOLUTION INTRAMUSCULAR; INTRAVENOUS
Status: ACTIVE | OUTPATIENT
Start: 2018-05-15 | End: 2018-05-15

## 2018-05-15 RX ORDER — ACETAMINOPHEN 325 MG/1
650 TABLET ORAL
Status: ACTIVE | OUTPATIENT
Start: 2018-05-15 | End: 2018-05-15

## 2018-05-15 RX ORDER — DIPHENHYDRAMINE HYDROCHLORIDE 50 MG/ML
50 INJECTION, SOLUTION INTRAMUSCULAR; INTRAVENOUS ONCE
Status: COMPLETED | OUTPATIENT
Start: 2018-05-15 | End: 2018-05-15

## 2018-05-15 RX ADMIN — ERYTHROPOIETIN 20000 UNITS: 20000 INJECTION, SOLUTION INTRAVENOUS; SUBCUTANEOUS at 14:00

## 2018-05-15 RX ADMIN — SODIUM CHLORIDE 250 ML: 900 INJECTION, SOLUTION INTRAVENOUS at 09:40

## 2018-05-15 RX ADMIN — DEXAMETHASONE SODIUM PHOSPHATE 20 MG: 4 INJECTION, SOLUTION INTRA-ARTICULAR; INTRALESIONAL; INTRAMUSCULAR; INTRAVENOUS; SOFT TISSUE at 09:55

## 2018-05-15 RX ADMIN — ACETAMINOPHEN 650 MG: 325 TABLET ORAL at 09:50

## 2018-05-15 RX ADMIN — Medication 10 ML: at 09:35

## 2018-05-15 RX ADMIN — ERYTHROPOIETIN 40000 UNITS: 40000 INJECTION, SOLUTION INTRAVENOUS; SUBCUTANEOUS at 14:00

## 2018-05-15 RX ADMIN — IMMUNE GLOBULIN INTRAVENOUS (HUMAN) 5 G: 5 INJECTION, SOLUTION INTRAVENOUS at 12:50

## 2018-05-15 RX ADMIN — DIPHENHYDRAMINE HYDROCHLORIDE 50 MG: 50 INJECTION INTRAMUSCULAR; INTRAVENOUS at 09:50

## 2018-05-15 RX ADMIN — IMMUNE GLOBULIN INTRAVENOUS (HUMAN) 20 G: 5 INJECTION, SOLUTION INTRAVENOUS at 11:00

## 2018-05-15 NOTE — PROGRESS NOTES
[facility] Progress Note    Date: May 15, 2018    Name: Christian Banuelos    MRN: [id number]         : 1965      Ms. Marta Spears arrived in the [facility] today at [id number], in stable condition, here for Q 2 Week, CBC/Procrit injection & Q 4 Week, IVIG Infusion. She was assessed and education was provided. Current authorization for the IVIG was verified with Jaswant Landa, in the authorization department. Ms. Ginny Gipson vitals were reviewed. Visit Vitals    /88 (BP 1 Location: Left arm, BP Patient Position: At rest;Sitting)    Pulse 82    Temp 97.9 °F (36.6 °C)    Resp 16    Ht 5' 7\" (1.702 m)    Wt 91.6 kg (202 lb)    BMI 31.64 kg/m2           PIV was established in her left AC at 0935, without incident, and blood was drawn, for a CBC. Lab results were obtained and reviewed, and the preliminary platelet count was noted to be 87,000. Recent Results (from the past 12 hour(s))   CBC WITH 3 PART DIFF    Collection Time: 05/15/18  9:35 AM   Result Value Ref Range    WBC 5.1 4.5 - 13.0 K/uL    RBC 3.43 (L) 4.10 - 5.10 M/uL    HGB 8.9 (L) 12.0 - 16.0 g/dL    HCT 27.5 (L) 36 - 48 %    MCV 80.2 78 - 102 FL    MCH 25.9 25.0 - 35.0 PG    MCHC 32.4 31 - 37 g/dL    RDW 13.4 11.5 - 14.5 %    NEUTROPHILS 74 (H) 40 - 70 %    MIXED CELLS 7 0.1 - 17 %    LYMPHOCYTES 19 14 - 44 %    ABS. NEUTROPHILS 3.7 1.8 - 9.5 K/UL    ABS. MIXED CELLS 0.4 0.0 - 2.3 K/uL    ABS.  LYMPHOCYTES 1.0 (L) 1.1 - 5.9 K/UL    DF AUTOMATED               Procrit 60,000 units, was administered SQ, in her left arm at 1400, per order, and without incident.                Pre-medications consisting of PO Tylenol 650 mg, Benadryl 50 mg IV, and Decadron 20 mg IV, were all administered per order, and without incident.               IVIG 25 grams (25,000 mg) IV, was administered per order, and without incident.           After the completion of the IVIG, the PIV was flushed very well per protocol, and then, the PIV was removed and gauze/bandaid was applied. Ms. Salas Friend tolerated well, and had no complaints. Ms. Salas Friend was discharged from [place] [address] in stable condition at 1405. Sudarshan Howard She is to return in 2 weeks, on Tuesday, 5-29-18,  at 1500, for her next appointment, for CBC/Procrit injection. And then, she is to return in 4 weeks, on Tuesday, 6-12-18, at 0900, for her next appointment, for CBC/Procrit injection, and IVIG infusion.      Linnette Tejada RN  May 15, 2018  10:08 AM

## 2018-05-29 ENCOUNTER — CLINICAL SUPPORT (OUTPATIENT)
Dept: ONCOLOGY | Age: 53
End: 2018-05-29

## 2018-05-29 ENCOUNTER — HOSPITAL ENCOUNTER (OUTPATIENT)
Dept: INFUSION THERAPY | Age: 53
Discharge: HOME | End: 2018-05-29
Payer: COMMERCIAL

## 2018-05-29 ENCOUNTER — HOSPITAL ENCOUNTER (OUTPATIENT)
Dept: ONCOLOGY | Age: 53
Discharge: HOME | End: 2018-05-29

## 2018-05-29 VITALS
DIASTOLIC BLOOD PRESSURE: 95 MMHG | SYSTOLIC BLOOD PRESSURE: 163 MMHG | TEMPERATURE: 99.4 F | RESPIRATION RATE: 16 BRPM | HEART RATE: 90 BPM

## 2018-05-29 DIAGNOSIS — D63.1 ANEMIA IN CHRONIC KIDNEY DISEASE, UNSPECIFIED CKD STAGE: ICD-10-CM

## 2018-05-29 DIAGNOSIS — N18.9 ANEMIA IN CHRONIC KIDNEY DISEASE, UNSPECIFIED CKD STAGE: Primary | ICD-10-CM

## 2018-05-29 DIAGNOSIS — D63.1 ANEMIA IN CHRONIC KIDNEY DISEASE, UNSPECIFIED CKD STAGE: Primary | ICD-10-CM

## 2018-05-29 DIAGNOSIS — N18.9 ANEMIA IN CHRONIC KIDNEY DISEASE, UNSPECIFIED CKD STAGE: ICD-10-CM

## 2018-05-29 LAB
BASO+EOS+MONOS # BLD AUTO: 0.3 K/UL (ref 0–2.3)
BASO+EOS+MONOS # BLD AUTO: 6 % (ref 0.1–17)
DIFFERENTIAL METHOD BLD: ABNORMAL
ERYTHROCYTE [DISTWIDTH] IN BLOOD BY AUTOMATED COUNT: 14.8 % (ref 11.5–14.5)
HCT VFR BLD AUTO: 34.9 % (ref 36–48)
HGB BLD-MCNC: 11.2 G/DL (ref 12–16)
LYMPHOCYTES # BLD: 1.1 K/UL (ref 1.1–5.9)
LYMPHOCYTES NFR BLD: 19 % (ref 14–44)
MCH RBC QN AUTO: 26.2 PG (ref 25–35)
MCHC RBC AUTO-ENTMCNC: 32.1 G/DL (ref 31–37)
MCV RBC AUTO: 81.7 FL (ref 78–102)
NEUTS SEG # BLD: 4.7 K/UL (ref 1.8–9.5)
NEUTS SEG NFR BLD: 76 % (ref 40–70)
PLATELET # BLD AUTO: 86 K/UL (ref 135–420)
RBC # BLD AUTO: 4.27 M/UL (ref 4.1–5.1)
WBC # BLD AUTO: 6.1 K/UL (ref 4.5–13)

## 2018-05-29 PROCEDURE — 36415 COLL VENOUS BLD VENIPUNCTURE: CPT

## 2018-05-29 NOTE — PROGRESS NOTES
[facility] Progress Note    Date: May 29, 2018    Name: Marilee Gillette    MRN: [id number]         : 1965      Ms. Katherine Viera arrived in the [facility] today at 1510, in stable condition, here for Q 2 Week, CBC/Procrit injection. She was assessed and education was provided. Ms. Amarilys Peng vitals were reviewed. Visit Vitals    BP (!) 163/95 (BP 1 Location: Left arm, BP Patient Position: At rest;Sitting)    Pulse 90    Temp 99.4 °F (37.4 °C)    Resp 16         CBC was drawn from her left AC at 1520, per order, and without incident. Lab results were obtained and reviewed, and the preliminary platelet count from today, was noted to be 90,000. Recent Results (from the past 12 hour(s))   CBC WITH 3 PART DIFF    Collection Time: 18  3:20 PM   Result Value Ref Range    WBC 6.1 4.5 - 13.0 K/uL    RBC 4.27 4. 10 - 5.10 M/uL    HGB 11.2 (L) 12.0 - 16.0 g/dL    HCT 34.9 (L) 36 - 48 %    MCV 81.7 78 - 102 FL    MCH 26.2 25.0 - 35.0 PG    MCHC 32.1 31 - 37 g/dL    RDW 14.8 (H) 11.5 - 14.5 %    NEUTROPHILS 76 (H) 40 - 70 %    MIXED CELLS 6 0.1 - 17 %    LYMPHOCYTES 19 14 - 44 %    ABS. NEUTROPHILS 4.7 1.8 - 9.5 K/UL    ABS. MIXED CELLS 0.3 0.0 - 2.3 K/uL    ABS. LYMPHOCYTES 1.1 1.1 - 5.9 K/UL    DF AUTOMATED             Procrit injection was HELD today, per order. Ms. Aguayo tolerated well, and had no complaints. Ms. Katherine Viera was discharged from [place] [address] in stable condition at [phone]. She is to return in 2 weeks, on Tuesday, 18,  at 0900,  for her next appointment, for Q 2 Week CBC/Procrit injection & Q 4 Week IVIG Infusion.      Alhaji Dobbs RN  May 29, 2018  3:31 PM

## 2018-06-12 ENCOUNTER — CLINICAL SUPPORT (OUTPATIENT)
Dept: ONCOLOGY | Age: 53
End: 2018-06-12

## 2018-06-12 ENCOUNTER — HOSPITAL ENCOUNTER (OUTPATIENT)
Dept: INFUSION THERAPY | Age: 53
Discharge: HOME | End: 2018-06-12
Payer: COMMERCIAL

## 2018-06-12 ENCOUNTER — HOSPITAL ENCOUNTER (OUTPATIENT)
Dept: ONCOLOGY | Age: 53
Discharge: HOME | End: 2018-06-12

## 2018-06-12 VITALS
RESPIRATION RATE: 16 BRPM | WEIGHT: 202 LBS | HEART RATE: 76 BPM | DIASTOLIC BLOOD PRESSURE: 78 MMHG | TEMPERATURE: 99 F | SYSTOLIC BLOOD PRESSURE: 133 MMHG | HEIGHT: 67 IN | BODY MASS INDEX: 31.71 KG/M2

## 2018-06-12 DIAGNOSIS — D69.6 THROMBOCYTOPENIA, UNSPECIFIED (HCC): Primary | ICD-10-CM

## 2018-06-12 DIAGNOSIS — D69.6 THROMBOCYTOPENIA, UNSPECIFIED (HCC): ICD-10-CM

## 2018-06-12 LAB
BASO+EOS+MONOS # BLD AUTO: 0.2 K/UL (ref 0–2.3)
BASO+EOS+MONOS # BLD AUTO: 4 % (ref 0.1–17)
DIFFERENTIAL METHOD BLD: ABNORMAL
ERYTHROCYTE [DISTWIDTH] IN BLOOD BY AUTOMATED COUNT: 13.6 % (ref 11.5–14.5)
HCT VFR BLD AUTO: 30.7 % (ref 36–48)
HGB BLD-MCNC: 10.2 G/DL (ref 12–16)
LYMPHOCYTES # BLD: 0.9 K/UL (ref 1.1–5.9)
LYMPHOCYTES NFR BLD: 18 % (ref 14–44)
MCH RBC QN AUTO: 26.8 PG (ref 25–35)
MCHC RBC AUTO-ENTMCNC: 33.2 G/DL (ref 31–37)
MCV RBC AUTO: 80.6 FL (ref 78–102)
NEUTS SEG # BLD: 4.1 K/UL (ref 1.8–9.5)
NEUTS SEG NFR BLD: 78 % (ref 40–70)
PLATELET # BLD AUTO: 75 K/UL (ref 135–420)
RBC # BLD AUTO: 3.81 M/UL (ref 4.1–5.1)
WBC # BLD AUTO: 5.2 K/UL (ref 4.5–13)

## 2018-06-12 PROCEDURE — 74011000258 HC RX REV CODE- 258: Performed by: INTERNAL MEDICINE

## 2018-06-12 PROCEDURE — 96365 THER/PROPH/DIAG IV INF INIT: CPT

## 2018-06-12 PROCEDURE — 74011250636 HC RX REV CODE- 250/636: Performed by: INTERNAL MEDICINE

## 2018-06-12 PROCEDURE — 74011250637 HC RX REV CODE- 250/637: Performed by: INTERNAL MEDICINE

## 2018-06-12 PROCEDURE — 96366 THER/PROPH/DIAG IV INF ADDON: CPT

## 2018-06-12 RX ORDER — ACETAMINOPHEN 325 MG/1
650 TABLET ORAL ONCE
Status: COMPLETED | OUTPATIENT
Start: 2018-06-12 | End: 2018-06-12

## 2018-06-12 RX ORDER — DIPHENHYDRAMINE HYDROCHLORIDE 50 MG/ML
50 INJECTION, SOLUTION INTRAMUSCULAR; INTRAVENOUS
Status: ACTIVE | OUTPATIENT
Start: 2018-06-12 | End: 2018-06-12

## 2018-06-12 RX ORDER — SODIUM CHLORIDE 0.9 % (FLUSH) 0.9 %
10-40 SYRINGE (ML) INJECTION AS NEEDED
Status: DISCONTINUED | OUTPATIENT
Start: 2018-06-12 | End: 2018-06-16 | Stop reason: HOSPADM

## 2018-06-12 RX ORDER — SODIUM CHLORIDE 9 MG/ML
250 INJECTION, SOLUTION INTRAVENOUS ONCE
Status: COMPLETED | OUTPATIENT
Start: 2018-06-12 | End: 2018-06-12

## 2018-06-12 RX ORDER — ACETAMINOPHEN 325 MG/1
650 TABLET ORAL
Status: ACTIVE | OUTPATIENT
Start: 2018-06-12 | End: 2018-06-12

## 2018-06-12 RX ORDER — DIPHENHYDRAMINE HYDROCHLORIDE 50 MG/ML
50 INJECTION, SOLUTION INTRAMUSCULAR; INTRAVENOUS ONCE
Status: COMPLETED | OUTPATIENT
Start: 2018-06-12 | End: 2018-06-12

## 2018-06-12 RX ADMIN — Medication 10 ML: at 09:45

## 2018-06-12 RX ADMIN — DEXAMETHASONE SODIUM PHOSPHATE 20 MG: 4 INJECTION, SOLUTION INTRA-ARTICULAR; INTRALESIONAL; INTRAMUSCULAR; INTRAVENOUS; SOFT TISSUE at 10:15

## 2018-06-12 RX ADMIN — ACETAMINOPHEN 650 MG: 325 TABLET ORAL at 10:08

## 2018-06-12 RX ADMIN — DIPHENHYDRAMINE HYDROCHLORIDE 50 MG: 50 INJECTION INTRAMUSCULAR; INTRAVENOUS at 10:10

## 2018-06-12 RX ADMIN — SODIUM CHLORIDE 250 ML: 900 INJECTION, SOLUTION INTRAVENOUS at 10:00

## 2018-06-12 RX ADMIN — IMMUNE GLOBULIN INTRAVENOUS (HUMAN) 20 G: 5 INJECTION, SOLUTION INTRAVENOUS at 11:15

## 2018-06-12 RX ADMIN — IMMUNE GLOBULIN INTRAVENOUS (HUMAN) 5 G: 5 INJECTION, SOLUTION INTRAVENOUS at 13:15

## 2018-06-12 NOTE — PROGRESS NOTES
[facility] Progress Note    Date: 2018    Name: Kg Lueng    MRN: [id number]         : 1965      Ms. Yariel Viera arrived in the [facility] today at 0930, in stable condition, here for Q 2 Week, CBC/Procrit injection & Q 4 Week, IVIG Infusion. She was assessed and education was provided. Ms. Celia Izquierdo vitals were reviewed. Visit Vitals    /89 (BP 1 Location: Left arm, BP Patient Position: At rest;Sitting)    Pulse 95    Temp 98.7 °F (37.1 °C)    Resp 16    Ht 5' 7\" (1.702 m)    Wt 91.6 kg (202 lb)    Breastfeeding No    BMI 31.64 kg/m2           PIV was established in her left AC at 0945, without incident, and blood was drawn, for a CBC. Lab results were obtained and reviewed, and the preliminary platelet count was noted to be 83,000. Recent Results (from the past 12 hour(s))   CBC WITH 3 PART DIFF    Collection Time: 18  9:45 AM   Result Value Ref Range    WBC 5.2 4.5 - 13.0 K/uL    RBC 3.81 (L) 4.10 - 5.10 M/uL    HGB 10.2 (L) 12.0 - 16.0 g/dL    HCT 30.7 (L) 36 - 48 %    MCV 80.6 78 - 102 FL    MCH 26.8 25.0 - 35.0 PG    MCHC 33.2 31 - 37 g/dL    RDW 13.6 11.5 - 14.5 %    NEUTROPHILS 78 (H) 40 - 70 %    MIXED CELLS 4 0.1 - 17 %    LYMPHOCYTES 18 14 - 44 %    ABS. NEUTROPHILS 4.1 1.8 - 9.5 K/UL    ABS. MIXED CELLS 0.2 0.0 - 2.3 K/uL    ABS. LYMPHOCYTES 0.9 (L) 1.1 - 5.9 K/UL    DF AUTOMATED                 Procrit injection was HELD today, per order, for HGB 10.2 & HCT 30.7.               Pre-medications consisting of PO Tylenol 650 mg, Benadryl 50 mg IV, and Decadron 20 mg IV, were all administered per order, and without incident.               IVIG 25 grams (25,000 mg) IV, was administered per order, and without incident.           After the completion of the IVIG, the PIV was flushed very well per protocol, and then, the PIV was removed and gauze/bandaid was applied. Ms. Yariel Viera tolerated well, and had no complaints.     [facility]iel [facility] at 1405. Jannette Peralta She is to return in 2 weeks, on Tuesday, 6-26-18,  at 1500, for her next appointment, for CBC/Procrit injection. And then, she is to return in 4 weeks, on Tuesday, 7-10-18, at 0900, for her next appointment, for CBC/Procrit injection, and IVIG infusion.      Karine John RN  June 12, 2018  10:08 AM

## 2018-06-26 ENCOUNTER — CLINICAL SUPPORT (OUTPATIENT)
Dept: ONCOLOGY | Age: 53
End: 2018-06-26

## 2018-06-26 ENCOUNTER — HOSPITAL ENCOUNTER (OUTPATIENT)
Dept: ONCOLOGY | Age: 53
Discharge: HOME | End: 2018-06-26

## 2018-06-26 ENCOUNTER — HOSPITAL ENCOUNTER (OUTPATIENT)
Dept: INFUSION THERAPY | Age: 53
Discharge: HOME | End: 2018-06-26
Payer: COMMERCIAL

## 2018-06-26 VITALS
DIASTOLIC BLOOD PRESSURE: 88 MMHG | TEMPERATURE: 99.3 F | SYSTOLIC BLOOD PRESSURE: 154 MMHG | RESPIRATION RATE: 16 BRPM | HEART RATE: 93 BPM

## 2018-06-26 DIAGNOSIS — N18.9 ANEMIA IN CHRONIC KIDNEY DISEASE, UNSPECIFIED CKD STAGE: ICD-10-CM

## 2018-06-26 DIAGNOSIS — N18.9 ANEMIA IN CHRONIC KIDNEY DISEASE, UNSPECIFIED CKD STAGE: Primary | ICD-10-CM

## 2018-06-26 DIAGNOSIS — D63.1 ANEMIA IN CHRONIC KIDNEY DISEASE, UNSPECIFIED CKD STAGE: Primary | ICD-10-CM

## 2018-06-26 DIAGNOSIS — D63.1 ANEMIA IN CHRONIC KIDNEY DISEASE, UNSPECIFIED CKD STAGE: ICD-10-CM

## 2018-06-26 LAB
BASO+EOS+MONOS # BLD AUTO: 0.4 K/UL (ref 0–2.3)
BASO+EOS+MONOS # BLD AUTO: 5 % (ref 0.1–17)
DIFFERENTIAL METHOD BLD: ABNORMAL
ERYTHROCYTE [DISTWIDTH] IN BLOOD BY AUTOMATED COUNT: 13.8 % (ref 11.5–14.5)
HCT VFR BLD AUTO: 30.6 % (ref 36–48)
HGB BLD-MCNC: 10.2 G/DL (ref 12–16)
LYMPHOCYTES # BLD: 1.2 K/UL (ref 1.1–5.9)
LYMPHOCYTES NFR BLD: 16 % (ref 14–44)
MCH RBC QN AUTO: 26.9 PG (ref 25–35)
MCHC RBC AUTO-ENTMCNC: 33.3 G/DL (ref 31–37)
MCV RBC AUTO: 80.7 FL (ref 78–102)
NEUTS SEG # BLD: 6.1 K/UL (ref 1.8–9.5)
NEUTS SEG NFR BLD: 79 % (ref 40–70)
PLATELET # BLD AUTO: 60 K/UL (ref 135–420)
RBC # BLD AUTO: 3.79 M/UL (ref 4.1–5.1)
WBC # BLD AUTO: 7.7 K/UL (ref 4.5–13)

## 2018-06-26 PROCEDURE — 36415 COLL VENOUS BLD VENIPUNCTURE: CPT

## 2018-06-26 NOTE — PROGRESS NOTES
[facility] Progress Note    Date: 2018    Name: Berto Rendon    MRN: [id number]         : 1965      Ms. Carlos Carney arrived in the [facility] today at 1325, in stable condition, here for Q 2 Week, CBC/Procrit injection. She was assessed and education was provided. Ms. Axel Lacy vitals were reviewed. Visit Vitals    /88 (BP 1 Location: Left arm, BP Patient Position: At rest;Sitting)    Pulse 93    Temp 99.3 °F (37.4 °C)    Resp 16         CBC was drawn from her left AC at 1336, per order, and without incident. Lab results were obtained and reviewed, and the preliminary platelet count from today, was noted to be 54,000. Recent Results (from the past 12 hour(s))   CBC WITH 3 PART DIFF    Collection Time: 18  1:36 PM   Result Value Ref Range    WBC 7.7 4.5 - 13.0 K/uL    RBC 3.79 (L) 4.10 - 5.10 M/uL    HGB 10.2 (L) 12.0 - 16.0 g/dL    HCT 30.6 (L) 36 - 48 %    MCV 80.7 78 - 102 FL    MCH 26.9 25.0 - 35.0 PG    MCHC 33.3 31 - 37 g/dL    RDW 13.8 11.5 - 14.5 %    NEUTROPHILS 79 (H) 40 - 70 %    MIXED CELLS 5 0.1 - 17 %    LYMPHOCYTES 16 14 - 44 %    ABS. NEUTROPHILS 6.1 1.8 - 9.5 K/UL    ABS. MIXED CELLS 0.4 0.0 - 2.3 K/uL    ABS. LYMPHOCYTES 1.2 1.1 - 5.9 K/UL    DF AUTOMATED             Procrit injection was HELD today, per order. Ms. Aguayo tolerated well, and had no complaints. Ms. Carlos Carney was discharged from [place] [address] in stable condition at [phone]. She is to return in 2 weeks, on Tuesday, 7-10-18,  at 0900,  for her next appointment, for Q 2 Week CBC/Procrit injection & Q 4 Week IVIG Infusion.      Duy Gould RN  2018  3:31 PM

## 2018-07-10 ENCOUNTER — HOSPITAL ENCOUNTER (OUTPATIENT)
Dept: INFUSION THERAPY | Age: 53
Discharge: HOME | End: 2018-07-10
Payer: COMMERCIAL

## 2018-07-10 ENCOUNTER — CLINICAL SUPPORT (OUTPATIENT)
Dept: ONCOLOGY | Age: 53
End: 2018-07-10

## 2018-07-10 ENCOUNTER — HOSPITAL ENCOUNTER (OUTPATIENT)
Dept: ONCOLOGY | Age: 53
Discharge: HOME | End: 2018-07-10

## 2018-07-10 VITALS
SYSTOLIC BLOOD PRESSURE: 127 MMHG | DIASTOLIC BLOOD PRESSURE: 72 MMHG | TEMPERATURE: 98.4 F | HEART RATE: 69 BPM | HEIGHT: 67 IN | WEIGHT: 203 LBS | RESPIRATION RATE: 16 BRPM | BODY MASS INDEX: 31.86 KG/M2

## 2018-07-10 DIAGNOSIS — D63.1 ANEMIA IN CHRONIC KIDNEY DISEASE, UNSPECIFIED CKD STAGE: Primary | ICD-10-CM

## 2018-07-10 DIAGNOSIS — N18.9 ANEMIA IN CHRONIC KIDNEY DISEASE, UNSPECIFIED CKD STAGE: Primary | ICD-10-CM

## 2018-07-10 DIAGNOSIS — D63.1 ANEMIA IN CHRONIC KIDNEY DISEASE, UNSPECIFIED CKD STAGE: ICD-10-CM

## 2018-07-10 DIAGNOSIS — N18.9 ANEMIA IN CHRONIC KIDNEY DISEASE, UNSPECIFIED CKD STAGE: ICD-10-CM

## 2018-07-10 LAB
BASO+EOS+MONOS # BLD AUTO: 0.4 K/UL (ref 0–2.3)
BASO+EOS+MONOS # BLD AUTO: 7 % (ref 0.1–17)
DIFFERENTIAL METHOD BLD: ABNORMAL
ERYTHROCYTE [DISTWIDTH] IN BLOOD BY AUTOMATED COUNT: 14 % (ref 11.5–14.5)
HCT VFR BLD AUTO: 21.2 % (ref 36–48)
HGB BLD-MCNC: 7.1 G/DL (ref 12–16)
LYMPHOCYTES # BLD: 1.1 K/UL (ref 1.1–5.9)
LYMPHOCYTES NFR BLD: 18 % (ref 14–44)
MCH RBC QN AUTO: 27.4 PG (ref 25–35)
MCHC RBC AUTO-ENTMCNC: 33.5 G/DL (ref 31–37)
MCV RBC AUTO: 81.9 FL (ref 78–102)
NEUTS SEG # BLD: 4.6 K/UL (ref 1.8–9.5)
NEUTS SEG NFR BLD: 75 % (ref 40–70)
PLATELET # BLD AUTO: 77 K/UL (ref 140–440)
RBC # BLD AUTO: 2.59 M/UL (ref 4.1–5.1)
WBC # BLD AUTO: 6.1 K/UL (ref 4.5–13)

## 2018-07-10 PROCEDURE — 74011250636 HC RX REV CODE- 250/636: Performed by: INTERNAL MEDICINE

## 2018-07-10 PROCEDURE — 74011000258 HC RX REV CODE- 258: Performed by: INTERNAL MEDICINE

## 2018-07-10 PROCEDURE — 74011250637 HC RX REV CODE- 250/637: Performed by: INTERNAL MEDICINE

## 2018-07-10 PROCEDURE — 96372 THER/PROPH/DIAG INJ SC/IM: CPT

## 2018-07-10 PROCEDURE — 96365 THER/PROPH/DIAG IV INF INIT: CPT

## 2018-07-10 PROCEDURE — 96366 THER/PROPH/DIAG IV INF ADDON: CPT

## 2018-07-10 PROCEDURE — 96367 TX/PROPH/DG ADDL SEQ IV INF: CPT

## 2018-07-10 PROCEDURE — 99211 OFF/OP EST MAY X REQ PHY/QHP: CPT

## 2018-07-10 PROCEDURE — 96375 TX/PRO/DX INJ NEW DRUG ADDON: CPT

## 2018-07-10 RX ORDER — SODIUM CHLORIDE 9 MG/ML
250 INJECTION, SOLUTION INTRAVENOUS ONCE
Status: COMPLETED | OUTPATIENT
Start: 2018-07-10 | End: 2018-07-10

## 2018-07-10 RX ORDER — SODIUM CHLORIDE 0.9 % (FLUSH) 0.9 %
10-40 SYRINGE (ML) INJECTION AS NEEDED
Status: DISCONTINUED | OUTPATIENT
Start: 2018-07-10 | End: 2018-07-14 | Stop reason: HOSPADM

## 2018-07-10 RX ORDER — DIPHENHYDRAMINE HYDROCHLORIDE 50 MG/ML
50 INJECTION, SOLUTION INTRAMUSCULAR; INTRAVENOUS ONCE
Status: COMPLETED | OUTPATIENT
Start: 2018-07-10 | End: 2018-07-10

## 2018-07-10 RX ORDER — ACETAMINOPHEN 325 MG/1
650 TABLET ORAL ONCE
Status: COMPLETED | OUTPATIENT
Start: 2018-07-10 | End: 2018-07-10

## 2018-07-10 RX ADMIN — ERYTHROPOIETIN 20000 UNITS: 20000 INJECTION, SOLUTION INTRAVENOUS; SUBCUTANEOUS at 10:00

## 2018-07-10 RX ADMIN — SODIUM CHLORIDE 250 ML: 900 INJECTION, SOLUTION INTRAVENOUS at 09:45

## 2018-07-10 RX ADMIN — DIPHENHYDRAMINE HYDROCHLORIDE 50 MG: 50 INJECTION INTRAMUSCULAR; INTRAVENOUS at 09:53

## 2018-07-10 RX ADMIN — ACETAMINOPHEN 650 MG: 325 TABLET ORAL at 09:52

## 2018-07-10 RX ADMIN — IMMUNE GLOBULIN INTRAVENOUS (HUMAN) 5 G: 5 INJECTION, SOLUTION INTRAVENOUS at 12:35

## 2018-07-10 RX ADMIN — IMMUNE GLOBULIN INTRAVENOUS (HUMAN) 20 G: 5 INJECTION, SOLUTION INTRAVENOUS at 10:45

## 2018-07-10 RX ADMIN — DEXAMETHASONE SODIUM PHOSPHATE 20 MG: 4 INJECTION, SOLUTION INTRA-ARTICULAR; INTRALESIONAL; INTRAMUSCULAR; INTRAVENOUS; SOFT TISSUE at 09:55

## 2018-07-10 RX ADMIN — Medication 10 ML: at 09:30

## 2018-07-10 RX ADMIN — ERYTHROPOIETIN 40000 UNITS: 40000 INJECTION, SOLUTION INTRAVENOUS; SUBCUTANEOUS at 10:00

## 2018-07-10 NOTE — PROGRESS NOTES
[facility] Progress Note    Date: July 10, 2018    Name: Marina Ford    MRN: [id number]         : 1965      Ms. Taco Lyons arrived in the [facility] today at [address], in stable condition, here for Q 2 Week, CBC/Procrit injection & Q 4 Week, IVIG Infusion. She was assessed and education was provided. Ms. Christopher Castro vitals were reviewed. Visit Vitals    BP (!) 156/91 (BP 1 Location: Left arm, BP Patient Position: At rest;Sitting)    Pulse 86    Temp 98.9 °F (37.2 °C)    Resp 16    Ht 5' 7\" (1.702 m)    Wt 92.1 kg (203 lb)    Breastfeeding No    BMI 31.79 kg/m2           PIV was established in her left AC at 0930, without incident, and blood was drawn, for a CBC. Lab results were obtained and reviewed, and the very low H&H result was reported to Dr. Kulwinder Moscoso. Order was received from Dr. Kulwinder Moscoso, to schedule Ms. Aguayo to receive 2 units of PRBC ASAP. However, Ms. Aguayo refused the blood, and stated that she was just not ready to receive another blood transfusion at this time. She was strongly encouraged to reconsider her decision, but still refused the blood transfusion for now. Therefore, she was instructed that if over the next several days, she begins to experience shortness of breath, dizziness, lightheadedness, or just feeling really bad, she should report to the emergency room immediately, and she agreed. Recent Results (from the past 12 hour(s))   CBC WITH 3 PART DIFF    Collection Time: 07/10/18  9:30 AM   Result Value Ref Range    WBC 6.1 4.5 - 13.0 K/uL    RBC 2.59 (L) 4.10 - 5.10 M/uL    HGB 7.1 (L) 12.0 - 16.0 g/dL    HCT 21.2 (L) 36 - 48 %    MCV 81.9 78 - 102 FL    MCH 27.4 25.0 - 35.0 PG    MCHC 33.5 31 - 37 g/dL    RDW 14.0 11.5 - 14.5 %    PLATELET 77 (L) 862 - 440 K/uL    NEUTROPHILS 75 (H) 40 - 70 %    MIXED CELLS 7 0.1 - 17 %    LYMPHOCYTES 18 14 - 44 %    ABS. NEUTROPHILS 4.6 1.8 - 9.5 K/UL    ABS. MIXED CELLS 0.4 0.0 - 2.3 K/uL    ABS.  LYMPHOCYTES 1.1 1.1 - 5.9 K/UL DF AUTOMATED                 Procrit injection 60,000 units, was administered SQ, in her right arm at 1000, per order, and without incident.                Pre-medications consisting of PO Tylenol 650 mg, Benadryl 50 mg IV, and Decadron 20 mg IV, were all administered per order, and without incident.               IVIG 25 grams (25,000 mg) IV, was administered per order, and without incident.           After the completion of the IVIG, the PIV was flushed very well per protocol, and then, the PIV was removed and gauze/bandaid was applied. Ms. Ada Ohara tolerated well, and had no complaints. Ms. Ada Ohara was discharged from [place] [address] in stable condition at 1315. Ar Moran She is to return in 2 weeks, on Tuesday, 7-24-18,  at 1100, for her next appointment, for CBC/Procrit injection. And then, she is to return in 4 weeks, on Tuesday, 8-7-18, at 0900, for her next appointment, for CBC/Procrit injection, and IVIG infusion.      Francisco العلي RN  July 10, 2018  10:08 AM

## 2018-07-24 ENCOUNTER — CLINICAL SUPPORT (OUTPATIENT)
Dept: ONCOLOGY | Age: 53
End: 2018-07-24

## 2018-07-24 ENCOUNTER — HOSPITAL ENCOUNTER (OUTPATIENT)
Dept: INFUSION THERAPY | Age: 53
Discharge: HOME | End: 2018-07-24
Payer: COMMERCIAL

## 2018-07-24 ENCOUNTER — HOSPITAL ENCOUNTER (OUTPATIENT)
Dept: ONCOLOGY | Age: 53
Discharge: HOME | End: 2018-07-24

## 2018-07-24 VITALS
SYSTOLIC BLOOD PRESSURE: 174 MMHG | DIASTOLIC BLOOD PRESSURE: 93 MMHG | RESPIRATION RATE: 16 BRPM | HEART RATE: 88 BPM | TEMPERATURE: 98.3 F

## 2018-07-24 DIAGNOSIS — D63.1 ANEMIA IN CHRONIC KIDNEY DISEASE, UNSPECIFIED CKD STAGE: ICD-10-CM

## 2018-07-24 DIAGNOSIS — N18.9 ANEMIA IN CHRONIC KIDNEY DISEASE, UNSPECIFIED CKD STAGE: Primary | ICD-10-CM

## 2018-07-24 DIAGNOSIS — N18.9 ANEMIA IN CHRONIC KIDNEY DISEASE, UNSPECIFIED CKD STAGE: ICD-10-CM

## 2018-07-24 DIAGNOSIS — D63.1 ANEMIA IN CHRONIC KIDNEY DISEASE, UNSPECIFIED CKD STAGE: Primary | ICD-10-CM

## 2018-07-24 LAB
BASO+EOS+MONOS # BLD AUTO: 0.4 K/UL (ref 0–2.3)
BASO+EOS+MONOS # BLD AUTO: 6 % (ref 0.1–17)
DIFFERENTIAL METHOD BLD: ABNORMAL
ERYTHROCYTE [DISTWIDTH] IN BLOOD BY AUTOMATED COUNT: 14.4 % (ref 11.5–14.5)
HCT VFR BLD AUTO: 31.4 % (ref 36–48)
HGB BLD-MCNC: 10.4 G/DL (ref 12–16)
LYMPHOCYTES # BLD: 0.9 K/UL (ref 1.1–5.9)
LYMPHOCYTES NFR BLD: 15 % (ref 14–44)
MCH RBC QN AUTO: 27.6 PG (ref 25–35)
MCHC RBC AUTO-ENTMCNC: 33.1 G/DL (ref 31–37)
MCV RBC AUTO: 83.3 FL (ref 78–102)
NEUTS SEG # BLD: 4.8 K/UL (ref 1.8–9.5)
NEUTS SEG NFR BLD: 79 % (ref 40–70)
PLATELET # BLD AUTO: 77 K/UL (ref 135–420)
RBC # BLD AUTO: 3.77 M/UL (ref 4.1–5.1)
WBC # BLD AUTO: 6.1 K/UL (ref 4.5–13)

## 2018-07-24 PROCEDURE — 36415 COLL VENOUS BLD VENIPUNCTURE: CPT

## 2018-07-24 NOTE — PROGRESS NOTES
[facility] Progress Note    Date: 2018    Name: Berto Rendon    MRN: [id number]         : 1965      Ms. Carlos Carney was assessed and education was provided. Ms. Axel Lacy vitals were reviewed and patient was observed for 5 minutes prior to treatment. Visit Vitals    BP (!) 174/93 (BP 1 Location: Left arm, BP Patient Position: At rest;Sitting)    Pulse 88    Temp 98.3 °F (36.8 °C)    Resp 16       Lab results were obtained and reviewed. Recent Results (from the past 12 hour(s))   CBC WITH 3 PART DIFF    Collection Time: 18 11:34 AM   Result Value Ref Range    WBC 6.1 4.5 - 13.0 K/uL    RBC 3.77 (L) 4.10 - 5.10 M/uL    HGB 10.4 (L) 12.0 - 16.0 g/dL    HCT 31.4 (L) 36 - 48 %    MCV 83.3 78 - 102 FL    MCH 27.6 25.0 - 35.0 PG    MCHC 33.1 31 - 37 g/dL    RDW 14.4 11.5 - 14.5 %    NEUTROPHILS 79 (H) 40 - 70 %    MIXED CELLS 6 0.1 - 17 %    LYMPHOCYTES 15 14 - 44 %    ABS. NEUTROPHILS 4.8 1.8 - 9.5 K/UL    ABS. MIXED CELLS 0.4 0.0 - 2.3 K/uL    ABS. LYMPHOCYTES 0.9 (L) 1.1 - 5.9 K/UL    DF AUTOMATED         Procrit not given. Today's H&H = 10.4/31.4. Patient armband removed and shredded. Ms. Carlos Carney was discharged from [place] [address] in stable condition at 1145. She is to return on 18 at 0900 for her next appointment for CBC/Procrit Q 2 wks.     Coby Lucas RN  2018  1:39 PM

## 2018-07-29 ENCOUNTER — HOSPITAL ENCOUNTER (EMERGENCY)
Age: 53
Discharge: HOME | End: 2018-07-29
Attending: EMERGENCY MEDICINE | Admitting: EMERGENCY MEDICINE
Payer: COMMERCIAL

## 2018-07-29 VITALS
RESPIRATION RATE: 14 BRPM | OXYGEN SATURATION: 100 % | WEIGHT: 203 LBS | BODY MASS INDEX: 30.77 KG/M2 | HEART RATE: 87 BPM | HEIGHT: 68 IN | SYSTOLIC BLOOD PRESSURE: 170 MMHG | DIASTOLIC BLOOD PRESSURE: 88 MMHG | TEMPERATURE: 98.3 F

## 2018-07-29 DIAGNOSIS — A59.9 TRICHOMONAS INFECTION: ICD-10-CM

## 2018-07-29 DIAGNOSIS — N30.01 ACUTE CYSTITIS WITH HEMATURIA: Primary | ICD-10-CM

## 2018-07-29 DIAGNOSIS — A64 STD (FEMALE): ICD-10-CM

## 2018-07-29 LAB
ABO + RH BLD: NORMAL
ALBUMIN SERPL-MCNC: 2.5 G/DL (ref 3.4–5)
ALBUMIN/GLOB SERPL: 0.5 {RATIO} (ref 0.8–1.7)
ALP SERPL-CCNC: 110 U/L (ref 45–117)
ALT SERPL-CCNC: 15 U/L (ref 13–56)
ANION GAP SERPL CALC-SCNC: 9 MMOL/L (ref 3–18)
APPEARANCE UR: ABNORMAL
AST SERPL-CCNC: 24 U/L (ref 15–37)
BACTERIA URNS QL MICRO: ABNORMAL /HPF
BASOPHILS # BLD: 0 K/UL (ref 0–0.1)
BASOPHILS NFR BLD: 0 % (ref 0–2)
BILIRUB SERPL-MCNC: 0.1 MG/DL (ref 0.2–1)
BILIRUB UR QL: NEGATIVE
BLOOD GROUP ANTIBODIES SERPL: NORMAL
BUN SERPL-MCNC: 43 MG/DL (ref 7–18)
BUN/CREAT SERPL: 16 (ref 12–20)
CALCIUM SERPL-MCNC: 8.5 MG/DL (ref 8.5–10.1)
CHLORIDE SERPL-SCNC: 106 MMOL/L (ref 100–108)
CO2 SERPL-SCNC: 23 MMOL/L (ref 21–32)
COLOR UR: ABNORMAL
CREAT SERPL-MCNC: 2.63 MG/DL (ref 0.6–1.3)
DIFFERENTIAL METHOD BLD: ABNORMAL
EOSINOPHIL # BLD: 0.2 K/UL (ref 0–0.4)
EOSINOPHIL NFR BLD: 4 % (ref 0–5)
EPITH CASTS URNS QL MICRO: ABNORMAL /LPF (ref 0–5)
ERYTHROCYTE [DISTWIDTH] IN BLOOD BY AUTOMATED COUNT: 14.3 % (ref 11.6–14.5)
GLOBULIN SER CALC-MCNC: 4.7 G/DL (ref 2–4)
GLUCOSE SERPL-MCNC: 143 MG/DL (ref 74–99)
GLUCOSE UR STRIP.AUTO-MCNC: 100 MG/DL
HCT VFR BLD AUTO: 31.9 % (ref 35–45)
HGB BLD-MCNC: 10.2 G/DL (ref 12–16)
HGB UR QL STRIP: ABNORMAL
KETONES UR QL STRIP.AUTO: NEGATIVE MG/DL
LEUKOCYTE ESTERASE UR QL STRIP.AUTO: ABNORMAL
LYMPHOCYTES # BLD: 0.7 K/UL (ref 0.9–3.6)
LYMPHOCYTES NFR BLD: 14 % (ref 21–52)
MCH RBC QN AUTO: 27.3 PG (ref 24–34)
MCHC RBC AUTO-ENTMCNC: 32 G/DL (ref 31–37)
MCV RBC AUTO: 85.3 FL (ref 74–97)
MONOCYTES # BLD: 0.3 K/UL (ref 0.05–1.2)
MONOCYTES NFR BLD: 6 % (ref 3–10)
NEUTS SEG # BLD: 3.9 K/UL (ref 1.8–8)
NEUTS SEG NFR BLD: 76 % (ref 40–73)
NITRITE UR QL STRIP.AUTO: NEGATIVE
PH UR STRIP: 5.5 [PH] (ref 5–8)
PLATELET # BLD AUTO: 75 K/UL (ref 135–420)
PLATELET COMMENTS,PCOM: ABNORMAL
POTASSIUM SERPL-SCNC: 4.3 MMOL/L (ref 3.5–5.5)
PROT SERPL-MCNC: 7.2 G/DL (ref 6.4–8.2)
PROT UR STRIP-MCNC: 300 MG/DL
RBC # BLD AUTO: 3.74 M/UL (ref 4.2–5.3)
RBC #/AREA URNS HPF: ABNORMAL /HPF (ref 0–5)
RBC MORPH BLD: ABNORMAL
SODIUM SERPL-SCNC: 138 MMOL/L (ref 136–145)
SP GR UR REFRACTOMETRY: 1.01 (ref 1–1.03)
SPECIMEN EXP DATE BLD: NORMAL
TRICHOMONAS UR QL MICRO: ABNORMAL
UROBILINOGEN UR QL STRIP.AUTO: 1 EU/DL (ref 0.2–1)
WBC # BLD AUTO: 5.1 K/UL (ref 4.6–13.2)
WBC URNS QL MICRO: ABNORMAL /HPF (ref 0–4)

## 2018-07-29 PROCEDURE — 99284 EMERGENCY DEPT VISIT MOD MDM: CPT

## 2018-07-29 PROCEDURE — 86900 BLOOD TYPING SEROLOGIC ABO: CPT | Performed by: EMERGENCY MEDICINE

## 2018-07-29 PROCEDURE — 80053 COMPREHEN METABOLIC PANEL: CPT | Performed by: EMERGENCY MEDICINE

## 2018-07-29 PROCEDURE — 74011250637 HC RX REV CODE- 250/637: Performed by: EMERGENCY MEDICINE

## 2018-07-29 PROCEDURE — 85025 COMPLETE CBC W/AUTO DIFF WBC: CPT | Performed by: EMERGENCY MEDICINE

## 2018-07-29 PROCEDURE — 81001 URINALYSIS AUTO W/SCOPE: CPT | Performed by: EMERGENCY MEDICINE

## 2018-07-29 RX ORDER — FLUCONAZOLE 150 MG/1
150 TABLET ORAL
Qty: 1 TAB | Refills: 0 | Status: SHIPPED | OUTPATIENT
Start: 2018-07-29 | End: 2018-07-29

## 2018-07-29 RX ORDER — SULFAMETHOXAZOLE AND TRIMETHOPRIM 800; 160 MG/1; MG/1
1 TABLET ORAL 2 TIMES DAILY
Qty: 6 TAB | Refills: 0 | Status: SHIPPED | OUTPATIENT
Start: 2018-07-29 | End: 2018-08-01

## 2018-07-29 RX ORDER — METRONIDAZOLE 500 MG/1
2000 TABLET ORAL
Status: COMPLETED | OUTPATIENT
Start: 2018-07-29 | End: 2018-07-29

## 2018-07-29 RX ADMIN — METRONIDAZOLE 2000 MG: 500 TABLET ORAL at 10:36

## 2018-07-29 NOTE — ED NOTES
I have reviewed discharge instructions with the patient. The patient verbalized understanding. Current Discharge Medication List      START taking these medications    Details   trimethoprim-sulfamethoxazole (BACTRIM DS) 160-800 mg per tablet Take 1 Tab by mouth two (2) times a day for 3 days. Qty: 6 Tab, Refills: 0      fluconazole (DIFLUCAN) 150 mg tablet Take 1 Tab by mouth now for 1 dose. FDA advises cautious prescribing of oral fluconazole in pregnancy.   Qty: 1 Tab, Refills: 0         Patient armband removed and shredded

## 2018-07-29 NOTE — ED PROVIDER NOTES
EMERGENCY DEPARTMENT HISTORY AND PHYSICAL EXAM    9:43 AM      Date: 7/29/2018  Patient Name: Mao Helm    History of Presenting Illness     Chief Complaint   Patient presents with    Vaginal Bleeding         History Provided By: Patient    Chief Complaint: Hematuria   Duration:  Hours  Timing:  Acute  Location: N/A  Quality: N/A  Severity: N/A  Modifying Factors: None  Associated Symptoms: Abdominal Pain      Additional History (Context): Mao Helm is a 48 y.o. female with PMHx of HTN, DM, anemia, and ITP presenting to the ED c/o acute onset of hematuria that started this morning. States she went to urinate this morning and noticed blood in her urine, notes seeing a small clot. Notes blood only when urinating, denies vaginal bleeding. Reports PSHx of hysterectomy one year ago. Pt also c/o lower abdominal pain only when she urinates. States she just urinated and pain is 7/10 at this time. Reports no other modifying factors for her symptoms. States her OB/GYN is Dr. Crystal Coffey. Denies fever, dysuria, nausea, vomiting, shortness of breath, and any other symptoms or complaints. PCP: Dayana Khoury MD    Current Facility-Administered Medications   Medication Dose Route Frequency Provider Last Rate Last Dose    metroNIDAZOLE (FLAGYL) tablet 2,000 mg  2,000 mg Oral NOW Clarkpatrick Gonzalez, DO         Current Outpatient Prescriptions   Medication Sig Dispense Refill    trimethoprim-sulfamethoxazole (BACTRIM DS) 160-800 mg per tablet Take 1 Tab by mouth two (2) times a day for 3 days. 6 Tab 0    fluconazole (DIFLUCAN) 150 mg tablet Take 1 Tab by mouth now for 1 dose. FDA advises cautious prescribing of oral fluconazole in pregnancy. 1 Tab 0    metFORMIN (GLUCOPHAGE) 500 mg tablet Take 2 Tabs by mouth two (2) times daily (with meals). Indications: TYPE 2 DIABETES MELLITUS 120 Tab 2    lisinopril-hydrochlorothiazide (PRINZIDE, ZESTORETIC) 20-25 mg per tablet Take 1 Tab by mouth daily.  Indications: HYPERTENSION  amLODIPine (NORVASC) 10 mg tablet Take 10 mg by mouth daily. Indications: HYPERTENSION      ferrous sulfate 325 mg (65 mg iron) tablet Take 325 mg by mouth two (2) times a day. Past History     Past Medical History:  Past Medical History:   Diagnosis Date    Anemia     Arthritis     Diabetes ([address]     Hypertension     ITP (idiopathic thrombocytopenic purpura)        Past Surgical History:  Past Surgical History:   Procedure Laterality Date    HX GYN      c section 1984    HX HYSTERECTOMY  2017    HX TUBAL LIGATION         Family History:  Family History   Problem Relation Age of Onset    Hypertension Mother     Cancer Father      Colon       Social History:  Social History   Substance Use Topics    Smoking status: Never Smoker    Smokeless tobacco: Never Used    Alcohol use No       Allergies: Allergies   Allergen Reactions    Pcn [Penicillins] Itching         Review of Systems       Review of Systems   Constitutional: Negative. Negative for chills, diaphoresis and fever. HENT: Negative. Negative for congestion, rhinorrhea and sore throat. Eyes: Negative. Negative for pain, discharge and redness. Respiratory: Negative. Negative for cough, chest tightness, shortness of breath and wheezing. Cardiovascular: Negative. Negative for chest pain. Gastrointestinal: Positive for abdominal pain. Negative for constipation, diarrhea, nausea and vomiting. Genitourinary: Positive for hematuria. Negative for dysuria, flank pain, frequency and urgency. Musculoskeletal: Negative. Negative for back pain and neck pain. Skin: Negative. Negative for rash. Neurological: Negative. Negative for syncope, weakness, numbness and headaches. Psychiatric/Behavioral: Negative. All other systems reviewed and are negative.         Physical Exam     Visit Vitals    /88 (BP 1 Location: Left arm, BP Patient Position: At rest)    Pulse 87    Temp 98.3 °F (36.8 °C)    Resp 14    Ht 5' 8\" (1.727 m)    Wt 92.1 kg (203 lb)    LMP 06/18/2017    SpO2 100%    BMI 30.87 kg/m2         Physical Exam   Constitutional: She appears well-developed and well-nourished. Non-toxic appearance. She does not have a sickly appearance. She does not appear ill. No distress. HENT:   Head: Normocephalic and atraumatic. Mouth/Throat: Oropharynx is clear and moist. No oropharyngeal exudate. Eyes: Conjunctivae and EOM are normal. Pupils are equal, round, and reactive to light. No scleral icterus. Neck: Normal range of motion. Neck supple. No hepatojugular reflux and no JVD present. No tracheal deviation present. No thyromegaly present. Cardiovascular: Normal rate, regular rhythm, S1 normal, S2 normal, normal heart sounds, intact distal pulses and normal pulses. Exam reveals no gallop, no S3 and no S4. No murmur heard. Pulses:       Radial pulses are 2+ on the right side, and 2+ on the left side. Dorsalis pedis pulses are 2+ on the right side, and 2+ on the left side. Pulmonary/Chest: Effort normal and breath sounds normal. No respiratory distress. She has no decreased breath sounds. She has no wheezes. She has no rhonchi. She has no rales. Abdominal: Soft. Normal appearance and bowel sounds are normal. She exhibits no distension, no fluid wave, no ascites and no mass. There is no hepatosplenomegaly. There is tenderness in the suprapubic area. There is no rigidity, no rebound, no guarding, no CVA tenderness, no tenderness at McBurney's point and negative Ramon's sign. Musculoskeletal: Normal range of motion. Strength 4/5 throughout   Lymphadenopathy:        Head (right side): No submental, no submandibular, no preauricular and no occipital adenopathy present. Head (left side): No submental, no submandibular, no preauricular and no occipital adenopathy present. She has no cervical adenopathy. Right: No supraclavicular adenopathy present.         Left: No supraclavicular adenopathy present. Neurological: She is alert. She has normal strength and normal reflexes. She is not disoriented. No cranial nerve deficit or sensory deficit. Coordination and gait normal. GCS eye subscore is 4. GCS verbal subscore is 5. GCS motor subscore is 6. Grossly intact    Skin: Skin is warm, dry and intact. No rash noted. She is not diaphoretic. Psychiatric: She has a normal mood and affect. Her speech is normal and behavior is normal. Judgment and thought content normal. Cognition and memory are normal.   Nursing note and vitals reviewed. Diagnostic Study Results     Labs -  Recent Results (from the past 12 hour(s))   URINALYSIS W/ RFLX MICROSCOPIC    Collection Time: 07/29/18  9:26 AM   Result Value Ref Range    Color RED      Appearance CLOUDY      Specific gravity 1.012 1.005 - 1.030      pH (UA) 5.5 5.0 - 8.0      Protein 300 (A) NEG mg/dL    Glucose 100 (A) NEG mg/dL    Ketone NEGATIVE  NEG mg/dL    Bilirubin NEGATIVE  NEG      Blood LARGE (A) NEG      Urobilinogen 1.0 0.2 - 1.0 EU/dL    Nitrites NEGATIVE  NEG      Leukocyte Esterase LARGE (A) NEG     URINE MICROSCOPIC ONLY    Collection Time: 07/29/18  9:26 AM   Result Value Ref Range    WBC 36 to 50 0 - 4 /hpf    RBC TOO NUMEROUS TO COUNT 0 - 5 /hpf    Epithelial cells 2+ 0 - 5 /lpf    Bacteria FEW (A) NEG /hpf    Trichomonas FEW (A) NEG     CBC WITH AUTOMATED DIFF    Collection Time: 07/29/18 10:00 AM   Result Value Ref Range    WBC 5.1 4.6 - 13.2 K/uL    RBC 3.74 (L) 4.20 - 5.30 M/uL    HGB 10.2 (L) 12.0 - 16.0 g/dL    HCT 31.9 (L) 35.0 - 45.0 %    MCV 85.3 74.0 - 97.0 FL    MCH 27.3 24.0 - 34.0 PG    MCHC 32.0 31.0 - 37.0 g/dL    RDW 14.3 11.6 - 14.5 %    PLATELET PENDING K/uL    MPV PENDING FL    NEUTROPHILS PENDING %    LYMPHOCYTES PENDING %    MONOCYTES PENDING %    EOSINOPHILS PENDING %    BASOPHILS PENDING %    ABS. NEUTROPHILS PENDING K/UL    ABS. LYMPHOCYTES PENDING K/UL    ABS. MONOCYTES PENDING K/UL    ABS.  EOSINOPHILS PENDING K/UL    ABS. BASOPHILS PENDING K/UL    DF PENDING    METABOLIC PANEL, COMPREHENSIVE    Collection Time: 07/29/18 10:00 AM   Result Value Ref Range    Sodium 138 136 - 145 mmol/L    Potassium 4.3 3.5 - 5.5 mmol/L    Chloride 106 100 - 108 mmol/L    CO2 23 21 - 32 mmol/L    Anion gap 9 3.0 - 18 mmol/L    Glucose 143 (H) 74 - 99 mg/dL    BUN 43 (H) 7.0 - 18 MG/DL    Creatinine 2.63 (H) 0.6 - 1.3 MG/DL    BUN/Creatinine ratio 16 12 - 20      GFR est AA 23 (L) >60 ml/min/1.73m2    GFR est non-AA 19 (L) >60 ml/min/1.73m2    Calcium 8.5 8.5 - 10.1 MG/DL    Bilirubin, total 0.1 (L) 0.2 - 1.0 MG/DL    ALT (SGPT) 15 13 - 56 U/L    AST (SGOT) 24 15 - 37 U/L    Alk. phosphatase 110 45 - 117 U/L    Protein, total 7.2 6.4 - 8.2 g/dL    Albumin 2.5 (L) 3.4 - 5.0 g/dL    Globulin 4.7 (H) 2.0 - 4.0 g/dL    A-G Ratio 0.5 (L) 0.8 - 1.7         Radiologic Studies -   No orders to display         Medical Decision Making   Provider Notes (Medical Decision Making):  MDM  Number of Diagnoses or Management Options  Acute cystitis with hematuria:   STD (female):   Trichomonas infection:   Diagnosis management comments: Hematuria  Anemia       I am the first provider for this patient. I reviewed the vital signs, available nursing notes, past medical history, past surgical history, family history and social history. Vital Signs-Reviewed the patient's vital signs. Records Reviewed: Nursing Notes and Old Medical Records (Time of Review: 9:43 AM)    ED Course: Progress Notes, Reevaluation, and Consults:  Labs essentially normal with the exception of hemoglobin of 10.2. Creatinine of 2.63, this is chronic. UA showed large amount of blood and large amount of leukocytes. Positive trichomonas. Informed patient. Will place on Bactrim and Diflucan. 10:24 AM 7/29/2018    Diagnosis       I have reassessed the patient. Patient is feeling better. Patient will be prescribed Bactrim and Diflucan.   Patient was discharged in stable condition. Patient is to return to emergency department if any new or worsening condition. Clinical Impression:   1. Acute cystitis with hematuria    2. STD (female)    3. Trichomonas infection        Disposition: Discharged     Follow-up Information     Follow up With Details Comments [address], [place] Schedule an appointment as soon as possible for a visit If symptoms do not improve [address]  [place] [address] [place] [address]      [id number] [place] [address] EMERGENCY DEPT  As needed, If symptoms worsen [address] [facility]  [phone]           _______________________________    Attestations:  Scribe Attestation     Faye Polk acting as a scribe for and in the presence of Nik Olivares DO      July 29, 2018 at 9:43 AM       Provider Attestation:      I personally performed the services described in the documentation, reviewed the documentation, as recorded by the scribe in my presence, and it accurately and completely records my words and actions.  July 29, 2018 at 9:43 AM - Eulalia Gonzalez, DO    _______________________________

## 2018-07-29 NOTE — ED TRIAGE NOTES
Pt states that she had a hysterectomy 1 year ago. Pt states that she has had abdominal pain x 2-3 days and today noticed blood when she wipes.

## 2018-07-29 NOTE — DISCHARGE INSTRUCTIONS
Urinary Tract Infection in Women: Care Instructions  Your Care Instructions    A urinary tract infection, or UTI, is a general term for an infection anywhere between the kidneys and the urethra (where urine comes out). Most UTIs are bladder infections. They often cause pain or burning when you urinate. UTIs are caused by bacteria and can be cured with antibiotics. Be sure to complete your treatment so that the infection goes away. Follow-up care is a key part of your treatment and safety. Be sure to make and go to all appointments, and call your doctor if you are having problems. It's also a good idea to know your test results and keep a list of the medicines you take. How can you care for yourself at home? · Take your antibiotics as directed. Do not stop taking them just because you feel better. You need to take the full course of antibiotics. · Drink extra water and other fluids for the next day or two. This may help wash out the bacteria that are causing the infection. (If you have kidney, heart, or liver disease and have to limit fluids, talk with your doctor before you increase your fluid intake.)  · Avoid drinks that are carbonated or have caffeine. They can irritate the bladder. · Urinate often. Try to empty your bladder each time. · To relieve pain, take a hot bath or lay a heating pad set on low over your lower belly or genital area. Never go to sleep with a heating pad in place. To prevent UTIs  · Drink plenty of water each day. This helps you urinate often, which clears bacteria from your system. (If you have kidney, heart, or liver disease and have to limit fluids, talk with your doctor before you increase your fluid intake.)  · Urinate when you need to. · Urinate right after you have sex. · Change sanitary pads often. · Avoid douches, bubble baths, feminine hygiene sprays, and other feminine hygiene products that have deodorants.   · After going to the bathroom, wipe from front to back.  When should you call for help? Call your doctor now or seek immediate medical care if:    · Symptoms such as fever, chills, nausea, or vomiting get worse or appear for the first time.     · You have new pain in your back just below your rib cage. This is called flank pain.     · There is new blood or pus in your urine.     · You have any problems with your antibiotic medicine.    Watch closely for changes in your health, and be sure to contact your doctor if:    · You are not getting better after taking an antibiotic for 2 days.     · Your symptoms go away but then come back. Where can you learn more? Go to http://michela-ivan.info/. Enter A358 in the search box to learn more about \"Urinary Tract Infection in Women: Care Instructions. \"  Current as of: May 12, 2017  Content Version: 11.7  © 0128-4469 [employer]. Care instructions adapted under license by [employer] (which disclaims liability or warranty for this information). If you have questions about a medical condition or this instruction, always ask your healthcare professional. [address] any warranty or liability for your use of this information. Trichomoniasis: Care Instructions  Your Care Instructions  Trichomoniasis is a sexually transmitted infection (STI) that is spread by having sex with an infected partner. Trichomoniasis is commonly called trich (say \"trick\"). In women, trich may cause vaginal itching and a smelly discharge. But in many cases, especially in men, there are no symptoms. Ashu Pollock is treated so that you do not spread it to others. Both you and your sex partner or partners should be treated at the same time so you do not infect each other again. Trich may cause problems with pregnancy. Your doctor will talk with you about treatment for Trich if you are pregnant. Follow-up care is a key part of your treatment and safety.  Be sure to make and go to all appointments, and call your doctor if you are having problems. It's also a good idea to know your test results and keep a list of the medicines you take. How can you care for yourself at home? · Take your antibiotics as directed. Do not stop taking them just because you feel better. You need to take the full course of antibiotics. · Do not have sex while you are being treated. If your doctor gave you a single dose of antibiotics, do not have sex for one week after being treated and until your partner also has been treated. · Tell your sex partner (or partners) that he or she will also need to be tested and treated. · Use a cold water compress or cool baths to relieve itching. To prevent trichomoniasis in the future  · Use latex condoms every time you have sex. Use them from the beginning to the end of sexual contact. · Talk to your partner before having sex. Find out if he or she has or is at risk for trich or any other STI. Keep in mind that a person may be able to spread an STI even if he or she does not have symptoms. · Do not have sex if you are being treated for trich or any other STI. · Do not have sex with anyone who has symptoms of an STI, such as sores on the genitals or mouth. · Having one sex partner (who does not have STIs and does not have sex with anyone else) is a good way to avoid STIs. When should you call for help? Call your doctor now or seek immediate medical care if:    · You have unusual vaginal bleeding.     · You have a fever.     · You have new discharge from the vagina or penis.     · You have pelvic pain.    Watch closely for changes in your health, and be sure to contact your doctor if:    · You do not get better as expected.     · You have any new symptoms or your symptoms get worse. Where can you learn more? Go to http://michela-ivan.info/. Enter H709 in the search box to learn more about \"Trichomoniasis: Care Instructions. \"  Current as of: November 27, 2017  Content Version: 11.7  © 3834-3576 [employer]. Care instructions adapted under license by [employer] (which disclaims liability or warranty for this information). If you have questions about a medical condition or this instruction, always ask your healthcare professional. [address] any warranty or liability for your use of this information.

## 2018-08-07 ENCOUNTER — HOSPITAL ENCOUNTER (OUTPATIENT)
Dept: INFUSION THERAPY | Age: 53
Discharge: HOME | End: 2018-08-07
Payer: COMMERCIAL

## 2018-08-07 ENCOUNTER — CLINICAL SUPPORT (OUTPATIENT)
Dept: ONCOLOGY | Age: 53
End: 2018-08-07

## 2018-08-07 ENCOUNTER — HOSPITAL ENCOUNTER (OUTPATIENT)
Dept: ONCOLOGY | Age: 53
Discharge: HOME | End: 2018-08-07

## 2018-08-07 VITALS
SYSTOLIC BLOOD PRESSURE: 127 MMHG | TEMPERATURE: 98.9 F | HEART RATE: 81 BPM | BODY MASS INDEX: 32.02 KG/M2 | HEIGHT: 67 IN | RESPIRATION RATE: 16 BRPM | WEIGHT: 204 LBS | DIASTOLIC BLOOD PRESSURE: 75 MMHG

## 2018-08-07 DIAGNOSIS — D63.1 ANEMIA IN CHRONIC KIDNEY DISEASE, UNSPECIFIED CKD STAGE: Primary | ICD-10-CM

## 2018-08-07 DIAGNOSIS — N18.9 ANEMIA IN CHRONIC KIDNEY DISEASE, UNSPECIFIED CKD STAGE: ICD-10-CM

## 2018-08-07 DIAGNOSIS — D63.1 ANEMIA IN CHRONIC KIDNEY DISEASE, UNSPECIFIED CKD STAGE: ICD-10-CM

## 2018-08-07 DIAGNOSIS — N18.9 ANEMIA IN CHRONIC KIDNEY DISEASE, UNSPECIFIED CKD STAGE: Primary | ICD-10-CM

## 2018-08-07 LAB
BASO+EOS+MONOS # BLD AUTO: 0.4 K/UL (ref 0–2.3)
BASO+EOS+MONOS # BLD AUTO: 6 % (ref 0.1–17)
DIFFERENTIAL METHOD BLD: ABNORMAL
ERYTHROCYTE [DISTWIDTH] IN BLOOD BY AUTOMATED COUNT: 13.6 % (ref 11.5–14.5)
HCT VFR BLD AUTO: 30.5 % (ref 36–48)
HGB BLD-MCNC: 10 G/DL (ref 12–16)
LYMPHOCYTES # BLD: 1.2 K/UL (ref 1.1–5.9)
LYMPHOCYTES NFR BLD: 21 % (ref 14–44)
MCH RBC QN AUTO: 26.8 PG (ref 25–35)
MCHC RBC AUTO-ENTMCNC: 32.8 G/DL (ref 31–37)
MCV RBC AUTO: 81.8 FL (ref 78–102)
NEUTS SEG # BLD: 4.3 K/UL (ref 1.8–9.5)
NEUTS SEG NFR BLD: 73 % (ref 40–70)
PLATELET # BLD AUTO: 87 K/UL (ref 140–440)
RBC # BLD AUTO: 3.73 M/UL (ref 4.1–5.1)
WBC # BLD AUTO: 5.9 K/UL (ref 4.5–13)

## 2018-08-07 PROCEDURE — 74011250636 HC RX REV CODE- 250/636: Performed by: INTERNAL MEDICINE

## 2018-08-07 PROCEDURE — 96367 TX/PROPH/DG ADDL SEQ IV INF: CPT

## 2018-08-07 PROCEDURE — 96375 TX/PRO/DX INJ NEW DRUG ADDON: CPT

## 2018-08-07 PROCEDURE — 74011250637 HC RX REV CODE- 250/637: Performed by: INTERNAL MEDICINE

## 2018-08-07 PROCEDURE — 96366 THER/PROPH/DIAG IV INF ADDON: CPT

## 2018-08-07 PROCEDURE — 96365 THER/PROPH/DIAG IV INF INIT: CPT

## 2018-08-07 RX ORDER — ACETAMINOPHEN 325 MG/1
650 TABLET ORAL ONCE
Status: COMPLETED | OUTPATIENT
Start: 2018-08-07 | End: 2018-08-07

## 2018-08-07 RX ORDER — DIPHENHYDRAMINE HYDROCHLORIDE 50 MG/ML
50 INJECTION, SOLUTION INTRAMUSCULAR; INTRAVENOUS
Status: ACTIVE | OUTPATIENT
Start: 2018-08-07 | End: 2018-08-07

## 2018-08-07 RX ORDER — SODIUM CHLORIDE 9 MG/ML
250 INJECTION, SOLUTION INTRAVENOUS ONCE
Status: COMPLETED | OUTPATIENT
Start: 2018-08-07 | End: 2018-08-07

## 2018-08-07 RX ORDER — ACETAMINOPHEN 325 MG/1
650 TABLET ORAL
Status: ACTIVE | OUTPATIENT
Start: 2018-08-07 | End: 2018-08-07

## 2018-08-07 RX ORDER — SODIUM CHLORIDE 0.9 % (FLUSH) 0.9 %
10-40 SYRINGE (ML) INJECTION AS NEEDED
Status: DISCONTINUED | OUTPATIENT
Start: 2018-08-07 | End: 2018-08-11 | Stop reason: HOSPADM

## 2018-08-07 RX ORDER — DEXAMETHASONE SODIUM PHOSPHATE 4 MG/ML
20 INJECTION, SOLUTION INTRA-ARTICULAR; INTRALESIONAL; INTRAMUSCULAR; INTRAVENOUS; SOFT TISSUE ONCE
Status: DISPENSED | OUTPATIENT
Start: 2018-08-07 | End: 2018-08-07

## 2018-08-07 RX ORDER — DEXAMETHASONE SODIUM PHOSPHATE 4 MG/ML
20 INJECTION, SOLUTION INTRA-ARTICULAR; INTRALESIONAL; INTRAMUSCULAR; INTRAVENOUS; SOFT TISSUE ONCE
Status: COMPLETED | OUTPATIENT
Start: 2018-08-07 | End: 2018-08-07

## 2018-08-07 RX ORDER — DIPHENHYDRAMINE HYDROCHLORIDE 50 MG/ML
50 INJECTION, SOLUTION INTRAMUSCULAR; INTRAVENOUS ONCE
Status: COMPLETED | OUTPATIENT
Start: 2018-08-07 | End: 2018-08-07

## 2018-08-07 RX ADMIN — IMMUNE GLOBULIN INTRAVENOUS (HUMAN) 5 G: 5 INJECTION, SOLUTION INTRAVENOUS at 13:20

## 2018-08-07 RX ADMIN — ACETAMINOPHEN 650 MG: 325 TABLET ORAL at 10:02

## 2018-08-07 RX ADMIN — SODIUM CHLORIDE 250 ML: 900 INJECTION, SOLUTION INTRAVENOUS at 09:55

## 2018-08-07 RX ADMIN — IMMUNE GLOBULIN INTRAVENOUS (HUMAN) 20 G: 5 INJECTION, SOLUTION INTRAVENOUS at 11:30

## 2018-08-07 RX ADMIN — DEXAMETHASONE SODIUM PHOSPHATE 20 MG: 4 INJECTION, SOLUTION INTRA-ARTICULAR; INTRALESIONAL; INTRAMUSCULAR; INTRAVENOUS; SOFT TISSUE at 10:25

## 2018-08-07 RX ADMIN — DIPHENHYDRAMINE HYDROCHLORIDE 50 MG: 50 INJECTION, SOLUTION INTRAMUSCULAR; INTRAVENOUS at 10:03

## 2018-08-07 RX ADMIN — Medication 10 ML: at 09:40

## 2018-08-07 NOTE — PROGRESS NOTES
[facility] Progress Note    Date: 2018    Name: Poornima Menard    MRN: [id number]         : 1965      Ms. Giuliana Franco arrived in the [facility] today at 0930, in stable condition, here for Q 2 Week, CBC/Procrit injection & Q 4 Week, IVIG Infusion. She was assessed and education was provided. Ms. Giovanni Lux vitals were reviewed. Visit Vitals    BP (!) 160/93 (BP 1 Location: Left arm, BP Patient Position: At rest;Sitting)    Pulse 90    Temp 98.1 °F (36.7 °C)    Resp 16    Ht 5' 7\" (1.702 m)    Wt 92.5 kg (204 lb)    Breastfeeding No    BMI 31.95 kg/m2           PIV was established in her left AC at 0940, without incident, and blood was drawn, for a CBC, per order. Recent Results (from the past 12 hour(s))   CBC WITH 3 PART DIFF    Collection Time: 18  9:40 AM   Result Value Ref Range    WBC 5.9 4.5 - 13.0 K/uL    RBC 3.73 (L) 4.10 - 5.10 M/uL    HGB 10.0 (L) 12.0 - 16.0 g/dL    HCT 30.5 (L) 36 - 48 %    MCV 81.8 78 - 102 FL    MCH 26.8 25.0 - 35.0 PG    MCHC 32.8 31 - 37 g/dL    RDW 13.6 11.5 - 14.5 %    PLATELET 87 (L) 630 - 440 K/uL    NEUTROPHILS 73 (H) 40 - 70 %    MIXED CELLS 6 0.1 - 17 %    LYMPHOCYTES 21 14 - 44 %    ABS. NEUTROPHILS 4.3 1.8 - 9.5 K/UL    ABS. MIXED CELLS 0.4 0.0 - 2.3 K/uL    ABS. LYMPHOCYTES 1.2 1.1 - 5.9 K/UL    DF AUTOMATED                 Procrit injection was HELD today, per order.                Pre-medications consisting of PO Tylenol 650 mg, Benadryl 50 mg IV, and Decadron 20 mg IV, were all administered per order, and without incident.               IVIG 25 grams (25,000 mg) IV, was administered per order, and without incident.           After the completion of the IVIG, the PIV was flushed very well per protocol, and then, the PIV was removed and gauze/bandaid was applied. Ms. Giuliana Franco tolerated well, and had no complaints. Ms. Giuliana Franco was discharged from [place] [address] in stable condition at 1430. Brian Russo  She is to return in 2 weeks, on Tuesday, 8-21-18,  at 1500, for her next appointment, for CBC/Procrit injection. And then, she is to return in 4 weeks, on Tuesday, 9-4-18, at 0900, for her next appointment, for CBC/Procrit injection, and IVIG infusion.      Gadiel Londono RN  August 7, 2018  10:08 AM

## 2018-08-21 ENCOUNTER — HOSPITAL ENCOUNTER (OUTPATIENT)
Dept: INFUSION THERAPY | Age: 53
Discharge: HOME | End: 2018-08-21
Payer: COMMERCIAL

## 2018-08-21 ENCOUNTER — CLINICAL SUPPORT (OUTPATIENT)
Dept: ONCOLOGY | Age: 53
End: 2018-08-21

## 2018-08-21 ENCOUNTER — HOSPITAL ENCOUNTER (OUTPATIENT)
Dept: ONCOLOGY | Age: 53
Discharge: HOME | End: 2018-08-21

## 2018-08-21 VITALS
HEART RATE: 100 BPM | RESPIRATION RATE: 16 BRPM | DIASTOLIC BLOOD PRESSURE: 87 MMHG | TEMPERATURE: 98.4 F | SYSTOLIC BLOOD PRESSURE: 173 MMHG

## 2018-08-21 DIAGNOSIS — D63.1 ANEMIA IN CHRONIC KIDNEY DISEASE, UNSPECIFIED CKD STAGE: ICD-10-CM

## 2018-08-21 DIAGNOSIS — D63.1 ANEMIA IN CHRONIC KIDNEY DISEASE, UNSPECIFIED CKD STAGE: Primary | ICD-10-CM

## 2018-08-21 DIAGNOSIS — N18.9 ANEMIA IN CHRONIC KIDNEY DISEASE, UNSPECIFIED CKD STAGE: ICD-10-CM

## 2018-08-21 DIAGNOSIS — N18.9 ANEMIA IN CHRONIC KIDNEY DISEASE, UNSPECIFIED CKD STAGE: Primary | ICD-10-CM

## 2018-08-21 LAB
BASO+EOS+MONOS # BLD AUTO: 0.3 K/UL (ref 0–2.3)
BASO+EOS+MONOS # BLD AUTO: 4 % (ref 0.1–17)
DIFFERENTIAL METHOD BLD: ABNORMAL
ERYTHROCYTE [DISTWIDTH] IN BLOOD BY AUTOMATED COUNT: 14.1 % (ref 11.5–14.5)
HCT VFR BLD AUTO: 29.2 % (ref 36–48)
HGB BLD-MCNC: 9.9 G/DL (ref 12–16)
LYMPHOCYTES # BLD: 1.2 K/UL (ref 1.1–5.9)
LYMPHOCYTES NFR BLD: 16 % (ref 14–44)
MCH RBC QN AUTO: 27.7 PG (ref 25–35)
MCHC RBC AUTO-ENTMCNC: 33.9 G/DL (ref 31–37)
MCV RBC AUTO: 81.8 FL (ref 78–102)
NEUTS SEG # BLD: 5.7 K/UL (ref 1.8–9.5)
NEUTS SEG NFR BLD: 80 % (ref 40–70)
PLATELET # BLD AUTO: 60 K/UL (ref 135–420)
RBC # BLD AUTO: 3.57 M/UL (ref 4.1–5.1)
WBC # BLD AUTO: 7.2 K/UL (ref 4.5–13)

## 2018-08-21 PROCEDURE — 96372 THER/PROPH/DIAG INJ SC/IM: CPT

## 2018-08-21 PROCEDURE — 74011250636 HC RX REV CODE- 250/636: Performed by: INTERNAL MEDICINE

## 2018-08-21 PROCEDURE — 36415 COLL VENOUS BLD VENIPUNCTURE: CPT

## 2018-08-21 RX ADMIN — ERYTHROPOIETIN 40000 UNITS: 40000 INJECTION, SOLUTION INTRAVENOUS; SUBCUTANEOUS at 15:42

## 2018-08-21 RX ADMIN — ERYTHROPOIETIN 20000 UNITS: 20000 INJECTION, SOLUTION INTRAVENOUS; SUBCUTANEOUS at 15:42

## 2018-08-21 NOTE — PROGRESS NOTES
[facility] Progress Note    Date: 2018    Name: Bhumi Hodge    MRN: [id number]         : 1965      Ms. Sisi Tsai arrived in the [facility] today at 1520, in stable condition, here for Q 2 Week, CBC/Procrit injection. She was assessed and education was provided. Ms. Chai Carty vitals were reviewed. Visit Vitals    /87 (BP 1 Location: Left arm, BP Patient Position: At rest;Sitting)    Pulse 100    Temp 98.4 °F (36.9 °C)    Resp 16    Breastfeeding No           CBC was drawn from her right AC at 1528, per order, and without incident. Lab results were obtained and reviewed, and the preliminary platelet count was noted to be 53,000. Recent Results (from the past 12 hour(s))   CBC WITH 3 PART DIFF    Collection Time: 18  3:28 PM   Result Value Ref Range    WBC 7.2 4.5 - 13.0 K/uL    RBC 3.57 (L) 4.10 - 5.10 M/uL    HGB 9.9 (L) 12.0 - 16.0 g/dL    HCT 29.2 (L) 36 - 48 %    MCV 81.8 78 - 102 FL    MCH 27.7 25.0 - 35.0 PG    MCHC 33.9 31 - 37 g/dL    RDW 14.1 11.5 - 14.5 %    NEUTROPHILS 80 (H) 40 - 70 %    MIXED CELLS 4 0.1 - 17 %    LYMPHOCYTES 16 14 - 44 %    ABS. NEUTROPHILS 5.7 1.8 - 9.5 K/UL    ABS. MIXED CELLS 0.3 0.0 - 2.3 K/uL    ABS. LYMPHOCYTES 1.2 1.1 - 5.9 K/UL    DF AUTOMATED           Procrit 60,000 units was administered SQ, in her right arm at 1542, per order, and without incident. Ms. Aguayo tolerated well, and had no complaints. Ms. Sisi Tsai was discharged from [place] [address] in stable condition at 1550. Pravin Angry She is to return in 2 weeks, on Tuesday, 18,  at 0900,  for her next appointment, for Q 2 Week CBC/Procrit injection & Q 4 Week, IVIG Infusion.      Maciel Goldstein RN  2018  3:38 PM

## 2018-09-04 ENCOUNTER — HOSPITAL ENCOUNTER (OUTPATIENT)
Dept: ONCOLOGY | Age: 53
Discharge: HOME | End: 2018-09-04

## 2018-09-04 ENCOUNTER — HOSPITAL ENCOUNTER (OUTPATIENT)
Dept: INFUSION THERAPY | Age: 53
Discharge: HOME | End: 2018-09-04
Payer: COMMERCIAL

## 2018-09-04 ENCOUNTER — CLINICAL SUPPORT (OUTPATIENT)
Dept: ONCOLOGY | Age: 53
End: 2018-09-04

## 2018-09-04 VITALS
SYSTOLIC BLOOD PRESSURE: 135 MMHG | HEART RATE: 77 BPM | TEMPERATURE: 98.6 F | HEIGHT: 67 IN | WEIGHT: 206 LBS | BODY MASS INDEX: 32.33 KG/M2 | DIASTOLIC BLOOD PRESSURE: 81 MMHG | RESPIRATION RATE: 16 BRPM

## 2018-09-04 DIAGNOSIS — D63.1 ANEMIA IN CHRONIC KIDNEY DISEASE, UNSPECIFIED CKD STAGE: Primary | ICD-10-CM

## 2018-09-04 DIAGNOSIS — D63.1 ANEMIA IN CHRONIC KIDNEY DISEASE, UNSPECIFIED CKD STAGE: ICD-10-CM

## 2018-09-04 DIAGNOSIS — N18.9 ANEMIA IN CHRONIC KIDNEY DISEASE, UNSPECIFIED CKD STAGE: ICD-10-CM

## 2018-09-04 DIAGNOSIS — N18.9 ANEMIA IN CHRONIC KIDNEY DISEASE, UNSPECIFIED CKD STAGE: Primary | ICD-10-CM

## 2018-09-04 LAB
BASO+EOS+MONOS # BLD AUTO: 0.2 K/UL (ref 0–2.3)
BASO+EOS+MONOS # BLD AUTO: 4 % (ref 0.1–17)
DIFFERENTIAL METHOD BLD: ABNORMAL
ERYTHROCYTE [DISTWIDTH] IN BLOOD BY AUTOMATED COUNT: 14.4 % (ref 11.5–14.5)
HCT VFR BLD AUTO: 30.4 % (ref 36–48)
HGB BLD-MCNC: 9.8 G/DL (ref 12–16)
LYMPHOCYTES # BLD: 0.9 K/UL (ref 1.1–5.9)
LYMPHOCYTES NFR BLD: 17 % (ref 14–44)
MCH RBC QN AUTO: 26.9 PG (ref 25–35)
MCHC RBC AUTO-ENTMCNC: 32.2 G/DL (ref 31–37)
MCV RBC AUTO: 83.5 FL (ref 78–102)
NEUTS SEG # BLD: 4.2 K/UL (ref 1.8–9.5)
NEUTS SEG NFR BLD: 79 % (ref 40–70)
PLATELET # BLD AUTO: 103 K/UL (ref 140–440)
RBC # BLD AUTO: 3.64 M/UL (ref 4.1–5.1)
WBC # BLD AUTO: 5.3 K/UL (ref 4.5–13)

## 2018-09-04 PROCEDURE — 96366 THER/PROPH/DIAG IV INF ADDON: CPT

## 2018-09-04 PROCEDURE — 96375 TX/PRO/DX INJ NEW DRUG ADDON: CPT

## 2018-09-04 PROCEDURE — 74011250637 HC RX REV CODE- 250/637: Performed by: INTERNAL MEDICINE

## 2018-09-04 PROCEDURE — 74011000258 HC RX REV CODE- 258: Performed by: INTERNAL MEDICINE

## 2018-09-04 PROCEDURE — 96367 TX/PROPH/DG ADDL SEQ IV INF: CPT

## 2018-09-04 PROCEDURE — 74011250636 HC RX REV CODE- 250/636: Performed by: INTERNAL MEDICINE

## 2018-09-04 PROCEDURE — 96365 THER/PROPH/DIAG IV INF INIT: CPT

## 2018-09-04 RX ORDER — ACETAMINOPHEN 325 MG/1
650 TABLET ORAL ONCE
Status: COMPLETED | OUTPATIENT
Start: 2018-09-04 | End: 2018-09-04

## 2018-09-04 RX ORDER — ACETAMINOPHEN 325 MG/1
650 TABLET ORAL
Status: ACTIVE | OUTPATIENT
Start: 2018-09-04 | End: 2018-09-04

## 2018-09-04 RX ORDER — DIPHENHYDRAMINE HYDROCHLORIDE 50 MG/ML
50 INJECTION, SOLUTION INTRAMUSCULAR; INTRAVENOUS
Status: ACTIVE | OUTPATIENT
Start: 2018-09-04 | End: 2018-09-04

## 2018-09-04 RX ORDER — SODIUM CHLORIDE 9 MG/ML
250 INJECTION, SOLUTION INTRAVENOUS ONCE
Status: COMPLETED | OUTPATIENT
Start: 2018-09-04 | End: 2018-09-04

## 2018-09-04 RX ORDER — DIPHENHYDRAMINE HYDROCHLORIDE 50 MG/ML
50 INJECTION, SOLUTION INTRAMUSCULAR; INTRAVENOUS ONCE
Status: COMPLETED | OUTPATIENT
Start: 2018-09-04 | End: 2018-09-04

## 2018-09-04 RX ORDER — SODIUM CHLORIDE 0.9 % (FLUSH) 0.9 %
10-40 SYRINGE (ML) INJECTION AS NEEDED
Status: DISCONTINUED | OUTPATIENT
Start: 2018-09-04 | End: 2018-09-08 | Stop reason: HOSPADM

## 2018-09-04 RX ADMIN — DEXAMETHASONE SODIUM PHOSPHATE 20 MG: 4 INJECTION, SOLUTION INTRA-ARTICULAR; INTRALESIONAL; INTRAMUSCULAR; INTRAVENOUS; SOFT TISSUE at 10:15

## 2018-09-04 RX ADMIN — IMMUNE GLOBULIN INTRAVENOUS (HUMAN) 10 G: 5 INJECTION, SOLUTION INTRAVENOUS at 11:30

## 2018-09-04 RX ADMIN — ACETAMINOPHEN 650 MG: 325 TABLET ORAL at 10:08

## 2018-09-04 RX ADMIN — DIPHENHYDRAMINE HYDROCHLORIDE 50 MG: 50 INJECTION, SOLUTION INTRAMUSCULAR; INTRAVENOUS at 10:12

## 2018-09-04 RX ADMIN — Medication 10 ML: at 09:40

## 2018-09-04 RX ADMIN — IMMUNE GLOBULIN INTRAVENOUS (HUMAN) 5 G: 5 INJECTION, SOLUTION INTRAVENOUS at 13:25

## 2018-09-04 RX ADMIN — IMMUNE GLOBULIN INTRAVENOUS (HUMAN) 10 G: 5 INJECTION, SOLUTION INTRAVENOUS at 12:45

## 2018-09-04 RX ADMIN — SODIUM CHLORIDE 250 ML: 900 INJECTION, SOLUTION INTRAVENOUS at 10:00

## 2018-09-04 NOTE — PROGRESS NOTES
[facility] Progress Note    Date: 2018    Name: Logan Gu    MRN: [id number]         : 1965      Ms. Federica Vigil arrived in the [facility] today at [phone], in stable condition, here for Q 2 Week, CBC/Procrit injection & Q 4 Week, IVIG Infusion. She was assessed and education was provided. Ms. Harjit Church vitals were reviewed. Visit Vitals    /87 (BP 1 Location: Left arm, BP Patient Position: At rest;Sitting)    Pulse 88    Temp 98.6 °F (37 °C)    Resp 16    Ht 5' 7\" (1.702 m)    Wt 93.4 kg (206 lb)    Breastfeeding No    BMI 32.26 kg/m2           PIV was established in her left AC at 0940, without incident, and blood was drawn, for a CBC, per order. Recent Results (from the past 12 hour(s))   CBC WITH 3 PART DIFF    Collection Time: 18  9:40 AM   Result Value Ref Range    WBC 5.3 4.5 - 13.0 K/uL    RBC 3.64 (L) 4.10 - 5.10 M/uL    HGB 9.8 (L) 12.0 - 16.0 g/dL    HCT 30.4 (L) 36 - 48 %    MCV 83.5 78 - 102 FL    MCH 26.9 25.0 - 35.0 PG    MCHC 32.2 31 - 37 g/dL    RDW 14.4 11.5 - 14.5 %    PLATELET 284 (L) 829 - 440 K/uL    NEUTROPHILS 79 (H) 40 - 70 %    MIXED CELLS 4 0.1 - 17 %    LYMPHOCYTES 17 14 - 44 %    ABS. NEUTROPHILS 4.2 1.8 - 9.5 K/UL    ABS. MIXED CELLS 0.2 0.0 - 2.3 K/uL    ABS. LYMPHOCYTES 0.9 (L) 1.1 - 5.9 K/UL    DF AUTOMATED                 Procrit injection was HELD today, per order.                Pre-medications consisting of PO Tylenol 650 mg, Benadryl 50 mg IV, and Decadron 20 mg IV, were all administered per order, and without incident.               IVIG 25 grams (25,000 mg) IV, was administered per order, and without incident.           After the completion of the IVIG, the PIV was flushed very well per protocol, and then, the PIV was removed and gauze/bandaid was applied. Ms. Federica Vigil tolerated well, and had no complaints. Ms. Federica Vigil was discharged from [place] [address] in stable condition at 1405.  She is to return in 2 weeks, on Tuesday, 9-18-18,  at 1500, for her next appointment, for CBC/Procrit injection. And then, she is to return in 4 weeks, on Tuesday, 10-2-18, at 0900, for her next appointment, for CBC/Procrit injection, and IVIG infusion.      Chanell Wylie RN  September 4, 2018  10:08 AM

## 2018-09-18 ENCOUNTER — HOSPITAL ENCOUNTER (OUTPATIENT)
Dept: INFUSION THERAPY | Age: 53
Discharge: HOME | End: 2018-09-18
Payer: COMMERCIAL

## 2018-09-18 ENCOUNTER — CLINICAL SUPPORT (OUTPATIENT)
Dept: ONCOLOGY | Age: 53
End: 2018-09-18

## 2018-09-18 ENCOUNTER — HOSPITAL ENCOUNTER (OUTPATIENT)
Dept: ONCOLOGY | Age: 53
Discharge: HOME | End: 2018-09-18

## 2018-09-18 VITALS
TEMPERATURE: 98.2 F | HEART RATE: 84 BPM | DIASTOLIC BLOOD PRESSURE: 82 MMHG | RESPIRATION RATE: 16 BRPM | SYSTOLIC BLOOD PRESSURE: 153 MMHG

## 2018-09-18 DIAGNOSIS — D63.1 ANEMIA IN CHRONIC KIDNEY DISEASE, UNSPECIFIED CKD STAGE: ICD-10-CM

## 2018-09-18 DIAGNOSIS — D63.1 ANEMIA IN CHRONIC KIDNEY DISEASE, UNSPECIFIED CKD STAGE: Primary | ICD-10-CM

## 2018-09-18 DIAGNOSIS — N18.9 ANEMIA IN CHRONIC KIDNEY DISEASE, UNSPECIFIED CKD STAGE: Primary | ICD-10-CM

## 2018-09-18 DIAGNOSIS — N18.9 ANEMIA IN CHRONIC KIDNEY DISEASE, UNSPECIFIED CKD STAGE: ICD-10-CM

## 2018-09-18 LAB
BASO+EOS+MONOS # BLD AUTO: 0.4 K/UL (ref 0–2.3)
BASO+EOS+MONOS # BLD AUTO: 6 % (ref 0.1–17)
DIFFERENTIAL METHOD BLD: ABNORMAL
ERYTHROCYTE [DISTWIDTH] IN BLOOD BY AUTOMATED COUNT: 13.2 % (ref 11.5–14.5)
HCT VFR BLD AUTO: 30.7 % (ref 36–48)
HGB BLD-MCNC: 10 G/DL (ref 12–16)
LYMPHOCYTES # BLD: 1.1 K/UL (ref 1.1–5.9)
LYMPHOCYTES NFR BLD: 17 % (ref 14–44)
MCH RBC QN AUTO: 26.7 PG (ref 25–35)
MCHC RBC AUTO-ENTMCNC: 32.6 G/DL (ref 31–37)
MCV RBC AUTO: 82.1 FL (ref 78–102)
NEUTS SEG # BLD: 5.1 K/UL (ref 1.8–9.5)
NEUTS SEG NFR BLD: 77 % (ref 40–70)
PLATELET # BLD AUTO: 58 K/UL (ref 135–420)
RBC # BLD AUTO: 3.74 M/UL (ref 4.1–5.1)
WBC # BLD AUTO: 6.6 K/UL (ref 4.5–13)

## 2018-09-18 PROCEDURE — 36415 COLL VENOUS BLD VENIPUNCTURE: CPT

## 2018-09-18 NOTE — PROGRESS NOTES
[facility] Progress Note    Date: 2018    Name: Partha Jesus    MRN: [id number]         : 1965      Ms. Kendra Estrella arrived in the [facility] today at [phone], in stable condition, here for Q 2 Week, CBC/Procrit injection. She was assessed and education was provided. Ms. Jodie Paulino vitals were reviewed. Visit Vitals    /82 (BP 1 Location: Left arm, BP Patient Position: Sitting)    Pulse 84    Temp 98.2 °F (36.8 °C)    Resp 16           CBC was drawn from her right AC at 1555, per order, and without incident. Lab results were obtained and reviewed, and the preliminary platelet count was noted to be 49,000. Recent Results (from the past 12 hour(s))   CBC WITH 3 PART DIFF    Collection Time: 18  3:55 PM   Result Value Ref Range    WBC 6.6 4.5 - 13.0 K/uL    RBC 3.74 (L) 4.10 - 5.10 M/uL    HGB 10.0 (L) 12.0 - 16.0 g/dL    HCT 30.7 (L) 36 - 48 %    MCV 82.1 78 - 102 FL    MCH 26.7 25.0 - 35.0 PG    MCHC 32.6 31 - 37 g/dL    RDW 13.2 11.5 - 14.5 %    NEUTROPHILS 77 (H) 40 - 70 %    MIXED CELLS 6 0.1 - 17 %    LYMPHOCYTES 17 14 - 44 %    ABS. NEUTROPHILS 5.1 1.8 - 9.5 K/UL    ABS. MIXED CELLS 0.4 0.0 - 2.3 K/uL    ABS. LYMPHOCYTES 1.1 1.1 - 5.9 K/UL    DF AUTOMATED             Procrit injection was HELD today, per order. Ms. Aguayo tolerated well, and had no complaints. Ms. Kendra Estrella was discharged from [place] [address] in stable condition at [phone] 74. . She is to return in 2 weeks, on Tuesday, 10-2-18,  at 0900,  for her next appointment, for Q 2 Week CBC/Procrit injection & Q 4 Week, IVIG Infusion.      Yvette Redmond RN  2018  3:38 PM

## 2018-10-02 ENCOUNTER — CLINICAL SUPPORT (OUTPATIENT)
Dept: ONCOLOGY | Age: 53
End: 2018-10-02

## 2018-10-02 ENCOUNTER — HOSPITAL ENCOUNTER (OUTPATIENT)
Dept: ONCOLOGY | Age: 53
Discharge: HOME | End: 2018-10-02

## 2018-10-02 ENCOUNTER — HOSPITAL ENCOUNTER (OUTPATIENT)
Dept: INFUSION THERAPY | Age: 53
Discharge: HOME | End: 2018-10-02
Payer: COMMERCIAL

## 2018-10-02 VITALS
TEMPERATURE: 98.3 F | HEART RATE: 82 BPM | SYSTOLIC BLOOD PRESSURE: 124 MMHG | BODY MASS INDEX: 32.18 KG/M2 | DIASTOLIC BLOOD PRESSURE: 76 MMHG | HEIGHT: 67 IN | RESPIRATION RATE: 16 BRPM | WEIGHT: 205 LBS

## 2018-10-02 DIAGNOSIS — D63.1 ANEMIA IN CHRONIC KIDNEY DISEASE, UNSPECIFIED CKD STAGE: Primary | ICD-10-CM

## 2018-10-02 DIAGNOSIS — N18.9 ANEMIA IN CHRONIC KIDNEY DISEASE, UNSPECIFIED CKD STAGE: ICD-10-CM

## 2018-10-02 DIAGNOSIS — N18.9 ANEMIA IN CHRONIC KIDNEY DISEASE, UNSPECIFIED CKD STAGE: Primary | ICD-10-CM

## 2018-10-02 DIAGNOSIS — D63.1 ANEMIA IN CHRONIC KIDNEY DISEASE, UNSPECIFIED CKD STAGE: ICD-10-CM

## 2018-10-02 LAB
BASO+EOS+MONOS # BLD AUTO: 0.2 K/UL (ref 0–2.3)
BASO+EOS+MONOS # BLD AUTO: 4 % (ref 0.1–17)
DIFFERENTIAL METHOD BLD: ABNORMAL
ERYTHROCYTE [DISTWIDTH] IN BLOOD BY AUTOMATED COUNT: 13.5 % (ref 11.5–14.5)
HCT VFR BLD AUTO: 26.8 % (ref 36–48)
HGB BLD-MCNC: 8.8 G/DL (ref 12–16)
LYMPHOCYTES # BLD: 1.2 K/UL (ref 1.1–5.9)
LYMPHOCYTES NFR BLD: 20 % (ref 14–44)
MCH RBC QN AUTO: 27 PG (ref 25–35)
MCHC RBC AUTO-ENTMCNC: 32.8 G/DL (ref 31–37)
MCV RBC AUTO: 82.2 FL (ref 78–102)
NEUTS SEG # BLD: 4.7 K/UL (ref 1.8–9.5)
NEUTS SEG NFR BLD: 77 % (ref 40–70)
PLATELET # BLD AUTO: 81 K/UL (ref 135–420)
RBC # BLD AUTO: 3.26 M/UL (ref 4.1–5.1)
WBC # BLD AUTO: 6.1 K/UL (ref 4.5–13)

## 2018-10-02 PROCEDURE — 96366 THER/PROPH/DIAG IV INF ADDON: CPT

## 2018-10-02 PROCEDURE — 96375 TX/PRO/DX INJ NEW DRUG ADDON: CPT

## 2018-10-02 PROCEDURE — 74011250637 HC RX REV CODE- 250/637: Performed by: INTERNAL MEDICINE

## 2018-10-02 PROCEDURE — 96372 THER/PROPH/DIAG INJ SC/IM: CPT

## 2018-10-02 PROCEDURE — 96367 TX/PROPH/DG ADDL SEQ IV INF: CPT

## 2018-10-02 PROCEDURE — 74011250636 HC RX REV CODE- 250/636: Performed by: INTERNAL MEDICINE

## 2018-10-02 PROCEDURE — 74011000258 HC RX REV CODE- 258: Performed by: INTERNAL MEDICINE

## 2018-10-02 PROCEDURE — 96365 THER/PROPH/DIAG IV INF INIT: CPT

## 2018-10-02 RX ORDER — ACETAMINOPHEN 325 MG/1
650 TABLET ORAL
Status: ACTIVE | OUTPATIENT
Start: 2018-10-02 | End: 2018-10-02

## 2018-10-02 RX ORDER — DIPHENHYDRAMINE HYDROCHLORIDE 50 MG/ML
50 INJECTION, SOLUTION INTRAMUSCULAR; INTRAVENOUS ONCE
Status: COMPLETED | OUTPATIENT
Start: 2018-10-02 | End: 2018-10-02

## 2018-10-02 RX ORDER — DIPHENHYDRAMINE HYDROCHLORIDE 50 MG/ML
50 INJECTION, SOLUTION INTRAMUSCULAR; INTRAVENOUS
Status: ACTIVE | OUTPATIENT
Start: 2018-10-02 | End: 2018-10-02

## 2018-10-02 RX ORDER — SODIUM CHLORIDE 9 MG/ML
250 INJECTION, SOLUTION INTRAVENOUS ONCE
Status: COMPLETED | OUTPATIENT
Start: 2018-10-02 | End: 2018-10-02

## 2018-10-02 RX ORDER — ACETAMINOPHEN 325 MG/1
650 TABLET ORAL ONCE
Status: COMPLETED | OUTPATIENT
Start: 2018-10-02 | End: 2018-10-02

## 2018-10-02 RX ORDER — SODIUM CHLORIDE 0.9 % (FLUSH) 0.9 %
10-40 SYRINGE (ML) INJECTION AS NEEDED
Status: DISCONTINUED | OUTPATIENT
Start: 2018-10-02 | End: 2018-10-06 | Stop reason: HOSPADM

## 2018-10-02 RX ADMIN — IMMUNE GLOBULIN INTRAVENOUS (HUMAN) 20 G: 5 INJECTION, SOLUTION INTRAVENOUS at 11:00

## 2018-10-02 RX ADMIN — DEXAMETHASONE SODIUM PHOSPHATE 20 MG: 4 INJECTION, SOLUTION INTRA-ARTICULAR; INTRALESIONAL; INTRAMUSCULAR; INTRAVENOUS; SOFT TISSUE at 10:00

## 2018-10-02 RX ADMIN — IMMUNE GLOBULIN INTRAVENOUS (HUMAN) 5 G: 5 INJECTION, SOLUTION INTRAVENOUS at 12:45

## 2018-10-02 RX ADMIN — SODIUM CHLORIDE 250 ML: 900 INJECTION, SOLUTION INTRAVENOUS at 09:45

## 2018-10-02 RX ADMIN — ERYTHROPOIETIN 20000 UNITS: 20000 INJECTION, SOLUTION INTRAVENOUS; SUBCUTANEOUS at 10:05

## 2018-10-02 RX ADMIN — ERYTHROPOIETIN 40000 UNITS: 40000 INJECTION, SOLUTION INTRAVENOUS; SUBCUTANEOUS at 10:05

## 2018-10-02 RX ADMIN — Medication 10 ML: at 09:25

## 2018-10-02 RX ADMIN — DIPHENHYDRAMINE HYDROCHLORIDE 50 MG: 50 INJECTION INTRAMUSCULAR; INTRAVENOUS at 09:54

## 2018-10-02 RX ADMIN — ACETAMINOPHEN 650 MG: 325 TABLET ORAL at 09:53

## 2018-10-02 NOTE — PROGRESS NOTES
[facility] Progress Note    Date: 2018    Name: Christian Banuelos    MRN: [id number]         : 1965      Ms. Marta Spears arrived in the [facility] today at 0910, in stable condition, here for Q 2 Week, CBC/Procrit injection & Q 4 Week, IVIG Infusion. She was assessed and education was provided. Ms. Ginny Gipson vitals were reviewed. Visit Vitals    BP (!) 158/93 (BP 1 Location: Left arm, BP Patient Position: Sitting)    Pulse 92    Temp 98.3 °F (36.8 °C)    Resp 16    Ht 5' 7\" (1.702 m)    Wt 93 kg (205 lb)    Breastfeeding No    BMI 32.11 kg/m2           PIV was established in her left AC at 0925, without incident, and blood was drawn, for a CBC, per order. The CBC results below were reviewed, and the preliminary platelet count was noted to be 81,000. Recent Results (from the past 12 hour(s))   CBC WITH 3 PART DIFF    Collection Time: 10/02/18  9:25 AM   Result Value Ref Range    WBC 6.1 4.5 - 13.0 K/uL    RBC 3.26 (L) 4.10 - 5.10 M/uL    HGB 8.8 (L) 12.0 - 16.0 g/dL    HCT 26.8 (L) 36 - 48 %    MCV 82.2 78 - 102 FL    MCH 27.0 25.0 - 35.0 PG    MCHC 32.8 31 - 37 g/dL    RDW 13.5 11.5 - 14.5 %    NEUTROPHILS 77 (H) 40 - 70 %    MIXED CELLS 4 0.1 - 17 %    LYMPHOCYTES 20 14 - 44 %    ABS. NEUTROPHILS 4.7 1.8 - 9.5 K/UL    ABS. MIXED CELLS 0.2 0.0 - 2.3 K/uL    ABS. LYMPHOCYTES 1.2 1.1 - 5.9 K/UL    DF AUTOMATED                 Procrit 60,000 units, was administered SQ, in her right arm at 1005, per order, and without incident.                Pre-medications consisting of PO Tylenol 650 mg, Benadryl 50 mg IV, and Decadron 20 mg IV, were all administered per order, and without incident.               IVIG 25 grams (25,000 mg) IV, was administered per order, and without incident.      IVIG was started at 34 ml/hr X 30 minutes, then increased to 68 ml/hr X 30 minutes, then increased to 171 ml/hr X 30 minutes, then increased to 205 ml/hr X 30 minutes, then increased to 239 ml/hr, for the remainder of the IVIG infusion.           After the completion of the IVIG, the PIV was flushed very well per protocol, and then, the PIV was removed and gauze/bandaid was applied. Ms. Bob Davies tolerated well, and had no complaints. Ms. Bob Davies was discharged from [place] [address] in stable condition at 1340. She is to return in 2 weeks, on Tuesday, 10-16-18,  at 1500, for her next appointment, for CBC/Procrit injection. And then, she is to return in 4 weeks, on Tuesday, 10-30-18, at 0900, for her next appointment, for CBC/Procrit injection, and IVIG infusion.      Cuate Leary RN  October 2, 2018  10:08 AM

## 2018-10-16 ENCOUNTER — HOSPITAL ENCOUNTER (OUTPATIENT)
Dept: ONCOLOGY | Age: 53
Discharge: HOME | End: 2018-10-16

## 2018-10-16 ENCOUNTER — CLINICAL SUPPORT (OUTPATIENT)
Dept: ONCOLOGY | Age: 53
End: 2018-10-16

## 2018-10-16 ENCOUNTER — HOSPITAL ENCOUNTER (OUTPATIENT)
Dept: INFUSION THERAPY | Age: 53
Discharge: HOME | End: 2018-10-16
Payer: COMMERCIAL

## 2018-10-16 VITALS
RESPIRATION RATE: 16 BRPM | DIASTOLIC BLOOD PRESSURE: 90 MMHG | TEMPERATURE: 98.7 F | SYSTOLIC BLOOD PRESSURE: 159 MMHG | HEART RATE: 83 BPM

## 2018-10-16 DIAGNOSIS — D63.1 ANEMIA IN CHRONIC KIDNEY DISEASE, UNSPECIFIED CKD STAGE: Primary | ICD-10-CM

## 2018-10-16 DIAGNOSIS — N18.9 ANEMIA IN CHRONIC KIDNEY DISEASE, UNSPECIFIED CKD STAGE: ICD-10-CM

## 2018-10-16 DIAGNOSIS — N18.9 ANEMIA IN CHRONIC KIDNEY DISEASE, UNSPECIFIED CKD STAGE: Primary | ICD-10-CM

## 2018-10-16 DIAGNOSIS — D63.1 ANEMIA IN CHRONIC KIDNEY DISEASE, UNSPECIFIED CKD STAGE: ICD-10-CM

## 2018-10-16 LAB
BASO+EOS+MONOS # BLD AUTO: 0.4 K/UL (ref 0–2.3)
BASO+EOS+MONOS # BLD AUTO: 7 % (ref 0.1–17)
DIFFERENTIAL METHOD BLD: ABNORMAL
ERYTHROCYTE [DISTWIDTH] IN BLOOD BY AUTOMATED COUNT: 14.6 % (ref 11.5–14.5)
HCT VFR BLD AUTO: 29.6 % (ref 36–48)
HGB BLD-MCNC: 9.7 G/DL (ref 12–16)
LYMPHOCYTES # BLD: 0.9 K/UL (ref 1.1–5.9)
LYMPHOCYTES NFR BLD: 17 % (ref 14–44)
MCH RBC QN AUTO: 27.5 PG (ref 25–35)
MCHC RBC AUTO-ENTMCNC: 32.8 G/DL (ref 31–37)
MCV RBC AUTO: 83.9 FL (ref 78–102)
NEUTS SEG # BLD: 4.3 K/UL (ref 1.8–9.5)
NEUTS SEG NFR BLD: 77 % (ref 40–70)
PLATELET # BLD AUTO: 81 K/UL (ref 135–420)
RBC # BLD AUTO: 3.53 M/UL (ref 4.1–5.1)
WBC # BLD AUTO: 5.6 K/UL (ref 4.5–13)

## 2018-10-16 PROCEDURE — 36415 COLL VENOUS BLD VENIPUNCTURE: CPT

## 2018-10-16 PROCEDURE — 85049 AUTOMATED PLATELET COUNT: CPT | Performed by: INTERNAL MEDICINE

## 2018-10-16 PROCEDURE — 96372 THER/PROPH/DIAG INJ SC/IM: CPT

## 2018-10-16 PROCEDURE — 74011250636 HC RX REV CODE- 250/636: Performed by: INTERNAL MEDICINE

## 2018-10-16 RX ADMIN — ERYTHROPOIETIN 40000 UNITS: 40000 INJECTION, SOLUTION INTRAVENOUS; SUBCUTANEOUS at 15:40

## 2018-10-16 RX ADMIN — ERYTHROPOIETIN 20000 UNITS: 20000 INJECTION, SOLUTION INTRAVENOUS; SUBCUTANEOUS at 15:40

## 2018-10-16 NOTE — PROGRESS NOTES
[facility] Progress Note    Date: 2018    Name: Jazlyn Ye    MRN: [id number]         : 1965      Ms. Bernice Malone arrived in the [facility] today at [phone], in stable condition, here for Q 2 Week, CBC/Procrit injection. She was assessed and education was provided. Ms. Tre Velasquez vitals were reviewed. Visit Vitals    /90 (BP 1 Location: Left arm, BP Patient Position: Sitting)    Pulse 83    Temp 98.7 °F (37.1 °C)    Resp 16           CBC was drawn from her right AC at 1525, per order, and without incident. Lab results were obtained and reviewed, and the preliminary platelet count was noted to be 71,000. Recent Results (from the past 12 hour(s))   CBC WITH 3 PART DIFF    Collection Time: 10/16/18  3:25 PM   Result Value Ref Range    WBC 5.6 4.5 - 13.0 K/uL    RBC 3.53 (L) 4.10 - 5.10 M/uL    HGB 9.7 (L) 12.0 - 16.0 g/dL    HCT 29.6 (L) 36 - 48 %    MCV 83.9 78 - 102 FL    MCH 27.5 25.0 - 35.0 PG    MCHC 32.8 31 - 37 g/dL    RDW 14.6 (H) 11.5 - 14.5 %    NEUTROPHILS 77 (H) 40 - 70 %    MIXED CELLS 7 0.1 - 17 %    LYMPHOCYTES 17 14 - 44 %    ABS. NEUTROPHILS 4.3 1.8 - 9.5 K/UL    ABS. MIXED CELLS 0.4 0.0 - 2.3 K/uL    ABS. LYMPHOCYTES 0.9 (L) 1.1 - 5.9 K/UL    DF AUTOMATED             Procrit 60,000 units, was administered SQ, in her right arm at 1540, per order, and without incident. Ms. Aguayo tolerated well, and had no complaints. Ms. Bernice Malone was discharged from [place] [address] in stable condition at [phone]. Nina Olmos She is to return in 2 weeks, on Tuesday, 10-30-18,  at 0900,  for her next appointment, for Q 2 Week CBC/Procrit injection & Q 4 Week, IVIG Infusion. (Ms. Bernice Malone was made aware that this appointment will be at the [place] [address] location, instead of the [facility], where she usually comes, because the [facility] will be closed on 10-30-18, and she agreed to the appointment location change.)     Mahnaz Granger RN  2018  3:38 PM

## 2018-10-29 RX ORDER — DIPHENHYDRAMINE HYDROCHLORIDE 50 MG/ML
50 INJECTION, SOLUTION INTRAMUSCULAR; INTRAVENOUS ONCE
Status: CANCELLED | OUTPATIENT
Start: 2018-10-30 | End: 2018-10-30

## 2018-10-30 ENCOUNTER — HOSPITAL ENCOUNTER (OUTPATIENT)
Dept: INFUSION THERAPY | Age: 53
End: 2018-10-30
Payer: COMMERCIAL

## 2018-10-30 ENCOUNTER — APPOINTMENT (OUTPATIENT)
Dept: INFUSION THERAPY | Age: 53
End: 2018-10-30
Payer: COMMERCIAL

## 2018-10-30 ENCOUNTER — HOSPITAL ENCOUNTER (OUTPATIENT)
Dept: INFUSION THERAPY | Age: 53
Discharge: HOME | End: 2018-10-30
Payer: COMMERCIAL

## 2018-10-30 VITALS
TEMPERATURE: 98.4 F | HEIGHT: 67 IN | SYSTOLIC BLOOD PRESSURE: 155 MMHG | RESPIRATION RATE: 16 BRPM | BODY MASS INDEX: 32.33 KG/M2 | DIASTOLIC BLOOD PRESSURE: 81 MMHG | HEART RATE: 87 BPM | WEIGHT: 206 LBS

## 2018-10-30 LAB
BASO+EOS+MONOS # BLD AUTO: 0.4 K/UL (ref 0–2.3)
BASO+EOS+MONOS # BLD AUTO: 7 % (ref 0.1–17)
DIFFERENTIAL METHOD BLD: ABNORMAL
ERYTHROCYTE [DISTWIDTH] IN BLOOD BY AUTOMATED COUNT: 13.7 % (ref 11.5–14.5)
HCT VFR BLD AUTO: 22.2 % (ref 36–48)
HGB BLD-MCNC: 7.3 G/DL (ref 12–16)
LYMPHOCYTES # BLD: 1.2 K/UL (ref 1.1–5.9)
LYMPHOCYTES NFR BLD: 20 % (ref 14–44)
MCH RBC QN AUTO: 27.9 PG (ref 25–35)
MCHC RBC AUTO-ENTMCNC: 32.9 G/DL (ref 31–37)
MCV RBC AUTO: 84.7 FL (ref 78–102)
NEUTS SEG # BLD: 4.5 K/UL (ref 1.8–9.5)
NEUTS SEG NFR BLD: 73 % (ref 40–70)
PLATELET # BLD AUTO: 125 K/UL (ref 140–440)
RBC # BLD AUTO: 2.62 M/UL (ref 4.1–5.1)
WBC # BLD AUTO: 6.1 K/UL (ref 4.5–13)

## 2018-10-30 PROCEDURE — 74011250637 HC RX REV CODE- 250/637: Performed by: INTERNAL MEDICINE

## 2018-10-30 PROCEDURE — 74011000258 HC RX REV CODE- 258: Performed by: INTERNAL MEDICINE

## 2018-10-30 PROCEDURE — 85025 COMPLETE CBC W/AUTO DIFF WBC: CPT | Performed by: INTERNAL MEDICINE

## 2018-10-30 PROCEDURE — 74011250636 HC RX REV CODE- 250/636: Performed by: INTERNAL MEDICINE

## 2018-10-30 PROCEDURE — 74011250637 HC RX REV CODE- 250/637: Performed by: NURSE PRACTITIONER

## 2018-10-30 PROCEDURE — 99211 OFF/OP EST MAY X REQ PHY/QHP: CPT

## 2018-10-30 PROCEDURE — 96365 THER/PROPH/DIAG IV INF INIT: CPT

## 2018-10-30 PROCEDURE — 96366 THER/PROPH/DIAG IV INF ADDON: CPT

## 2018-10-30 PROCEDURE — 96367 TX/PROPH/DG ADDL SEQ IV INF: CPT

## 2018-10-30 PROCEDURE — 96372 THER/PROPH/DIAG INJ SC/IM: CPT

## 2018-10-30 RX ORDER — DIPHENHYDRAMINE HCL 25 MG
50 CAPSULE ORAL ONCE
Status: COMPLETED | OUTPATIENT
Start: 2018-10-30 | End: 2018-10-30

## 2018-10-30 RX ORDER — DIPHENHYDRAMINE HYDROCHLORIDE 50 MG/ML
50 INJECTION, SOLUTION INTRAMUSCULAR; INTRAVENOUS
Status: ACTIVE | OUTPATIENT
Start: 2018-10-30 | End: 2018-10-30

## 2018-10-30 RX ORDER — ACETAMINOPHEN 325 MG/1
650 TABLET ORAL ONCE
Status: COMPLETED | OUTPATIENT
Start: 2018-10-30 | End: 2018-10-30

## 2018-10-30 RX ORDER — ACETAMINOPHEN 325 MG/1
650 TABLET ORAL
Status: ACTIVE | OUTPATIENT
Start: 2018-10-30 | End: 2018-10-30

## 2018-10-30 RX ORDER — SODIUM CHLORIDE 9 MG/ML
250 INJECTION, SOLUTION INTRAVENOUS ONCE
Status: COMPLETED | OUTPATIENT
Start: 2018-10-30 | End: 2018-10-30

## 2018-10-30 RX ORDER — SODIUM CHLORIDE 0.9 % (FLUSH) 0.9 %
10-40 SYRINGE (ML) INJECTION AS NEEDED
Status: DISCONTINUED | OUTPATIENT
Start: 2018-10-30 | End: 2018-11-03 | Stop reason: HOSPADM

## 2018-10-30 RX ADMIN — DEXAMETHASONE SODIUM PHOSPHATE 20 MG: 4 INJECTION, SOLUTION INTRA-ARTICULAR; INTRALESIONAL; INTRAMUSCULAR; INTRAVENOUS; SOFT TISSUE at 09:55

## 2018-10-30 RX ADMIN — ERYTHROPOIETIN 20000 UNITS: 20000 INJECTION, SOLUTION INTRAVENOUS; SUBCUTANEOUS at 10:30

## 2018-10-30 RX ADMIN — Medication 10 ML: at 09:30

## 2018-10-30 RX ADMIN — ACETAMINOPHEN 650 MG: 325 TABLET ORAL at 09:53

## 2018-10-30 RX ADMIN — DIPHENHYDRAMINE HYDROCHLORIDE 50 MG: 25 CAPSULE ORAL at 09:54

## 2018-10-30 RX ADMIN — IMMUNE GLOBULIN INTRAVENOUS (HUMAN) 20 G: 5 INJECTION, SOLUTION INTRAVENOUS at 10:45

## 2018-10-30 RX ADMIN — ERYTHROPOIETIN 40000 UNITS: 40000 INJECTION, SOLUTION INTRAVENOUS; SUBCUTANEOUS at 10:30

## 2018-10-30 RX ADMIN — IMMUNE GLOBULIN INTRAVENOUS (HUMAN) 5 G: 5 INJECTION, SOLUTION INTRAVENOUS at 12:30

## 2018-10-30 RX ADMIN — SODIUM CHLORIDE 250 ML: 900 INJECTION, SOLUTION INTRAVENOUS at 09:45

## 2018-10-30 NOTE — PROGRESS NOTES
[facility] Progress Note    Date: 2018    Name: Maryan Brooks    MRN: [id number]         : 1965      Ms. Crystal Theodore arrived in the [facility] today at [address], in stable condition, here for Q 2 Week, CBC/Procrit injection & Q 4 Week, IVIG Infusion. She was assessed and education was provided. Ms. Kaila Fisher vitals were reviewed. Visit Vitals  BP (!) 188/103 (BP 1 Location: Left arm, BP Patient Position: Sitting)   Pulse 91   Temp 97.9 °F (36.6 °C)   Resp 16   Ht 5' 7\" (1.702 m)   Wt 93.4 kg (206 lb)   BMI 32.26 kg/m²           PIV was established in her left AC at 0930, without incident, and blood was drawn, for a CBC, per order. The CBC results below were reviewed. The very low H&H results were reported to Iain Medina NP at 1025. She recommended that Ms. Aguayo received 2 units of Blood, but Ms. Crystal Theodore emphatically refused a blood transfusion, and stated that she just wanted to wait for the Procrit injection to bring her counts back up. Iain Medina NP, was made aware. No further orders were received. However, Ms. Crystal Theodore was strongly encouraged to make an appointment to see Dr. Geneva FULLER, and she verbalized understanding. Recent Results (from the past 12 hour(s))   CBC WITH 3 PART DIFF    Collection Time: 10/30/18  9:30 AM   Result Value Ref Range    WBC 6.1 4.5 - 13.0 K/uL    RBC 2.62 (L) 4.10 - 5.10 M/uL    HGB 7.3 (L) 12.0 - 16 g/dL    HCT 22.2 (L) 36 - 48 %    MCV 84.7 78 - 102 FL    MCH 27.9 25.0 - 35.0 PG    MCHC 32.9 31 - 37 g/dL    RDW 13.7 11.5 - 14.5 %    PLATELET 656 (L) 622 - 440 K/uL    NEUTROPHILS 73 (H) 40 - 70 %    MIXED CELLS 7 0.1 - 17 %    LYMPHOCYTES 20 14 - 44 %    ABS. NEUTROPHILS 4.5 1.8 - 9.5 K/UL    ABS. MIXED CELLS 0.4 0.0 - 2.3 K/uL    ABS.  LYMPHOCYTES 1.2 1.1 - 5.9 K/UL    DF AUTOMATED                 Procrit 60,000 units, was administered SQ, in her right arm at 1030, per order, and without incident.                Pre-medications consisting of PO Tylenol 650 mg, Benadryl 50 mg PO, and Decadron 20 mg IV, were all administered per order, and without incident.               IVIG 25 grams (25,000 mg) IV, was administered per order, and without incident. IVIG was started at 34 ml/hr X 30 minutes, then increased to 68 ml/hr X 30 minutes, then increased to 171 ml/hr X 30 minutes, then increased to 205 ml/hr X 30 minutes, then increased to 239 ml/hr, for the remainder of the IVIG infusion.           After the completion of the IVIG, the PIV was flushed very well per protocol, and then, the PIV was removed and gauze/bandaid was applied. Ms. Nino Bingham tolerated well, and had no complaints. Ms. Nino Bingham was discharged from [place] [address] in stable condition at 1330. She is to return in 2 weeks, on Tuesday, 11-13-18,  at 1500, for her next appointment, for CBC/Procrit injection. And then, she is to return in 4 weeks, on Tuesday, 11-27-18, at 0900, for her next appointment, for CBC/Procrit injection, and IVIG infusion.      Kamlesh Montoya RN  October 30, 2018  10:08 AM

## 2018-11-13 ENCOUNTER — HOSPITAL ENCOUNTER (OUTPATIENT)
Dept: ONCOLOGY | Age: 53
Discharge: HOME | End: 2018-11-13

## 2018-11-13 ENCOUNTER — CLINICAL SUPPORT (OUTPATIENT)
Dept: ONCOLOGY | Age: 53
End: 2018-11-13

## 2018-11-13 ENCOUNTER — HOSPITAL ENCOUNTER (OUTPATIENT)
Dept: INFUSION THERAPY | Age: 53
Discharge: HOME | End: 2018-11-13
Payer: COMMERCIAL

## 2018-11-13 VITALS
TEMPERATURE: 98.2 F | SYSTOLIC BLOOD PRESSURE: 167 MMHG | RESPIRATION RATE: 16 BRPM | HEART RATE: 99 BPM | DIASTOLIC BLOOD PRESSURE: 93 MMHG

## 2018-11-13 DIAGNOSIS — N18.9 ANEMIA IN CHRONIC KIDNEY DISEASE, UNSPECIFIED CKD STAGE: ICD-10-CM

## 2018-11-13 DIAGNOSIS — D63.1 ANEMIA IN CHRONIC KIDNEY DISEASE, UNSPECIFIED CKD STAGE: Primary | ICD-10-CM

## 2018-11-13 DIAGNOSIS — D63.1 ANEMIA IN CHRONIC KIDNEY DISEASE, UNSPECIFIED CKD STAGE: ICD-10-CM

## 2018-11-13 DIAGNOSIS — N18.9 ANEMIA IN CHRONIC KIDNEY DISEASE, UNSPECIFIED CKD STAGE: Primary | ICD-10-CM

## 2018-11-13 LAB
BASO+EOS+MONOS # BLD AUTO: 0.3 K/UL (ref 0–2.3)
BASO+EOS+MONOS # BLD AUTO: 4 % (ref 0.1–17)
DIFFERENTIAL METHOD BLD: ABNORMAL
ERYTHROCYTE [DISTWIDTH] IN BLOOD BY AUTOMATED COUNT: 14.5 % (ref 11.5–14.5)
HCT VFR BLD AUTO: 38 % (ref 36–48)
HGB BLD-MCNC: 12.3 G/DL (ref 12–16)
LYMPHOCYTES # BLD: 0.9 K/UL (ref 1.1–5.9)
LYMPHOCYTES NFR BLD: 14 % (ref 14–44)
MCH RBC QN AUTO: 27 PG (ref 25–35)
MCHC RBC AUTO-ENTMCNC: 32.4 G/DL (ref 31–37)
MCV RBC AUTO: 83.3 FL (ref 78–102)
NEUTS SEG # BLD: 5.4 K/UL (ref 1.8–9.5)
NEUTS SEG NFR BLD: 82 % (ref 40–70)
PLATELET # BLD AUTO: 68 K/UL (ref 135–420)
RBC # BLD AUTO: 4.56 M/UL (ref 4.1–5.1)
WBC # BLD AUTO: 6.6 K/UL (ref 4.5–13)

## 2018-11-13 PROCEDURE — 85049 AUTOMATED PLATELET COUNT: CPT

## 2018-11-13 PROCEDURE — 36415 COLL VENOUS BLD VENIPUNCTURE: CPT

## 2018-11-13 NOTE — PROGRESS NOTES
[facility] Progress Note    Date: 2018    Name: Bhumi Hodge    MRN: [id number]         : 1965      Ms. Sisi Tsai arrived in the [facility] today at [phone] 84, in stable condition, here for Q 2 Week, CBC/Procrit injection. She was assessed and education was provided. Ms. Chai Carty vitals were reviewed. Visit Vitals  BP (!) 167/93 (BP 1 Location: Left arm, BP Patient Position: Sitting)   Pulse 99   Temp 98.2 °F (36.8 °C)   Resp 16   Breastfeeding? No           CBC was drawn from her right AC at 1513, per order, and without incident. Lab results were obtained and reviewed, and the preliminary platelet count was noted to be 71,000. Pravin Angry Recent Results (from the past 12 hour(s))   CBC WITH 3 PART DIFF    Collection Time: 18  3:13 PM   Result Value Ref Range    WBC 6.6 4.5 - 13.0 K/uL    RBC 4.56 4.10 - 5.10 M/uL    HGB 12.3 12.0 - 16.0 g/dL    HCT 38.0 36 - 48 %    MCV 83.3 78 - 102 FL    MCH 27.0 25.0 - 35.0 PG    MCHC 32.4 31 - 37 g/dL    RDW 14.5 11.5 - 14.5 %    NEUTROPHILS 82 (H) 40 - 70 %    MIXED CELLS 4 0.1 - 17 %    LYMPHOCYTES 14 14 - 44 %    ABS. NEUTROPHILS 5.4 1.8 - 9.5 K/UL    ABS. MIXED CELLS 0.3 0.0 - 2.3 K/uL    ABS. LYMPHOCYTES 0.9 (L) 1.1 - 5.9 K/UL    DF AUTOMATED             Procrit injection was HELD today, per order. Ms. Aguayo tolerated well, and had no complaints. Ms. Sisi Tsai was discharged from [place] [address] in stable condition at 1530. .. She is to return in 2 weeks, on Tuesday, 18,  at 0900,  for her next appointment, for Q 2 Week CBC/Procrit injection & Q 4 Week, IVIG Infusion.     Maciel Goldstein RN  2018  3:38 PM

## 2018-11-27 ENCOUNTER — HOSPITAL ENCOUNTER (OUTPATIENT)
Dept: ONCOLOGY | Age: 53
Discharge: HOME | End: 2018-11-27

## 2018-11-27 ENCOUNTER — HOSPITAL ENCOUNTER (OUTPATIENT)
Dept: INFUSION THERAPY | Age: 53
Discharge: HOME | End: 2018-11-27
Payer: COMMERCIAL

## 2018-11-27 ENCOUNTER — CLINICAL SUPPORT (OUTPATIENT)
Dept: ONCOLOGY | Age: 53
End: 2018-11-27

## 2018-11-27 ENCOUNTER — OFFICE VISIT (OUTPATIENT)
Dept: ONCOLOGY | Age: 53
End: 2018-11-27

## 2018-11-27 VITALS
BODY MASS INDEX: 32.02 KG/M2 | DIASTOLIC BLOOD PRESSURE: 78 MMHG | WEIGHT: 204 LBS | SYSTOLIC BLOOD PRESSURE: 135 MMHG | HEIGHT: 67 IN | TEMPERATURE: 98.7 F | RESPIRATION RATE: 16 BRPM | HEART RATE: 86 BPM | OXYGEN SATURATION: 99 %

## 2018-11-27 VITALS
SYSTOLIC BLOOD PRESSURE: 135 MMHG | HEIGHT: 67 IN | RESPIRATION RATE: 16 BRPM | TEMPERATURE: 98.7 F | DIASTOLIC BLOOD PRESSURE: 78 MMHG | HEART RATE: 86 BPM | WEIGHT: 204 LBS | BODY MASS INDEX: 32.02 KG/M2

## 2018-11-27 DIAGNOSIS — D63.1 ANEMIA IN CHRONIC KIDNEY DISEASE, UNSPECIFIED CKD STAGE: ICD-10-CM

## 2018-11-27 DIAGNOSIS — D63.1 ANEMIA IN CHRONIC KIDNEY DISEASE, UNSPECIFIED CKD STAGE: Primary | ICD-10-CM

## 2018-11-27 DIAGNOSIS — N18.30 ANEMIA IN STAGE 3 CHRONIC KIDNEY DISEASE (HCC): Primary | ICD-10-CM

## 2018-11-27 DIAGNOSIS — D50.0 ANEMIA DUE TO BLOOD LOSS, CHRONIC: ICD-10-CM

## 2018-11-27 DIAGNOSIS — N92.0 MENORRHAGIA WITH REGULAR CYCLE: ICD-10-CM

## 2018-11-27 DIAGNOSIS — D63.1 ANEMIA IN STAGE 3 CHRONIC KIDNEY DISEASE (HCC): Primary | ICD-10-CM

## 2018-11-27 DIAGNOSIS — N18.9 ANEMIA IN CHRONIC KIDNEY DISEASE, UNSPECIFIED CKD STAGE: Primary | ICD-10-CM

## 2018-11-27 DIAGNOSIS — N18.9 ANEMIA IN CHRONIC KIDNEY DISEASE, UNSPECIFIED CKD STAGE: ICD-10-CM

## 2018-11-27 DIAGNOSIS — D69.6 THROMBOCYTOPENIA (HCC): ICD-10-CM

## 2018-11-27 LAB
BASO+EOS+MONOS # BLD AUTO: 0.3 K/UL (ref 0–2.3)
BASO+EOS+MONOS # BLD AUTO: 4 % (ref 0.1–17)
DIFFERENTIAL METHOD BLD: ABNORMAL
ERYTHROCYTE [DISTWIDTH] IN BLOOD BY AUTOMATED COUNT: 13.2 % (ref 11.5–14.5)
HCT VFR BLD AUTO: 31.7 % (ref 36–48)
HGB BLD-MCNC: 10.2 G/DL (ref 12–16)
LYMPHOCYTES # BLD: 0.9 K/UL (ref 1.1–5.9)
LYMPHOCYTES NFR BLD: 15 % (ref 14–44)
MCH RBC QN AUTO: 26.5 PG (ref 25–35)
MCHC RBC AUTO-ENTMCNC: 32.2 G/DL (ref 31–37)
MCV RBC AUTO: 82.3 FL (ref 78–102)
NEUTS SEG # BLD: 5 K/UL (ref 1.8–9.5)
NEUTS SEG NFR BLD: 81 % (ref 40–70)
PLATELET # BLD AUTO: 73 K/UL (ref 135–420)
RBC # BLD AUTO: 3.85 M/UL (ref 4.1–5.1)
WBC # BLD AUTO: 6.2 K/UL (ref 4.5–13)

## 2018-11-27 PROCEDURE — 74011000258 HC RX REV CODE- 258: Performed by: INTERNAL MEDICINE

## 2018-11-27 PROCEDURE — 96365 THER/PROPH/DIAG IV INF INIT: CPT

## 2018-11-27 PROCEDURE — 74011250636 HC RX REV CODE- 250/636: Performed by: INTERNAL MEDICINE

## 2018-11-27 PROCEDURE — 74011250637 HC RX REV CODE- 250/637: Performed by: INTERNAL MEDICINE

## 2018-11-27 PROCEDURE — 96366 THER/PROPH/DIAG IV INF ADDON: CPT

## 2018-11-27 PROCEDURE — 96367 TX/PROPH/DG ADDL SEQ IV INF: CPT

## 2018-11-27 PROCEDURE — 85049 AUTOMATED PLATELET COUNT: CPT

## 2018-11-27 RX ORDER — SODIUM CHLORIDE 9 MG/ML
250 INJECTION, SOLUTION INTRAVENOUS CONTINUOUS
Status: DISPENSED | OUTPATIENT
Start: 2018-11-27 | End: 2018-11-28

## 2018-11-27 RX ORDER — DIPHENHYDRAMINE HYDROCHLORIDE 50 MG/ML
50 INJECTION, SOLUTION INTRAMUSCULAR; INTRAVENOUS
Status: ACTIVE | OUTPATIENT
Start: 2018-11-27 | End: 2018-11-27

## 2018-11-27 RX ORDER — SODIUM CHLORIDE 0.9 % (FLUSH) 0.9 %
10-40 SYRINGE (ML) INJECTION AS NEEDED
Status: DISCONTINUED | OUTPATIENT
Start: 2018-11-27 | End: 2018-12-01 | Stop reason: HOSPADM

## 2018-11-27 RX ORDER — ACETAMINOPHEN 325 MG/1
650 TABLET ORAL
Status: ACTIVE | OUTPATIENT
Start: 2018-11-27 | End: 2018-11-27

## 2018-11-27 RX ORDER — DIPHENHYDRAMINE HCL 50 MG
50 CAPSULE ORAL ONCE
Status: COMPLETED | OUTPATIENT
Start: 2018-11-27 | End: 2018-11-27

## 2018-11-27 RX ORDER — ACETAMINOPHEN 325 MG/1
650 TABLET ORAL ONCE
Status: COMPLETED | OUTPATIENT
Start: 2018-11-27 | End: 2018-11-27

## 2018-11-27 RX ADMIN — Medication 10 ML: at 09:45

## 2018-11-27 RX ADMIN — SODIUM CHLORIDE 250 ML: 900 INJECTION, SOLUTION INTRAVENOUS at 10:15

## 2018-11-27 RX ADMIN — IMMUNE GLOBULIN INTRAVENOUS (HUMAN) 20 G: 5 INJECTION, SOLUTION INTRAVENOUS at 11:15

## 2018-11-27 RX ADMIN — DEXAMETHASONE SODIUM PHOSPHATE 20 MG: 4 INJECTION, SOLUTION INTRA-ARTICULAR; INTRALESIONAL; INTRAMUSCULAR; INTRAVENOUS; SOFT TISSUE at 10:25

## 2018-11-27 RX ADMIN — ACETAMINOPHEN 650 MG: 325 TABLET ORAL at 10:20

## 2018-11-27 RX ADMIN — IMMUNE GLOBULIN INTRAVENOUS (HUMAN) 5 G: 5 INJECTION, SOLUTION INTRAVENOUS at 13:05

## 2018-11-27 RX ADMIN — DIPHENHYDRAMINE HYDROCHLORIDE 50 MG: 50 CAPSULE ORAL at 10:20

## 2018-11-27 NOTE — PATIENT INSTRUCTIONS
Anemia From Chronic Disease: Care Instructions  Your Care Instructions    Anemia is a low level of red blood cells. Red blood cells carry oxygen from your lungs to the rest of your body. Sometimes when you have a long-term (chronic) disease, such as kidney disease, arthritis, diabetes, cancer, or an infection, your body does not make enough red blood cells. Follow-up care is a key part of your treatment and safety. Be sure to make and go to all appointments, and call your doctor if you are having problems. It's also a good idea to know your test results and keep a list of the medicines you take. How can you care for yourself at home? · Follow your doctor's instructions to treat the chronic condition that is causing the anemia. · Be safe with medicines. Take your medicine to treat your chronic condition exactly as prescribed. Call your doctor if you think you are having a problem with your medicine. · Take your medicine for anemia exactly as prescribed. Call your doctor if you think you are having a problem with your medicine. Medicines to increase the number of red blood cells (such as epoetin or darbepoetin) may be given as an injection. ? If you miss a dose, take it as soon as you can, unless it is almost time for your next dose. In that case, get back on your regular schedule and take only one dose. ? Do not freeze this medicine. Store it in the refrigerator. Do not shake the bottle before you prepare the shot. · Keep all your appointments for blood tests to check on your hemoglobin levels. When should you call for help? Call 911 anytime you think you may need emergency care.  For example, call if:    · You passed out (lost consciousness).    Call your doctor now or seek immediate medical care if:    · You are short of breath.     · You are dizzy or light-headed, or you feel like you may faint.     · You have new or worse bleeding.    Watch closely for changes in your health, and be sure to contact your doctor if:    · You feel weaker or more tired than usual.     · You do not get better as expected. Where can you learn more? Go to http://michela-ivan.info/. Enter E502 in the search box to learn more about \"Anemia From Chronic Disease: Care Instructions. \"  Current as of: May 7, 2018  Content Version: 11.8  © 1302-6310 [employer]. Care instructions adapted under license by [employer] (which disclaims liability or warranty for this information). If you have questions about a medical condition or this instruction, always ask your healthcare professional. [address] any warranty or liability for your use of this information. Anemia From Chronic Disease: Care Instructions  Your Care Instructions    Anemia is a low level of red blood cells. Red blood cells carry oxygen from your lungs to the rest of your body. Sometimes when you have a long-term (chronic) disease, such as kidney disease, arthritis, diabetes, cancer, or an infection, your body does not make enough red blood cells. Follow-up care is a key part of your treatment and safety. Be sure to make and go to all appointments, and call your doctor if you are having problems. It's also a good idea to know your test results and keep a list of the medicines you take. How can you care for yourself at home? · Follow your doctor's instructions to treat the chronic condition that is causing the anemia. · Be safe with medicines. Take your medicine to treat your chronic condition exactly as prescribed. Call your doctor if you think you are having a problem with your medicine. · Take your medicine for anemia exactly as prescribed. Call your doctor if you think you are having a problem with your medicine. Medicines to increase the number of red blood cells (such as epoetin or darbepoetin) may be given as an injection.   ? If you miss a dose, take it as soon as you can, unless it is almost time for your next dose. In that case, get back on your regular schedule and take only one dose. ? Do not freeze this medicine. Store it in the refrigerator. Do not shake the bottle before you prepare the shot. · Keep all your appointments for blood tests to check on your hemoglobin levels. When should you call for help? Call 911 anytime you think you may need emergency care. For example, call if:    · You passed out (lost consciousness).    Call your doctor now or seek immediate medical care if:    · You are short of breath.     · You are dizzy or light-headed, or you feel like you may faint.     · You have new or worse bleeding.    Watch closely for changes in your health, and be sure to contact your doctor if:    · You feel weaker or more tired than usual.     · You do not get better as expected. Where can you learn more? Go to http://michela-ivan.info/. Enter E502 in the search box to learn more about \"Anemia From Chronic Disease: Care Instructions. \"  Current as of: May 7, 2018  Content Version: 11.8  © 9894-0775 [employer]. Care instructions adapted under license by [employer] (which disclaims liability or warranty for this information). If you have questions about a medical condition or this instruction, always ask your healthcare professional. [address] any warranty or liability for your use of this information.

## 2018-11-27 NOTE — PROGRESS NOTES
[facility] Progress Note    Date: 2018    Name: Iron Quiñones    MRN: [id number]         : 1965      Ms. Adrien Buerger arrived in the [facility] today at [phone], in stable condition, here for Q 2 Week, CBC/Procrit injection & Q 4 Week, IVIG Infusion. She was assessed and education was provided. Ms. Severiano Clos vitals were reviewed. Visit Vitals  BP (!) 176/95 (BP 1 Location: Left arm, BP Patient Position: Sitting)   Pulse (!) 112   Temp 99.1 °F (37.3 °C)   Resp 20   Ht 5' 7\" (1.702 m)   Wt 92.5 kg (204 lb)   BMI 31.95 kg/m²           PIV was established in her left AC at 0945, without incident, and blood was drawn, for a CBC, per order. (Also, 1 extra lavender top tube, 2 SST tubes, & 1 green top tube were drawn, in preparation for her office visit with Dr. Adriana Ovalles later today.)      The CBC results from today were as follows: (the preliminary platelet count from today was 67,000)      Recent Results (from the past 12 hour(s))   CBC WITH 3 PART DIFF    Collection Time: 18  9:45 AM   Result Value Ref Range    WBC 6.2 4.5 - 13.0 K/uL    RBC 3.85 (L) 4.10 - 5.10 M/uL    HGB 10.2 (L) 12.0 - 16.0 g/dL    HCT 31.7 (L) 36 - 48 %    MCV 82.3 78 - 102 FL    MCH 26.5 25.0 - 35.0 PG    MCHC 32.2 31 - 37 g/dL    RDW 13.2 11.5 - 14.5 %    NEUTROPHILS 81 (H) 40 - 70 %    MIXED CELLS 4 0.1 - 17 %    LYMPHOCYTES 15 14 - 44 %    ABS. NEUTROPHILS 5.0 1.8 - 9.5 K/UL    ABS. MIXED CELLS 0.3 0.0 - 2.3 K/uL    ABS. LYMPHOCYTES 0.9 (L) 1.1 - 5.9 K/UL    DF AUTOMATED           Procrit injection was HELD today per order.             Pre-medications consisting of PO Tylenol 650 mg, Benadryl 50 mg PO, and Decadron 20 mg IV, were all administered per order, and without incident.               IVIG 25 grams (25,000 mg) IV, was administered per order, and without incident.      IVIG was started at 34 ml/hr X 30 minutes, then increased to 68 ml/hr X 30 minutes, then increased to 171 ml/hr X 30 minutes, then increased to 205 ml/hr X 30 minutes, then increased to 239 ml/hr, for the remainder of the IVIG infusion.           After the completion of the IVIG, the PIV was flushed very well per protocol, and then, the PIV was removed and gauze/bandaid was applied. Ms. Franc Richards tolerated well, and had no complaints. Ms. Franc Richards was discharged from [place] [address] in stable condition at 1350. She is to return in 2 weeks, on Tuesday, 12-11-18,  at 1500, for her next appointment, for CBC/Procrit injection. And then, she is to return in 6 weeks, on Tuesday, 1-8-19, at 0900, for her next appointment, for CBC/Procrit injection, and IVIG infusion.  (Please note that her next IVIG appointment is 2 weeks delayed, due to both the [address] [place] [address] Street.)    Gale Franco RN  November 27, 2018  10:08 AM

## 2018-11-28 LAB
A-G RATIO,AGRAT: 0.9 RATIO (ref 1.1–2.6)
ALBUMIN SERPL-MCNC: 3.2 G/DL (ref 3.5–5)
ALP SERPL-CCNC: 107 U/L (ref 25–115)
ALT SERPL-CCNC: 10 U/L (ref 5–40)
ANION GAP SERPL CALC-SCNC: 18 MMOL/L
AST SERPL W P-5'-P-CCNC: 19 U/L (ref 10–37)
BILIRUB SERPL-MCNC: 0.2 MG/DL (ref 0.2–1.2)
BUN SERPL-MCNC: 38 MG/DL (ref 6–22)
CALCIUM SERPL-MCNC: 8.6 MG/DL (ref 8.4–10.5)
CHLORIDE SERPL-SCNC: 101 MMOL/L (ref 98–110)
CO2 SERPL-SCNC: 19 MMOL/L (ref 20–32)
CREAT SERPL-MCNC: 2.7 MG/DL (ref 0.5–1.2)
FERRITIN SERPL-MCNC: 122 NG/ML (ref 10–291)
GFRAA, 66117: 22.5
GFRNA, 66118: 18.6
GLOBULIN,GLOB: 3.6 G/DL (ref 2–4)
GLUCOSE SERPL-MCNC: 229 MG/DL (ref 70–99)
POTASSIUM SERPL-SCNC: 4.7 MMOL/L (ref 3.5–5.5)
PROT SERPL-MCNC: 6.8 G/DL (ref 6.4–8.3)
SODIUM SERPL-SCNC: 138 MMOL/L (ref 133–145)

## 2018-12-11 ENCOUNTER — HOSPITAL ENCOUNTER (OUTPATIENT)
Dept: INFUSION THERAPY | Age: 53
Discharge: HOME | End: 2018-12-11
Payer: COMMERCIAL

## 2018-12-11 ENCOUNTER — CLINICAL SUPPORT (OUTPATIENT)
Dept: ONCOLOGY | Age: 53
End: 2018-12-11

## 2018-12-11 ENCOUNTER — HOSPITAL ENCOUNTER (OUTPATIENT)
Dept: ONCOLOGY | Age: 53
Discharge: HOME | End: 2018-12-11

## 2018-12-11 VITALS
RESPIRATION RATE: 16 BRPM | TEMPERATURE: 98.4 F | SYSTOLIC BLOOD PRESSURE: 152 MMHG | DIASTOLIC BLOOD PRESSURE: 88 MMHG | HEART RATE: 93 BPM

## 2018-12-11 DIAGNOSIS — N18.9 ANEMIA IN CHRONIC KIDNEY DISEASE, UNSPECIFIED CKD STAGE: Primary | ICD-10-CM

## 2018-12-11 DIAGNOSIS — N18.9 ANEMIA IN CHRONIC KIDNEY DISEASE, UNSPECIFIED CKD STAGE: ICD-10-CM

## 2018-12-11 DIAGNOSIS — D63.1 ANEMIA IN CHRONIC KIDNEY DISEASE, UNSPECIFIED CKD STAGE: Primary | ICD-10-CM

## 2018-12-11 DIAGNOSIS — D63.1 ANEMIA IN CHRONIC KIDNEY DISEASE, UNSPECIFIED CKD STAGE: ICD-10-CM

## 2018-12-11 LAB
BASO+EOS+MONOS # BLD AUTO: 0.4 K/UL (ref 0–2.3)
BASO+EOS+MONOS # BLD AUTO: 6 % (ref 0.1–17)
DIFFERENTIAL METHOD BLD: ABNORMAL
ERYTHROCYTE [DISTWIDTH] IN BLOOD BY AUTOMATED COUNT: 13.1 % (ref 11.5–14.5)
HCT VFR BLD AUTO: 30.6 % (ref 36–48)
HGB BLD-MCNC: 10.2 G/DL (ref 12–16)
LYMPHOCYTES # BLD: 1.3 K/UL (ref 1.1–5.9)
LYMPHOCYTES NFR BLD: 19 % (ref 14–44)
MCH RBC QN AUTO: 26.8 PG (ref 25–35)
MCHC RBC AUTO-ENTMCNC: 33.3 G/DL (ref 31–37)
MCV RBC AUTO: 80.5 FL (ref 78–102)
NEUTS SEG # BLD: 5.1 K/UL (ref 1.8–9.5)
NEUTS SEG NFR BLD: 75 % (ref 40–70)
PLATELET # BLD AUTO: 50 K/UL (ref 135–420)
RBC # BLD AUTO: 3.8 M/UL (ref 4.1–5.1)
WBC # BLD AUTO: 6.8 K/UL (ref 4.5–13)

## 2018-12-11 PROCEDURE — 36415 COLL VENOUS BLD VENIPUNCTURE: CPT

## 2018-12-11 PROCEDURE — 85049 AUTOMATED PLATELET COUNT: CPT

## 2018-12-11 NOTE — PROGRESS NOTES
[facility]    Date: 2018    Name: Nicole Head    MRN: [id number]         : 1965      Ms. Lc Kurtz arrived in the [facility] today at 1510, in stable condition, here for Q 2 Week, CBC/Procrit injection. She was assessed and education was provided. Ms. Leo Johnson vitals were reviewed. Visit Vitals  /88 (BP 1 Location: Right arm, BP Patient Position: Sitting)   Pulse 93   Temp 98.4 °F (36.9 °C)   Resp 16   Breastfeeding? No           CBC was drawn from her right AC at 1520, per order, and without incident. Lab results were obtained and reviewed, and the preliminary platelet count was noted to be 49,000. Mac Lincoln Recent Results (from the past 12 hour(s))   CBC WITH 3 PART DIFF    Collection Time: 18  3:20 PM   Result Value Ref Range    WBC 6.8 4.5 - 13.0 K/uL    RBC 3.80 (L) 4.10 - 5.10 M/uL    HGB 10.2 (L) 12.0 - 16.0 g/dL    HCT 30.6 (L) 36 - 48 %    MCV 80.5 78 - 102 FL    MCH 26.8 25.0 - 35.0 PG    MCHC 33.3 31 - 37 g/dL    RDW 13.1 11.5 - 14.5 %    NEUTROPHILS 75 (H) 40 - 70 %    MIXED CELLS 6 0.1 - 17 %    LYMPHOCYTES 19 14 - 44 %    ABS. NEUTROPHILS 5.1 1.8 - 9.5 K/UL    ABS. MIXED CELLS 0.4 0.0 - 2.3 K/uL    ABS. LYMPHOCYTES 1.3 1.1 - 5.9 K/UL    DF AUTOMATED             Procrit injection was HELD today, per order. Ms. Aguayo tolerated well, and had no complaints. Ms. Lc Kurtz was discharged from [place] [address] in stable condition at [phone]. .. She is to return in 2 weeks, on Tuesday, 19,  at 0900,  for her next appointment, for Q 2 Week CBC/Procrit injection & Q 4 Week, IVIG Infusion.  (Please note that the Procrit injection is 2 weeks delayed, due to both the [address] [place] [address].)    Chiquita Benoit RN  2018  3:38 PM

## 2018-12-23 ENCOUNTER — APPOINTMENT (OUTPATIENT)
Dept: GENERAL RADIOLOGY | Age: 53
End: 2018-12-23
Attending: PHYSICIAN ASSISTANT
Payer: COMMERCIAL

## 2018-12-23 ENCOUNTER — HOSPITAL ENCOUNTER (EMERGENCY)
Age: 53
Discharge: HOME | End: 2018-12-23
Attending: EMERGENCY MEDICINE | Admitting: EMERGENCY MEDICINE
Payer: COMMERCIAL

## 2018-12-23 ENCOUNTER — APPOINTMENT (OUTPATIENT)
Dept: CT IMAGING | Age: 53
End: 2018-12-23
Attending: PHYSICIAN ASSISTANT
Payer: COMMERCIAL

## 2018-12-23 VITALS
OXYGEN SATURATION: 98 % | HEART RATE: 123 BPM | RESPIRATION RATE: 19 BRPM | WEIGHT: 204 LBS | BODY MASS INDEX: 32.02 KG/M2 | TEMPERATURE: 99.5 F | DIASTOLIC BLOOD PRESSURE: 95 MMHG | SYSTOLIC BLOOD PRESSURE: 186 MMHG | HEIGHT: 67 IN

## 2018-12-23 DIAGNOSIS — R50.9 FEVER, UNSPECIFIED FEVER CAUSE: ICD-10-CM

## 2018-12-23 DIAGNOSIS — E11.65 CONTROLLED TYPE 2 DIABETES MELLITUS WITH HYPERGLYCEMIA, WITHOUT LONG-TERM CURRENT USE OF INSULIN (HCC): ICD-10-CM

## 2018-12-23 DIAGNOSIS — N17.9 ACUTE KIDNEY INJURY SUPERIMPOSED ON CHRONIC KIDNEY DISEASE (HCC): ICD-10-CM

## 2018-12-23 DIAGNOSIS — N18.9 ACUTE KIDNEY INJURY SUPERIMPOSED ON CHRONIC KIDNEY DISEASE (HCC): ICD-10-CM

## 2018-12-23 DIAGNOSIS — N12 PYELONEPHRITIS: Primary | ICD-10-CM

## 2018-12-23 LAB
ALBUMIN SERPL-MCNC: 2.3 G/DL (ref 3.4–5)
ALBUMIN/GLOB SERPL: 0.4 {RATIO} (ref 0.8–1.7)
ALP SERPL-CCNC: 118 U/L (ref 45–117)
ALT SERPL-CCNC: 18 U/L (ref 13–56)
ANION GAP SERPL CALC-SCNC: 12 MMOL/L (ref 3–18)
APPEARANCE UR: ABNORMAL
AST SERPL-CCNC: 15 U/L (ref 15–37)
BACTERIA URNS QL MICRO: ABNORMAL /HPF
BASOPHILS # BLD: 0 K/UL (ref 0–0.1)
BASOPHILS NFR BLD: 0 % (ref 0–2)
BILIRUB SERPL-MCNC: 0.3 MG/DL (ref 0.2–1)
BILIRUB UR QL: NEGATIVE
BUN SERPL-MCNC: 44 MG/DL (ref 7–18)
BUN/CREAT SERPL: 12 (ref 12–20)
CALCIUM SERPL-MCNC: 9 MG/DL (ref 8.5–10.1)
CHLORIDE SERPL-SCNC: 103 MMOL/L (ref 100–108)
CO2 SERPL-SCNC: 22 MMOL/L (ref 21–32)
COLOR UR: YELLOW
CREAT SERPL-MCNC: 3.57 MG/DL (ref 0.6–1.3)
DIFFERENTIAL METHOD BLD: ABNORMAL
EOSINOPHIL # BLD: 0.2 K/UL (ref 0–0.4)
EOSINOPHIL NFR BLD: 2 % (ref 0–5)
EPITH CASTS URNS QL MICRO: ABNORMAL /LPF (ref 0–5)
ERYTHROCYTE [DISTWIDTH] IN BLOOD BY AUTOMATED COUNT: 14 % (ref 11.6–14.5)
FLUAV AG NPH QL IA: NEGATIVE
FLUBV AG NOSE QL IA: NEGATIVE
GLOBULIN SER CALC-MCNC: 5.7 G/DL (ref 2–4)
GLUCOSE SERPL-MCNC: 288 MG/DL (ref 74–99)
GLUCOSE UR STRIP.AUTO-MCNC: 500 MG/DL
HCT VFR BLD AUTO: 31.5 % (ref 35–45)
HGB BLD-MCNC: 10.2 G/DL (ref 12–16)
HGB UR QL STRIP: ABNORMAL
KETONES UR QL STRIP.AUTO: NEGATIVE MG/DL
LACTATE BLD-SCNC: 1.45 MMOL/L (ref 0.4–2)
LEUKOCYTE ESTERASE UR QL STRIP.AUTO: ABNORMAL
LYMPHOCYTES # BLD: 0.6 K/UL (ref 0.9–3.6)
LYMPHOCYTES NFR BLD: 7 % (ref 21–52)
MCH RBC QN AUTO: 27.1 PG (ref 24–34)
MCHC RBC AUTO-ENTMCNC: 32.4 G/DL (ref 31–37)
MCV RBC AUTO: 83.6 FL (ref 74–97)
MONOCYTES # BLD: 0.4 K/UL (ref 0.05–1.2)
MONOCYTES NFR BLD: 5 % (ref 3–10)
NEUTS SEG # BLD: 7.6 K/UL (ref 1.8–8)
NEUTS SEG NFR BLD: 86 % (ref 40–73)
NITRITE UR QL STRIP.AUTO: NEGATIVE
PH UR STRIP: 6 [PH] (ref 5–8)
PLATELET # BLD AUTO: 63 K/UL (ref 135–420)
PLATELET COMMENTS,PCOM: ABNORMAL
POTASSIUM SERPL-SCNC: 4.4 MMOL/L (ref 3.5–5.5)
PROT SERPL-MCNC: 8 G/DL (ref 6.4–8.2)
PROT UR STRIP-MCNC: >1000 MG/DL
RBC # BLD AUTO: 3.77 M/UL (ref 4.2–5.3)
RBC #/AREA URNS HPF: ABNORMAL /HPF (ref 0–5)
RBC MORPH BLD: ABNORMAL
SODIUM SERPL-SCNC: 137 MMOL/L (ref 136–145)
SP GR UR REFRACTOMETRY: 1.02 (ref 1–1.03)
UROBILINOGEN UR QL STRIP.AUTO: 0.2 EU/DL (ref 0.2–1)
WBC # BLD AUTO: 8.8 K/UL (ref 4.6–13.2)
WBC URNS QL MICRO: ABNORMAL /HPF (ref 0–4)

## 2018-12-23 PROCEDURE — 74011000250 HC RX REV CODE- 250: Performed by: PHYSICIAN ASSISTANT

## 2018-12-23 PROCEDURE — 96365 THER/PROPH/DIAG IV INF INIT: CPT

## 2018-12-23 PROCEDURE — 74011250637 HC RX REV CODE- 250/637: Performed by: PHYSICIAN ASSISTANT

## 2018-12-23 PROCEDURE — 83605 ASSAY OF LACTIC ACID: CPT

## 2018-12-23 PROCEDURE — 74011250636 HC RX REV CODE- 250/636

## 2018-12-23 PROCEDURE — 80053 COMPREHEN METABOLIC PANEL: CPT

## 2018-12-23 PROCEDURE — 87186 SC STD MICRODIL/AGAR DIL: CPT

## 2018-12-23 PROCEDURE — 87804 INFLUENZA ASSAY W/OPTIC: CPT

## 2018-12-23 PROCEDURE — 74176 CT ABD & PELVIS W/O CONTRAST: CPT

## 2018-12-23 PROCEDURE — 87040 BLOOD CULTURE FOR BACTERIA: CPT

## 2018-12-23 PROCEDURE — 74011250636 HC RX REV CODE- 250/636: Performed by: PHYSICIAN ASSISTANT

## 2018-12-23 PROCEDURE — 99285 EMERGENCY DEPT VISIT HI MDM: CPT

## 2018-12-23 PROCEDURE — 96375 TX/PRO/DX INJ NEW DRUG ADDON: CPT

## 2018-12-23 PROCEDURE — 87086 URINE CULTURE/COLONY COUNT: CPT

## 2018-12-23 PROCEDURE — 96366 THER/PROPH/DIAG IV INF ADDON: CPT

## 2018-12-23 PROCEDURE — 81001 URINALYSIS AUTO W/SCOPE: CPT

## 2018-12-23 PROCEDURE — 71045 X-RAY EXAM CHEST 1 VIEW: CPT

## 2018-12-23 PROCEDURE — 85025 COMPLETE CBC W/AUTO DIFF WBC: CPT

## 2018-12-23 PROCEDURE — 87077 CULTURE AEROBIC IDENTIFY: CPT

## 2018-12-23 PROCEDURE — 96367 TX/PROPH/DG ADDL SEQ IV INF: CPT

## 2018-12-23 PROCEDURE — 93005 ELECTROCARDIOGRAM TRACING: CPT

## 2018-12-23 RX ORDER — IPRATROPIUM BROMIDE AND ALBUTEROL SULFATE 2.5; .5 MG/3ML; MG/3ML
3 SOLUTION RESPIRATORY (INHALATION)
Status: COMPLETED | OUTPATIENT
Start: 2018-12-23 | End: 2018-12-23

## 2018-12-23 RX ORDER — ACETAMINOPHEN 500 MG
1000 TABLET ORAL
Qty: 20 TAB | Refills: 0 | Status: SHIPPED | OUTPATIENT
Start: 2018-12-23

## 2018-12-23 RX ORDER — SODIUM CHLORIDE 0.9 % (FLUSH) 0.9 %
5-10 SYRINGE (ML) INJECTION AS NEEDED
Status: DISCONTINUED | OUTPATIENT
Start: 2018-12-23 | End: 2018-12-24 | Stop reason: HOSPADM

## 2018-12-23 RX ORDER — DIPHENHYDRAMINE HYDROCHLORIDE 50 MG/ML
INJECTION, SOLUTION INTRAMUSCULAR; INTRAVENOUS
Status: COMPLETED
Start: 2018-12-23 | End: 2018-12-23

## 2018-12-23 RX ORDER — DIPHENHYDRAMINE HYDROCHLORIDE 50 MG/ML
25 INJECTION, SOLUTION INTRAMUSCULAR; INTRAVENOUS
Status: COMPLETED | OUTPATIENT
Start: 2018-12-23 | End: 2018-12-23

## 2018-12-23 RX ORDER — MORPHINE SULFATE 4 MG/ML
2 INJECTION INTRAVENOUS
Status: DISCONTINUED | OUTPATIENT
Start: 2018-12-23 | End: 2018-12-23

## 2018-12-23 RX ORDER — LEVOFLOXACIN 5 MG/ML
750 INJECTION, SOLUTION INTRAVENOUS
Status: DISCONTINUED | OUTPATIENT
Start: 2018-12-23 | End: 2018-12-24 | Stop reason: HOSPADM

## 2018-12-23 RX ORDER — VANCOMYCIN/0.9 % SOD CHLORIDE 1 G/100 ML
1000 PLASTIC BAG, INJECTION (ML) INTRAVENOUS ONCE
Status: COMPLETED | OUTPATIENT
Start: 2018-12-23 | End: 2018-12-23

## 2018-12-23 RX ORDER — CEFDINIR 300 MG/1
300 CAPSULE ORAL 2 TIMES DAILY
Qty: 14 CAP | Refills: 0 | Status: SHIPPED | OUTPATIENT
Start: 2018-12-23 | End: 2018-12-26

## 2018-12-23 RX ORDER — ACETAMINOPHEN 500 MG
1000 TABLET ORAL
Status: COMPLETED | OUTPATIENT
Start: 2018-12-23 | End: 2018-12-23

## 2018-12-23 RX ORDER — AMLODIPINE BESYLATE 5 MG/1
10 TABLET ORAL
Status: COMPLETED | OUTPATIENT
Start: 2018-12-23 | End: 2018-12-23

## 2018-12-23 RX ORDER — LEVOFLOXACIN 5 MG/ML
750 INJECTION, SOLUTION INTRAVENOUS EVERY 24 HOURS
Status: DISCONTINUED | OUTPATIENT
Start: 2018-12-23 | End: 2018-12-23

## 2018-12-23 RX ADMIN — DIPHENHYDRAMINE HYDROCHLORIDE 25 MG: 50 INJECTION, SOLUTION INTRAMUSCULAR; INTRAVENOUS at 20:32

## 2018-12-23 RX ADMIN — ACETAMINOPHEN 1000 MG: 500 TABLET ORAL at 16:29

## 2018-12-23 RX ADMIN — Medication 10 ML: at 18:20

## 2018-12-23 RX ADMIN — SODIUM CHLORIDE 1000 ML: 900 INJECTION, SOLUTION INTRAVENOUS at 16:30

## 2018-12-23 RX ADMIN — VANCOMYCIN HYDROCHLORIDE 1000 MG: 10 INJECTION, POWDER, LYOPHILIZED, FOR SOLUTION INTRAVENOUS at 19:37

## 2018-12-23 RX ADMIN — CEFTRIAXONE SODIUM 1 G: 1 INJECTION, POWDER, FOR SOLUTION INTRAMUSCULAR; INTRAVENOUS at 20:19

## 2018-12-23 RX ADMIN — IPRATROPIUM BROMIDE AND ALBUTEROL SULFATE 3 ML: .5; 3 SOLUTION RESPIRATORY (INHALATION) at 17:18

## 2018-12-23 RX ADMIN — AMLODIPINE BESYLATE 10 MG: 5 TABLET ORAL at 17:17

## 2018-12-23 RX ADMIN — LEVOFLOXACIN 750 MG: 5 INJECTION, SOLUTION INTRAVENOUS at 18:14

## 2018-12-23 RX ADMIN — DIPHENHYDRAMINE HYDROCHLORIDE 25 MG: 50 INJECTION INTRAMUSCULAR; INTRAVENOUS at 20:32

## 2018-12-23 RX ADMIN — VANCOMYCIN HYDROCHLORIDE 1000 MG: 10 INJECTION, POWDER, LYOPHILIZED, FOR SOLUTION INTRAVENOUS at 17:18

## 2018-12-23 RX ADMIN — SODIUM CHLORIDE 1000 ML: 900 INJECTION, SOLUTION INTRAVENOUS at 19:38

## 2018-12-23 NOTE — ED PROVIDER NOTES
EMERGENCY DEPARTMENT HISTORY AND PHYSICAL EXAM    Date: 12/23/2018  Patient Name: Lorna Salinas    History of Presenting Illness       History Provided By: Patient    Chief Complaint: Fever  Duration: 1 Days  Timing:  Acute  Location: Generalized  Quality: Aching  Severity: 8 out of 10  Modifying Factors: Some improvement with tylenol this AM.   Associated Symptoms: HA, body aches, neck pain, rhinorrhea, nausea      Additional History (Context): Lorna Salinas is a 48 y.o. female with diabetes and hypertension who presents with fever beginning this morning, subjective at home with chills. She also had a HA and body aches with the chills, some mild improvement with tylenol this AM but has returned. Notes some ongoing rhinorrhea. Pt also has neck pain but states that has been for \"months\". She did not receive the flu vaccine this year. She denies recent travel. 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 She denies dizziness, weakness, numbness, tingling, vision changes, CP, SOB, sore throat, cough, abd pain, V/D/C, dysuria, urinary frequency, flank pain, or any other complaints at this time.      PCP: Margaret Lee MD    Current Facility-Administered Medications   Medication Dose Route Frequency Provider Last Rate Last Dose    sodium chloride (NS) flush 5-10 mL  5-10 mL IntraVENous PRN Santa Agent, PA-C   10 mL at 12/23/18 1820    levoFLOXacin (LEVAQUIN) 750 mg in D5W IVPB  750 mg IntraVENous Q48H Maylon Fuchs., PA-C 100 mL/hr at 12/23/18 1814 750 mg at 12/23/18 1814    VANCOMYCIN INFORMATION NOTE   Other CONTINUOUS Maylon Fuchs., PA-C        vancomycin (VANCOCIN) 1000 mg in  ml infusion <<Dose #2>>  1,000 mg IntraVENous ONCE Maylon Fuchs., PA-C 125 mL/hr at 12/23/18 1937 1,000 mg at 12/23/18 1937    sodium chloride 0.9 % bolus infusion 1,000 mL  1,000 mL IntraVENous ONCE Maylon Fuchs., PA-C 1,000 mL/hr at 12/23/18 1938 1,000 mL at 12/23/18 1938    cefTRIAXone (ROCEPHIN) 1 g in sterile water (preservative free) 10 mL IV syringe  1 g IntraVENous NOW Ashtyn Pinehurst., PA-C         Current Outpatient Medications   Medication Sig Dispense Refill    cefdinir (OMNICEF) 300 mg capsule Take 1 Cap by mouth two (2) times a day for 7 days. 14 Cap 0    acetaminophen (TYLENOL) 500 mg tablet Take 2 Tabs by mouth every six (6) hours as needed for Pain. 20 Tab 0    metFORMIN (GLUCOPHAGE) 500 mg tablet Take 2 Tabs by mouth two (2) times daily (with meals). Indications: TYPE 2 DIABETES MELLITUS 120 Tab 2    lisinopril-hydrochlorothiazide (PRINZIDE, ZESTORETIC) 20-25 mg per tablet Take 1 Tab by mouth daily. Indications: HYPERTENSION      amLODIPine (NORVASC) 10 mg tablet Take 10 mg by mouth daily. Indications: HYPERTENSION      ferrous sulfate 325 mg (65 mg iron) tablet Take 325 mg by mouth two (2) times a day. Past History     Past Medical History:  Past Medical History:   Diagnosis Date    Anemia     Arthritis     Diabetes ([address]     Hypertension     ITP (idiopathic thrombocytopenic purpura)        Past Surgical History:  Past Surgical History:   Procedure Laterality Date    HX GYN      c section 1984    HX HYSTERECTOMY  2017    HX TUBAL LIGATION         Family History:  Family History   Problem Relation Age of Onset    Hypertension Mother     Cancer Father         Colon       Social History:  Social History     Tobacco Use    Smoking status: Never Smoker    Smokeless tobacco: Never Used   Substance Use Topics    Alcohol use: No    Drug use: No       Allergies: Allergies   Allergen Reactions    Pcn [Penicillins] Itching         Review of Systems   Review of Systems   Constitutional: Positive for chills and fever. HENT: Positive for rhinorrhea. Negative for congestion, ear pain, sore throat, trouble swallowing and voice change. Eyes: Negative for photophobia, pain, redness and visual disturbance. Respiratory: Negative for cough, shortness of breath and wheezing. Cardiovascular: Negative for chest pain. Gastrointestinal: Positive for nausea. Negative for abdominal pain, constipation, diarrhea and vomiting. Genitourinary: Negative for dysuria, flank pain, hematuria, pelvic pain, vaginal bleeding, vaginal discharge and vaginal pain. Musculoskeletal: Positive for myalgias and neck pain. Negative for arthralgias and gait problem. Skin: Negative. Negative for rash. Neurological: Positive for headaches. Negative for dizziness, tremors, seizures, syncope, facial asymmetry, speech difficulty, weakness, light-headedness and numbness. Psychiatric/Behavioral: Negative. All other systems reviewed and are negative. All Other Systems Negative  Physical Exam     Vitals:    12/23/18 1830 12/23/18 1845 12/23/18 1900 12/23/18 1912   BP: 130/75 146/75 153/81    Pulse: (!) 118 (!) 122 (!) 122 (!) 112   Resp: 20 21 13 16   Temp:       SpO2: 99% 97% 99% 100%   Weight:       Height:         Physical Exam   Constitutional: She is oriented to person, place, and time. She appears well-developed and well-nourished. She is active and cooperative. Non-toxic appearance. No distress. HENT:   Head: Normocephalic and atraumatic. Right Ear: Tympanic membrane, external ear and ear canal normal.   Left Ear: Tympanic membrane, external ear and ear canal normal.   Nose: Nose normal.   Mouth/Throat: Uvula is midline, oropharynx is clear and moist and mucous membranes are normal. No oropharyngeal exudate, posterior oropharyngeal edema or posterior oropharyngeal erythema. Eyes: Conjunctivae and EOM are normal. Pupils are equal, round, and reactive to light. Neck: Normal range of motion and phonation normal. Neck supple. Muscular tenderness present. No spinous process tenderness present. No neck rigidity. No edema, no erythema and normal range of motion present. No Brudzinski's sign and no Kernig's sign noted.    Cardiovascular: Normal rate, regular rhythm, normal heart sounds and intact distal pulses. Exam reveals no gallop and no friction rub. No murmur heard. Pulmonary/Chest: Effort normal. No accessory muscle usage or stridor. No respiratory distress. She has no decreased breath sounds. She has no wheezes. She has no rhonchi. She has no rales. Abdominal: Soft. Bowel sounds are normal. There is no tenderness. There is no rigidity, no rebound, no guarding, no CVA tenderness, no tenderness at McBurney's point and negative Ramon's sign. Musculoskeletal: Normal range of motion. Lymphadenopathy:     She has no cervical adenopathy. Neurological: She is alert and oriented to person, place, and time. She has normal strength. She is not disoriented. She displays no atrophy and no tremor. No cranial nerve deficit or sensory deficit. She exhibits normal muscle tone. She displays no seizure activity. Coordination normal. GCS eye subscore is 4. GCS verbal subscore is 5. GCS motor subscore is 6. Skin: Skin is warm and dry. No rash noted. She is not diaphoretic. Psychiatric: She has a normal mood and affect. Her behavior is normal. Judgment and thought content normal.   Nursing note and vitals reviewed. Diagnostic Study Results     Labs -     Recent Results (from the past 12 hour(s))   METABOLIC PANEL, COMPREHENSIVE    Collection Time: 12/23/18  4:25 PM   Result Value Ref Range    Sodium 137 136 - 145 mmol/L    Potassium 4.4 3.5 - 5.5 mmol/L    Chloride 103 100 - 108 mmol/L    CO2 22 21 - 32 mmol/L    Anion gap 12 3.0 - 18 mmol/L    Glucose 288 (H) 74 - 99 mg/dL    BUN 44 (H) 7.0 - 18 MG/DL    Creatinine 3.57 (H) 0.6 - 1.3 MG/DL    BUN/Creatinine ratio 12 12 - 20      GFR est AA 16 (L) >60 ml/min/1.73m2    GFR est non-AA 13 (L) >60 ml/min/1.73m2    Calcium 9.0 8.5 - 10.1 MG/DL    Bilirubin, total 0.3 0.2 - 1.0 MG/DL    ALT (SGPT) 18 13 - 56 U/L    AST (SGOT) 15 15 - 37 U/L    Alk.  phosphatase 118 (H) 45 - 117 U/L    Protein, total 8.0 6.4 - 8.2 g/dL    Albumin 2.3 (L) 3.4 - 5.0 g/dL Globulin 5.7 (H) 2.0 - 4.0 g/dL    A-G Ratio 0.4 (L) 0.8 - 1.7     CBC WITH AUTOMATED DIFF    Collection Time: 12/23/18  4:25 PM   Result Value Ref Range    WBC 8.8 4.6 - 13.2 K/uL    RBC 3.77 (L) 4.20 - 5.30 M/uL    HGB 10.2 (L) 12.0 - 16.0 g/dL    HCT 31.5 (L) 35.0 - 45.0 %    MCV 83.6 74.0 - 97.0 FL    MCH 27.1 24.0 - 34.0 PG    MCHC 32.4 31.0 - 37.0 g/dL    RDW 14.0 11.6 - 14.5 %    PLATELET 63 (L) 406 - 420 K/uL    NEUTROPHILS 86 (H) 40 - 73 %    LYMPHOCYTES 7 (L) 21 - 52 %    MONOCYTES 5 3 - 10 %    EOSINOPHILS 2 0 - 5 %    BASOPHILS 0 0 - 2 %    ABS. NEUTROPHILS 7.6 1.8 - 8.0 K/UL    ABS. LYMPHOCYTES 0.6 (L) 0.9 - 3.6 K/UL    ABS. MONOCYTES 0.4 0.05 - 1.2 K/UL    ABS. EOSINOPHILS 0.2 0.0 - 0.4 K/UL    ABS.  BASOPHILS 0.0 0.0 - 0.1 K/UL    DF AUTOMATED      PLATELET COMMENTS DECREASED PLATELETS      RBC COMMENTS NORMOCYTIC, NORMOCHROMIC     EKG, 12 LEAD, INITIAL    Collection Time: 12/23/18  4:25 PM   Result Value Ref Range    Ventricular Rate 131 BPM    Atrial Rate 131 BPM    P-R Interval 142 ms    QRS Duration 90 ms    Q-T Interval 306 ms    QTC Calculation (Bezet) 451 ms    Calculated P Axis 73 degrees    Calculated R Axis 50 degrees    Calculated T Axis 95 degrees    Diagnosis       Sinus tachycardia  Nonspecific T wave abnormality  Abnormal ECG  When compared with ECG of 31-JUL-2016 11:44,  ST no longer elevated in Inferior leads     INFLUENZA A & B AG (RAPID TEST)    Collection Time: 12/23/18  4:30 PM   Result Value Ref Range    Influenza A Antigen NEGATIVE  NEG      Influenza B Antigen NEGATIVE  NEG     POC LACTIC ACID    Collection Time: 12/23/18  4:38 PM   Result Value Ref Range    Lactic Acid (POC) 1.45 0.40 - 2.00 mmol/L   URINALYSIS W/ RFLX MICROSCOPIC    Collection Time: 12/23/18  6:00 PM   Result Value Ref Range    Color YELLOW      Appearance CLOUDY      Specific gravity 1.018 1.005 - 1.030      pH (UA) 6.0 5.0 - 8.0      Protein >1,000 (A) NEG mg/dL    Glucose 500 (A) NEG mg/dL    Ketone NEGATIVE  NEG mg/dL    Bilirubin NEGATIVE  NEG      Blood LARGE (A) NEG      Urobilinogen 0.2 0.2 - 1.0 EU/dL    Nitrites NEGATIVE  NEG      Leukocyte Esterase MODERATE (A) NEG     URINE MICROSCOPIC ONLY    Collection Time: 12/23/18  6:00 PM   Result Value Ref Range    WBC TOO NUMEROUS TO COUNT 0 - 4 /hpf    RBC 4 to 10 0 - 5 /hpf    Epithelial cells 2+ 0 - 5 /lpf    Bacteria 4+ (A) NEG /hpf       Radiologic Studies -   XR CHEST PORT   Final Result   IMPRESSION:     1. No acute findings                CT ABD PELV WO CONT    (Results Pending)     CT Results  (Last 48 hours)    None        CXR Results  (Last 48 hours)               12/23/18 1652  XR CHEST PORT Final result    Impression:  IMPRESSION:     1. No acute findings                   Narrative:  EXAM: XR CHEST PORT       CLINICAL INDICATION/HISTORY : Sepsis. Neck pain       COMPARISON: July 31, 2016       TECHNIQUE: 1 VIEWS       FINDINGS:        The lungs are hypoinflated without focal lung consolidation. The heart and mediastinum are unremarkable. No evidence of pleural effusion. Medical Decision Making   I am the first provider for this patient. I reviewed the vital signs, available nursing notes, past medical history, past surgical history, family history and social history. Vital Signs-Reviewed the patient's vital signs.       Pulse Oximetry Analysis - 96% on RA    Cardiac Monitor:  Rate: 132  Rhythm:Sinus tachycardia      Records Reviewed: Nursing Notes, Old Medical Records, Previous electrocardiograms, Previous Radiology Studies and Previous Laboratory Studies    Procedures:  Procedures    Provider Notes (Medical Decision Making):   DDX: influenza, URI, bronchitis, pneumonia, UTI, pyelo, AOM, pharyngitis, meningitis    SEPSIS ASSESSMENT NOTE:    1638PM   Orville Mejia PA-C has seen and assessed the patient as follows:  Elev temp, HR, RR, BP normal.     Capillary Refill:normal/brisk  Cardiopulmonary Evaluation:   Lung Sounds: normal   Cardiac Sounds: regular  Peripheral Pulses: bounding, weak, absent, doppler  Skin Exam: skin unremarkable, turgor good    Sepsis bundle initiated in triage. Lactic acid normal, BP not hypotensive, do not suspect septic shock. Influenza negative, unclear source of fever, will order broad spectrum antibx until further testing completed. Lungs clear, no oropharyngeal exudate, abd soft, non-tender. Neck without stiffness, rigidity, or meningeal signs. Aga Romo PA-C 5:21 PM     CBC without elevated WBC. Glucose elevated, pt with h/o DM on metformin, pt reports took first dose today. Does note she missed her BP meds today. Pt reporting that her head/neck/body pain has resolved with tylenol and fluids, declined morphine. CXR negative for acute cardiopulmonary process noted. UA with leuks, bacteria, suspect UTI is source of fever and body aches. Denies flank pain or h/o stones. Cr slightly elevated from baseline in setting of CKD, however given this with UTI, d/w Dr. Nicole Chanel who recommends CT abd pelv to r/o infected nephrolithiasis. Pt temp and HR improving, BP improved with amlodipine. If CT abd pelv neg for acute findings, plan to dc to home on cefdinir (PCN rxn low risk itching), and tylenol for fever. Pt to f/u with PCP in 1-2 days, strict ED return precautions given. 7:55 PM : Pt care transferred to Dr. Crystal Bentley ED provider. History of patient complaint(s), available diagnostic reports and current treatment plan has been discussed thoroughly. Intended disposition of patient : Home if negative CT abd pelv. Pending diagnostics reports and/or labs (please list):  CT abd pelv reading.   Aga Romo PA-C     MED RECONCILIATION:  Current Facility-Administered Medications   Medication Dose Route Frequency    sodium chloride (NS) flush 5-10 mL  5-10 mL IntraVENous PRN    levoFLOXacin (LEVAQUIN) 750 mg in D5W IVPB  750 mg IntraVENous Q48H    VANCOMYCIN INFORMATION NOTE   Other CONTINUOUS    vancomycin (VANCOCIN) 1000 mg in  ml infusion <<Dose #2>>  1,000 mg IntraVENous ONCE    sodium chloride 0.9 % bolus infusion 1,000 mL  1,000 mL IntraVENous ONCE    cefTRIAXone (ROCEPHIN) 1 g in sterile water (preservative free) 10 mL IV syringe  1 g IntraVENous NOW     Current Outpatient Medications   Medication Sig    cefdinir (OMNICEF) 300 mg capsule Take 1 Cap by mouth two (2) times a day for 7 days.  acetaminophen (TYLENOL) 500 mg tablet Take 2 Tabs by mouth every six (6) hours as needed for Pain.  metFORMIN (GLUCOPHAGE) 500 mg tablet Take 2 Tabs by mouth two (2) times daily (with meals). Indications: TYPE 2 DIABETES MELLITUS    lisinopril-hydrochlorothiazide (PRINZIDE, ZESTORETIC) 20-25 mg per tablet Take 1 Tab by mouth daily. Indications: HYPERTENSION    amLODIPine (NORVASC) 10 mg tablet Take 10 mg by mouth daily. Indications: HYPERTENSION    ferrous sulfate 325 mg (65 mg iron) tablet Take 325 mg by mouth two (2) times a day. Disposition:  Discharge to home. DISCHARGE NOTE:   Pt has been reexamined. Stable and improved. Patient has no new complaints, changes, or physical findings. Patient is ready to go home. Pt results have been reviewed with the patient and any family present. They have been counseled regarding diagnosis, treatment, and plan. They verbally convey understanding and agreement of the signs, symptoms, diagnosis, treatment and prognosis and additionally agrees to follow up as discussed. They also agree with the care-plan and convey that all of their questions have been answered. I have also provided discharge instructions for them that include: educational information regarding their diagnosis and treatment, and list of reasons why they would want to return to the ED prior to their follow-up appointment, should their condition change.      Follow-up Information     Follow up With Specialties Details Why [place] [address] EMERGENCY DEPT Emergency Medicine  As needed, If symptoms worsen [address] Katelyn Holm MD Internal Medicine Go in 1 day  [address] [place] [address] Hannah Edward [address]            Current Discharge Medication List      START taking these medications    Details   cefdinir (OMNICEF) 300 mg capsule Take 1 Cap by mouth two (2) times a day for 7 days. Qty: 14 Cap, Refills: 0      acetaminophen (TYLENOL) 500 mg tablet Take 2 Tabs by mouth every six (6) hours as needed for Pain. Qty: 20 Tab, Refills: 0         CONTINUE these medications which have NOT CHANGED    Details   metFORMIN (GLUCOPHAGE) 500 mg tablet Take 2 Tabs by mouth two (2) times daily (with meals). Indications: TYPE 2 DIABETES MELLITUS  Qty: 120 Tab, Refills: 2      lisinopril-hydrochlorothiazide (PRINZIDE, ZESTORETIC) 20-25 mg per tablet Take 1 Tab by mouth daily. Indications: HYPERTENSION      amLODIPine (NORVASC) 10 mg tablet Take 10 mg by mouth daily. Indications: HYPERTENSION      ferrous sulfate 325 mg (65 mg iron) tablet Take 325 mg by mouth two (2) times a day. Diagnosis     Clinical Impression:   1. Pyelonephritis    2. Fever, unspecified fever cause    3. Acute kidney injury superimposed on chronic kidney disease ([facility] [address]    4.  Controlled type 2 diabetes mellitus with hyperglycemia, without long-term current use of insulin ([address]

## 2018-12-23 NOTE — ED TRIAGE NOTES
Pt c/o neck pain on and off for the past months, got worse 3-4 days ago, denies any injury or trauma

## 2018-12-24 ENCOUNTER — APPOINTMENT (OUTPATIENT)
Dept: GENERAL RADIOLOGY | Age: 53
DRG: 872 | End: 2018-12-24
Attending: PHYSICIAN ASSISTANT
Payer: COMMERCIAL

## 2018-12-24 ENCOUNTER — HOSPITAL ENCOUNTER (INPATIENT)
Age: 53
LOS: 2 days | Discharge: HOME | DRG: 872 | End: 2018-12-26
Attending: EMERGENCY MEDICINE | Admitting: HOSPITALIST
Payer: COMMERCIAL

## 2018-12-24 DIAGNOSIS — N17.9 ACUTE KIDNEY INJURY (HCC): ICD-10-CM

## 2018-12-24 DIAGNOSIS — N12 PYELONEPHRITIS: Primary | ICD-10-CM

## 2018-12-24 LAB
ALBUMIN SERPL-MCNC: 1.9 G/DL (ref 3.4–5)
ALBUMIN/GLOB SERPL: 0.5 {RATIO} (ref 0.8–1.7)
ALP SERPL-CCNC: 94 U/L (ref 45–117)
ALT SERPL-CCNC: 11 U/L (ref 13–56)
ANION GAP SERPL CALC-SCNC: 7 MMOL/L (ref 3–18)
AST SERPL-CCNC: 14 U/L (ref 15–37)
ATRIAL RATE: 131 BPM
BASOPHILS # BLD: 0 K/UL (ref 0–0.1)
BASOPHILS NFR BLD: 0 % (ref 0–2)
BILIRUB SERPL-MCNC: 0.2 MG/DL (ref 0.2–1)
BUN SERPL-MCNC: 39 MG/DL (ref 7–18)
BUN/CREAT SERPL: 11 (ref 12–20)
CALCIUM SERPL-MCNC: 7.4 MG/DL (ref 8.5–10.1)
CALCULATED P AXIS, ECG09: 73 DEGREES
CALCULATED R AXIS, ECG10: 50 DEGREES
CALCULATED T AXIS, ECG11: 95 DEGREES
CHLORIDE SERPL-SCNC: 111 MMOL/L (ref 100–108)
CO2 SERPL-SCNC: 20 MMOL/L (ref 21–32)
CREAT SERPL-MCNC: 3.7 MG/DL (ref 0.6–1.3)
CREAT UR-MCNC: 57.3 MG/DL (ref 30–125)
DIAGNOSIS, 93000: NORMAL
DIFFERENTIAL METHOD BLD: ABNORMAL
EOSINOPHIL # BLD: 0.1 K/UL (ref 0–0.4)
EOSINOPHIL NFR BLD: 2 % (ref 0–5)
ERYTHROCYTE [DISTWIDTH] IN BLOOD BY AUTOMATED COUNT: 14.1 % (ref 11.6–14.5)
EST. AVERAGE GLUCOSE BLD GHB EST-MCNC: 272 MG/DL
GLOBULIN SER CALC-MCNC: 4 G/DL (ref 2–4)
GLUCOSE BLD STRIP.AUTO-MCNC: 192 MG/DL (ref 70–110)
GLUCOSE SERPL-MCNC: 239 MG/DL (ref 74–99)
HBA1C MFR BLD: 11.1 % (ref 4.2–5.6)
HCT VFR BLD AUTO: 26.6 % (ref 35–45)
HGB BLD-MCNC: 8.7 G/DL (ref 12–16)
LACTATE BLD-SCNC: 0.8 MMOL/L (ref 0.4–2)
LYMPHOCYTES # BLD: 0.6 K/UL (ref 0.9–3.6)
LYMPHOCYTES NFR BLD: 8 % (ref 21–52)
MCH RBC QN AUTO: 26.8 PG (ref 24–34)
MCHC RBC AUTO-ENTMCNC: 32.7 G/DL (ref 31–37)
MCV RBC AUTO: 81.8 FL (ref 74–97)
MONOCYTES # BLD: 0.4 K/UL (ref 0.05–1.2)
MONOCYTES NFR BLD: 6 % (ref 3–10)
NEUTS SEG # BLD: 5.9 K/UL (ref 1.8–8)
NEUTS SEG NFR BLD: 84 % (ref 40–73)
P-R INTERVAL, ECG05: 142 MS
PLATELET # BLD AUTO: 65 K/UL (ref 135–420)
PLATELET COMMENTS,PCOM: ABNORMAL
POTASSIUM SERPL-SCNC: 4.2 MMOL/L (ref 3.5–5.5)
PROT SERPL-MCNC: 5.9 G/DL (ref 6.4–8.2)
Q-T INTERVAL, ECG07: 306 MS
QRS DURATION, ECG06: 90 MS
QTC CALCULATION (BEZET), ECG08: 451 MS
RBC # BLD AUTO: 3.25 M/UL (ref 4.2–5.3)
RBC MORPH BLD: ABNORMAL
SODIUM SERPL-SCNC: 138 MMOL/L (ref 136–145)
SODIUM UR-SCNC: 74 MMOL/L (ref 20–110)
VENTRICULAR RATE, ECG03: 131 BPM
WBC # BLD AUTO: 7 K/UL (ref 4.6–13.2)

## 2018-12-24 PROCEDURE — 96365 THER/PROPH/DIAG IV INF INIT: CPT

## 2018-12-24 PROCEDURE — 71045 X-RAY EXAM CHEST 1 VIEW: CPT

## 2018-12-24 PROCEDURE — 85025 COMPLETE CBC W/AUTO DIFF WBC: CPT

## 2018-12-24 PROCEDURE — 83036 HEMOGLOBIN GLYCOSYLATED A1C: CPT

## 2018-12-24 PROCEDURE — 96374 THER/PROPH/DIAG INJ IV PUSH: CPT

## 2018-12-24 PROCEDURE — 74011000250 HC RX REV CODE- 250: Performed by: PHYSICIAN ASSISTANT

## 2018-12-24 PROCEDURE — 96361 HYDRATE IV INFUSION ADD-ON: CPT

## 2018-12-24 PROCEDURE — 74011250637 HC RX REV CODE- 250/637: Performed by: EMERGENCY MEDICINE

## 2018-12-24 PROCEDURE — 94762 N-INVAS EAR/PLS OXIMTRY CONT: CPT

## 2018-12-24 PROCEDURE — 99284 EMERGENCY DEPT VISIT MOD MDM: CPT

## 2018-12-24 PROCEDURE — 74011000258 HC RX REV CODE- 258: Performed by: HOSPITALIST

## 2018-12-24 PROCEDURE — 87040 BLOOD CULTURE FOR BACTERIA: CPT

## 2018-12-24 PROCEDURE — 74011250637 HC RX REV CODE- 250/637: Performed by: HOSPITALIST

## 2018-12-24 PROCEDURE — 82962 GLUCOSE BLOOD TEST: CPT

## 2018-12-24 PROCEDURE — 82570 ASSAY OF URINE CREATININE: CPT

## 2018-12-24 PROCEDURE — 74011636637 HC RX REV CODE- 636/637: Performed by: HOSPITALIST

## 2018-12-24 PROCEDURE — 65270000029 HC RM PRIVATE

## 2018-12-24 PROCEDURE — 74011250636 HC RX REV CODE- 250/636: Performed by: PHYSICIAN ASSISTANT

## 2018-12-24 PROCEDURE — 99285 EMERGENCY DEPT VISIT HI MDM: CPT

## 2018-12-24 PROCEDURE — 84300 ASSAY OF URINE SODIUM: CPT

## 2018-12-24 PROCEDURE — 83605 ASSAY OF LACTIC ACID: CPT

## 2018-12-24 PROCEDURE — 80053 COMPREHEN METABOLIC PANEL: CPT

## 2018-12-24 PROCEDURE — 74011250636 HC RX REV CODE- 250/636: Performed by: HOSPITALIST

## 2018-12-24 RX ORDER — LEVOFLOXACIN 5 MG/ML
750 INJECTION, SOLUTION INTRAVENOUS EVERY 24 HOURS
Status: DISCONTINUED | OUTPATIENT
Start: 2018-12-24 | End: 2018-12-24 | Stop reason: DRUGHIGH

## 2018-12-24 RX ORDER — MAGNESIUM SULFATE 100 %
4 CRYSTALS MISCELLANEOUS AS NEEDED
Status: DISCONTINUED | OUTPATIENT
Start: 2018-12-24 | End: 2018-12-26 | Stop reason: HOSPADM

## 2018-12-24 RX ORDER — ACETAMINOPHEN 500 MG
1000 TABLET ORAL
Status: DISCONTINUED | OUTPATIENT
Start: 2018-12-24 | End: 2018-12-26 | Stop reason: HOSPADM

## 2018-12-24 RX ORDER — LANOLIN ALCOHOL/MO/W.PET/CERES
325 CREAM (GRAM) TOPICAL 2 TIMES DAILY
Status: DISCONTINUED | OUTPATIENT
Start: 2018-12-24 | End: 2018-12-25

## 2018-12-24 RX ORDER — ACETAMINOPHEN 500 MG
1000 TABLET ORAL ONCE
Status: COMPLETED | OUTPATIENT
Start: 2018-12-24 | End: 2018-12-24

## 2018-12-24 RX ORDER — SODIUM CHLORIDE 0.9 % (FLUSH) 0.9 %
5-10 SYRINGE (ML) INJECTION AS NEEDED
Status: DISCONTINUED | OUTPATIENT
Start: 2018-12-24 | End: 2018-12-26 | Stop reason: HOSPADM

## 2018-12-24 RX ORDER — LEVOFLOXACIN 5 MG/ML
750 INJECTION, SOLUTION INTRAVENOUS
Status: DISCONTINUED | OUTPATIENT
Start: 2018-12-25 | End: 2018-12-24

## 2018-12-24 RX ORDER — DEXTROSE 50 % IN WATER (D50W) INTRAVENOUS SYRINGE
25-50 AS NEEDED
Status: DISCONTINUED | OUTPATIENT
Start: 2018-12-24 | End: 2018-12-26 | Stop reason: HOSPADM

## 2018-12-24 RX ORDER — INSULIN LISPRO 100 [IU]/ML
INJECTION, SOLUTION INTRAVENOUS; SUBCUTANEOUS
Status: DISCONTINUED | OUTPATIENT
Start: 2018-12-24 | End: 2018-12-26 | Stop reason: HOSPADM

## 2018-12-24 RX ADMIN — SODIUM CHLORIDE 1000 ML: 900 INJECTION, SOLUTION INTRAVENOUS at 12:21

## 2018-12-24 RX ADMIN — SODIUM CHLORIDE 775 ML: 900 INJECTION, SOLUTION INTRAVENOUS at 14:54

## 2018-12-24 RX ADMIN — FERROUS SULFATE TAB 325 MG (65 MG ELEMENTAL FE) 325 MG: 325 (65 FE) TAB at 18:49

## 2018-12-24 RX ADMIN — Medication 10 ML: at 14:56

## 2018-12-24 RX ADMIN — ACETAMINOPHEN 1000 MG: 500 TABLET ORAL at 12:14

## 2018-12-24 RX ADMIN — Medication 10 ML: at 13:32

## 2018-12-24 RX ADMIN — SODIUM CHLORIDE 1000 ML: 900 INJECTION, SOLUTION INTRAVENOUS at 13:29

## 2018-12-24 RX ADMIN — ACETAMINOPHEN 1000 MG: 500 TABLET ORAL at 21:57

## 2018-12-24 RX ADMIN — CEFEPIME HYDROCHLORIDE 2 G: 2 INJECTION, POWDER, FOR SOLUTION INTRAVENOUS at 12:16

## 2018-12-24 RX ADMIN — INSULIN LISPRO 2 UNITS: 100 INJECTION, SOLUTION INTRAVENOUS; SUBCUTANEOUS at 21:58

## 2018-12-24 RX ADMIN — AZTREONAM 1 G: 1 INJECTION, POWDER, LYOPHILIZED, FOR SOLUTION INTRAMUSCULAR; INTRAVENOUS at 19:03

## 2018-12-24 NOTE — ED NOTES
Patient is being admitted. Inpatient patient doctor at bedside informing patient as to why she is being admitted. Patient verbalized understanding. Family members at bedside. Patient in bed watching tv, in stable condition. Waiting for bed assignment. Will continue to monitor.

## 2018-12-24 NOTE — ED NOTES
Patient received Rocephin and began to c/o eyes itching; Provider informed. Benadryl given as ordered.

## 2018-12-24 NOTE — ED NOTES
Pt with positive blood cultures. Called pt, verified ID x 2, pt feeling improved from yesterday. Informed of blood cultures and need to return to ED as soon as possible today for reevalution.   Aga Romo PA-C 10:22 AM

## 2018-12-24 NOTE — ED NOTES
Assumed care of patient. Patient presents to the ED for evaluation per doctor. Patient was seen at [facility] yesterday for a UTI. Patient in bed resting and in stable condition. Family member at bedside. Nurse at bedside obtaining line and labs. Assessment ongoing. Will continue to monitor.

## 2018-12-24 NOTE — ED PROVIDER NOTES
EMERGENCY DEPARTMENT HISTORY AND PHYSICAL EXAM    11:19 AM      Date: 12/24/2018  Patient Name: Jack Katz    History of Presenting Illness     No chief complaint on file. History Provided By: Patient      Additional History (Context): Jack Katz is a 48 y.o. female with diabetes, hypertension and anemia who came back today after she was called and informed that she had batceria in her blood. She was discharged from [facility] yesterday after she had constant moderate lower back pain for a few days which has since resolved. She also had a small cough. She is on Lisinopril and she took it today. She has only taken one of her abx she was started on yesterday. Pt does not smoke or drink. PCP: Debora Alarcon MD    Current Facility-Administered Medications   Medication Dose Route Frequency Provider Last Rate Last Dose    sodium chloride (NS) flush 5-10 mL  5-10 mL IntraVENous PRN Dayton Benítez PA-C   10 mL at 12/24/18 1456    cefepime (MAXIPIME) 2 g in sterile water (preservative free) 10 mL IV syringe  2 g IntraVENous Q24H Dayton Benítez PA-C   2 g at 12/24/18 1216    [START ON 12/25/2018] levoFLOXacin (LEVAQUIN) 750 mg in D5W IVPB  750 mg IntraVENous Q48H Dayton Benítez PA-C         Current Outpatient Medications   Medication Sig Dispense Refill    cefdinir (OMNICEF) 300 mg capsule Take 1 Cap by mouth two (2) times a day for 7 days. 14 Cap 0    acetaminophen (TYLENOL) 500 mg tablet Take 2 Tabs by mouth every six (6) hours as needed for Pain. 20 Tab 0    metFORMIN (GLUCOPHAGE) 500 mg tablet Take 2 Tabs by mouth two (2) times daily (with meals). Indications: TYPE 2 DIABETES MELLITUS 120 Tab 2    lisinopril-hydrochlorothiazide (PRINZIDE, ZESTORETIC) 20-25 mg per tablet Take 1 Tab by mouth daily. Indications: HYPERTENSION      amLODIPine (NORVASC) 10 mg tablet Take 10 mg by mouth daily.  Indications: HYPERTENSION      ferrous sulfate 325 mg (65 mg iron) tablet Take 325 mg by mouth two (2) times a day. Past History     Past Medical History:  Past Medical History:   Diagnosis Date    Anemia     Arthritis     Diabetes ([address]     Hypertension     ITP (idiopathic thrombocytopenic purpura)        Past Surgical History:  Past Surgical History:   Procedure Laterality Date    HX GYN      c section 1984    HX HYSTERECTOMY  2017    HX TUBAL LIGATION         Family History:  Family History   Problem Relation Age of Onset    Hypertension Mother     Cancer Father         Colon       Social History:  Social History     Tobacco Use    Smoking status: Never Smoker    Smokeless tobacco: Never Used   Substance Use Topics    Alcohol use: No    Drug use: No       Allergies: Allergies   Allergen Reactions    Rocephin [Ceftriaxone] Itching    Pcn [Penicillins] Itching         Review of Systems       Review of Systems   Constitutional: Negative. Negative for chills and fever. HENT: Negative for ear discharge, ear pain and hearing loss. Eyes: Negative. Negative for visual disturbance. Respiratory: Positive for cough. Negative for shortness of breath and wheezing. Cardiovascular: Negative. Negative for chest pain and leg swelling. Gastrointestinal: Negative. Negative for abdominal pain, diarrhea, nausea and vomiting. Genitourinary: Negative for dysuria and frequency. Musculoskeletal: Positive for back pain. Neurological: Negative. Negative for dizziness, numbness and headaches. Psychiatric/Behavioral: Negative. Negative for agitation and confusion. The patient is not nervous/anxious. All other systems reviewed and are negative. Physical Exam     Visit Vitals  /79   Pulse 98   Temp 98.5 °F (36.9 °C)   Resp 13   Wt 92.5 kg (204 lb)   LMP 06/18/2017   SpO2 99%   BMI 31.95 kg/m²         Physical Exam   Constitutional: She is oriented to person, place, and time. She appears well-developed and well-nourished.    HENT:   Head: Normocephalic and atraumatic. Eyes: EOM are normal. Pupils are equal, round, and reactive to light. Right eye exhibits no discharge. Left eye exhibits no discharge. Neck: Normal range of motion. Neck supple. No JVD present. No tracheal deviation present. Cardiovascular: Regular rhythm and normal heart sounds. Tachycardia present. No murmur heard. 2+ DP and 2+ radial pulse   Pulmonary/Chest: Effort normal. No tachypnea. No respiratory distress. She has wheezes. She has no rales. Abdominal: Soft. Bowel sounds are normal. She exhibits no distension. There is no tenderness. There is CVA tenderness (right). There is no rebound. Musculoskeletal: Normal range of motion. She exhibits no tenderness or deformity. Neurological: She is alert and oriented to person, place, and time. No cranial nerve deficit. 5/5 strength UE/LE, 5/5 sensation UE/LE     Skin: Skin is warm and dry. No rash noted. No erythema. Psychiatric: She has a normal mood and affect. Her behavior is normal.         Diagnostic Study Results     Labs -  Recent Results (from the past 12 hour(s))   METABOLIC PANEL, COMPREHENSIVE    Collection Time: 12/24/18 11:15 AM   Result Value Ref Range    Sodium 138 136 - 145 mmol/L    Potassium 4.2 3.5 - 5.5 mmol/L    Chloride 111 (H) 100 - 108 mmol/L    CO2 20 (L) 21 - 32 mmol/L    Anion gap 7 3.0 - 18 mmol/L    Glucose 239 (H) 74 - 99 mg/dL    BUN 39 (H) 7.0 - 18 MG/DL    Creatinine 3.70 (H) 0.6 - 1.3 MG/DL    BUN/Creatinine ratio 11 (L) 12 - 20      GFR est AA 16 (L) >60 ml/min/1.73m2    GFR est non-AA 13 (L) >60 ml/min/1.73m2    Calcium 7.4 (L) 8.5 - 10.1 MG/DL    Bilirubin, total 0.2 0.2 - 1.0 MG/DL    ALT (SGPT) 11 (L) 13 - 56 U/L    AST (SGOT) 14 (L) 15 - 37 U/L    Alk.  phosphatase 94 45 - 117 U/L    Protein, total 5.9 (L) 6.4 - 8.2 g/dL    Albumin 1.9 (L) 3.4 - 5.0 g/dL    Globulin 4.0 2.0 - 4.0 g/dL    A-G Ratio 0.5 (L) 0.8 - 1.7     CBC WITH AUTOMATED DIFF    Collection Time: 12/24/18 11:15 AM   Result Value Ref Range    WBC 7.0 4.6 - 13.2 K/uL    RBC 3.25 (L) 4.20 - 5.30 M/uL    HGB 8.7 (L) 12.0 - 16.0 g/dL    HCT 26.6 (L) 35.0 - 45.0 %    MCV 81.8 74.0 - 97.0 FL    MCH 26.8 24.0 - 34.0 PG    MCHC 32.7 31.0 - 37.0 g/dL    RDW 14.1 11.6 - 14.5 %    PLATELET 65 (L) 104 - 420 K/uL    NEUTROPHILS 84 (H) 40 - 73 %    LYMPHOCYTES 8 (L) 21 - 52 %    MONOCYTES 6 3 - 10 %    EOSINOPHILS 2 0 - 5 %    BASOPHILS 0 0 - 2 %    ABS. NEUTROPHILS 5.9 1.8 - 8.0 K/UL    ABS. LYMPHOCYTES 0.6 (L) 0.9 - 3.6 K/UL    ABS. MONOCYTES 0.4 0.05 - 1.2 K/UL    ABS. EOSINOPHILS 0.1 0.0 - 0.4 K/UL    ABS. BASOPHILS 0.0 0.0 - 0.1 K/UL    DF AUTOMATED      PLATELET COMMENTS DECREASED PLATELETS      RBC COMMENTS ANISOCYTOSIS  1+        RBC COMMENTS OVALOCYTES  1+        RBC COMMENTS TEARDROP CELLS  OCCASIONAL       POC LACTIC ACID    Collection Time: 12/24/18 11:22 AM   Result Value Ref Range    Lactic Acid (POC) 0.80 0.40 - 2.00 mmol/L       Radiologic Studies -   XR CHEST PORT   Final Result   Impression:      No radiographic evidence of acute cardiopulmonary process. Medical Decision Making   I am the first provider for this patient. I reviewed the vital signs, available nursing notes, past medical history, past surgical history, family history and social history. Vital Signs-Reviewed the patient's vital signs. Records Reviewed: Nursing Notes        ED Course: Progress Notes, Reevaluation, and Consults:      Provider Notes (Medical Decision Making):     MDM  Number of Diagnoses or Management Options  Acute kidney injury [facility]):   Pyelonephritis:   Diagnosis management comments: Diff dx: uti, pyelo, pna, bacteremia    Pt seen yesterday, had dirty urine, cx growing GNR>100,000 as likely source, blood cx positive, still febrile and tachy to 130s, will benefit from admission for IV abx. BP stable, no complaints, will sepsis alert and d/w hospitalist. Likely ok for obs admission.      3:41 PM  HR has come down to 90s, hospitalist will admit, cefepime and vanc ordered. Will d/w nephrology given her worsening SHAHANA, she will need to stop her lisinopril    Ramona Rendon MD            For Hospitalized Patients:    2. Hospitalization Decision Time:  The decision to hospitalize the patient was made by Dr. Mary Banuelos at [phone] on 12/24/2018        Diagnosis     Clinical Impression:   1. Pyelonephritis    2. Acute kidney injury ([address]        Disposition: home    Follow-up Information    None             Medication List      ASK your doctor about these medications    acetaminophen 500 mg tablet  Commonly known as:  TYLENOL  Take 2 Tabs by mouth every six (6) hours as needed for Pain. amLODIPine 10 mg tablet  Commonly known as:  NORVASC     cefdinir 300 mg capsule  Commonly known as:  OMNICEF  Take 1 Cap by mouth two (2) times a day for 7 days. ferrous sulfate 325 mg (65 mg iron) tablet     lisinopril-hydroCHLOROthiazide 20-25 mg per tablet  Commonly known as:  PRINZIDE, ZESTORETIC     metFORMIN 500 mg tablet  Commonly known as:  GLUCOPHAGE  Take 2 Tabs by mouth two (2) times daily (with meals). Indications: TYPE 2 DIABETES MELLITUS          _______________________________    Attestations:  [id number]  [id number] acting as a scribe for and in the presence of Janel Norris MD      December 24, 2018 at 11:27 AM       Provider Attestation:      I personally performed the services described in the documentation, reviewed the documentation, as recorded by the scribe in my presence, and it accurately and completely records my words and actions.  December 24, 2018 at 11:27 AM - Janel Norris MD    _______________________________

## 2018-12-24 NOTE — PROGRESS NOTES
conducted an initial consultation and Spiritual Assessment for Madison Borden, who is a 48 y.o.,female. Patients Primary Language is: [place]. According to the patients EMR Mormon Affiliation is: Orthodox. The reason the Patient came to the hospital is:   Patient Active Problem List    Diagnosis Date Noted    Bacteremia 12/24/2018    Menorrhagia 07/05/2017    Anemia in chronic kidney disease (CKD) 02/28/2017    Thrombocytopenia ([facility] 02/27/2015    Anemia due to blood loss, chronic 01/23/2015    Accelerated essential hypertension 01/23/2015    Menorrhagia with regular cycle 01/23/2015    Obesity (BMI 30.0-34.9) 01/23/2015    DM II (diabetes mellitus, type II), controlled ([facility] 01/23/2015        The  provided the following Interventions:  Initiated a relationship of care and support. Provided chaplaincy education. Provided information about Spiritual Care Services. Chart reviewed. The following outcomes where achieved:  Patient expressed gratitude for 's visit. Assessment:  Patient does not have any Protestant/cultural needs that will affect patients preferences in health care. There are no spiritual or Protestant issues which require intervention at this time. Plan:  Chaplains will continue to follow and will provide pastoral care on an as needed/requested basis.  recommends bedside caregivers page  on duty if patient shows signs of acute spiritual or emotional distress.     [id number] [facility]   [phone]

## 2018-12-24 NOTE — PROGRESS NOTES
Pt with positive blood cultures. Called pt, verified ID x 2, pt feeling improved from yesterday. Informed of blood cultures and need to return to ED as soon as possible today for reevalution. Pt agreed with plan.  Shin Baker PA-C 10:22 AM

## 2018-12-24 NOTE — ED TRIAGE NOTES
\"I was at [facility] yesterday because my back, neck and stomach was hurting, they prescribed antibiotics for me. Today they called me and told me to come to the ER because one of my cultures came back positive. \"

## 2018-12-24 NOTE — ED NOTES
Positive blood cultures from ED visit yesterday, called in to return to ED. Febrile and tachy upon arrival, sepsis work up ordered from triage. Patient is well appearing. Taking cefdinir currently. I performed a brief evaluation, including history and physical, of the patient here in triage and I have determined that pt will need further treatment and evaluation from the main side ER physician. I have placed initial orders to help in expediting patients care.      December 24, 2018 at 11:10 AM - Mary Jo Benítez PA-C        Visit Vitals  /83 (BP 1 Location: Left arm, BP Patient Position: At rest)   Pulse (!) 130   Temp (!) 101.1 °F (38.4 °C)   Resp 16   Wt 92.5 kg (204 lb)   SpO2 100%   BMI 31.95 kg/m²

## 2018-12-24 NOTE — ED NOTES
Received report from off-going-shift RN (Genevieve)and assumed care of patient. Call bell with reach. Awaiting patient return from [address] [place] [address] for antibiotic infusion.

## 2018-12-24 NOTE — ED NOTES
Medication teaching given on Vancomycin and NS bolus. Patient verbalized understanding of. Encouraged patient to voice any concerns with reassurance provided.

## 2018-12-24 NOTE — H&P
History & Physical    Patient: Saw Floyd MRN: [id number]  CSN: [id number]    YOB: 1965  Age: 48 y.o. Sex: female      DOA: 12/24/2018    Back pain and chills of 2 days       HPI:     48 y.o. female with diabetes, hypertension and anemia who came back today after she was called and informed that she had batceria in her blood. She was discharged from [facility] yesterday after she had constant moderate lower back pain for a few days. She is on Lisinopril and she took it today. She states she had been having chills but no objective fevers. No urinary frequency or dysuria. No blood in the urine. NO previous history of UTI or stone disease. She has a history of ITP and is on monthly IVIG infusions. Never had a platelet transfusion. Not on steroids. Patient is pain free at this time. Has received one dose of cefepime in the ER and appears to have tolerated it well. However she states that on her last visit to the ED yesterday she was given Rocephin which caused her to have a pruritic rash around the eyes. She also has a documentation of penicillin allergy in the system.       Past Medical History:   Diagnosis Date    Anemia     Arthritis     Diabetes ([address]     Hypertension     ITP (idiopathic thrombocytopenic purpura)        Past Surgical History:   Procedure Laterality Date    HX GYN      c section 1984    HX HYSTERECTOMY  2017    HX TUBAL LIGATION         Family History   Problem Relation Age of Onset    Hypertension Mother     Cancer Father         Colon       Social History     Socioeconomic History    Marital status: SINGLE     Spouse name: Not on file    Number of children: Not on file    Years of education: Not on file    Highest education level: Not on file   Tobacco Use    Smoking status: Never Smoker    Smokeless tobacco: Never Used   Substance and Sexual Activity    Alcohol use: No    Drug use: No       Prior to Admission medications    Medication Sig Start Date End Date Taking? Authorizing Provider   cefdinir (OMNICEF) 300 mg capsule Take 1 Cap by mouth two (2) times a day for 7 days. 12/23/18 12/30/18  Kianna Ornelas PA-C   acetaminophen (TYLENOL) 500 mg tablet Take 2 Tabs by mouth every six (6) hours as needed for Pain. 12/23/18   Kianna Ornelas PA-C   metFORMIN (GLUCOPHAGE) 500 mg tablet Take 2 Tabs by mouth two (2) times daily (with meals). Indications: TYPE 2 DIABETES MELLITUS 7/31/16   Jabier Camarena MD   lisinopril-hydrochlorothiazide (PRINZIDE, ZESTORETIC) 20-25 mg per tablet Take 1 Tab by mouth daily. Indications: HYPERTENSION    Provider, Historical   amLODIPine (NORVASC) 10 mg tablet Take 10 mg by mouth daily. Indications: HYPERTENSION    Provider, Historical   ferrous sulfate 325 mg (65 mg iron) tablet Take 325 mg by mouth two (2) times a day. Provider, Historical       Allergies   Allergen Reactions    Rocephin [Ceftriaxone] Itching    Pcn [Penicillins] Itching       Review of Systems  GENERAL: SEE HPI  HEENT: No change in vision, no earache, tinnitus, sore throat or sinus congestion. NECK: No pain or stiffness. CARDIOVASCULAR: No chest pain or pressure. No palpitations. PULMONARY: No shortness of breath, cough or wheeze. GASTROINTESTINAL: No abdominal pain, nausea, vomiting or diarrhea, melena or       bright red blood per rectum. GENITOURINARY: SEE HPI   MUSCULOSKELETAL: No joint or muscle pain, no back pain, no recent trauma. DERMATOLOGIC: No rash, no itching, no lesions. ENDOCRINE: No polyuria, polydipsia, no heat or cold intolerance. No recent change in    weight. HEMATOLOGICAL: No anemia or easy bruising or bleeding. NEUROLOGIC: No headache, seizures, numbness, tingling or weakness. PSYCHIATRIC: No depression, anxiety, mood disorder, no loss of interest in normal       activity or change in sleep pattern.         Physical Exam:     Physical Exam:  Visit Vitals  /79   Pulse 98   Temp 98.5 °F (36.9 °C) Resp 13   Wt 92.5 kg (204 lb)   LMP 2017   SpO2 99%   BMI 31.95 kg/m²      O2 Device: Room air    Temp (24hrs), Av.8 °F (37.7 °C), Min:98.5 °F (36.9 °C), Max:101.1 °F (38.4 °C)        0701 -  1900  In: 1000 [I.V.:1000]  Out: -      No intake/output data recorded. General:  Alert, cooperative, no distress, appears stated age. Head:  Normocephalic, without obvious abnormality, atraumatic. Eyes:  Conjunctivae/corneas clear. PERRL, EOMs intact. Nose: Nares normal. No drainage or sinus tenderness. Throat: Lips, mucosa, and tongue normal. Teeth and gums normal.   Neck: Supple, symmetrical, trachea midline, no adenopathy, thyroid: no enlargement/tenderness/nodules, no carotid bruit and no JVD. Back:   ROM normal. No CVA tenderness. Lungs:   Clear to auscultation bilaterally. Chest wall:  No tenderness or deformity. Heart:  Regular rate and rhythm, S1, S2 normal, no murmur, click, rub or gallop. Abdomen: Soft, non-tender. Bowel sounds normal. No masses,  No organomegaly. No CVA or suprapubic tenderness   Extremities: Extremities normal, atraumatic, no cyanosis or edema. Pulses: 2+ and symmetric all extremities. Skin: Skin color, texture, turgor normal. No rashes or lesions   Neurologic: CNII-XII intact. No focal motor or sensory deficit. Labs Reviewed:    Recent Results (from the past 24 hour(s))   CULTURE, BLOOD    Collection Time: 18  4:25 PM   Result Value Ref Range    Special Requests: NO SPECIAL REQUESTS      GRAM STAIN ANAEROBIC BOTTLE GRAM NEGATIVE RODS      GRAM STAIN        SMEAR CALLED TO AND CORRECTLY REPEATED BY: MARYAN GRACIA RN [facility] ED 18 1017 TO LAE    Culture result: CULTURE IN PROGRESS,FURTHER UPDATES TO FOLLOW     METABOLIC PANEL, COMPREHENSIVE    Collection Time: 18  4:25 PM   Result Value Ref Range    Sodium 137 136 - 145 mmol/L    Potassium 4.4 3.5 - 5.5 mmol/L    Chloride 103 100 - 108 mmol/L    CO2 22 21 - 32 mmol/L    Anion gap 12 3.0 - 18 mmol/L    Glucose 288 (H) 74 - 99 mg/dL    BUN 44 (H) 7.0 - 18 MG/DL    Creatinine 3.57 (H) 0.6 - 1.3 MG/DL    BUN/Creatinine ratio 12 12 - 20      GFR est AA 16 (L) >60 ml/min/1.73m2    GFR est non-AA 13 (L) >60 ml/min/1.73m2    Calcium 9.0 8.5 - 10.1 MG/DL    Bilirubin, total 0.3 0.2 - 1.0 MG/DL    ALT (SGPT) 18 13 - 56 U/L    AST (SGOT) 15 15 - 37 U/L    Alk. phosphatase 118 (H) 45 - 117 U/L    Protein, total 8.0 6.4 - 8.2 g/dL    Albumin 2.3 (L) 3.4 - 5.0 g/dL    Globulin 5.7 (H) 2.0 - 4.0 g/dL    A-G Ratio 0.4 (L) 0.8 - 1.7     CBC WITH AUTOMATED DIFF    Collection Time: 12/23/18  4:25 PM   Result Value Ref Range    WBC 8.8 4.6 - 13.2 K/uL    RBC 3.77 (L) 4.20 - 5.30 M/uL    HGB 10.2 (L) 12.0 - 16.0 g/dL    HCT 31.5 (L) 35.0 - 45.0 %    MCV 83.6 74.0 - 97.0 FL    MCH 27.1 24.0 - 34.0 PG    MCHC 32.4 31.0 - 37.0 g/dL    RDW 14.0 11.6 - 14.5 %    PLATELET 63 (L) 471 - 420 K/uL    NEUTROPHILS 86 (H) 40 - 73 %    LYMPHOCYTES 7 (L) 21 - 52 %    MONOCYTES 5 3 - 10 %    EOSINOPHILS 2 0 - 5 %    BASOPHILS 0 0 - 2 %    ABS. NEUTROPHILS 7.6 1.8 - 8.0 K/UL    ABS. LYMPHOCYTES 0.6 (L) 0.9 - 3.6 K/UL    ABS. MONOCYTES 0.4 0.05 - 1.2 K/UL    ABS. EOSINOPHILS 0.2 0.0 - 0.4 K/UL    ABS.  BASOPHILS 0.0 0.0 - 0.1 K/UL    DF AUTOMATED      PLATELET COMMENTS DECREASED PLATELETS      RBC COMMENTS NORMOCYTIC, NORMOCHROMIC     EKG, 12 LEAD, INITIAL    Collection Time: 12/23/18  4:25 PM   Result Value Ref Range    Ventricular Rate 131 BPM    Atrial Rate 131 BPM    P-R Interval 142 ms    QRS Duration 90 ms    Q-T Interval 306 ms    QTC Calculation (Bezet) 451 ms    Calculated P Axis 73 degrees    Calculated R Axis 50 degrees    Calculated T Axis 95 degrees    Diagnosis       Sinus tachycardia  Nonspecific T wave abnormality  Abnormal ECG  When compared with ECG of 31-JUL-2016 11:44,  No significant change was found  Confirmed by Samira Park MD, Aliza Suarez [id number]) on 12/24/2018 10:28:31 AM     CULTURE, BLOOD    Collection Time: 12/23/18 4:30 PM   Result Value Ref Range    Special Requests: NO SPECIAL REQUESTS      GRAM STAIN ANAEROBIC BOTTLE GRAM NEGATIVE RODS      GRAM STAIN        SMEAR CALLED TO AND CORRECTLY REPEATED BY: MARYAN GRACIA RN [facility] ED 12/14/18 1019 TO LAE    Culture result: CULTURE IN PROGRESS,FURTHER UPDATES TO FOLLOW     INFLUENZA A & B AG (RAPID TEST)    Collection Time: 12/23/18  4:30 PM   Result Value Ref Range    Influenza A Antigen NEGATIVE  NEG      Influenza B Antigen NEGATIVE  NEG     POC LACTIC ACID    Collection Time: 12/23/18  4:38 PM   Result Value Ref Range    Lactic Acid (POC) 1.45 0.40 - 2.00 mmol/L   URINALYSIS W/ RFLX MICROSCOPIC    Collection Time: 12/23/18  6:00 PM   Result Value Ref Range    Color YELLOW      Appearance CLOUDY      Specific gravity 1.018 1.005 - 1.030      pH (UA) 6.0 5.0 - 8.0      Protein >1,000 (A) NEG mg/dL    Glucose 500 (A) NEG mg/dL    Ketone NEGATIVE  NEG mg/dL    Bilirubin NEGATIVE  NEG      Blood LARGE (A) NEG      Urobilinogen 0.2 0.2 - 1.0 EU/dL    Nitrites NEGATIVE  NEG      Leukocyte Esterase MODERATE (A) NEG     URINE MICROSCOPIC ONLY    Collection Time: 12/23/18  6:00 PM   Result Value Ref Range    WBC TOO NUMEROUS TO COUNT 0 - 4 /hpf    RBC 4 to 10 0 - 5 /hpf    Epithelial cells 2+ 0 - 5 /lpf    Bacteria 4+ (A) NEG /hpf   CULTURE, URINE    Collection Time: 12/23/18  6:00 PM   Result Value Ref Range    Special Requests: NO SPECIAL REQUESTS      Culture result: >100,000 COLONIES/mL GRAM NEGATIVE RODS (A)     METABOLIC PANEL, COMPREHENSIVE    Collection Time: 12/24/18 11:15 AM   Result Value Ref Range    Sodium 138 136 - 145 mmol/L    Potassium 4.2 3.5 - 5.5 mmol/L    Chloride 111 (H) 100 - 108 mmol/L    CO2 20 (L) 21 - 32 mmol/L    Anion gap 7 3.0 - 18 mmol/L    Glucose 239 (H) 74 - 99 mg/dL    BUN 39 (H) 7.0 - 18 MG/DL    Creatinine 3.70 (H) 0.6 - 1.3 MG/DL    BUN/Creatinine ratio 11 (L) 12 - 20      GFR est AA 16 (L) >60 ml/min/1.73m2    GFR est non-AA 13 (L) >60 ml/min/1.73m2 Calcium 7.4 (L) 8.5 - 10.1 MG/DL    Bilirubin, total 0.2 0.2 - 1.0 MG/DL    ALT (SGPT) 11 (L) 13 - 56 U/L    AST (SGOT) 14 (L) 15 - 37 U/L    Alk. phosphatase 94 45 - 117 U/L    Protein, total 5.9 (L) 6.4 - 8.2 g/dL    Albumin 1.9 (L) 3.4 - 5.0 g/dL    Globulin 4.0 2.0 - 4.0 g/dL    A-G Ratio 0.5 (L) 0.8 - 1.7     CBC WITH AUTOMATED DIFF    Collection Time: 12/24/18 11:15 AM   Result Value Ref Range    WBC 7.0 4.6 - 13.2 K/uL    RBC 3.25 (L) 4.20 - 5.30 M/uL    HGB 8.7 (L) 12.0 - 16.0 g/dL    HCT 26.6 (L) 35.0 - 45.0 %    MCV 81.8 74.0 - 97.0 FL    MCH 26.8 24.0 - 34.0 PG    MCHC 32.7 31.0 - 37.0 g/dL    RDW 14.1 11.6 - 14.5 %    PLATELET 65 (L) 913 - 420 K/uL    NEUTROPHILS 84 (H) 40 - 73 %    LYMPHOCYTES 8 (L) 21 - 52 %    MONOCYTES 6 3 - 10 %    EOSINOPHILS 2 0 - 5 %    BASOPHILS 0 0 - 2 %    ABS. NEUTROPHILS 5.9 1.8 - 8.0 K/UL    ABS. LYMPHOCYTES 0.6 (L) 0.9 - 3.6 K/UL    ABS. MONOCYTES 0.4 0.05 - 1.2 K/UL    ABS. EOSINOPHILS 0.1 0.0 - 0.4 K/UL    ABS. BASOPHILS 0.0 0.0 - 0.1 K/UL    DF AUTOMATED      PLATELET COMMENTS DECREASED PLATELETS      RBC COMMENTS ANISOCYTOSIS  1+        RBC COMMENTS OVALOCYTES  1+        RBC COMMENTS TEARDROP CELLS  OCCASIONAL       POC LACTIC ACID    Collection Time: 12/24/18 11:22 AM   Result Value Ref Range    Lactic Acid (POC) 0.80 0.40 - 2.00 mmol/L       Procedures/imaging: see electronic medical records for all procedures/Xrays and details which were not copied into this note but were reviewed prior to creation of Plan        Assessment/Plan     1. Sepsis due to gram negative bacteremia  - identification, C/S pending  - will use aztreonam in this patient with penicillin allergy and skin reaction to Rocephin until C/S available. Hospital antibiogram shows levaquin susceptibility to E Coli at 67% only. [place] is the commonest organism causing UTI    2. Complicated UTI  In this patient with diabetes  - evidence of bacteremia, positive blood cultures.      3. SHAHANA on CKD 3  Elevated creatinine from baseline  Could be due to infectious process or prerenal  IVF hydration and nephrology consult     4. Hypertension  We will hold lisinopril for SHAHANA, hold other antihypertensives for now in this patient with sepsis and low normal BP  Will resume upon discharge, monitor BP in the meantime    5. Type 2 Diabetes  On Metformin at home which will be held (SHAHANA)  Low dose correctional sliding scale ordered  Calorie controlled diet    6. Hx of ITP  - stable platelet count, no bleeding, no acute intervention    7.  DVT PPx  Mechanical prophylaxis    Active Problems:    Bacteremia (12/24/2018)        Babita Wick MD  December 24, 2018

## 2018-12-25 LAB
BACTERIA SPEC CULT: ABNORMAL
GLUCOSE BLD STRIP.AUTO-MCNC: 117 MG/DL (ref 70–110)
GLUCOSE BLD STRIP.AUTO-MCNC: 162 MG/DL (ref 70–110)
GLUCOSE BLD STRIP.AUTO-MCNC: 165 MG/DL (ref 70–110)
GLUCOSE BLD STRIP.AUTO-MCNC: 170 MG/DL (ref 70–110)
SERVICE CMNT-IMP: ABNORMAL

## 2018-12-25 PROCEDURE — 82962 GLUCOSE BLOOD TEST: CPT

## 2018-12-25 PROCEDURE — 74011250636 HC RX REV CODE- 250/636: Performed by: HOSPITALIST

## 2018-12-25 PROCEDURE — 74011250637 HC RX REV CODE- 250/637: Performed by: HOSPITALIST

## 2018-12-25 PROCEDURE — 74011250636 HC RX REV CODE- 250/636: Performed by: INTERNAL MEDICINE

## 2018-12-25 PROCEDURE — 74011636637 HC RX REV CODE- 636/637: Performed by: HOSPITALIST

## 2018-12-25 PROCEDURE — 65270000029 HC RM PRIVATE

## 2018-12-25 PROCEDURE — 74011000258 HC RX REV CODE- 258: Performed by: HOSPITALIST

## 2018-12-25 RX ORDER — CIPROFLOXACIN 2 MG/ML
200 INJECTION, SOLUTION INTRAVENOUS EVERY 12 HOURS
Status: DISCONTINUED | OUTPATIENT
Start: 2018-12-25 | End: 2018-12-26 | Stop reason: HOSPADM

## 2018-12-25 RX ORDER — HYDRALAZINE HYDROCHLORIDE 20 MG/ML
10 INJECTION INTRAMUSCULAR; INTRAVENOUS
Status: DISCONTINUED | OUTPATIENT
Start: 2018-12-25 | End: 2018-12-26 | Stop reason: HOSPADM

## 2018-12-25 RX ORDER — SODIUM CHLORIDE 9 MG/ML
75 INJECTION, SOLUTION INTRAVENOUS CONTINUOUS
Status: DISCONTINUED | OUTPATIENT
Start: 2018-12-25 | End: 2018-12-26 | Stop reason: HOSPADM

## 2018-12-25 RX ADMIN — AZTREONAM 1 G: 1 INJECTION, POWDER, LYOPHILIZED, FOR SOLUTION INTRAMUSCULAR; INTRAVENOUS at 01:50

## 2018-12-25 RX ADMIN — INSULIN LISPRO 2 UNITS: 100 INJECTION, SOLUTION INTRAVENOUS; SUBCUTANEOUS at 17:25

## 2018-12-25 RX ADMIN — INSULIN LISPRO 2 UNITS: 100 INJECTION, SOLUTION INTRAVENOUS; SUBCUTANEOUS at 08:45

## 2018-12-25 RX ADMIN — AZTREONAM 1 G: 1 INJECTION, POWDER, LYOPHILIZED, FOR SOLUTION INTRAMUSCULAR; INTRAVENOUS at 10:53

## 2018-12-25 RX ADMIN — CIPROFLOXACIN 200 MG: 2 INJECTION, SOLUTION INTRAVENOUS at 22:37

## 2018-12-25 RX ADMIN — INSULIN LISPRO 2 UNITS: 100 INJECTION, SOLUTION INTRAVENOUS; SUBCUTANEOUS at 11:35

## 2018-12-25 RX ADMIN — SODIUM CHLORIDE 75 ML/HR: 900 INJECTION, SOLUTION INTRAVENOUS at 17:25

## 2018-12-25 RX ADMIN — FERROUS SULFATE TAB 325 MG (65 MG ELEMENTAL FE) 325 MG: 325 (65 FE) TAB at 08:45

## 2018-12-25 RX ADMIN — AZTREONAM 1 G: 1 INJECTION, POWDER, LYOPHILIZED, FOR SOLUTION INTRAMUSCULAR; INTRAVENOUS at 17:25

## 2018-12-25 NOTE — PROGRESS NOTES
Acute/crf stage 3 related to urosepsis,acei  Stop acei,give iv cipro,ivf,renal ultrasound  Full consult to follow

## 2018-12-25 NOTE — PROGRESS NOTES
Progress Note    Follow up visit for : Gram Negative Bacteremia. Subjective:   Patient was seen and examined in her room.  in the room. + fever of 101.1 last night. No chills. No chest pain. No SOB. No N/V. No dysuria. No hematuria. ROS :   Positive findings mentioned in HPI. All other systems reviewed and are negative. Physical Examination:     Visit Vitals  /83 (BP 1 Location: Left arm, BP Patient Position: At rest)   Pulse 96   Temp 98.6 °F (37 °C)   Resp 20   Wt 93.6 kg (206 lb 6.4 oz)   LMP 06/18/2017   SpO2 99%   Breastfeeding? No   BMI 32.33 kg/m²       General Appearance:  Alert, cooperative, no distress, appears stated age   Head:  Normocephalic, without obvious abnormality, atraumatic   Eyes:  PERRL, conjunctiva/corneas clear, EOM's intact, anicteric sclerae   Ears:  Normal  external ear canals, both ears   Throat: Lips, mucosa, and tongue normal; teeth and gums normal   Neck:  No carotid bruit or JVD, no thyromegaly   Lungs:   Clear to auscultation bilaterally,symmetrical expansion    Heart:  Regular rate and rhythm, S1 and S2 normal, no murmur, rub, or gallop, POMI nondisplaced   Abdomen:   Soft, non-tender non-distended, bowel sounds active all four quadrants,  no masses, no organomegaly   Extremities: Extremities normal, atraumatic, no cyanosis, clubbing or edema   Pulses: 2+ and symmetric   Skin: Skin color, texture, turgor normal, no rashes or lesions   Neurologic: Normal sensation, cranial nerves grossly intact           Assessment and Plan:  1. Gram Negative Bacteremia : Continue IV antibiotic. Awaiting for final result of repeat BC on 12/24/18. 2. Essential HTN: Cover with Hydralazine IV PRN. 3. DM-II: Correctional Insuline Scale. 4. Anemia of chronic disease: Stable. Monitor CBC. 5. ITP : She is on monthly IVIG infusion: Monitor Platelet. She is Full Code.

## 2018-12-26 ENCOUNTER — APPOINTMENT (OUTPATIENT)
Dept: ULTRASOUND IMAGING | Age: 53
DRG: 872 | End: 2018-12-26
Attending: INTERNAL MEDICINE
Payer: COMMERCIAL

## 2018-12-26 VITALS
HEART RATE: 87 BPM | SYSTOLIC BLOOD PRESSURE: 178 MMHG | BODY MASS INDEX: 33.36 KG/M2 | WEIGHT: 213 LBS | DIASTOLIC BLOOD PRESSURE: 83 MMHG | RESPIRATION RATE: 18 BRPM | TEMPERATURE: 97.8 F | OXYGEN SATURATION: 100 %

## 2018-12-26 LAB
ALBUMIN SERPL-MCNC: 1.5 G/DL (ref 3.4–5)
ALBUMIN/GLOB SERPL: 0.3 {RATIO} (ref 0.8–1.7)
ALP SERPL-CCNC: 73 U/L (ref 45–117)
ALT SERPL-CCNC: 13 U/L (ref 13–56)
ANION GAP SERPL CALC-SCNC: 6 MMOL/L (ref 3–18)
ANION GAP SERPL CALC-SCNC: 9 MMOL/L (ref 3–18)
AST SERPL-CCNC: 15 U/L (ref 15–37)
BACTERIA SPEC CULT: ABNORMAL
BACTERIA SPEC CULT: ABNORMAL
BASOPHILS # BLD: 0 K/UL (ref 0–0.1)
BASOPHILS NFR BLD: 0 % (ref 0–2)
BILIRUB SERPL-MCNC: 0.1 MG/DL (ref 0.2–1)
BUN SERPL-MCNC: 33 MG/DL (ref 7–18)
BUN SERPL-MCNC: 34 MG/DL (ref 7–18)
BUN/CREAT SERPL: 11 (ref 12–20)
BUN/CREAT SERPL: 11 (ref 12–20)
CALCIUM SERPL-MCNC: 7.4 MG/DL (ref 8.5–10.1)
CALCIUM SERPL-MCNC: 7.6 MG/DL (ref 8.5–10.1)
CHLORIDE SERPL-SCNC: 117 MMOL/L (ref 100–108)
CHLORIDE SERPL-SCNC: 118 MMOL/L (ref 100–108)
CO2 SERPL-SCNC: 18 MMOL/L (ref 21–32)
CO2 SERPL-SCNC: 22 MMOL/L (ref 21–32)
CREAT SERPL-MCNC: 2.94 MG/DL (ref 0.6–1.3)
CREAT SERPL-MCNC: 3.12 MG/DL (ref 0.6–1.3)
DIFFERENTIAL METHOD BLD: ABNORMAL
EOSINOPHIL # BLD: 0.2 K/UL (ref 0–0.4)
EOSINOPHIL NFR BLD: 7 % (ref 0–5)
ERYTHROCYTE [DISTWIDTH] IN BLOOD BY AUTOMATED COUNT: 14.2 % (ref 11.6–14.5)
GLOBULIN SER CALC-MCNC: 4.6 G/DL (ref 2–4)
GLUCOSE BLD STRIP.AUTO-MCNC: 122 MG/DL (ref 70–110)
GLUCOSE SERPL-MCNC: 120 MG/DL (ref 74–99)
GLUCOSE SERPL-MCNC: 164 MG/DL (ref 74–99)
GRAM STN SPEC: ABNORMAL
HCT VFR BLD AUTO: 23.4 % (ref 35–45)
HGB BLD-MCNC: 7.7 G/DL (ref 12–16)
LYMPHOCYTES # BLD: 0.7 K/UL (ref 0.9–3.6)
LYMPHOCYTES NFR BLD: 23 % (ref 21–52)
MAGNESIUM SERPL-MCNC: 1.7 MG/DL (ref 1.6–2.6)
MCH RBC QN AUTO: 27 PG (ref 24–34)
MCHC RBC AUTO-ENTMCNC: 32.9 G/DL (ref 31–37)
MCV RBC AUTO: 82.1 FL (ref 74–97)
MONOCYTES # BLD: 0.3 K/UL (ref 0.05–1.2)
MONOCYTES NFR BLD: 8 % (ref 3–10)
NEUTS SEG # BLD: 2 K/UL (ref 1.8–8)
NEUTS SEG NFR BLD: 62 % (ref 40–73)
PHOSPHATE SERPL-MCNC: 3.6 MG/DL (ref 2.5–4.9)
PLATELET # BLD AUTO: 58 K/UL (ref 135–420)
POTASSIUM SERPL-SCNC: 4.1 MMOL/L (ref 3.5–5.5)
POTASSIUM SERPL-SCNC: 4.3 MMOL/L (ref 3.5–5.5)
PROT SERPL-MCNC: 6.1 G/DL (ref 6.4–8.2)
RBC # BLD AUTO: 2.85 M/UL (ref 4.2–5.3)
SERVICE CMNT-IMP: ABNORMAL
SERVICE CMNT-IMP: ABNORMAL
SODIUM SERPL-SCNC: 144 MMOL/L (ref 136–145)
SODIUM SERPL-SCNC: 146 MMOL/L (ref 136–145)
WBC # BLD AUTO: 3.2 K/UL (ref 4.6–13.2)

## 2018-12-26 PROCEDURE — 74011250637 HC RX REV CODE- 250/637: Performed by: INTERNAL MEDICINE

## 2018-12-26 PROCEDURE — 84100 ASSAY OF PHOSPHORUS: CPT

## 2018-12-26 PROCEDURE — 83735 ASSAY OF MAGNESIUM: CPT

## 2018-12-26 PROCEDURE — 85025 COMPLETE CBC W/AUTO DIFF WBC: CPT

## 2018-12-26 PROCEDURE — 76770 US EXAM ABDO BACK WALL COMP: CPT

## 2018-12-26 PROCEDURE — 82962 GLUCOSE BLOOD TEST: CPT

## 2018-12-26 PROCEDURE — 36415 COLL VENOUS BLD VENIPUNCTURE: CPT

## 2018-12-26 PROCEDURE — 80053 COMPREHEN METABOLIC PANEL: CPT

## 2018-12-26 RX ORDER — CARVEDILOL 6.25 MG/1
6.25 TABLET ORAL 2 TIMES DAILY WITH MEALS
Qty: 30 TAB | Refills: 0 | Status: SHIPPED | OUTPATIENT
Start: 2018-12-26 | End: 2019-06-11

## 2018-12-26 RX ORDER — CIPROFLOXACIN 500 MG/1
500 TABLET ORAL 2 TIMES DAILY
Qty: 10 TAB | Refills: 0 | Status: SHIPPED | OUTPATIENT
Start: 2018-12-26 | End: 2018-12-31

## 2018-12-26 RX ORDER — CEFUROXIME AXETIL 250 MG/1
250 TABLET ORAL ONCE
Status: COMPLETED | OUTPATIENT
Start: 2018-12-26 | End: 2018-12-26

## 2018-12-26 RX ORDER — AMLODIPINE BESYLATE 10 MG/1
10 TABLET ORAL DAILY
Status: DISCONTINUED | OUTPATIENT
Start: 2018-12-26 | End: 2018-12-26 | Stop reason: HOSPADM

## 2018-12-26 RX ADMIN — AMLODIPINE BESYLATE 10 MG: 10 TABLET ORAL at 11:33

## 2018-12-26 RX ADMIN — CEFUROXIME AXETIL 250 MG: 250 TABLET ORAL at 11:33

## 2018-12-26 NOTE — DISCHARGE INSTRUCTIONS
DISCHARGE SUMMARY from Nurse    PATIENT INSTRUCTIONS:    After general anesthesia or intravenous sedation, for 24 hours or while taking prescription Narcotics:  · Limit your activities  · Do not drive and operate hazardous machinery  · Do not make important personal or business decisions  · Do  not drink alcoholic beverages  · If you have not urinated within 8 hours after discharge, please contact your surgeon on call. Report the following to your surgeon:  · Excessive pain, swelling, redness or odor of or around the surgical area  · Temperature over 100.5  · Nausea and vomiting lasting longer than 4 hours or if unable to take medications  · Any signs of decreased circulation or nerve impairment to extremity: change in color, persistent  numbness, tingling, coldness or increase pain  · Any questions    What to do at Home:  Recommended activity: Activity as tolerated    If you experience any of the following symptoms lower back pain, painful urination, abdominal pain please follow up with primary care physician. *  Please give a list of your current medications to your Primary Care Provider. *  Please update this list whenever your medications are discontinued, doses are      changed, or new medications (including over-the-counter products) are added. *  Please carry medication information at all times in case of emergency situations. These are general instructions for a healthy lifestyle:    No smoking/ No tobacco products/ Avoid exposure to second hand smoke  Surgeon General's Warning:  Quitting smoking now greatly reduces serious risk to your health.     Obesity, smoking, and sedentary lifestyle greatly increases your risk for illness    A healthy diet, regular physical exercise & weight monitoring are important for maintaining a healthy lifestyle    You may be retaining fluid if you have a history of heart failure or if you experience any of the following symptoms:  Weight gain of 3 pounds or more overnight or 5 pounds in a week, increased swelling in our hands or feet or shortness of breath while lying flat in bed. Please call your doctor as soon as you notice any of these symptoms; do not wait until your next office visit. Recognize signs and symptoms of STROKE:    F-face looks uneven    A-arms unable to move or move unevenly    S-speech slurred or non-existent    T-time-call 911 as soon as signs and symptoms begin-DO NOT go       Back to bed or wait to see if you get better-TIME IS BRAIN. Warning Signs of HEART ATTACK     Call 911 if you have these symptoms:   Chest discomfort. Most heart attacks involve discomfort in the center of the chest that lasts more than a few minutes, or that goes away and comes back. It can feel like uncomfortable pressure, squeezing, fullness, or pain.  Discomfort in other areas of the upper body. Symptoms can include pain or discomfort in one or both arms, the back, neck, jaw, or stomach.  Shortness of breath with or without chest discomfort.  Other signs may include breaking out in a cold sweat, nausea, or lightheadedness. Don't wait more than five minutes to call 911 - MINUTES MATTER! Fast action can save your life. Calling 911 is almost always the fastest way to get lifesaving treatment. Emergency Medical Services staff can begin treatment when they arrive -- up to an hour sooner than if someone gets to the hospital by car. The discharge information has been reviewed with the patient and spouse. The patient and spouse verbalized understanding. Discharge medications reviewed with the patient and spouse and appropriate educational materials and side effects teaching were provided.   ___________________________________________________________________________________________________________________________________      URINARY TRACT INFECTION DISCHARGE INSTRUCTIONS    Name: Cheryle Pinna  YOB: 1965  Primary Diagnosis: Active Problems: Bacteremia (12/24/2018)        Introduction: You came to the hospital because you had one or some of the following symptoms: pain, tenderness in the bladder area, fever, frequent or painful urination, burning on urination or uterine irritability. Based on your evaluation, you have been diagnosed with a bladder infection. Prescribed medication and instructions follow to treat the infection. You are now ready to be sent home. In general, you should remember:     Empty your bladder at least every 2-3 hours. An over distended bladder can lead to infection.  Wipe front to back after urination, avoid wiping back to front as you may transfer bacteria from the rectum to the urethra (opening to the bladder).  Drink 8-10 glasses of water daily.  Take medication as prescribed. Take all of the medicine even though you start to feel better.  Know that bladder infections are the most common cause of pre-term labor. Discharge Instructions/ Special Treatment/ Home Care Needs:     Call your provider if:   Your symptoms dont improve 24-36 hours after starting medication.  Pelvic pressure continues after 24-36 hours of treatment with medication.  You develop low backache along with pelvic pressure.  You develop cramps or contractions-more frequent than 8 in one hour or four in twenty minutes.  Your baby is not moving as much as usual-at least 4 fetal movements in 1 hour after drinking and resting on your side for one hour.  Other:          Learning About Diabetes Food Guidelines  Your Care Instructions    Meal planning is important to manage diabetes. It helps keep your blood sugar at a target level (which you set with your doctor). You don't have to eat special foods. You can eat what your family eats, including sweets once in a while. But you do have to pay attention to how often you eat and how much you eat of certain foods.   You may want to work with a dietitian or a certified diabetes educator (CDE) to help you plan meals and snacks. A dietitian or CDE can also help you lose weight if that is one of your goals. What should you know about eating carbs? Managing the amount of carbohydrate (carbs) you eat is an important part of healthy meals when you have diabetes. Carbohydrate is found in many foods. · Learn which foods have carbs. And learn the amounts of carbs in different foods. ? Bread, cereal, pasta, and rice have about 15 grams of carbs in a serving. A serving is 1 slice of bread (1 ounce), ½ cup of cooked cereal, or 1/3 cup of cooked pasta or rice. ? Fruits have 15 grams of carbs in a serving. A serving is 1 small fresh fruit, such as an apple or orange; ½ of a banana; ½ cup of cooked or canned fruit; ½ cup of fruit juice; 1 cup of melon or raspberries; or 2 tablespoons of dried fruit. ? Milk and no-sugar-added yogurt have 15 grams of carbs in a serving. A serving is 1 cup of milk or 2/3 cup of no-sugar-added yogurt. ? Starchy vegetables have 15 grams of carbs in a serving. A serving is ½ cup of mashed potatoes or sweet potato; 1 cup winter squash; ½ of a small baked potato; ½ cup of cooked beans; or ½ cup cooked corn or green peas. · Learn how much carbs to eat each day and at each meal. A dietitian or CDE can teach you how to keep track of the amount of carbs you eat. This is called carbohydrate counting. · If you are not sure how to count carbohydrate grams, use the Plate Method to plan meals. It is a good, quick way to make sure that you have a balanced meal. It also helps you spread carbs throughout the day. ? Divide your plate by types of foods. Put non-starchy vegetables on half the plate, meat or other protein food on one-quarter of the plate, and a grain or starchy vegetable in the final quarter of the plate.  To this you can add a small piece of fruit and 1 cup of milk or yogurt, depending on how many carbs you are supposed to eat at a meal.  · Try to eat about the same amount of carbs at each meal. Do not \"save up\" your daily allowance of carbs to eat at one meal.  · Proteins have very little or no carbs per serving. Examples of proteins are beef, chicken, turkey, fish, eggs, tofu, cheese, cottage cheese, and peanut butter. A serving size of meat is 3 ounces, which is about the size of a deck of cards. Examples of meat substitute serving sizes (equal to 1 ounce of meat) are 1/4 cup of cottage cheese, 1 egg, 1 tablespoon of peanut butter, and ½ cup of tofu. How can you eat out and still eat healthy? · Learn to estimate the serving sizes of foods that have carbohydrate. If you measure food at home, it will be easier to estimate the amount in a serving of restaurant food. · If the meal you order has too much carbohydrate (such as potatoes, corn, or baked beans), ask to have a low-carbohydrate food instead. Ask for a salad or green vegetables. · If you use insulin, check your blood sugar before and after eating out to help you plan how much to eat in the future. · If you eat more carbohydrate at a meal than you had planned, take a walk or do other exercise. This will help lower your blood sugar. What else should you know? · Limit saturated fat, such as the fat from meat and dairy products. This is a healthy choice because people who have diabetes are at higher risk of heart disease. So choose lean cuts of meat and nonfat or low-fat dairy products. Use olive or canola oil instead of butter or shortening when cooking. · Don't skip meals. Your blood sugar may drop too low if you skip meals and take insulin or certain medicines for diabetes. · Check with your doctor before you drink alcohol. Alcohol can cause your blood sugar to drop too low. Alcohol can also cause a bad reaction if you take certain diabetes medicines. Follow-up care is a key part of your treatment and safety. Be sure to make and go to all appointments, and call your doctor if you are having problems.  It's also a good idea to know your test results and keep a list of the medicines you take. Where can you learn more? Go to http://michela-ivan.info/. Enter D815 in the search box to learn more about \"Learning About Diabetes Food Guidelines. \"  Current as of: 2017  Content Version: 11.8  © 8930-8215 [employer]. Care instructions adapted under license by [employer] (which disclaims liability or warranty for this information). If you have questions about a medical condition or this instruction, always ask your healthcare professional. [address] any warranty or liability for your use of this information. NetMinder Activation    Thank you for requesting access to NetMinder. Please follow the instructions below to securely access and download your online medical record. NetMinder allows you to send messages to your doctor, view your test results, renew your prescriptions, schedule appointments, and more. How Do I Sign Up? 1. In your internet browser, go to [email or web address]  2. Click on the First Time User? Click Here link in the Sign In box. You will be redirect to the New Member Sign Up page. 3. Enter your NetMinder Access Code exactly as it appears below. You will not need to use this code after youve completed the sign-up process. If you do not sign up before the expiration date, you must request a new code. NetMinder Access Code: [email or web address]  Expires: 2019  6:39 AM (This is the date your NetMinder access code will )    4. Enter the last four digits of your Social Security Number (xxxx) and Date of Birth (mm/dd/yyyy) as indicated and click Submit. You will be taken to the next sign-up page. 5. Create a NetMinder ID. This will be your NetMinder login ID and cannot be changed, so think of one that is secure and easy to remember. 6. Create a NetMinder password. You can change your password at any time.   7. Enter your Password Reset Question and Answer. This can be used at a later time if you forget your password. 8. Enter your e-mail address. You will receive e-mail notification when new information is available in [address] 9. Click Sign Up. You can now view and download portions of your medical record. 10. Click the Download Summary menu link to download a portable copy of your medical information. Additional Information    If you have questions, please visit the Frequently Asked Questions section of the WiMi5 website at [email or web address] Remember, WiMi5 is NOT to be used for urgent needs. For medical emergencies, dial 911.       Patient armband removed and shredded

## 2018-12-26 NOTE — DISCHARGE SUMMARY
Discharge Summary    Patient: Sade Newman MRN: [id number]  CSN: [id number]    YOB: 1965  Age: 48 y.o. Sex: female    DOA: 12/24/2018 LOS:  LOS: 2 days   Discharge Date: 12/26/2018     Admission Diagnoses: Bacteremia    Discharge Diagnoses:    Problem List as of 12/26/2018 Date Reviewed: 12/24/2018          Codes Class Noted - Resolved    Bacteremia ICD-10-CM: R78.81  ICD-9-CM: 790.7  12/24/2018 - Present        Menorrhagia ICD-10-CM: N92.0  ICD-9-CM: 626.2  7/5/2017 - Present        Anemia in chronic kidney disease (CKD) ICD-10-CM: N18.9, D63.1  ICD-9-CM: 285.21  2/28/2017 - Present        Thrombocytopenia ([facility] ICD-10-CM: D69.6  ICD-9-CM: 287.5  2/27/2015 - Present        Anemia due to blood loss, chronic ICD-10-CM: D50.0  ICD-9-CM: 280.0  1/23/2015 - Present        Accelerated essential hypertension ICD-10-CM: I10  ICD-9-CM: 401.0  1/23/2015 - Present        Menorrhagia with regular cycle ICD-10-CM: N92.0  ICD-9-CM: 626.2  1/23/2015 - Present        Obesity (BMI 30.0-34.9) ICD-10-CM: E66.9  ICD-9-CM: 278.00  1/23/2015 - Present        DM II (diabetes mellitus, type II), controlled ([facility] ICD-10-CM: E11.9  ICD-9-CM: 250.00  1/23/2015 - Present              Discharge Condition: Stable    PHYSICAL EXAM  Visit Vitals  /83 (BP 1 Location: Right arm, BP Patient Position: Sitting)   Pulse 87   Temp 97.8 °F (36.6 °C)   Resp 18   Wt 96.6 kg (213 lb)   SpO2 100%   Breastfeeding? No   BMI 33.36 kg/m²       General: Alert, cooperative, no acute distress    HEENT: NC, Atraumatic. PERRLA, EOMI. Anicteric sclerae. Lungs:  CTA Bilaterally. No Wheezing/Rhonchi/Rales. Heart:  Regular  rhythm,  No murmur, No Rubs, No Gallops  Abdomen: Soft, Non distended, Non tender.  +Bowel sounds, no HSM  Extremities: No c/c/e  Psych:   Good insight. Not anxious or agitated. Neurologic:  CN 2-12 grossly intact, oriented X 3.   No acute neurological                                 Deficits,     Hospital Course: patient was admitted to the hospital due to E coli sepsis of UTI origin. Patient was admitted resuscitated with IBV fluids, started don Broad spectrum antibiotics. Patient started to improve and blood as well as urine cultures showed E coli that is sensitive to many antibiotics was identified. Patient slowly improved and has been fever free and has no currently compliant. Patient tolerated oral feeding and was ambulatory. Patient discharged home with Oral ciprofloxacin as she has allergy to penicillin as well as Cephalosporins       Consults:     Significant Diagnostic Studies: microbiology: blood culture: positive for E coli     Discharge Medications:   Cannot display discharge medications since this patient is not currently admitted.       Activity: activity as tolerated    Diet: Cardiac Diet    Wound Care: None needed    Follow-up: with PCP, Alyce King MD in 7-10days    Minutes spent on discharge: >30 minutes spent coordinating this discharge (review instructions/follow-up, prescriptions, preparing report for sign off)

## 2018-12-26 NOTE — PROGRESS NOTES
Reason for Admission:  Bacteremia                 RRAT Score:    10           Plan for utilizing home health:  No home health orders in place at the time of this initial assessment. Will monitor for potential home health needs. Likelihood of Readmission:   Moderate                         Do you (patient/family) have any concerns for transition/discharge? Patient has no concerns regarding her discharge from [facility]. Transition of Care Plan:   Patient will return home with help from her family. Family will transport home at the time of discharge. Initial assessment completed with patient. Face sheet information confirmed:  YES. The patient requests we communicate with her , in reference to medical care. This patient lives in a house with her . Patient is able to navigate steps as needed. Prior to hospitalization, patient was considered to be independent : YES. If not independent,  patient needs assist with :  N/A. Cognitive status of patient:   Alert and oriented. Patient has a current ACP document on file: No    The patient's family will be available to transport patient home upon discharge. The patient has no DME available in the home. Patient has never stayed in a skilled nursing facility or rehab. Currently, the discharge plan is to return home help from her family. Family will assist with care, as needed, post discharge. Patient's family will also be able to transport home at the time of discharge. Patient's PCP is Dr. Sanjay Booker. Patient is insured through [employer]. This writer will continue to closely monitor for discharge planning to ensure a safe discharge home from [facility]. Care Management Interventions  PCP Verified by CM: Yes(Dr. Sanjay Booker)  Palliative Care Criteria Met (RRAT>21 & CHF Dx)?: No  Mode of Transport at Discharge:  Other (see comment)(Family will transport home at the time of discharge.)  Transition of Care Consult (CM Consult): Discharge Planning  Current Support Network: Lives with Spouse, Family Lives Nearby  Confirm Follow Up Transport: Family  Plan discussed with Pt/Family/Caregiver: Yes  Discharge Location  Discharge Placement: Home with family assistance        Tyra Tapia [facility] Manager  [facility]#[phone]

## 2018-12-26 NOTE — PROGRESS NOTES
Verified with Dr. Magalene Rubinstein that he wants the pt to receive clotrimazole 10 mg inserted vaginally. Will call back pt pharmacy.

## 2018-12-26 NOTE — PROGRESS NOTES
Discharge:    Patient will likely discharge home today (12/26/2018). Patient's family will transport home at the time of discharge. Patient has no home health orders in place and she has no care manager needs. There are no other care manager concerns regarding this discharge. This writer will continue to closely monitor for discharge planning until the patient has officially discharged from [facility].     Tyra Reis [facility] Manager  [facility]#[phone]

## 2018-12-30 LAB
BACTERIA SPEC CULT: NORMAL
BACTERIA SPEC CULT: NORMAL
SERVICE CMNT-IMP: NORMAL
SERVICE CMNT-IMP: NORMAL

## 2019-01-08 ENCOUNTER — HOSPITAL ENCOUNTER (OUTPATIENT)
Dept: ONCOLOGY | Age: 54
Discharge: HOME | End: 2019-01-08

## 2019-01-08 ENCOUNTER — HOSPITAL ENCOUNTER (OUTPATIENT)
Dept: INFUSION THERAPY | Age: 54
Discharge: HOME | End: 2019-01-08
Payer: COMMERCIAL

## 2019-01-08 ENCOUNTER — CLINICAL SUPPORT (OUTPATIENT)
Dept: ONCOLOGY | Age: 54
End: 2019-01-08

## 2019-01-08 VITALS
DIASTOLIC BLOOD PRESSURE: 85 MMHG | SYSTOLIC BLOOD PRESSURE: 148 MMHG | HEART RATE: 82 BPM | WEIGHT: 206 LBS | BODY MASS INDEX: 32.33 KG/M2 | RESPIRATION RATE: 16 BRPM | HEIGHT: 67 IN | TEMPERATURE: 98.1 F

## 2019-01-08 DIAGNOSIS — N18.9 ANEMIA IN CHRONIC KIDNEY DISEASE, UNSPECIFIED CKD STAGE: ICD-10-CM

## 2019-01-08 DIAGNOSIS — N18.9 ANEMIA IN CHRONIC KIDNEY DISEASE, UNSPECIFIED CKD STAGE: Primary | ICD-10-CM

## 2019-01-08 DIAGNOSIS — D63.1 ANEMIA IN CHRONIC KIDNEY DISEASE, UNSPECIFIED CKD STAGE: Primary | ICD-10-CM

## 2019-01-08 DIAGNOSIS — D63.1 ANEMIA IN CHRONIC KIDNEY DISEASE, UNSPECIFIED CKD STAGE: ICD-10-CM

## 2019-01-08 LAB
BASO+EOS+MONOS # BLD AUTO: 0.2 K/UL (ref 0–2.3)
BASO+EOS+MONOS NFR BLD AUTO: 3 % (ref 0.1–17)
DIFFERENTIAL METHOD BLD: ABNORMAL
ERYTHROCYTE [DISTWIDTH] IN BLOOD BY AUTOMATED COUNT: 14.2 % (ref 11.5–14.5)
HCT VFR BLD AUTO: 28.5 % (ref 36–48)
HGB BLD-MCNC: 9 G/DL (ref 12–16)
LYMPHOCYTES # BLD: 1.1 K/UL (ref 1.1–5.9)
LYMPHOCYTES NFR BLD: 20 % (ref 14–44)
MCH RBC QN AUTO: 26.2 PG (ref 25–35)
MCHC RBC AUTO-ENTMCNC: 31.6 G/DL (ref 31–37)
MCV RBC AUTO: 83.1 FL (ref 78–102)
NEUTS SEG # BLD: 4.2 K/UL (ref 1.8–9.5)
NEUTS SEG NFR BLD: 77 % (ref 40–70)
PLATELET # BLD AUTO: 72 K/UL (ref 135–420)
RBC # BLD AUTO: 3.43 M/UL (ref 4.1–5.1)
WBC # BLD AUTO: 5.5 K/UL (ref 4.5–13)

## 2019-01-08 PROCEDURE — 96372 THER/PROPH/DIAG INJ SC/IM: CPT

## 2019-01-08 PROCEDURE — 74011250637 HC RX REV CODE- 250/637: Performed by: INTERNAL MEDICINE

## 2019-01-08 PROCEDURE — 96366 THER/PROPH/DIAG IV INF ADDON: CPT

## 2019-01-08 PROCEDURE — 96367 TX/PROPH/DG ADDL SEQ IV INF: CPT

## 2019-01-08 PROCEDURE — 96365 THER/PROPH/DIAG IV INF INIT: CPT

## 2019-01-08 PROCEDURE — 74011250636 HC RX REV CODE- 250/636: Performed by: INTERNAL MEDICINE

## 2019-01-08 PROCEDURE — 74011000258 HC RX REV CODE- 258: Performed by: INTERNAL MEDICINE

## 2019-01-08 RX ORDER — SODIUM CHLORIDE 9 MG/ML
250 INJECTION, SOLUTION INTRAVENOUS CONTINUOUS
Status: DISPENSED | OUTPATIENT
Start: 2019-01-08 | End: 2019-01-09

## 2019-01-08 RX ORDER — SODIUM CHLORIDE 0.9 % (FLUSH) 0.9 %
10-40 SYRINGE (ML) INJECTION AS NEEDED
Status: DISCONTINUED | OUTPATIENT
Start: 2019-01-08 | End: 2019-01-12 | Stop reason: HOSPADM

## 2019-01-08 RX ORDER — ACETAMINOPHEN 325 MG/1
650 TABLET ORAL ONCE
Status: COMPLETED | OUTPATIENT
Start: 2019-01-08 | End: 2019-01-08

## 2019-01-08 RX ORDER — ACETAMINOPHEN 325 MG/1
650 TABLET ORAL
Status: ACTIVE | OUTPATIENT
Start: 2019-01-08 | End: 2019-01-08

## 2019-01-08 RX ORDER — DIPHENHYDRAMINE HYDROCHLORIDE 50 MG/ML
50 INJECTION, SOLUTION INTRAMUSCULAR; INTRAVENOUS
Status: ACTIVE | OUTPATIENT
Start: 2019-01-08 | End: 2019-01-08

## 2019-01-08 RX ORDER — DIPHENHYDRAMINE HCL 50 MG
50 CAPSULE ORAL ONCE
Status: COMPLETED | OUTPATIENT
Start: 2019-01-08 | End: 2019-01-08

## 2019-01-08 RX ADMIN — SODIUM CHLORIDE 250 ML: 0.9 INJECTION, SOLUTION INTRAVENOUS at 09:45

## 2019-01-08 RX ADMIN — DIPHENHYDRAMINE HYDROCHLORIDE 50 MG: 50 CAPSULE ORAL at 09:50

## 2019-01-08 RX ADMIN — IMMUNE GLOBULIN INTRAVENOUS (HUMAN) 20 G: 5 INJECTION, SOLUTION INTRAVENOUS at 11:00

## 2019-01-08 RX ADMIN — IMMUNE GLOBULIN INTRAVENOUS (HUMAN) 5 G: 5 INJECTION, SOLUTION INTRAVENOUS at 12:50

## 2019-01-08 RX ADMIN — ACETAMINOPHEN 650 MG: 325 TABLET ORAL at 09:50

## 2019-01-08 RX ADMIN — Medication 10 ML: at 09:30

## 2019-01-08 RX ADMIN — DEXAMETHASONE SODIUM PHOSPHATE 20 MG: 4 INJECTION, SOLUTION INTRA-ARTICULAR; INTRALESIONAL; INTRAMUSCULAR; INTRAVENOUS; SOFT TISSUE at 09:55

## 2019-01-08 RX ADMIN — ERYTHROPOIETIN 20000 UNITS: 20000 INJECTION, SOLUTION INTRAVENOUS; SUBCUTANEOUS at 11:12

## 2019-01-08 RX ADMIN — ERYTHROPOIETIN 40000 UNITS: 40000 INJECTION, SOLUTION INTRAVENOUS; SUBCUTANEOUS at 11:12

## 2019-01-08 NOTE — PROGRESS NOTES
[address] [facility] Progress Note    Date: 2019    Name: Kodi Peña    MRN: [id number]         : 1965      Ms. Katherine Franks arrived in the [facility] today at 0920, in stable condition, here for Q 2 Week, CBC/Procrit injection & Q 4 Week, IVIG Infusion. She was assessed and education was provided. Ms. Eric Hagen vitals were reviewed. Visit Vitals  /89 (BP 1 Location: Left arm, BP Patient Position: Sitting)   Pulse 95   Temp 97.9 °F (36.6 °C)   Resp 16   Ht 5' 7\" (1.702 m)   Wt 93.4 kg (206 lb)   Breastfeeding? No   BMI 32.26 kg/m²           PIV was established in her left AC at 0930, without incident, and blood was drawn, for a CBC, per order. The CBC results from today were as follows: (the preliminary platelet count from today was 64,000)      Recent Results (from the past 12 hour(s))   CBC WITH 3 PART DIFF    Collection Time: 19  9:30 AM   Result Value Ref Range    WBC 5.5 4.5 - 13.0 K/uL    RBC 3.43 (L) 4.10 - 5.10 M/uL    HGB 9.0 (L) 12.0 - 16.0 g/dL    HCT 28.5 (L) 36 - 48 %    MCV 83.1 78 - 102 FL    MCH 26.2 25.0 - 35.0 PG    MCHC 31.6 31 - 37 g/dL    RDW 14.2 11.5 - 14.5 %    NEUTROPHILS 77 (H) 40 - 70 %    MIXED CELLS 3 0.1 - 17 %    LYMPHOCYTES 20 14 - 44 %    ABS. NEUTROPHILS 4.2 1.8 - 9.5 K/UL    ABS. MIXED CELLS 0.2 0.0 - 2.3 K/uL    ABS. LYMPHOCYTES 1.1 1.1 - 5.9 K/UL    DF AUTOMATED           Procrit 60,000 units, was administered SQ, in her left arm at 1112, per order, and without incident.             Pre-medications consisting of PO Tylenol 650 mg, Benadryl 50 mg PO, and Decadron 20 mg IV, were all administered per order, and without incident.               IVIG 25 grams (25,000 mg) IV, was administered per order, and without incident.      IVIG was started at 34 ml/hr X 30 minutes, then increased to 68 ml/hr X 30 minutes, then increased to 171 ml/hr X 30 minutes, then increased to 205 ml/hr X 30 minutes, then increased to 239 ml/hr, for the remainder of the IVIG infusion.           After the completion of the IVIG, the PIV was flushed very well per protocol, and then, the PIV was removed and gauze/bandaid was applied. Ms. Paul Mcelroy tolerated well, and had no complaints. Ms. Paul Mcelroy was discharged from [place] [address] in stable condition at 1335. She is to return in 2 weeks, on Tuesday, 1-22-19,  at 1500, for her next appointment, for CBC/Procrit injection. And then, she is to return in 4 weeks, on Tuesday, 2-5-19, at 0900, for her next appointment, for CBC/Procrit injection, and IVIG infusion.      Sylvia Ye RN  January 8, 2019  10:08 AM

## 2019-01-22 ENCOUNTER — HOSPITAL ENCOUNTER (OUTPATIENT)
Dept: ONCOLOGY | Age: 54
Discharge: HOME | End: 2019-01-22

## 2019-01-22 ENCOUNTER — CLINICAL SUPPORT (OUTPATIENT)
Dept: ONCOLOGY | Age: 54
End: 2019-01-22

## 2019-01-22 ENCOUNTER — HOSPITAL ENCOUNTER (OUTPATIENT)
Dept: INFUSION THERAPY | Age: 54
Discharge: HOME | End: 2019-01-22
Payer: COMMERCIAL

## 2019-01-22 VITALS
RESPIRATION RATE: 16 BRPM | HEART RATE: 100 BPM | OXYGEN SATURATION: 99 % | DIASTOLIC BLOOD PRESSURE: 90 MMHG | TEMPERATURE: 98.2 F | SYSTOLIC BLOOD PRESSURE: 170 MMHG

## 2019-01-22 DIAGNOSIS — D63.1 ANEMIA IN CHRONIC KIDNEY DISEASE, UNSPECIFIED CKD STAGE: ICD-10-CM

## 2019-01-22 DIAGNOSIS — N18.9 ANEMIA IN CHRONIC KIDNEY DISEASE, UNSPECIFIED CKD STAGE: Primary | ICD-10-CM

## 2019-01-22 DIAGNOSIS — D63.1 ANEMIA IN CHRONIC KIDNEY DISEASE, UNSPECIFIED CKD STAGE: Primary | ICD-10-CM

## 2019-01-22 DIAGNOSIS — N18.9 ANEMIA IN CHRONIC KIDNEY DISEASE, UNSPECIFIED CKD STAGE: ICD-10-CM

## 2019-01-22 LAB
BASO+EOS+MONOS # BLD AUTO: 0.4 K/UL (ref 0–2.3)
BASO+EOS+MONOS NFR BLD AUTO: 7 % (ref 0.1–17)
DIFFERENTIAL METHOD BLD: ABNORMAL
ERYTHROCYTE [DISTWIDTH] IN BLOOD BY AUTOMATED COUNT: 14.5 % (ref 11.5–14.5)
HCT VFR BLD AUTO: 30.5 % (ref 36–48)
HGB BLD-MCNC: 9.9 G/DL (ref 12–16)
LYMPHOCYTES # BLD: 0.9 K/UL (ref 1.1–5.9)
LYMPHOCYTES NFR BLD: 14 % (ref 14–44)
MCH RBC QN AUTO: 27.1 PG (ref 25–35)
MCHC RBC AUTO-ENTMCNC: 32.5 G/DL (ref 31–37)
MCV RBC AUTO: 83.6 FL (ref 78–102)
NEUTS SEG # BLD: 5.3 K/UL (ref 1.8–9.5)
NEUTS SEG NFR BLD: 80 % (ref 40–70)
PLATELET # BLD AUTO: 77 K/UL (ref 140–440)
RBC # BLD AUTO: 3.65 M/UL (ref 4.1–5.1)
WBC # BLD AUTO: 6.6 K/UL (ref 4.5–13)

## 2019-01-22 PROCEDURE — 36415 COLL VENOUS BLD VENIPUNCTURE: CPT

## 2019-01-22 NOTE — PROGRESS NOTES
[facility] Progress Note    Date: 2019    Name: Ashtyn Duran    MRN: [id number]         : 1965      Ms. Regis Merrill arrived in the [facility] today at 1510, in stable condition, here for Q 2 Week, CBC/Procrit injection. She was assessed and education was provided. Ms. Kendall Oliver vitals were reviewed. Visit Vitals  /90 (BP 1 Location: Left arm, BP Patient Position: Sitting)   Pulse 100   Temp 98.2 °F (36.8 °C)   Resp 16   SpO2 99%           CBC was drawn from her left AC at 1520, per order, and without incident. Lab results were obtained and reviewed, and were as follows: Jazmin Mckeon Recent Results (from the past 12 hour(s))   CBC WITH 3 PART DIFF    Collection Time: 19  3:20 PM   Result Value Ref Range    WBC 6.6 4.5 - 13.0 K/uL    RBC 3.65 (L) 4.10 - 5.10 M/uL    HGB 9.9 (L) 12.0 - 16.0 g/dL    HCT 30.5 (L) 36 - 48 %    MCV 83.6 78 - 102 FL    MCH 27.1 25.0 - 35.0 PG    MCHC 32.5 31 - 37 g/dL    RDW 14.5 11.5 - 14.5 %    PLATELET 77 (L) 005 - 440 K/uL    NEUTROPHILS 80 (H) 40 - 70 %    MIXED CELLS 7 0.1 - 17 %    LYMPHOCYTES 14 14 - 44 %    ABS. NEUTROPHILS 5.3 1.8 - 9.5 K/UL    ABS. MIXED CELLS 0.4 0.0 - 2.3 K/uL    ABS. LYMPHOCYTES 0.9 (L) 1.1 - 5.9 K/UL    DF AUTOMATED             Procrit injection was HELD today, per order, for HCT 30.5. Ms. Regis Merrill tolerated well, and had no complaints. Ms. Regis Merrill was discharged from [place] [address] in stable condition at 32 61 16. .. She is to return in 2 weeks, on Tuesday, 19,  at 0900,  for her next appointment, for Q 2 Week CBC/Procrit injection & Q 4 Week, IVIG Infusion.      Seamus Leslie RN  2019  3:38 PM

## 2019-01-29 RX ORDER — ACETAMINOPHEN 325 MG/1
650 TABLET ORAL
Status: CANCELLED | OUTPATIENT
Start: 2019-02-05 | End: 2019-02-05

## 2019-01-29 RX ORDER — DIPHENHYDRAMINE HYDROCHLORIDE 50 MG/ML
50 INJECTION, SOLUTION INTRAMUSCULAR; INTRAVENOUS
Status: CANCELLED | OUTPATIENT
Start: 2019-02-05 | End: 2019-02-05

## 2019-02-05 ENCOUNTER — HOSPITAL ENCOUNTER (OUTPATIENT)
Dept: ONCOLOGY | Age: 54
Discharge: HOME | End: 2019-02-05

## 2019-02-05 ENCOUNTER — HOSPITAL ENCOUNTER (OUTPATIENT)
Dept: INFUSION THERAPY | Age: 54
Discharge: HOME | End: 2019-02-05
Payer: COMMERCIAL

## 2019-02-05 ENCOUNTER — CLINICAL SUPPORT (OUTPATIENT)
Dept: ONCOLOGY | Age: 54
End: 2019-02-05

## 2019-02-05 VITALS
BODY MASS INDEX: 31.39 KG/M2 | SYSTOLIC BLOOD PRESSURE: 131 MMHG | WEIGHT: 200 LBS | DIASTOLIC BLOOD PRESSURE: 80 MMHG | RESPIRATION RATE: 20 BRPM | HEIGHT: 67 IN | HEART RATE: 93 BPM | TEMPERATURE: 99.1 F | OXYGEN SATURATION: 99 %

## 2019-02-05 DIAGNOSIS — D63.1 ANEMIA IN CHRONIC KIDNEY DISEASE, UNSPECIFIED CKD STAGE: ICD-10-CM

## 2019-02-05 DIAGNOSIS — N18.9 ANEMIA IN CHRONIC KIDNEY DISEASE, UNSPECIFIED CKD STAGE: ICD-10-CM

## 2019-02-05 DIAGNOSIS — D63.1 ANEMIA IN CHRONIC KIDNEY DISEASE, UNSPECIFIED CKD STAGE: Primary | ICD-10-CM

## 2019-02-05 DIAGNOSIS — N18.9 ANEMIA IN CHRONIC KIDNEY DISEASE, UNSPECIFIED CKD STAGE: Primary | ICD-10-CM

## 2019-02-05 LAB
BASO+EOS+MONOS # BLD AUTO: 0.3 K/UL (ref 0–2.3)
BASO+EOS+MONOS NFR BLD AUTO: 4 % (ref 0.1–17)
DIFFERENTIAL METHOD BLD: ABNORMAL
ERYTHROCYTE [DISTWIDTH] IN BLOOD BY AUTOMATED COUNT: 13.1 % (ref 11.5–14.5)
HCT VFR BLD AUTO: 25 % (ref 36–48)
HGB BLD-MCNC: 8.1 G/DL (ref 12–16)
LYMPHOCYTES # BLD: 1.3 K/UL (ref 1.1–5.9)
LYMPHOCYTES NFR BLD: 19 % (ref 14–44)
MCH RBC QN AUTO: 26.9 PG (ref 25–35)
MCHC RBC AUTO-ENTMCNC: 32.4 G/DL (ref 31–37)
MCV RBC AUTO: 83.1 FL (ref 78–102)
NEUTS SEG # BLD: 5 K/UL (ref 1.8–9.5)
NEUTS SEG NFR BLD: 76 % (ref 40–70)
PLATELET # BLD AUTO: 79 K/UL (ref 140–440)
RBC # BLD AUTO: 3.01 M/UL (ref 4.1–5.1)
WBC # BLD AUTO: 6.6 K/UL (ref 4.5–13)

## 2019-02-05 PROCEDURE — 96367 TX/PROPH/DG ADDL SEQ IV INF: CPT

## 2019-02-05 PROCEDURE — 96372 THER/PROPH/DIAG INJ SC/IM: CPT

## 2019-02-05 PROCEDURE — 74011250637 HC RX REV CODE- 250/637: Performed by: INTERNAL MEDICINE

## 2019-02-05 PROCEDURE — 74011250636 HC RX REV CODE- 250/636: Performed by: INTERNAL MEDICINE

## 2019-02-05 PROCEDURE — 96366 THER/PROPH/DIAG IV INF ADDON: CPT

## 2019-02-05 PROCEDURE — 96365 THER/PROPH/DIAG IV INF INIT: CPT

## 2019-02-05 PROCEDURE — 74011000258 HC RX REV CODE- 258: Performed by: INTERNAL MEDICINE

## 2019-02-05 RX ORDER — DIPHENHYDRAMINE HCL 50 MG
50 CAPSULE ORAL ONCE
Status: COMPLETED | OUTPATIENT
Start: 2019-02-05 | End: 2019-02-05

## 2019-02-05 RX ORDER — SODIUM CHLORIDE 9 MG/ML
250 INJECTION, SOLUTION INTRAVENOUS CONTINUOUS
Status: DISPENSED | OUTPATIENT
Start: 2019-02-05 | End: 2019-02-06

## 2019-02-05 RX ORDER — ACETAMINOPHEN 325 MG/1
650 TABLET ORAL ONCE
Status: COMPLETED | OUTPATIENT
Start: 2019-02-05 | End: 2019-02-05

## 2019-02-05 RX ORDER — SODIUM CHLORIDE 0.9 % (FLUSH) 0.9 %
10-40 SYRINGE (ML) INJECTION AS NEEDED
Status: DISCONTINUED | OUTPATIENT
Start: 2019-02-05 | End: 2019-02-09 | Stop reason: HOSPADM

## 2019-02-05 RX ADMIN — ERYTHROPOIETIN 40000 UNITS: 40000 INJECTION, SOLUTION INTRAVENOUS; SUBCUTANEOUS at 10:28

## 2019-02-05 RX ADMIN — IMMUNE GLOBULIN INTRAVENOUS (HUMAN) 20 G: 5 INJECTION, SOLUTION INTRAVENOUS at 11:30

## 2019-02-05 RX ADMIN — DEXAMETHASONE SODIUM PHOSPHATE 20 MG: 4 INJECTION, SOLUTION INTRAMUSCULAR; INTRAVENOUS at 10:25

## 2019-02-05 RX ADMIN — DIPHENHYDRAMINE HYDROCHLORIDE 50 MG: 50 CAPSULE ORAL at 10:23

## 2019-02-05 RX ADMIN — IMMUNE GLOBULIN INTRAVENOUS (HUMAN) 5 G: 5 INJECTION, SOLUTION INTRAVENOUS at 13:00

## 2019-02-05 RX ADMIN — ERYTHROPOIETIN 20000 UNITS: 20000 INJECTION, SOLUTION INTRAVENOUS; SUBCUTANEOUS at 10:28

## 2019-02-05 RX ADMIN — SODIUM CHLORIDE 250 ML: 900 INJECTION, SOLUTION INTRAVENOUS at 10:15

## 2019-02-05 RX ADMIN — ACETAMINOPHEN 650 MG: 325 TABLET ORAL at 10:23

## 2019-02-05 RX ADMIN — Medication 10 ML: at 09:50

## 2019-02-05 NOTE — PROGRESS NOTES
[facility]    Date: 2019    Name: Ashok Justice    MRN: [id number]         : 1965      Ms. Franc Richards arrived in the [facility] today at 0930, in stable condition, here for Q 2 Week, CBC/Procrit injection & Q 4 Week, IVIG Infusion. She was assessed and education was provided. Ms. Mio Koch vitals were reviewed. Visit Vitals  /90 (BP 1 Location: Left arm, BP Patient Position: Sitting)   Pulse 97   Temp 98.5 °F (36.9 °C)   Resp 20   Ht 5' 7\" (1.702 m)   Wt 90.7 kg (200 lb)   SpO2 99%   Breastfeeding? No   BMI 31.32 kg/m²           PIV was established in her left AC at 0950, without incident, and blood was drawn, for a CBC, per order. The CBC results from today were as follows:        Recent Results (from the past 12 hour(s))   CBC WITH 3 PART DIFF    Collection Time: 19  9:50 AM   Result Value Ref Range    WBC 6.6 4.5 - 13.0 K/uL    RBC 3.01 (L) 4.10 - 5.10 M/uL    HGB 8.1 (L) 12.0 - 16.0 g/dL    HCT 25.0 (L) 36 - 48 %    MCV 83.1 78 - 102 FL    MCH 26.9 25.0 - 35.0 PG    MCHC 32.4 31 - 37 g/dL    RDW 13.1 11.5 - 14.5 %    PLATELET 79 (L) 353 - 440 K/uL    NEUTROPHILS 76 (H) 40 - 70 %    MIXED CELLS 4 0.1 - 17 %    LYMPHOCYTES 19 14 - 44 %    ABS. NEUTROPHILS 5.0 1.8 - 9.5 K/UL    ABS. MIXED CELLS 0.3 0.0 - 2.3 K/uL    ABS. LYMPHOCYTES 1.3 1.1 - 5.9 K/UL    DF AUTOMATED           Procrit 60,000 units, was administered SQ, in her right arm at 1028, per order, and without incident.             Pre-medications consisting of PO Tylenol 650 mg, Benadryl 50 mg PO, and Decadron 20 mg IV, were all administered per order, and without incident.               IVIG 25 grams (25,000 mg) IV, was administered per order, and without incident. IVIG was started at 34 ml/hr X 30 minutes, then increased to 68 ml/hr X 30 minutes, then increased to 171 ml/hr X 30 minutes, then increased to 205 ml/hr X 30 minutes, then increased to 239 ml/hr, for the remainder of the IVIG infusion.         After the completion of the IVIG, the PIV was flushed very well per protocol, and then, the PIV was removed and gauze/bandaid was applied. Ms. Bola Collins tolerated well, and had no complaints. Ms. Bola Collins was discharged from [place] [address] in stable condition at 1400. She is to return in 2 weeks, on Tuesday, 2-19-19,  at 1600, for her next appointment, for CBC/Procrit injection. And then, she is to return in 4 weeks, on Tuesday, 3-5-19, at 0900, for her next appointment, for CBC/Procrit injection, and IVIG infusion.      Chanell Wylie RN  February 5, 2019  10:08 AM

## 2019-02-19 ENCOUNTER — HOSPITAL ENCOUNTER (OUTPATIENT)
Dept: ONCOLOGY | Age: 54
Discharge: HOME | End: 2019-02-19

## 2019-02-19 ENCOUNTER — CLINICAL SUPPORT (OUTPATIENT)
Dept: ONCOLOGY | Age: 54
End: 2019-02-19

## 2019-02-19 ENCOUNTER — HOSPITAL ENCOUNTER (OUTPATIENT)
Dept: INFUSION THERAPY | Age: 54
Discharge: HOME | End: 2019-02-19
Payer: COMMERCIAL

## 2019-02-19 VITALS
BODY MASS INDEX: 31.87 KG/M2 | TEMPERATURE: 97.8 F | OXYGEN SATURATION: 100 % | SYSTOLIC BLOOD PRESSURE: 168 MMHG | WEIGHT: 203.5 LBS | HEART RATE: 102 BPM | DIASTOLIC BLOOD PRESSURE: 97 MMHG | RESPIRATION RATE: 16 BRPM

## 2019-02-19 DIAGNOSIS — D63.1 ANEMIA IN CHRONIC KIDNEY DISEASE, UNSPECIFIED CKD STAGE: ICD-10-CM

## 2019-02-19 DIAGNOSIS — N18.9 ANEMIA IN CHRONIC KIDNEY DISEASE, UNSPECIFIED CKD STAGE: ICD-10-CM

## 2019-02-19 DIAGNOSIS — N18.9 ANEMIA IN CHRONIC KIDNEY DISEASE, UNSPECIFIED CKD STAGE: Primary | ICD-10-CM

## 2019-02-19 DIAGNOSIS — D63.1 ANEMIA IN CHRONIC KIDNEY DISEASE, UNSPECIFIED CKD STAGE: Primary | ICD-10-CM

## 2019-02-19 LAB
BASO+EOS+MONOS # BLD AUTO: 0.2 K/UL (ref 0–2.3)
BASO+EOS+MONOS NFR BLD AUTO: 3 % (ref 0.1–17)
DIFFERENTIAL METHOD BLD: ABNORMAL
ERYTHROCYTE [DISTWIDTH] IN BLOOD BY AUTOMATED COUNT: 14.8 % (ref 11.5–14.5)
HCT VFR BLD AUTO: 32.3 % (ref 36–48)
HGB BLD-MCNC: 10.3 G/DL (ref 12–16)
LYMPHOCYTES # BLD: 0.9 K/UL (ref 1.1–5.9)
LYMPHOCYTES NFR BLD: 12 % (ref 14–44)
MCH RBC QN AUTO: 27.2 PG (ref 25–35)
MCHC RBC AUTO-ENTMCNC: 31.9 G/DL (ref 31–37)
MCV RBC AUTO: 85.2 FL (ref 78–102)
NEUTS SEG # BLD: 6.4 K/UL (ref 1.8–9.5)
NEUTS SEG NFR BLD: 85 % (ref 40–70)
PLATELET # BLD AUTO: 72 K/UL (ref 140–440)
RBC # BLD AUTO: 3.79 M/UL (ref 4.1–5.1)
WBC # BLD AUTO: 7.5 K/UL (ref 4.5–13)

## 2019-02-19 PROCEDURE — 36415 COLL VENOUS BLD VENIPUNCTURE: CPT

## 2019-03-05 ENCOUNTER — HOSPITAL ENCOUNTER (OUTPATIENT)
Dept: INFUSION THERAPY | Age: 54
Discharge: HOME | End: 2019-03-05
Payer: COMMERCIAL

## 2019-03-05 ENCOUNTER — HOSPITAL ENCOUNTER (OUTPATIENT)
Dept: ONCOLOGY | Age: 54
Discharge: HOME | End: 2019-03-05

## 2019-03-05 ENCOUNTER — CLINICAL SUPPORT (OUTPATIENT)
Dept: ONCOLOGY | Age: 54
End: 2019-03-05

## 2019-03-05 ENCOUNTER — OFFICE VISIT (OUTPATIENT)
Dept: ONCOLOGY | Age: 54
End: 2019-03-05

## 2019-03-05 VITALS
DIASTOLIC BLOOD PRESSURE: 88 MMHG | HEART RATE: 81 BPM | OXYGEN SATURATION: 100 % | SYSTOLIC BLOOD PRESSURE: 154 MMHG | WEIGHT: 203 LBS | RESPIRATION RATE: 16 BRPM | TEMPERATURE: 97.7 F | HEIGHT: 67 IN | BODY MASS INDEX: 31.86 KG/M2

## 2019-03-05 VITALS — DIASTOLIC BLOOD PRESSURE: 89 MMHG | HEART RATE: 103 BPM | SYSTOLIC BLOOD PRESSURE: 151 MMHG

## 2019-03-05 DIAGNOSIS — D63.1 ANEMIA IN CHRONIC KIDNEY DISEASE, UNSPECIFIED CKD STAGE: Primary | ICD-10-CM

## 2019-03-05 DIAGNOSIS — N18.9 ANEMIA IN CHRONIC KIDNEY DISEASE, UNSPECIFIED CKD STAGE: ICD-10-CM

## 2019-03-05 DIAGNOSIS — D63.1 ANEMIA IN STAGE 3 CHRONIC KIDNEY DISEASE (HCC): Primary | ICD-10-CM

## 2019-03-05 DIAGNOSIS — D63.1 ANEMIA IN CHRONIC KIDNEY DISEASE, UNSPECIFIED CKD STAGE: ICD-10-CM

## 2019-03-05 DIAGNOSIS — D69.6 THROMBOCYTOPENIA (HCC): ICD-10-CM

## 2019-03-05 DIAGNOSIS — N18.9 ANEMIA IN CHRONIC KIDNEY DISEASE, UNSPECIFIED CKD STAGE: Primary | ICD-10-CM

## 2019-03-05 DIAGNOSIS — N18.30 ANEMIA IN STAGE 3 CHRONIC KIDNEY DISEASE (HCC): Primary | ICD-10-CM

## 2019-03-05 DIAGNOSIS — N92.0 MENORRHAGIA WITH REGULAR CYCLE: ICD-10-CM

## 2019-03-05 DIAGNOSIS — D50.0 ANEMIA DUE TO BLOOD LOSS, CHRONIC: ICD-10-CM

## 2019-03-05 LAB
BASO+EOS+MONOS # BLD AUTO: 0.3 K/UL (ref 0–2.3)
BASO+EOS+MONOS NFR BLD AUTO: 5 % (ref 0.1–17)
DIFFERENTIAL METHOD BLD: ABNORMAL
ERYTHROCYTE [DISTWIDTH] IN BLOOD BY AUTOMATED COUNT: 13.2 % (ref 11.5–14.5)
HCT VFR BLD AUTO: 26.9 % (ref 36–48)
HGB BLD-MCNC: 8.9 G/DL (ref 12–16)
LYMPHOCYTES # BLD: 1.1 K/UL (ref 1.1–5.9)
LYMPHOCYTES NFR BLD: 19 % (ref 14–44)
MCH RBC QN AUTO: 27.3 PG (ref 25–35)
MCHC RBC AUTO-ENTMCNC: 33.1 G/DL (ref 31–37)
MCV RBC AUTO: 82.5 FL (ref 78–102)
NEUTS SEG # BLD: 4.2 K/UL (ref 1.8–9.5)
NEUTS SEG NFR BLD: 76 % (ref 40–70)
PLATELET # BLD AUTO: 76 K/UL (ref 140–440)
RBC # BLD AUTO: 3.26 M/UL (ref 4.1–5.1)
WBC # BLD AUTO: 5.6 K/UL (ref 4.5–13)

## 2019-03-05 PROCEDURE — 74011250636 HC RX REV CODE- 250/636: Performed by: NURSE PRACTITIONER

## 2019-03-05 PROCEDURE — 96366 THER/PROPH/DIAG IV INF ADDON: CPT

## 2019-03-05 PROCEDURE — 74011000258 HC RX REV CODE- 258: Performed by: INTERNAL MEDICINE

## 2019-03-05 PROCEDURE — 96372 THER/PROPH/DIAG INJ SC/IM: CPT

## 2019-03-05 PROCEDURE — 96367 TX/PROPH/DG ADDL SEQ IV INF: CPT

## 2019-03-05 PROCEDURE — 74011250636 HC RX REV CODE- 250/636: Performed by: INTERNAL MEDICINE

## 2019-03-05 PROCEDURE — 96365 THER/PROPH/DIAG IV INF INIT: CPT

## 2019-03-05 PROCEDURE — 74011250637 HC RX REV CODE- 250/637: Performed by: INTERNAL MEDICINE

## 2019-03-05 RX ORDER — SODIUM CHLORIDE 0.9 % (FLUSH) 0.9 %
10-40 SYRINGE (ML) INJECTION AS NEEDED
Status: DISCONTINUED | OUTPATIENT
Start: 2019-03-05 | End: 2019-03-09 | Stop reason: HOSPADM

## 2019-03-05 RX ORDER — DIPHENHYDRAMINE HCL 25 MG
25 CAPSULE ORAL ONCE
Status: COMPLETED | OUTPATIENT
Start: 2019-03-05 | End: 2019-03-05

## 2019-03-05 RX ORDER — SODIUM CHLORIDE 9 MG/ML
250 INJECTION, SOLUTION INTRAVENOUS CONTINUOUS
Status: DISPENSED | OUTPATIENT
Start: 2019-03-05 | End: 2019-03-06

## 2019-03-05 RX ORDER — ACETAMINOPHEN 325 MG/1
650 TABLET ORAL ONCE
Status: COMPLETED | OUTPATIENT
Start: 2019-03-05 | End: 2019-03-05

## 2019-03-05 RX ADMIN — EPOETIN ALFA-EPBX 60000 UNITS: 40000 INJECTION, SOLUTION INTRAVENOUS; SUBCUTANEOUS at 10:10

## 2019-03-05 RX ADMIN — ACETAMINOPHEN 650 MG: 325 TABLET ORAL at 10:03

## 2019-03-05 RX ADMIN — IMMUNE GLOBULIN INTRAVENOUS (HUMAN) 5 G: 5 INJECTION, SOLUTION INTRAVENOUS at 12:50

## 2019-03-05 RX ADMIN — DEXAMETHASONE SODIUM PHOSPHATE 20 MG: 4 INJECTION, SOLUTION INTRA-ARTICULAR; INTRALESIONAL; INTRAMUSCULAR; INTRAVENOUS; SOFT TISSUE at 10:00

## 2019-03-05 RX ADMIN — DIPHENHYDRAMINE HYDROCHLORIDE 25 MG: 25 CAPSULE ORAL at 10:03

## 2019-03-05 RX ADMIN — Medication 10 ML: at 09:40

## 2019-03-05 RX ADMIN — SODIUM CHLORIDE 250 ML: 900 INJECTION, SOLUTION INTRAVENOUS at 09:55

## 2019-03-05 RX ADMIN — IMMUNE GLOBULIN INTRAVENOUS (HUMAN) 20 G: 5 INJECTION, SOLUTION INTRAVENOUS at 11:00

## 2019-03-05 NOTE — PROGRESS NOTES
[facility] [place] Progress Note    Date: 2019    Name: Philip Deal    MRN: [id number]         : 1965      Ms. Daryl Jeans arrived in the [facility] today at [address], in stable condition, here for Q 2 Week, CBC/Retacrit injection & Q 4 Week, IVIG Infusion. She was assessed and education was provided. Ms. Tunde King vitals were reviewed. Visit Vitals  BP (!) 161/93 (BP 1 Location: Left arm, BP Patient Position: Sitting)   Pulse 98   Temp 98.1 °F (36.7 °C)   Resp 16   Ht 5' 7\" (1.702 m)   Wt 92.1 kg (203 lb)   SpO2 100%   Breastfeeding? No   BMI 31.79 kg/m²           PIV was established in her left AC at 0940, without incident, and blood was drawn, for a CBC, per order. (Also, extra tubes of blood were drawn, in anticipation of her office visit with Dr. Nichole White, later today--1 extra lavender top tube, 1 green top tube, and 2 SST tubes.)      The CBC results from today were as follows:        Recent Results (from the past 12 hour(s))   CBC WITH 3 PART DIFF    Collection Time: 19  9:40 AM   Result Value Ref Range    WBC 5.6 4.5 - 13.0 K/uL    RBC 3.26 (L) 4.10 - 5.10 M/uL    HGB 8.9 (L) 12.0 - 16.0 g/dL    HCT 26.9 (L) 36 - 48 %    MCV 82.5 78 - 102 FL    MCH 27.3 25.0 - 35.0 PG    MCHC 33.1 31 - 37 g/dL    RDW 13.2 11.5 - 14.5 %    PLATELET 76 (L) 618 - 440 K/uL    NEUTROPHILS 76 (H) 40 - 70 %    MIXED CELLS 5 0.1 - 17 %    LYMPHOCYTES 19 14 - 44 %    ABS. NEUTROPHILS 4.2 1.8 - 9.5 K/UL    ABS. MIXED CELLS 0.3 0.0 - 2.3 K/uL    ABS. LYMPHOCYTES 1.1 1.1 - 5.9 K/UL    DF AUTOMATED           Retacrit 60,000 units, was administered SQ, in her right arm at 1010, per order, and without incident.             Pre-medications consisting of PO Tylenol 650 mg, Benadryl 50 mg PO, and Decadron 20 mg IV, were all administered per order, and without incident.               IVIG 25 grams (25,000 mg) IV, was administered per order, and without incident.      IVIG was started at 34 ml/hr X 30 minutes, then increased to 68 ml/hr X 30 minutes, then increased to 171 ml/hr X 30 minutes, then increased to 205 ml/hr X 30 minutes, then increased to 239 ml/hr, for the remainder of the IVIG infusion.           After the completion of the IVIG, the PIV was flushed very well per protocol, and then, the PIV was removed and gauze/bandaid was applied. Ms. Vito Thibodeaux tolerated well, and had no complaints. Ms. Vito Thibodeaux was discharged from [place] [address] in stable condition at [phone]. Jeni Bell She is to return in 2 weeks, on Tuesday, 3-19-19,  at , for her next appointment, for CBC/Retacrit injection. And then, she is to return in 4 weeks, on Tuesday, 4-2-19, at 0900, for her next appointment, for CBC/Retacrit injection, and IVIG infusion.      Keith Mccabe RN  March 5, 2019  10:08 AM

## 2019-03-05 NOTE — PROGRESS NOTES
Hematology/Oncology  Progress Note    Name: Gisella García  Date: 3/5/2019  : 1965    PCP: Nilton Matthews MD     Ms. Blanco Yen is a 48year old female who was seen for management of her underlying anemia. The patient also past chronic thrombocytopenia/ITP. Current therapy: IVIG monthly, ferrous sulfate BID, and Venofer PRN. Subjective:     Ms. Blanco Yen is a 53-year-old woman who has anemia and thrombocytopenia. Her anemia is related more to chronic kidney disease at this point. She received Procrit whenever the hemoglobin is below 10 g/dL with hematocrit below 30%. Past medical history, family history, and social history: these were reviewed and remains unchanged.     Past Medical History:   Diagnosis Date    Anemia     Arthritis     Diabetes ([address]     Hypertension     ITP (idiopathic thrombocytopenic purpura)      Past Surgical History:   Procedure Laterality Date    HX GYN      c section     HX HYSTERECTOMY      HX TUBAL LIGATION       Social History     Socioeconomic History    Marital status: SINGLE     Spouse name: Not on file    Number of children: Not on file    Years of education: Not on file    Highest education level: Not on file   Social Needs    Financial resource strain: Not on file    Food insecurity - worry: Not on file    Food insecurity - inability: Not on file   [employer] needs - medical: Not on file   [employer] needs - non-medical: Not on file   Occupational History    Not on file   Tobacco Use    Smoking status: Never Smoker    Smokeless tobacco: Never Used   Substance and Sexual Activity    Alcohol use: No    Drug use: No    Sexual activity: Not on file   Other Topics Concern    Not on file   Social History Narrative    Not on file     Family History   Problem Relation Age of Onset    Hypertension Mother     Cancer Father         Colon     Current Outpatient Medications   Medication Sig Dispense Refill    carvedilol (COREG) 6.25 mg tablet Take 1 Tab by mouth two (2) times daily (with meals). 30 Tab 0    acetaminophen (TYLENOL) 500 mg tablet Take 2 Tabs by mouth every six (6) hours as needed for Pain. 20 Tab 0    metFORMIN (GLUCOPHAGE) 500 mg tablet Take 2 Tabs by mouth two (2) times daily (with meals). Indications: TYPE 2 DIABETES MELLITUS 120 Tab 2    lisinopril-hydrochlorothiazide (PRINZIDE, ZESTORETIC) 20-25 mg per tablet Take 1 Tab by mouth daily. Indications: HYPERTENSION      amLODIPine (NORVASC) 10 mg tablet Take 10 mg by mouth daily. Indications: HYPERTENSION      ferrous sulfate 325 mg (65 mg iron) tablet Take 325 mg by mouth two (2) times a day. Facility-Administered Medications Ordered in Other Visits   Medication Dose Route Frequency Provider Last Rate Last Dose    sodium chloride (NS) flush 10-40 mL  10-40 mL IntraVENous PRN Brenda Womack MD   10 mL at 03/05/19 0940    0.9% sodium chloride infusion 250 mL  250 mL IntraVENous CONTINUOUS Brenda Womack MD   Stopped at 03/05/19 1335       Review of Systems  Constitutional: The patient denies fatigue or acute distress  HEENT: The patient denies recent head trauma, eye pain, blurred vision,  hearing deficit, oropharyngeal mucosal pain or lesions, and the patient denies throat pain or discomfort. Lymphatics: The patient denies palpable peripheral lymphadenopathy. Hematologic: The patient denies having bruising, bleeding, or progressive fatigue. Respiratory: Patient denies having shortness of breath, cough, sputum production, fever, or dyspnea on exertion. Cardiovascular: The patient denies having leg pain, leg swelling, heart palpitations, chest permit, chest pain, or lightheadedness. The patient denies having dyspnea on exertion. Gastrointestinal: The patient denies having nausea, emesis, or diarrhea. The patient denies having any hematemesis or blood in the stool. Genitourinary: Patient denies having urinary urgency, frequency, or dysuria.   The patient denies having blood in the urine. Psychological: The patient denies having symptoms of nervousness, anxiety, depression, or thoughts of harming himself some of this. Skin: Patient denies having skin rashes, skin, ulcerations, or unexplained itching or pruritus. Musculoskeletal: The patient denies having pain in the joints or bones. Objective:     Visit Vitals  /89   Pulse (!) 103   LMP 06/18/2017     ECOG 0  Physical Exam:   Gen. Appearance: The patient is in no acute distress. Skin: There is no bruise or rash. HEENT: The exam is unremarkable. Neck: Supple without lymphadenopathy or thyromegaly. Lungs: Clear to auscultation and percussion; there are no wheezes or rhonchi. Heart: Regular rate and rhythm; there are no murmurs, gallops, or rubs. Anterior chest wall and breasts: Deferred. Abdomen: Bowel sounds are present and normal.  There is no guarding, tenderness, or hepatosplenomegaly. Extremities: There is no clubbing, cyanosis, or edema. Neurologic: There are no focal neurologic deficits. Lymphatics: There is no palpable peripheral lymphadenopathy. Musculoskeletal: The patient has full range of motion at all joints. There is no evidence of joint deformity or effusions. There is no focal joint tenderness. Psychological/psychiatric: There is no clinical evidence of anxiety, depression, or melancholy. Lab data:      Results for orders placed or performed during the hospital encounter of 03/05/19   CBC WITH 3 PART DIFF     Status: Abnormal   Result Value Ref Range Status    WBC 5.6 4.5 - 13.0 K/uL Final    RBC 3.26 (L) 4.10 - 5.10 M/uL Final    HGB 8.9 (L) 12.0 - 16.0 g/dL Final    HCT 26.9 (L) 36 - 48 % Final    MCV 82.5 78 - 102 FL Final    MCH 27.3 25.0 - 35.0 PG Final    MCHC 33.1 31 - 37 g/dL Final    RDW 13.2 11.5 - 14.5 % Final    PLATELET 76 (L) 830 - 440 K/uL Final    NEUTROPHILS 76 (H) 40 - 70 % Final    MIXED CELLS 5 0.1 - 17 % Final    LYMPHOCYTES 19 14 - 44 % Final    ABS. NEUTROPHILS 4.2 1.8 - 9.5 K/UL Final    ABS. MIXED CELLS 0.3 0.0 - 2.3 K/uL Final    ABS. LYMPHOCYTES 1.1 1.1 - 5.9 K/UL Final     Comment: Test performed at [address] [place] [address] or Outpatient Infusion Center Location. Reviewed by Medical Director. DF AUTOMATED   Final         Assessment:     1. Anemia in stage 3 chronic kidney disease ([facility]    2. Menorrhagia with regular cycle    3. Anemia due to blood loss, chronic    4. Thrombocytopenia ([facility]      Plan:   Anemia due to blood loss/menorrhagia and chronic kidney disease: The patient is continuing to receive Procrit every 2 weeks at a dose of 60,000 units subcutaneous whenever the hemoglobin is below 10 g/dL. 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 The platelets are continuing to respond to intravenous gamma globulin. The current CBC shows that her platelet count is 15,592. Menorrhagia (resolved problem: Patient underwent a hysterectomy in 2017 and no longer has evidence of vaginal bleeding or genitourinary blood loss. The patient will return to clinic for complete reassessment again in 12 weeks.   Orders Placed This Encounter    METABOLIC PANEL, COMPREHENSIVE     Standing Status:   Future     Number of Occurrences:   1     Standing Expiration Date:   3/5/2020    IRON PROFILE     Standing Status:   Future     Number of Occurrences:   1     Standing Expiration Date:   3/5/2020    FERRITIN     Standing Status:   Future     Number of Occurrences:   1     Standing Expiration Date:   3/5/2020       Annette Saunders MD  3/5/2018

## 2019-03-06 LAB
A-G RATIO,AGRAT: 1 RATIO (ref 1.1–2.6)
ALBUMIN SERPL-MCNC: 3.3 G/DL (ref 3.5–5)
ALP SERPL-CCNC: 117 U/L (ref 25–115)
ALT SERPL-CCNC: 10 U/L (ref 5–40)
ANION GAP SERPL CALC-SCNC: 19 MMOL/L
AST SERPL W P-5'-P-CCNC: 11 U/L (ref 10–37)
BILIRUB SERPL-MCNC: 0.1 MG/DL (ref 0.2–1.2)
BUN SERPL-MCNC: 52 MG/DL (ref 6–22)
CALCIUM SERPL-MCNC: 8.7 MG/DL (ref 8.4–10.5)
CHLORIDE SERPL-SCNC: 99 MMOL/L (ref 98–110)
CO2 SERPL-SCNC: 20 MMOL/L (ref 20–32)
CREAT SERPL-MCNC: 3.4 MG/DL (ref 0.5–1.2)
FE % SATURATION,PSAT: 25 % (ref 20–50)
FERRITIN SERPL-MCNC: 143 NG/ML (ref 10–291)
GFRAA, 66117: 17.4
GFRNA, 66118: 14.3
GLOBULIN,GLOB: 3.3 G/DL (ref 2–4)
GLUCOSE SERPL-MCNC: 243 MG/DL (ref 70–99)
IRON,IRN: 55 MCG/DL (ref 30–160)
POTASSIUM SERPL-SCNC: 4.6 MMOL/L (ref 3.5–5.5)
PROT SERPL-MCNC: 6.6 G/DL (ref 6.4–8.3)
SODIUM SERPL-SCNC: 138 MMOL/L (ref 133–145)
TIBC,TIBC: 224 MCG/DL (ref 228–428)
UIBC SERPL-MCNC: 169 MCG/DL (ref 110–370)

## 2019-03-19 ENCOUNTER — HOSPITAL ENCOUNTER (OUTPATIENT)
Dept: INFUSION THERAPY | Age: 54
Discharge: HOME | End: 2019-03-19
Payer: COMMERCIAL

## 2019-03-19 ENCOUNTER — HOSPITAL ENCOUNTER (OUTPATIENT)
Dept: ONCOLOGY | Age: 54
Discharge: HOME | End: 2019-03-19

## 2019-03-19 ENCOUNTER — CLINICAL SUPPORT (OUTPATIENT)
Dept: ONCOLOGY | Age: 54
End: 2019-03-19

## 2019-03-19 VITALS
DIASTOLIC BLOOD PRESSURE: 93 MMHG | TEMPERATURE: 98.2 F | HEART RATE: 86 BPM | OXYGEN SATURATION: 100 % | RESPIRATION RATE: 16 BRPM | SYSTOLIC BLOOD PRESSURE: 158 MMHG

## 2019-03-19 DIAGNOSIS — N18.9 ANEMIA IN CHRONIC KIDNEY DISEASE, UNSPECIFIED CKD STAGE: ICD-10-CM

## 2019-03-19 DIAGNOSIS — D63.1 ANEMIA IN CHRONIC KIDNEY DISEASE, UNSPECIFIED CKD STAGE: ICD-10-CM

## 2019-03-19 DIAGNOSIS — D63.1 ANEMIA IN CHRONIC KIDNEY DISEASE, UNSPECIFIED CKD STAGE: Primary | ICD-10-CM

## 2019-03-19 DIAGNOSIS — N18.9 ANEMIA IN CHRONIC KIDNEY DISEASE, UNSPECIFIED CKD STAGE: Primary | ICD-10-CM

## 2019-03-19 LAB
BASO+EOS+MONOS # BLD AUTO: 0.2 K/UL (ref 0–2.3)
BASO+EOS+MONOS NFR BLD AUTO: 3 % (ref 0.1–17)
DIFFERENTIAL METHOD BLD: ABNORMAL
ERYTHROCYTE [DISTWIDTH] IN BLOOD BY AUTOMATED COUNT: 14.5 % (ref 11.5–14.5)
HCT VFR BLD AUTO: 32.1 % (ref 36–48)
HGB BLD-MCNC: 10.5 G/DL (ref 12–16)
LYMPHOCYTES # BLD: 1.1 K/UL (ref 1.1–5.9)
LYMPHOCYTES NFR BLD: 18 % (ref 14–44)
MCH RBC QN AUTO: 27.5 PG (ref 25–35)
MCHC RBC AUTO-ENTMCNC: 32.7 G/DL (ref 31–37)
MCV RBC AUTO: 84 FL (ref 78–102)
NEUTS SEG # BLD: 4.7 K/UL (ref 1.8–9.5)
NEUTS SEG NFR BLD: 78 % (ref 40–70)
PLATELET # BLD AUTO: 78 K/UL (ref 140–440)
RBC # BLD AUTO: 3.82 M/UL (ref 4.1–5.1)
WBC # BLD AUTO: 6 K/UL (ref 4.5–13)

## 2019-03-19 PROCEDURE — 36415 COLL VENOUS BLD VENIPUNCTURE: CPT

## 2019-03-19 NOTE — PROGRESS NOTES
[facility] Progress Note    Date: 2019    Name: Christian Banuelos    MRN: [id number]         : 1965      Ms. Marta Spears arrived in the [facility] today at 1, in stable condition, here for Q 2 Week, CBC/Retacrit injection. She was assessed and education was provided. Ms. Ginny Gipson vitals were reviewed. Visit Vitals  BP (!) 158/93 (BP 1 Location: Left arm, BP Patient Position: Sitting)   Pulse 86   Temp 98.2 °F (36.8 °C)   Resp 16   SpO2 100%           CBC was drawn from her left AC at 1550, per order, and without incident. Lab results were obtained and reviewed, and were as follows: Kimberlee Hagen Recent Results (from the past 12 hour(s))   CBC WITH 3 PART DIFF    Collection Time: 19  3:50 PM   Result Value Ref Range    WBC 6.0 4.5 - 13.0 K/uL    RBC 3.82 (L) 4.10 - 5.10 M/uL    HGB 10.5 (L) 12.0 - 16.0 g/dL    HCT 32.1 (L) 36 - 48 %    MCV 84.0 78 - 102 FL    MCH 27.5 25.0 - 35.0 PG    MCHC 32.7 31 - 37 g/dL    RDW 14.5 11.5 - 14.5 %    PLATELET 78 (L) 826 - 440 K/uL    NEUTROPHILS 78 (H) 40 - 70 %    MIXED CELLS 3 0.1 - 17 %    LYMPHOCYTES 18 14 - 44 %    ABS. NEUTROPHILS 4.7 1.8 - 9.5 K/UL    ABS. MIXED CELLS 0.2 0.0 - 2.3 K/uL    ABS. LYMPHOCYTES 1.1 1.1 - 5.9 K/UL    DF AUTOMATED             Retacrit injection was HELD today, per order. Ms. Aguayo tolerated well, and had no complaints. Ms. Marta Spears was discharged from [place] [address] in stable condition at 1600. .. She is to return in 2 weeks, on Tuesday, 19,  at 0900,  for her next appointment, for Q 2 Week CBC/Retacrit injection & Q 4 Week, IVIG Infusion.  (She was made aware, that this appointment is at the [place] [address] location, and she agreed to this location, for this appointment.)    Liliana Goodwin RN  2019  3:38 PM

## 2019-03-26 ENCOUNTER — HOSPITAL ENCOUNTER (EMERGENCY)
Age: 54
Discharge: HOME | End: 2019-03-27
Attending: EMERGENCY MEDICINE
Payer: COMMERCIAL

## 2019-03-26 ENCOUNTER — HOSPITAL ENCOUNTER (EMERGENCY)
Age: 54
Discharge: HOME | End: 2019-03-26
Attending: EMERGENCY MEDICINE
Payer: COMMERCIAL

## 2019-03-26 VITALS
DIASTOLIC BLOOD PRESSURE: 81 MMHG | BODY MASS INDEX: 30.21 KG/M2 | HEIGHT: 69 IN | OXYGEN SATURATION: 100 % | SYSTOLIC BLOOD PRESSURE: 156 MMHG | RESPIRATION RATE: 16 BRPM | TEMPERATURE: 98.6 F | HEART RATE: 96 BPM | WEIGHT: 204 LBS

## 2019-03-26 DIAGNOSIS — R51.9 ACUTE NONINTRACTABLE HEADACHE, UNSPECIFIED HEADACHE TYPE: Primary | ICD-10-CM

## 2019-03-26 DIAGNOSIS — R04.0 EPISTAXIS: Primary | ICD-10-CM

## 2019-03-26 DIAGNOSIS — R73.9 HYPERGLYCEMIA: ICD-10-CM

## 2019-03-26 DIAGNOSIS — N17.9 ACUTE RENAL FAILURE, UNSPECIFIED ACUTE RENAL FAILURE TYPE (HCC): ICD-10-CM

## 2019-03-26 LAB
ADMINISTERED INITIALS, ADMINIT: NORMAL
ANION GAP SERPL CALC-SCNC: 9 MMOL/L (ref 3–18)
APTT PPP: 29 SEC (ref 23–36.4)
BASOPHILS # BLD: 0.1 K/UL (ref 0–0.1)
BASOPHILS NFR BLD: 1 % (ref 0–2)
BUN SERPL-MCNC: 59 MG/DL (ref 7–18)
BUN/CREAT SERPL: 15 (ref 12–20)
CALCIUM SERPL-MCNC: 8.5 MG/DL (ref 8.5–10.1)
CHLORIDE SERPL-SCNC: 105 MMOL/L (ref 100–108)
CO2 SERPL-SCNC: 22 MMOL/L (ref 21–32)
CREAT SERPL-MCNC: 4.03 MG/DL (ref 0.6–1.3)
D50 ADMINISTERED, D50ADM: 0 ML
D50 ORDER, D50ORD: 0 ML
DIFFERENTIAL METHOD BLD: ABNORMAL
EOSINOPHIL # BLD: 0.3 K/UL (ref 0–0.4)
EOSINOPHIL NFR BLD: 4 % (ref 0–5)
ERYTHROCYTE [DISTWIDTH] IN BLOOD BY AUTOMATED COUNT: 13.8 % (ref 11.6–14.5)
GLUCOSE BLD STRIP.AUTO-MCNC: 242 MG/DL (ref 70–110)
GLUCOSE BLD STRIP.AUTO-MCNC: 289 MG/DL (ref 70–110)
GLUCOSE BLD STRIP.AUTO-MCNC: 421 MG/DL (ref 70–110)
GLUCOSE BLD STRIP.AUTO-MCNC: 484 MG/DL (ref 70–110)
GLUCOSE SERPL-MCNC: 479 MG/DL (ref 74–99)
GLUCOSE, GLC: 289 MG/DL
GLUCOSE, GLC: 421 MG/DL
GLUCOSE, GLC: 484 MG/DL
HCT VFR BLD AUTO: 31.8 % (ref 35–45)
HGB BLD-MCNC: 10.6 G/DL (ref 12–16)
HIGH TARGET, HITG: 180 MG/DL
INR PPP: 1 (ref 0.8–1.2)
INSULIN ADMINSTERED, INSADM: 10.8 UNITS/HOUR
INSULIN ADMINSTERED, INSADM: 4.6 UNITS/HOUR
INSULIN ADMINSTERED, INSADM: 8.5 UNITS/HOUR
INSULIN ORDER, INSORD: 10.8 UNITS/HOUR
INSULIN ORDER, INSORD: 4.6 UNITS/HOUR
INSULIN ORDER, INSORD: 8.5 UNITS/HOUR
LOW TARGET, LOT: 140 MG/DL
LYMPHOCYTES # BLD: 0.8 K/UL (ref 0.9–3.6)
LYMPHOCYTES NFR BLD: 13 % (ref 21–52)
MCH RBC QN AUTO: 27.5 PG (ref 24–34)
MCHC RBC AUTO-ENTMCNC: 33.3 G/DL (ref 31–37)
MCV RBC AUTO: 82.4 FL (ref 74–97)
MINUTES UNTIL NEXT BG, NBG: 60 MIN
MONOCYTES # BLD: 0.3 K/UL (ref 0.05–1.2)
MONOCYTES NFR BLD: 4 % (ref 3–10)
MULTIPLIER, MUL: 0.02
MULTIPLIER, MUL: 0.02
MULTIPLIER, MUL: 0.03
NEUTS SEG # BLD: 4.8 K/UL (ref 1.8–8)
NEUTS SEG NFR BLD: 78 % (ref 40–73)
ORDER INITIALS, ORDINIT: NORMAL
PLATELET # BLD AUTO: 53 K/UL (ref 135–420)
PLATELET COMMENTS,PCOM: ABNORMAL
POTASSIUM SERPL-SCNC: 4.8 MMOL/L (ref 3.5–5.5)
PROTHROMBIN TIME: 13 SEC (ref 11.5–15.2)
RBC # BLD AUTO: 3.86 M/UL (ref 4.2–5.3)
RBC MORPH BLD: ABNORMAL
SODIUM SERPL-SCNC: 136 MMOL/L (ref 136–145)
WBC # BLD AUTO: 6.3 K/UL (ref 4.6–13.2)

## 2019-03-26 PROCEDURE — 99285 EMERGENCY DEPT VISIT HI MDM: CPT

## 2019-03-26 PROCEDURE — 75810000284 HC CNTRL NASAL HEMORHRAGE SIMPLE

## 2019-03-26 PROCEDURE — 74011000250 HC RX REV CODE- 250: Performed by: EMERGENCY MEDICINE

## 2019-03-26 PROCEDURE — 85730 THROMBOPLASTIN TIME PARTIAL: CPT

## 2019-03-26 PROCEDURE — 96375 TX/PRO/DX INJ NEW DRUG ADDON: CPT

## 2019-03-26 PROCEDURE — 77030011649 HC PK NSL RHNO S&N -B

## 2019-03-26 PROCEDURE — 80048 BASIC METABOLIC PNL TOTAL CA: CPT

## 2019-03-26 PROCEDURE — 85025 COMPLETE CBC W/AUTO DIFF WBC: CPT

## 2019-03-26 PROCEDURE — 82962 GLUCOSE BLOOD TEST: CPT

## 2019-03-26 PROCEDURE — 74011636637 HC RX REV CODE- 636/637: Performed by: EMERGENCY MEDICINE

## 2019-03-26 PROCEDURE — 74011250636 HC RX REV CODE- 250/636: Performed by: EMERGENCY MEDICINE

## 2019-03-26 PROCEDURE — 96366 THER/PROPH/DIAG IV INF ADDON: CPT

## 2019-03-26 PROCEDURE — 74011000258 HC RX REV CODE- 258: Performed by: EMERGENCY MEDICINE

## 2019-03-26 PROCEDURE — 96365 THER/PROPH/DIAG IV INF INIT: CPT

## 2019-03-26 PROCEDURE — 85610 PROTHROMBIN TIME: CPT

## 2019-03-26 PROCEDURE — 74011250637 HC RX REV CODE- 250/637: Performed by: EMERGENCY MEDICINE

## 2019-03-26 RX ORDER — ONDANSETRON 2 MG/ML
4 INJECTION INTRAMUSCULAR; INTRAVENOUS
Status: COMPLETED | OUTPATIENT
Start: 2019-03-26 | End: 2019-03-26

## 2019-03-26 RX ORDER — MAGNESIUM SULFATE 100 %
4 CRYSTALS MISCELLANEOUS AS NEEDED
Status: DISCONTINUED | OUTPATIENT
Start: 2019-03-26 | End: 2019-03-26 | Stop reason: HOSPADM

## 2019-03-26 RX ORDER — DEXTROSE 50 % IN WATER (D50W) INTRAVENOUS SYRINGE
25-50 AS NEEDED
Status: DISCONTINUED | OUTPATIENT
Start: 2019-03-26 | End: 2019-03-26 | Stop reason: HOSPADM

## 2019-03-26 RX ORDER — FENTANYL CITRATE 50 UG/ML
50 INJECTION, SOLUTION INTRAMUSCULAR; INTRAVENOUS
Status: COMPLETED | OUTPATIENT
Start: 2019-03-26 | End: 2019-03-26

## 2019-03-26 RX ORDER — TRANEXAMIC ACID 100 MG/ML
1 INJECTION, SOLUTION INTRAVENOUS ONCE
Status: COMPLETED | OUTPATIENT
Start: 2019-03-26 | End: 2019-03-26

## 2019-03-26 RX ORDER — ACETAMINOPHEN 500 MG
1000 TABLET ORAL
Status: COMPLETED | OUTPATIENT
Start: 2019-03-26 | End: 2019-03-26

## 2019-03-26 RX ORDER — SULFAMETHOXAZOLE AND TRIMETHOPRIM 800; 160 MG/1; MG/1
1 TABLET ORAL 2 TIMES DAILY
Qty: 8 TAB | Refills: 0 | Status: SHIPPED | OUTPATIENT
Start: 2019-03-26 | End: 2019-03-30

## 2019-03-26 RX ORDER — ACETAMINOPHEN 500 MG
500 TABLET ORAL
Status: DISCONTINUED | OUTPATIENT
Start: 2019-03-26 | End: 2019-03-26

## 2019-03-26 RX ADMIN — ONDANSETRON 4 MG: 2 INJECTION INTRAMUSCULAR; INTRAVENOUS at 07:16

## 2019-03-26 RX ADMIN — FENTANYL CITRATE 50 MCG: 50 INJECTION INTRAMUSCULAR; INTRAVENOUS at 07:18

## 2019-03-26 RX ADMIN — ACETAMINOPHEN 1000 MG: 500 TABLET ORAL at 10:03

## 2019-03-26 RX ADMIN — SODIUM CHLORIDE 8.5 UNITS/HR: 900 INJECTION, SOLUTION INTRAVENOUS at 08:29

## 2019-03-26 RX ADMIN — TRANEXAMIC ACID 1000 MG: 1 INJECTION, SOLUTION INTRAVENOUS at 06:05

## 2019-03-26 NOTE — DISCHARGE INSTRUCTIONS
Patient Education        Nosebleeds: Care Instructions  Your Care Instructions    Nosebleeds are common, especially if you have colds or allergies. Many things can cause a nosebleed. Some nosebleeds stop on their own with pressure. Others need packing. Some get cauterized (sealed). If you have gauze or other packing materials in your nose, you will need to follow up with your doctor to have the packing removed. You may need more treatment if you get nosebleeds a lot. The doctor has checked you carefully, but problems can develop later. If you notice any problems or new symptoms, get medical treatment right away. Follow-up care is a key part of your treatment and safety. Be sure to make and go to all appointments, and call your doctor if you are having problems. It's also a good idea to know your test results and keep a list of the medicines you take. How can you care for yourself at home? · If you get another nosebleed:  ? Sit up and tilt your head slightly forward. This keeps blood from going down your throat. ? Use your thumb and index finger to pinch your nose shut for 10 minutes. Use a clock. Do not check to see if the bleeding has stopped before the 10 minutes are up. If the bleeding has not stopped, pinch your nose shut for another 10 minutes. ? When the bleeding has stopped, try not to pick, rub, or blow your nose for 12 hours. Avoiding these things helps keep your nose from bleeding again. · If your doctor prescribed antibiotics, take them as directed. Do not stop taking them just because you feel better. You need to take the full course of antibiotics. To prevent nosebleeds  · Do not blow your nose too hard. · Try not to lift or strain after a nosebleed. · Raise your head on a pillow while you sleep. · Put a thin layer of a saline- or water-based nasal gel, such as NasoGel, inside your nose. Put it on the septum, which divides your nostrils.  This will prevent dryness that can cause nosebleeds. · Use a vaporizer or humidifier to add moisture to your bedroom. Follow the directions for cleaning the machine. · Do not use aspirin, ibuprofen (Advil, Motrin), or naproxen (Aleve) for 36 to 48 hours after a nosebleed unless your doctor tells you to. You can use acetaminophen (Tylenol) for pain relief. · Talk to your doctor about stopping any other medicines you are taking. Some medicines may make you more likely to get a nosebleed. · Do not use cold medicines or nasal sprays without first talking to your doctor. They can make your nose dry. When should you call for help? Call 911 anytime you think you may need emergency care. For example, call if:    · You passed out (lost consciousness).    Call your doctor now or seek immediate medical care if:    · You get another nosebleed and your nose is still bleeding after you have applied pressure 3 times for 10 minutes each time (30 minutes total).     · There is a lot of blood running down the back of your throat even after you pinch your nose and tilt your head forward.     · You have a fever.     · You have sinus pain.    Watch closely for changes in your health, and be sure to contact your doctor if:    · You get nosebleeds often, even if they stop.     · You do not get better as expected. Where can you learn more? Go to http://michlea-ivan.info/. Enter S156 in the search box to learn more about \"Nosebleeds: Care Instructions. \"  Current as of: September 23, 2018  Content Version: 11.9  © 2936-9169 [employer]. Care instructions adapted under license by [employer] (which disclaims liability or warranty for this information). If you have questions about a medical condition or this instruction, always ask your healthcare professional. [address] any warranty or liability for your use of this information.

## 2019-03-26 NOTE — ED NOTES
Intermittent/moderate amount of blood noted in emesis bag. Airway remains patent; slight oozing of blood noted from nares. Affect remains clam/conversant, respirations regular/non labored.

## 2019-03-26 NOTE — ED PROVIDER NOTES
EMERGENCY DEPARTMENT HISTORY AND PHYSICAL EXAM    3:01 AM      Date: 3/26/2019  Patient Name: Petar Farley    History of Presenting Illness     Chief Complaint   Patient presents with    Epistaxis         History Provided By: Patient      Additional History (Context): Petar Farley is a 48 y.o. female with anemia, diabetes, HTN, ITP who presents with an acute, moderate nose bleed which onset hours PTA and has not resolved prompting pt's arrival in the ED. Pt states the bleeding stopped, she tried to go to sleep when it began again. She has had nosebleeds before and reports a Hx of allergies and recent increase of sneezing. Pt denies trauma or blood thinners. No other concerns or Sx at this time. PCP: Christophe Pate MD    Chief Complaint: nose bleed   Duration:  Hours  Timing:  Acute  Location: n/a  Quality: n/a  Severity: Moderate  Modifying Factors: no trauma   Associated Symptoms: sneezing    Current Facility-Administered Medications   Medication Dose Route Frequency Provider Last Rate Last Dose    fentaNYL citrate (PF) injection 50 mcg  50 mcg IntraVENous NOW Oksana Charles DO         Current Outpatient Medications   Medication Sig Dispense Refill    trimethoprim-sulfamethoxazole (BACTRIM DS) 160-800 mg per tablet Take 1 Tab by mouth two (2) times a day for 4 days. 8 Tab 0    carvedilol (COREG) 6.25 mg tablet Take 1 Tab by mouth two (2) times daily (with meals). 30 Tab 0    acetaminophen (TYLENOL) 500 mg tablet Take 2 Tabs by mouth every six (6) hours as needed for Pain. 20 Tab 0    metFORMIN (GLUCOPHAGE) 500 mg tablet Take 2 Tabs by mouth two (2) times daily (with meals). Indications: TYPE 2 DIABETES MELLITUS 120 Tab 2    lisinopril-hydrochlorothiazide (PRINZIDE, ZESTORETIC) 20-25 mg per tablet Take 1 Tab by mouth daily. Indications: HYPERTENSION      amLODIPine (NORVASC) 10 mg tablet Take 10 mg by mouth daily.  Indications: HYPERTENSION      ferrous sulfate 325 mg (65 mg iron) tablet Take 325 mg by mouth two (2) times a day. Past History     Past Medical History:  Past Medical History:   Diagnosis Date    Anemia     Arthritis     Diabetes ([address]     Hypertension     ITP (idiopathic thrombocytopenic purpura)        Past Surgical History:  Past Surgical History:   Procedure Laterality Date    HX GYN      c section 1984    HX HYSTERECTOMY  2017    HX TUBAL LIGATION         Family History:  Family History   Problem Relation Age of Onset    Hypertension Mother     Cancer Father         Colon       Social History:  Social History     Tobacco Use    Smoking status: Never Smoker    Smokeless tobacco: Never Used   Substance Use Topics    Alcohol use: No    Drug use: No       Allergies: Allergies   Allergen Reactions    Rocephin [Ceftriaxone] Itching    Pcn [Penicillins] Itching         Review of Systems       Review of Systems   Constitutional: Negative for activity change, fatigue and fever. HENT: Positive for nosebleeds and sneezing. Negative for congestion and rhinorrhea. Eyes: Negative for visual disturbance. Respiratory: Negative for shortness of breath. Cardiovascular: Negative for chest pain and palpitations. Gastrointestinal: Negative for abdominal pain, diarrhea, nausea and vomiting. Genitourinary: Negative for dysuria and hematuria. Musculoskeletal: Negative for back pain. Skin: Negative for rash. Neurological: Negative for dizziness, weakness and light-headedness. All other systems reviewed and are negative. Physical Exam     Visit Vitals  /87 (BP Patient Position: At rest)   Pulse 96   Temp 98.6 °F (37 °C)   Resp 16   Ht 5' 9\" (1.753 m)   Wt 92.5 kg (204 lb)   LMP 06/18/2017   SpO2 99%   BMI 30.13 kg/m²         Physical Exam   Constitutional: She is oriented to person, place, and time. She appears well-developed and well-nourished. No distress. HENT:   Head: Normocephalic and atraumatic.    Right Ear: External ear normal.   Left Ear: External ear normal.   Bleeding from b/l nares, L>R  Blood in OP   Eyes: Pupils are equal, round, and reactive to light. Conjunctivae and EOM are normal. No scleral icterus. Neck: Normal range of motion. Neck supple. No JVD present. No tracheal deviation present. No thyromegaly present. Cardiovascular: Regular rhythm, normal heart sounds and intact distal pulses. Tachycardia present. Exam reveals no gallop and no friction rub. No murmur heard. Pulmonary/Chest: Effort normal and breath sounds normal. She exhibits no tenderness. Abdominal: Soft. Bowel sounds are normal. She exhibits no distension. There is no tenderness. There is no rebound and no guarding. Musculoskeletal: Normal range of motion. She exhibits no edema or tenderness. Lymphadenopathy:     She has no cervical adenopathy. Neurological: She is alert and oriented to person, place, and time. No cranial nerve deficit. Coordination normal.   No sensory loss, Gait normal, Motor 5/5   Skin: Skin is warm and dry. Psychiatric: She has a normal mood and affect. Her behavior is normal. Judgment and thought content normal.   Nursing note and vitals reviewed. Diagnostic Study Results     Labs -  No results found for this or any previous visit (from the past 12 hour(s)). Radiologic Studies -   No orders to display         Medical Decision Making     It should be noted that IEmily DO will be the provider of record for this patient. I reviewed the vital signs, available nursing notes, past medical history, past surgical history, family history and social history. Vital Signs-Reviewed the patient's vital signs.     Pulse Oximetry Analysis -  96% on room air WNL    Cardiac Monitor:  Rate: 105 BPM  Rhythm:  Sinus Tachycardia     Records Reviewed: Nursing Notes and Old Medical Records (Time of Review: 3:01 AM)    ED Course: Progress Notes, Reevaluation, and Consults:    Epistaxis Management  Date/Time: 3/26/2019 3:20 AM  Performed by: Mile Ashley DO  Authorized by: Mile Ashley DO     Consent:     Consent obtained:  Verbal    Consent given by:  Patient    Risks discussed:  Bleeding, infection and nasal injury    Alternatives discussed:  Delayed treatment  Anesthesia (see MAR for exact dosages): Anesthesia method:  None  Procedure details:     Treatment site:  L septum    Treatment method:  Nasal tampon    Treatment complexity:  Limited  Post-procedure details:     Assessment:  Bleeding decreased    Patient tolerance of procedure: Tolerated well, no immediate complications      9:78 AM  Patient with persistent epistaxis despite rapid Rhino placed. TXA ordered and 300 mg placed on a small piece of gauze and inserted into the left nare. Patient tolerated well.    5:32 AM  Patient with bleeding from right nare. Right nare packed as well. Packing from left nare removed    6:18 AM  Packing removed. No bleeding in oropharynx. Less bleeding from nares with still some oozing. ENT, Dr. Doug Portillo, consulted and will come see the patient    6:30 AM  Dr. Doug Portillo in ED seeing patient.    7:02 AM  Still working on epistaxis control. Rhino rocket 7/5 placed in L nare. 7:15 AM : Pt care transferred to Dr. Daquan Griffin  ,ED provider. History of patient complaint(s), available diagnostic reports and current treatment plan has been discussed thoroughly. Bedside rounding on patient occured : yes . Intended disposition of patient : discharge  Pending diagnostics reports and/or labs (please list): ENT dispo, CBC          Provider Notes (Medical Decision Making):   Patient is a 53-year-old female who comes in with epistaxis. Patient has a history of epistaxis and has had symptoms like this in the past which usually gets twice year. She has never had to have her nose packed. Initially placed [employer] with control of bleeding as there is nothing seen to cauterize.   The patient began to have oozing and then more bleeding from her right nare. TXA was ordered and placed in bilateral nares. Diagnosis     Clinical Impression:   1. Epistaxis        Disposition: discharge     Follow-up Information     Follow up With Specialties Details Why Contact Info    Radha Michael MD Internal Medicine Call in 1 day  [address] [facility] [address] [address] [place] [address]      Katia Wood MD Otolaryngology, Surgery Go in 3 days For wound re-check [address] [facility]  [phone]             Patient's Medications   Start Taking    TRIMETHOPRIM-SULFAMETHOXAZOLE (BACTRIM DS) 160-800 MG PER TABLET    Take 1 Tab by mouth two (2) times a day for 4 days. Continue Taking    ACETAMINOPHEN (TYLENOL) 500 MG TABLET    Take 2 Tabs by mouth every six (6) hours as needed for Pain. AMLODIPINE (NORVASC) 10 MG TABLET    Take 10 mg by mouth daily. Indications: HYPERTENSION    CARVEDILOL (COREG) 6.25 MG TABLET    Take 1 Tab by mouth two (2) times daily (with meals). FERROUS SULFATE 325 MG (65 MG IRON) TABLET    Take 325 mg by mouth two (2) times a day. LISINOPRIL-HYDROCHLOROTHIAZIDE (PRINZIDE, ZESTORETIC) 20-25 MG PER TABLET    Take 1 Tab by mouth daily. Indications: HYPERTENSION    METFORMIN (GLUCOPHAGE) 500 MG TABLET    Take 2 Tabs by mouth two (2) times daily (with meals). Indications: TYPE 2 DIABETES MELLITUS   These Medications have changed    No medications on file   Stop Taking    No medications on file     _______________________________       Scribe Valerio Sierra acting as a scribe for and in the presence of Deshaun Cisneros DO      March 26, 2019 at 3:01 AM       Provider Attestation:      I personally performed the services described in the documentation, reviewed the documentation, as recorded by the scribe in my presence, and it accurately and completely records my words and actions.  March 26, 2019 at 3:01 AM - Deshaun Cisneros DO _______________________________

## 2019-03-26 NOTE — LETTER
NOTIFICATION RETURN TO WORK / SCHOOL 
 
3/26/2019 11:52 AM 
 
Ms. Álvaro Lomas [address] [address] To Whom It May Concern: 
 
Álvaro Lomas is currently under the care of [id number] [place] [address] EMERGENCY DEPT. She will return to work/school on: 3/29/19 If there are questions or concerns please have the patient contact our office. Sincerely, Sandy Burk RN

## 2019-03-26 NOTE — ED NOTES
Pt sitting up in bed with intermittent coughing of post nasal drip/blood. Airway remains patent; affect calm/conversant, respirations regular/non labored/skin warm and dry. No distress noted at this time.

## 2019-03-26 NOTE — CONSULTS
[address] [facility]    Name:  Christopher Eagle  MR#:   [id number]  :  1965  ACCOUNT #:  [de-identified]  DATE OF SERVICE:  2019    REASON FOR CONSULTATION:  1.  epistaxis, left side. 2.  Thrombocytopenia. HISTORY OF PRESENT ILLNESS:  This patient is a 30-year-old female, who was seen earlier this morning for epistaxis on the left side. The patient has had intermittent difficulty with epistaxis in the past.    The patient was seen by the staff and was initially packed with a 5.5 Rapid Rhino. The patient having continued bleeding. The patient was subsequently given TXA and continued bleeding noted thereafter. ENT consultation was requested for evaluation regarding the above. PAST MEDICAL HISTORY:  The patient does have a past medical history significant for thrombocytopenia, however, was noted to have a platelet level 87,027 per the ER staff. PHYSICAL EXAMINATION:  GENERAL:  Exam reveals a well-developed, well-nourished 30-year-old female. HEENT:  Intraoral exam reveals a large blood clot in the posterior pharynx and this was partially removed. The intranasal exam reveals blood clots noted on the left and the right side. This was debrided with some difficulty. The patient does have a large blood clot present. The nose was anesthetized and the 7.5 Rapid Rhino was then placed and inflated with 10 mL air. The patient with some significant pain from this. Given IV fentanyl. At present, having the patient without any further evidence of any bleeding. IMPRESSION:  1. Epistaxis, left side. PLAN:  The patient with a Rapid Rhino placed. I have recommended this to be maintained for the next three days. We will follow up in my office at the end of the week for a removal.  The patient does not think she can tolerate this. I have urged the patient with this and should improve over time. This was discussed with the patient, who understands.         Teja Sensor, MD [id number]  D:  03/26/2019 7:18  T:  03/26/2019 10:34  JOB #:  [id number]

## 2019-03-26 NOTE — LETTER
NOTIFICATION RETURN TO WORK / SCHOOL 
 
3/26/2019 3:30 AM 
 
Ms. Hortencia Delacruz [address] [address] To Whom It May Concern: 
 
Hortencia Delacruz is currently under the care of [id number] [facility] EMERGENCY DEPT. She will return to work/school on: 3/29/19 If there are questions or concerns please have the patient contact our office. Sincerely, Jeff Goel, DO

## 2019-03-26 NOTE — ED NOTES
Pt remains awake/alert without distress. Pt still has intermittent cough/gag of post nasal bleeding; no hemorrhage noted. VS repeated. Affect remains calm/respirations regular/non labored/skin warm/dry.

## 2019-03-27 ENCOUNTER — APPOINTMENT (OUTPATIENT)
Dept: CT IMAGING | Age: 54
End: 2019-03-27
Attending: EMERGENCY MEDICINE
Payer: COMMERCIAL

## 2019-03-27 VITALS
DIASTOLIC BLOOD PRESSURE: 73 MMHG | HEART RATE: 89 BPM | BODY MASS INDEX: 30.2 KG/M2 | SYSTOLIC BLOOD PRESSURE: 163 MMHG | WEIGHT: 203.93 LBS | RESPIRATION RATE: 18 BRPM | TEMPERATURE: 98.9 F | HEIGHT: 69 IN | OXYGEN SATURATION: 100 %

## 2019-03-27 LAB
ANION GAP SERPL CALC-SCNC: 11 MMOL/L (ref 3–18)
ATRIAL RATE: 100 BPM
BASOPHILS # BLD: 0 K/UL (ref 0–0.1)
BASOPHILS NFR BLD: 0 % (ref 0–2)
BUN SERPL-MCNC: 61 MG/DL (ref 7–18)
BUN/CREAT SERPL: 15 (ref 12–20)
CALCIUM SERPL-MCNC: 8.2 MG/DL (ref 8.5–10.1)
CALCULATED P AXIS, ECG09: 59 DEGREES
CALCULATED R AXIS, ECG10: 11 DEGREES
CALCULATED T AXIS, ECG11: 75 DEGREES
CHLORIDE SERPL-SCNC: 107 MMOL/L (ref 100–108)
CO2 SERPL-SCNC: 21 MMOL/L (ref 21–32)
CREAT SERPL-MCNC: 4.11 MG/DL (ref 0.6–1.3)
DIAGNOSIS, 93000: NORMAL
DIFFERENTIAL METHOD BLD: ABNORMAL
EOSINOPHIL # BLD: 0.3 K/UL (ref 0–0.4)
EOSINOPHIL NFR BLD: 3 % (ref 0–5)
ERYTHROCYTE [DISTWIDTH] IN BLOOD BY AUTOMATED COUNT: 14.1 % (ref 11.6–14.5)
GLUCOSE SERPL-MCNC: 271 MG/DL (ref 74–99)
HCT VFR BLD AUTO: 29.3 % (ref 35–45)
HGB BLD-MCNC: 9.6 G/DL (ref 12–16)
LYMPHOCYTES # BLD: 1 K/UL (ref 0.9–3.6)
LYMPHOCYTES NFR BLD: 12 % (ref 21–52)
MAGNESIUM SERPL-MCNC: 2.2 MG/DL (ref 1.6–2.6)
MCH RBC QN AUTO: 27.2 PG (ref 24–34)
MCHC RBC AUTO-ENTMCNC: 32.8 G/DL (ref 31–37)
MCV RBC AUTO: 83 FL (ref 74–97)
MONOCYTES # BLD: 0.4 K/UL (ref 0.05–1.2)
MONOCYTES NFR BLD: 5 % (ref 3–10)
NEUTS SEG # BLD: 6.8 K/UL (ref 1.8–8)
NEUTS SEG NFR BLD: 80 % (ref 40–73)
P-R INTERVAL, ECG05: 152 MS
PLATELET # BLD AUTO: 60 K/UL (ref 135–420)
PLATELET COMMENTS,PCOM: ABNORMAL
POTASSIUM SERPL-SCNC: 4.9 MMOL/L (ref 3.5–5.5)
Q-T INTERVAL, ECG07: 378 MS
QRS DURATION, ECG06: 88 MS
QTC CALCULATION (BEZET), ECG08: 487 MS
RBC # BLD AUTO: 3.53 M/UL (ref 4.2–5.3)
RBC MORPH BLD: ABNORMAL
SODIUM SERPL-SCNC: 139 MMOL/L (ref 136–145)
TROPONIN I SERPL-MCNC: 0.15 NG/ML (ref 0–0.04)
VENTRICULAR RATE, ECG03: 100 BPM
WBC # BLD AUTO: 8.5 K/UL (ref 4.6–13.2)

## 2019-03-27 PROCEDURE — 93005 ELECTROCARDIOGRAM TRACING: CPT

## 2019-03-27 PROCEDURE — 84484 ASSAY OF TROPONIN QUANT: CPT

## 2019-03-27 PROCEDURE — 74011250637 HC RX REV CODE- 250/637: Performed by: EMERGENCY MEDICINE

## 2019-03-27 PROCEDURE — 85025 COMPLETE CBC W/AUTO DIFF WBC: CPT

## 2019-03-27 PROCEDURE — 70450 CT HEAD/BRAIN W/O DYE: CPT

## 2019-03-27 PROCEDURE — 83735 ASSAY OF MAGNESIUM: CPT

## 2019-03-27 PROCEDURE — 80048 BASIC METABOLIC PNL TOTAL CA: CPT

## 2019-03-27 RX ORDER — TRAMADOL HYDROCHLORIDE 50 MG/1
50 TABLET ORAL
Qty: 10 TAB | Refills: 0 | Status: SHIPPED | OUTPATIENT
Start: 2019-03-27 | End: 2019-04-01

## 2019-03-27 RX ORDER — OXYCODONE AND ACETAMINOPHEN 5; 325 MG/1; MG/1
1 TABLET ORAL
Status: COMPLETED | OUTPATIENT
Start: 2019-03-27 | End: 2019-03-27

## 2019-03-27 RX ORDER — OXYCODONE AND ACETAMINOPHEN 5; 325 MG/1; MG/1
TABLET ORAL
Status: DISCONTINUED
Start: 2019-03-27 | End: 2019-03-27 | Stop reason: HOSPADM

## 2019-03-27 RX ORDER — ONDANSETRON 4 MG/1
4 TABLET, ORALLY DISINTEGRATING ORAL
Status: COMPLETED | OUTPATIENT
Start: 2019-03-27 | End: 2019-03-27

## 2019-03-27 RX ADMIN — OXYCODONE AND ACETAMINOPHEN 1 TABLET: 5; 325 TABLET ORAL at 00:34

## 2019-03-27 RX ADMIN — ONDANSETRON 4 MG: 4 TABLET, ORALLY DISINTEGRATING ORAL at 00:34

## 2019-03-27 RX ADMIN — OXYCODONE AND ACETAMINOPHEN 1 TABLET: 5; 325 TABLET ORAL at 01:14

## 2019-03-27 NOTE — ED NOTES
Patient states seen here yesterday for nose bleed and She Fitzgerald placed a nasal balloon to left nare. She states she has had a headache ever since. She states her because she has to breath through her mouth, it makes it hard to breath only through her mouth.

## 2019-03-27 NOTE — ED NOTES
Provider at bedside; patient states feeling better and wants to go home and have no further work up.

## 2019-03-27 NOTE — ED NOTES
Patient c/o headache remains. Assisted with reposition for comfort. Lights dimmed. Call bell remains within reach. Ice water given per patient request and Provider approval. Patient updated on which test results are still pending. Encouraged to voice any concerns, and all questions/concerns addressed. Frequent rounding provided to address any concerns. Hourly rounding for comfort and pain control remains in progress.

## 2019-03-27 NOTE — DISCHARGE INSTRUCTIONS
Return for pain, fever not resolving with motrin or tylenol, shortness of breath, vomiting, decreased fluid intake, weakness, numbness, dizziness, or any change or concerns. Continue your bactrim antibiotic as prescribed. Patient Education        Headache: Care Instructions  Your Care Instructions    Headaches have many possible causes. Most headaches aren't a sign of a more serious problem, and they will get better on their own. Home treatment may help you feel better faster. The doctor has checked you carefully, but problems can develop later. If you notice any problems or new symptoms, get medical treatment right away. Follow-up care is a key part of your treatment and safety. Be sure to make and go to all appointments, and call your doctor if you are having problems. It's also a good idea to know your test results and keep a list of the medicines you take. How can you care for yourself at home? · Do not drive if you have taken a prescription pain medicine. · Rest in a quiet, dark room until your headache is gone. Close your eyes and try to relax or go to sleep. Don't watch TV or read. · Put a cold, moist cloth or cold pack on the painful area for 10 to 20 minutes at a time. Put a thin cloth between the cold pack and your skin. · Use a warm, moist towel or a heating pad set on low to relax tight shoulder and neck muscles. · Have someone gently massage your neck and shoulders. · Take pain medicines exactly as directed. ? If the doctor gave you a prescription medicine for pain, take it as prescribed. ? If you are not taking a prescription pain medicine, ask your doctor if you can take an over-the-counter medicine. · Be careful not to take pain medicine more often than the instructions allow, because you may get worse or more frequent headaches when the medicine wears off.   · Do not ignore new symptoms that occur with a headache, such as a fever, weakness or numbness, vision changes, or confusion. These may be signs of a more serious problem. To prevent headaches  · Keep a headache diary so you can figure out what triggers your headaches. Avoiding triggers may help you prevent headaches. Record when each headache began, how long it lasted, and what the pain was like (throbbing, aching, stabbing, or dull). Write down any other symptoms you had with the headache, such as nausea, flashing lights or dark spots, or sensitivity to bright light or loud noise. Note if the headache occurred near your period. List anything that might have triggered the headache, such as certain foods (chocolate, cheese, wine) or odors, smoke, bright light, stress, or lack of sleep. · Find healthy ways to deal with stress. Headaches are most common during or right after stressful times. Take time to relax before and after you do something that has caused a headache in the past.  · Try to keep your muscles relaxed by keeping good posture. Check your jaw, face, neck, and shoulder muscles for tension, and try relaxing them. When sitting at a desk, change positions often, and stretch for 30 seconds each hour. · Get plenty of sleep and exercise. · Eat regularly and well. Long periods without food can trigger a headache. · Treat yourself to a massage. Some people find that regular massages are very helpful in relieving tension. · Limit caffeine by not drinking too much coffee, tea, or soda. But don't quit caffeine suddenly, because that can also give you headaches. · Reduce eyestrain from computers by blinking frequently and looking away from the computer screen every so often. Make sure you have proper eyewear and that your monitor is set up properly, about an arm's length away. · Seek help if you have depression or anxiety. Your headaches may be linked to these conditions. Treatment can both prevent headaches and help with symptoms of anxiety or depression. When should you call for help?   Call 911 anytime you think you may need emergency care. For example, call if:    · You have signs of a stroke. These may include:  ? Sudden numbness, paralysis, or weakness in your face, arm, or leg, especially on only one side of your body. ? Sudden vision changes. ? Sudden trouble speaking. ? Sudden confusion or trouble understanding simple statements. ? Sudden problems with walking or balance. ? A sudden, severe headache that is different from past headaches.    Call your doctor now or seek immediate medical care if:    · You have a new or worse headache.     · Your headache gets much worse. Where can you learn more? Go to http://michela-ivan.info/. Enter M271 in the search box to learn more about \"Headache: Care Instructions. \"  Current as of: Ramona 3, 2018  Content Version: 11.9  © 8510-3584 [employer]. Care instructions adapted under license by [employer] (which disclaims liability or warranty for this information). If you have questions about a medical condition or this instruction, always ask your healthcare professional. [place] [address] any warranty or liability for your use of this information. Patient Education        Acute Kidney Injury: Care Instructions  Your Care Instructions    Acute kidney injury (SHAHANA) is a sudden decrease in kidney function. This can happen over a period of hours, days or, in some cases, weeks. SHAHANA used to be called acute renal failure, but kidney failure doesn't always happen with SHAHANA. Common causes of SHAHANA are dehydration, blood loss, and medicines. When SHAHANA happens, the kidneys have trouble removing waste and excess fluids from the body. The waste and fluids build up and become harmful. Kidney function may return to normal if the cause of SHAHANA is treated quickly. Your chance of a full recovery depends on what caused the problem, how quickly the cause was treated, and what other medical problems you have.  A machine may be used to help your kidneys remove waste and fluids for a short period of time. This is called dialysis. Follow-up care is a key part of your treatment and safety. Be sure to make and go to all appointments, and call your doctor if you are having problems. It's also a good idea to know your test results and keep a list of the medicines you take. How can you care for yourself at home? · Talk to your doctor about how much fluid you should drink. · Eat a balanced diet. Talk to your doctor or a dietitian about what type of diet may be best for you. You may need to limit sodium, potassium, and phosphorus. · If you need dialysis, follow the instructions and schedule for dialysis that your doctor gives you. · Do not smoke. Smoking can make your condition worse. If you need help quitting, talk to your doctor about stop-smoking programs and medicines. These can increase your chances of quitting for good. · Do not drink alcohol. · Review all of your medicines with your doctor. Do not take any medicines, including nonsteroidal anti-inflammatory drugs (NSAIDs) such as ibuprofen (Advil, Motrin) or naproxen (Aleve), unless your doctor says it is safe for you to do so. · Make sure that anyone treating you for any health problem knows that you have had SHAHANA. When should you call for help? Call 911 anytime you think you may need emergency care. For example, call if:    · You passed out (lost consciousness).    Call your doctor now or seek immediate medical care if:    · You have new or worse nausea and vomiting.     · You have much less urine than normal, or you have no urine.     · You are feeling confused or cannot think clearly.     · You have new or more blood in your urine.     · You have new swelling.     · You are dizzy or lightheaded, or feel like you may faint.    Watch closely for changes in your health, and be sure to contact your doctor if:    · You do not get better as expected. Where can you learn more?   Go to http://michela-ivan.info/. Enter P728 in the search box to learn more about \"Acute Kidney Injury: Care Instructions. \"  Current as of: March 14, 2018  Content Version: 11.9  © 8329-1711 [employer]. Care instructions adapted under license by [employer] (which disclaims liability or warranty for this information). If you have questions about a medical condition or this instruction, always ask your healthcare professional. [place] [address] any warranty or liability for your use of this information.

## 2019-03-27 NOTE — ED PROVIDER NOTES
Pt c/o pain from nasal balloon. Says lg amt of bleeding spont yest from left nare. Came to ed, couldn't stop initially, had packing placed by dr Eric Campbell ent in consult. But pt says didn't tolerate well, has been congested w diff time breathing and headache, intermittent, since balloon placed. Says no cristi meds given, taking tylenol but not helping much. Requests air taken out of balloon and pain meds. Says no further bleeding since leaving.             Past Medical History:   Diagnosis Date    Anemia     Arthritis     Diabetes ([address]     Hypertension     ITP (idiopathic thrombocytopenic purpura)        Past Surgical History:   Procedure Laterality Date    HX GYN      c section 1984    HX HYSTERECTOMY  2017    HX TUBAL LIGATION           Family History:   Problem Relation Age of Onset    Hypertension Mother     Cancer Father         Colon       Social History     Socioeconomic History    Marital status: SINGLE     Spouse name: Not on file    Number of children: Not on file    Years of education: Not on file    Highest education level: Not on file   Occupational History    Not on file   Social Needs    Financial resource strain: Not on file    Food insecurity:     Worry: Not on file     Inability: Not on file    Transportation needs:     Medical: Not on file     Non-medical: Not on file   Tobacco Use    Smoking status: Never Smoker    Smokeless tobacco: Never Used   Substance and Sexual Activity    Alcohol use: No    Drug use: No    Sexual activity: Not on file   Lifestyle    Physical activity:     Days per week: Not on file     Minutes per session: Not on file    Stress: Not on file   Relationships    Social connections:     Talks on phone: Not on file     Gets together: Not on file     Attends Oriental orthodox service: Not on file     Active member of club or organization: Not on file     Attends meetings of clubs or organizations: Not on file     Relationship status: Not on file    Intimate partner violence:     Fear of current or ex partner: Not on file     Emotionally abused: Not on file     Physically abused: Not on file     Forced sexual activity: Not on file   Other Topics Concern    Not on file   Social History Narrative    Not on file         ALLERGIES: Rocephin [ceftriaxone] and Pcn [penicillins]    Review of Systems   Constitutional: Negative for fever. HENT: Negative for congestion. Respiratory: Negative for cough and stridor. Cardiovascular: Negative for chest pain. Gastrointestinal: Negative for abdominal pain and vomiting. Musculoskeletal: Negative for back pain. Skin: Negative for rash. Neurological: Positive for headaches. Negative for light-headedness. All other systems reviewed and are negative. Vitals:    03/27/19 0200 03/27/19 0225 03/27/19 0230 03/27/19 0257   BP: 155/67  163/73    Pulse: (!) 103 93 91 89   Resp:       Temp:       SpO2: 94% 97% 94% 100%   Weight:       Height:                Physical Exam   Constitutional: She is oriented to person, place, and time. She appears well-developed. HENT:   Head: Normocephalic and atraumatic. Rapid rhino in place left nare. No bleeding   Eyes: Pupils are equal, round, and reactive to light. Neck: Normal range of motion. Cardiovascular: Normal rate and regular rhythm. No murmur heard. Pulmonary/Chest: Effort normal. She has no wheezes. Abdominal: Soft. There is no tenderness. Musculoskeletal: She exhibits no tenderness. Neurological: She is alert and oriented to person, place, and time. Skin: Skin is dry. Capillary refill takes less than 2 seconds. No rash noted. She is not diaphoretic. Psychiatric: She has a normal mood and affect. Nursing note and vitals reviewed. [facility]       Procedures    Vitals:  No data found.       Medications ordered:   Medications   oxyCODONE-acetaminophen (PERCOCET) 5-325 mg per tablet 1 Tab (1 Tab Oral Given 3/27/19 0034)   ondansetron (ZOFRAN ODT) tablet 4 mg (4 mg Oral Given 3/27/19 0034)   oxyCODONE-acetaminophen (PERCOCET) 5-325 mg per tablet 1 Tab (1 Tab Oral Given 3/27/19 0114)         Lab findings:  No results found for this or any previous visit (from the past 12 hour(s)). X-Ray, CT or other radiology findings or impressions:  CT HEAD WO CONT   Final Result   IMPRESSION:      No acute intracranial findings. Progress notes, Consult notes or additional Procedure notes:   12:30 AM 1-2 ml of air removed from balloon of rhino rocket. Pt felt much better. No complaints. No bleeding. Cont severe pain, ct head added, 1-2 more ml of air removed as pt was adamant regarding removing more. Slightly improved. Pain continues. 2:51 AM pt feels better, requests dc. Denies cp, declines waiting for repeat trop after discussion. Diagnosis:   1. Acute nonintractable headache, unspecified headache type    2. Acute renal failure, unspecified acute renal failure type ([facility] [address]        Disposition: home    Follow-up Information     Follow up With Specialties Details Why Contact Info    Kristopher White MD Internal Medicine Schedule an appointment as soon as possible for a visit in 2 days  [address] [place] [address]      Rema Krishnan MD Otolaryngology, Surgery Schedule an appointment as soon as possible for a visit in 1 day  [address]      Jemma Garduno MD Nephrology Schedule an appointment as soon as possible for a visit in 2 days  [facility] [address]  [phone]             Discharge Medication List as of 3/27/2019  2:51 AM      START taking these medications    Details   traMADol (ULTRAM) 50 mg tablet Take 1 Tab by mouth every eight (8) hours as needed for Pain for up to 5 days.  Max Daily Amount: 100 mg., Print, Disp-10 Tab, R-0         CONTINUE these medications which have NOT CHANGED    Details   trimethoprim-sulfamethoxazole (BACTRIM DS) 160-800 mg per tablet Take 1 Tab by mouth two (2) times a day for 4 days. , Normal, Disp-8 Tab, R-0      carvedilol (COREG) 6.25 mg tablet Take 1 Tab by mouth two (2) times daily (with meals). , Normal, Disp-30 Tab, R-0      acetaminophen (TYLENOL) 500 mg tablet Take 2 Tabs by mouth every six (6) hours as needed for Pain., Print, Disp-20 Tab, R-0      metFORMIN (GLUCOPHAGE) 500 mg tablet Take 2 Tabs by mouth two (2) times daily (with meals). Indications: TYPE 2 DIABETES MELLITUS, Print, Disp-120 Tab, R-2      lisinopril-hydrochlorothiazide (PRINZIDE, ZESTORETIC) 20-25 mg per tablet Take 1 Tab by mouth daily. Indications: HYPERTENSION, Historical Med      amLODIPine (NORVASC) 10 mg tablet Take 10 mg by mouth daily.  Indications: HYPERTENSION, Historical Med      ferrous sulfate 325 mg (65 mg iron) tablet Take 325 mg by mouth two (2) times a day., Historical Med

## 2019-03-27 NOTE — ED TRIAGE NOTES
Pt c/o headache and shortness of breath started at around 4pm this afternoon when she was lying down

## 2019-03-28 RX ORDER — DIPHENHYDRAMINE HCL 25 MG
25 CAPSULE ORAL ONCE
Status: CANCELLED | OUTPATIENT
Start: 2019-04-02 | End: 2019-04-02

## 2019-03-28 RX ORDER — ACETAMINOPHEN 325 MG/1
650 TABLET ORAL ONCE
Status: CANCELLED | OUTPATIENT
Start: 2019-04-02 | End: 2019-04-02

## 2019-04-02 ENCOUNTER — HOSPITAL ENCOUNTER (OUTPATIENT)
Dept: INFUSION THERAPY | Age: 54
Discharge: HOME | End: 2019-04-02
Payer: COMMERCIAL

## 2019-04-02 VITALS
TEMPERATURE: 98.2 F | OXYGEN SATURATION: 100 % | HEART RATE: 91 BPM | RESPIRATION RATE: 20 BRPM | SYSTOLIC BLOOD PRESSURE: 132 MMHG | DIASTOLIC BLOOD PRESSURE: 82 MMHG

## 2019-04-02 LAB
BASO+EOS+MONOS # BLD AUTO: 0.4 K/UL (ref 0–2.3)
BASO+EOS+MONOS NFR BLD AUTO: 8 % (ref 0.1–17)
DIFFERENTIAL METHOD BLD: ABNORMAL
ERYTHROCYTE [DISTWIDTH] IN BLOOD BY AUTOMATED COUNT: 12.6 % (ref 11.5–14.5)
HCT VFR BLD AUTO: 24.2 % (ref 36–48)
HGB BLD-MCNC: 8.1 G/DL (ref 12–16)
LYMPHOCYTES # BLD: 0.8 K/UL (ref 1.1–5.9)
LYMPHOCYTES NFR BLD: 15 % (ref 14–44)
MCH RBC QN AUTO: 27.6 PG (ref 25–35)
MCHC RBC AUTO-ENTMCNC: 33.5 G/DL (ref 31–37)
MCV RBC AUTO: 82.6 FL (ref 78–102)
NEUTS SEG # BLD: 4.4 K/UL (ref 1.8–9.5)
NEUTS SEG NFR BLD: 77 % (ref 40–70)
PLATELET # BLD AUTO: 142 K/UL (ref 140–440)
RBC # BLD AUTO: 2.93 M/UL (ref 4.1–5.1)
WBC # BLD AUTO: 5.6 K/UL (ref 4.5–13)

## 2019-04-02 PROCEDURE — 85025 COMPLETE CBC W/AUTO DIFF WBC: CPT

## 2019-04-02 PROCEDURE — 36415 COLL VENOUS BLD VENIPUNCTURE: CPT

## 2019-04-02 PROCEDURE — 96372 THER/PROPH/DIAG INJ SC/IM: CPT

## 2019-04-02 PROCEDURE — 74011250636 HC RX REV CODE- 250/636: Performed by: NURSE PRACTITIONER

## 2019-04-02 RX ADMIN — EPOETIN ALFA-EPBX 60000 UNITS: 40000 INJECTION, SOLUTION INTRAVENOUS; SUBCUTANEOUS at 10:45

## 2019-04-02 NOTE — PROGRESS NOTES
[facility] Progress Note    Date: 2019    Name: Alexander Lucas    MRN: [id number]         : 1965      Ms. Josue Schmidt arrived in the [facility] today at 1005, in stable condition, here for Q 2 Week, CBC/Retacrit injection. She was assessed and education was provided. Ms. Raymond Nickerson vitals were reviewed. Visit Vitals  /82 (BP 1 Location: Left arm, BP Patient Position: Sitting)   Pulse 91   Temp 98.2 °F (36.8 °C)   Resp 20   SpO2 100%   Breastfeeding? No       Please note, that she was due for her Monthly IVIG infusion today, however, her old IVIG authorization , and the new authorization is still pending. CBC was drawn from her left AC at 1018, per order, and without incident. Lab results were obtained and reviewed, and were as follows: Sudarshan Howard Recent Results (from the past 12 hour(s))   CBC WITH 3 PART DIFF    Collection Time: 19 10:18 AM   Result Value Ref Range    WBC 5.6 4.5 - 13.0 K/uL    RBC 2.93 (L) 4.10 - 5.10 M/uL    HGB 8.1 (L) 12.0 - 16 g/dL    HCT 24.2 (L) 36 - 48 %    MCV 82.6 78 - 102 FL    MCH 27.6 25.0 - 35.0 PG    MCHC 33.5 31 - 37 g/dL    RDW 12.6 11.5 - 14.5 %    PLATELET 707 542 - 634 K/uL    NEUTROPHILS 77 (H) 40 - 70 %    MIXED CELLS 8 0.1 - 17 %    LYMPHOCYTES 15 14 - 44 %    ABS. NEUTROPHILS 4.4 1.8 - 9.5 K/UL    ABS. MIXED CELLS 0.4 0.0 - 2.3 K/uL    ABS. LYMPHOCYTES 0.8 (L) 1.1 - 5.9 K/UL    DF AUTOMATED             Retacrit (Epoetin Mark-epbx) 60,000 units, was administered in 2 divided injections (20,000 units in the 1st injection & 40,000 units in the 2nd injection), in her right arm at 1045, per order, and without incident. Ms. Josue Schmidt tolerated well, and had no complaints. Ms. Josue Friend was discharged from [place] [address] in stable condition at 1600. .. She is to return in 2 weeks, on Tuesday, 19,  at 1100,  for her next appointment, for Q 2 Week CBC/Retacrit injection & Q 4 Week, IVIG Infusion.     Jaimee Mejia, RN  April 2, 2019  3:38 PM

## 2019-04-03 ENCOUNTER — HOSPITAL ENCOUNTER (OUTPATIENT)
Dept: LAB | Age: 54
Discharge: HOME | End: 2019-04-03
Payer: COMMERCIAL

## 2019-04-03 ENCOUNTER — HOSPITAL ENCOUNTER (OUTPATIENT)
Dept: LAB | Age: 54
Discharge: HOME | End: 2019-04-03

## 2019-04-03 DIAGNOSIS — J34.89 OVERDEVELOPMENT OF NASAL BONES: ICD-10-CM

## 2019-04-03 DIAGNOSIS — J34.2 DEVIATED NASAL SEPTUM: ICD-10-CM

## 2019-04-03 DIAGNOSIS — R04.0 EPISTAXIS: ICD-10-CM

## 2019-04-03 LAB — SENTARA SPECIMEN COL,SENBCF: NORMAL

## 2019-04-03 PROCEDURE — 93005 ELECTROCARDIOGRAM TRACING: CPT

## 2019-04-03 PROCEDURE — 99001 SPECIMEN HANDLING PT-LAB: CPT

## 2019-04-04 LAB
ATRIAL RATE: 79 BPM
CALCULATED P AXIS, ECG09: 59 DEGREES
CALCULATED R AXIS, ECG10: 24 DEGREES
CALCULATED T AXIS, ECG11: 51 DEGREES
DIAGNOSIS, 93000: NORMAL
P-R INTERVAL, ECG05: 166 MS
Q-T INTERVAL, ECG07: 410 MS
QRS DURATION, ECG06: 96 MS
QTC CALCULATION (BEZET), ECG08: 470 MS
VENTRICULAR RATE, ECG03: 79 BPM

## 2019-04-08 ENCOUNTER — HOSPITAL ENCOUNTER (OUTPATIENT)
Dept: LAB | Age: 54
Discharge: HOME | End: 2019-04-08

## 2019-04-08 PROCEDURE — 88305 TISSUE EXAM BY PATHOLOGIST: CPT

## 2019-04-08 PROCEDURE — 88311 DECALCIFY TISSUE: CPT

## 2019-04-16 ENCOUNTER — HOSPITAL ENCOUNTER (OUTPATIENT)
Dept: ONCOLOGY | Age: 54
Discharge: HOME | End: 2019-04-16

## 2019-04-16 ENCOUNTER — CLINICAL SUPPORT (OUTPATIENT)
Dept: ONCOLOGY | Age: 54
End: 2019-04-16

## 2019-04-16 ENCOUNTER — HOSPITAL ENCOUNTER (OUTPATIENT)
Dept: INFUSION THERAPY | Age: 54
Discharge: HOME | End: 2019-04-16
Payer: COMMERCIAL

## 2019-04-16 VITALS
DIASTOLIC BLOOD PRESSURE: 74 MMHG | HEART RATE: 87 BPM | HEIGHT: 69 IN | SYSTOLIC BLOOD PRESSURE: 130 MMHG | BODY MASS INDEX: 30.07 KG/M2 | TEMPERATURE: 98.5 F | OXYGEN SATURATION: 99 % | RESPIRATION RATE: 20 BRPM | WEIGHT: 203 LBS

## 2019-04-16 DIAGNOSIS — D63.1 ANEMIA IN CHRONIC KIDNEY DISEASE, UNSPECIFIED CKD STAGE: ICD-10-CM

## 2019-04-16 DIAGNOSIS — N18.9 ANEMIA IN CHRONIC KIDNEY DISEASE, UNSPECIFIED CKD STAGE: ICD-10-CM

## 2019-04-16 DIAGNOSIS — D63.1 ANEMIA IN CHRONIC KIDNEY DISEASE, UNSPECIFIED CKD STAGE: Primary | ICD-10-CM

## 2019-04-16 DIAGNOSIS — N18.9 ANEMIA IN CHRONIC KIDNEY DISEASE, UNSPECIFIED CKD STAGE: Primary | ICD-10-CM

## 2019-04-16 LAB
BASO+EOS+MONOS # BLD AUTO: 0.3 K/UL (ref 0–2.3)
BASO+EOS+MONOS NFR BLD AUTO: 5 % (ref 0.1–17)
DIFFERENTIAL METHOD BLD: ABNORMAL
ERYTHROCYTE [DISTWIDTH] IN BLOOD BY AUTOMATED COUNT: 14.5 % (ref 11.5–14.5)
HCT VFR BLD AUTO: 26.3 % (ref 36–48)
HGB BLD-MCNC: 8.2 G/DL (ref 12–16)
LYMPHOCYTES # BLD: 0.9 K/UL (ref 1.1–5.9)
LYMPHOCYTES NFR BLD: 16 % (ref 14–44)
MCH RBC QN AUTO: 26.6 PG (ref 25–35)
MCHC RBC AUTO-ENTMCNC: 31.2 G/DL (ref 31–37)
MCV RBC AUTO: 85.4 FL (ref 78–102)
NEUTS SEG # BLD: 4.4 K/UL (ref 1.8–9.5)
NEUTS SEG NFR BLD: 79 % (ref 40–70)
PLATELET # BLD AUTO: 87 K/UL (ref 135–420)
RBC # BLD AUTO: 3.08 M/UL (ref 4.1–5.1)
WBC # BLD AUTO: 5.6 K/UL (ref 4.5–13)

## 2019-04-16 PROCEDURE — 74011000258 HC RX REV CODE- 258: Performed by: INTERNAL MEDICINE

## 2019-04-16 PROCEDURE — 96366 THER/PROPH/DIAG IV INF ADDON: CPT

## 2019-04-16 PROCEDURE — 96367 TX/PROPH/DG ADDL SEQ IV INF: CPT

## 2019-04-16 PROCEDURE — 74011250637 HC RX REV CODE- 250/637: Performed by: INTERNAL MEDICINE

## 2019-04-16 PROCEDURE — 74011250636 HC RX REV CODE- 250/636: Performed by: NURSE PRACTITIONER

## 2019-04-16 PROCEDURE — 74011250636 HC RX REV CODE- 250/636: Performed by: INTERNAL MEDICINE

## 2019-04-16 PROCEDURE — 96372 THER/PROPH/DIAG INJ SC/IM: CPT

## 2019-04-16 PROCEDURE — 96365 THER/PROPH/DIAG IV INF INIT: CPT

## 2019-04-16 RX ORDER — DIPHENHYDRAMINE HCL 25 MG
25 CAPSULE ORAL ONCE
Status: COMPLETED | OUTPATIENT
Start: 2019-04-16 | End: 2019-04-16

## 2019-04-16 RX ORDER — ACETAMINOPHEN 325 MG/1
650 TABLET ORAL ONCE
Status: COMPLETED | OUTPATIENT
Start: 2019-04-16 | End: 2019-04-16

## 2019-04-16 RX ORDER — SODIUM CHLORIDE 0.9 % (FLUSH) 0.9 %
10-40 SYRINGE (ML) INJECTION AS NEEDED
Status: DISCONTINUED | OUTPATIENT
Start: 2019-04-16 | End: 2019-04-20 | Stop reason: HOSPADM

## 2019-04-16 RX ORDER — SODIUM CHLORIDE 9 MG/ML
250 INJECTION, SOLUTION INTRAVENOUS ONCE
Status: COMPLETED | OUTPATIENT
Start: 2019-04-16 | End: 2019-04-16

## 2019-04-16 RX ADMIN — IMMUNE GLOBULIN INTRAVENOUS (HUMAN) 5 G: 5 INJECTION, SOLUTION INTRAVENOUS at 14:30

## 2019-04-16 RX ADMIN — DEXAMETHASONE SODIUM PHOSPHATE 20 MG: 4 INJECTION, SOLUTION INTRA-ARTICULAR; INTRALESIONAL; INTRAMUSCULAR; INTRAVENOUS; SOFT TISSUE at 11:50

## 2019-04-16 RX ADMIN — Medication 10 ML: at 11:25

## 2019-04-16 RX ADMIN — IMMUNE GLOBULIN INTRAVENOUS (HUMAN) 20 G: 5 INJECTION, SOLUTION INTRAVENOUS at 12:45

## 2019-04-16 RX ADMIN — SODIUM CHLORIDE 250 ML: 900 INJECTION, SOLUTION INTRAVENOUS at 11:40

## 2019-04-16 RX ADMIN — ACETAMINOPHEN 650 MG: 325 TABLET ORAL at 11:48

## 2019-04-16 RX ADMIN — DIPHENHYDRAMINE HYDROCHLORIDE 25 MG: 25 CAPSULE ORAL at 11:48

## 2019-04-16 RX ADMIN — EPOETIN ALFA-EPBX 60000 UNITS: 40000 INJECTION, SOLUTION INTRAVENOUS; SUBCUTANEOUS at 11:55

## 2019-04-16 NOTE — PROGRESS NOTES
[facility] Progress Note    Date: 2019    Name: Philip Deal    MRN: [id number]         : 1965      Ms. Daryl Jeans arrived in the [facility] today at 1110, in stable condition, here for Q 2 Week, CBC/Retacrit injection & Q 4 Week, IVIG Infusion. She was assessed and education was provided. Ms. Tunde King vitals were reviewed. Visit Vitals  BP (!) 151/92 (BP 1 Location: Left arm, BP Patient Position: Sitting)   Pulse 97   Temp 98.4 °F (36.9 °C)   Resp 20   Ht 5' 9\" (1.753 m)   Wt 92.1 kg (203 lb)   SpO2 99%   BMI 29.98 kg/m²           PIV was established in her left AC at 1125, without incident, and blood was drawn, for a CBC, per order. The CBC results from today were as follows:  (The preliminary platelet count was noted to be 86,000.)      Recent Results (from the past 12 hour(s))   CBC WITH 3 PART DIFF    Collection Time: 19 11:25 AM   Result Value Ref Range    WBC 5.6 4.5 - 13.0 K/uL    RBC 3.08 (L) 4.10 - 5.10 M/uL    HGB 8.2 (L) 12.0 - 16.0 g/dL    HCT 26.3 (L) 36 - 48 %    MCV 85.4 78 - 102 FL    MCH 26.6 25.0 - 35.0 PG    MCHC 31.2 31 - 37 g/dL    RDW 14.5 11.5 - 14.5 %    NEUTROPHILS 79 (H) 40 - 70 %    MIXED CELLS 5 0.1 - 17 %    LYMPHOCYTES 16 14 - 44 %    ABS. NEUTROPHILS 4.4 1.8 - 9.5 K/UL    ABS. MIXED CELLS 0.3 0.0 - 2.3 K/uL    ABS. LYMPHOCYTES 0.9 (L) 1.1 - 5.9 K/UL    DF AUTOMATED           Retacrit 60,000 units, was administered SQ, in her right arm at 1155, per order, and without incident.             Pre-medications consisting of  Tylenol 650 mg PO, Benadryl 50 mg PO, and Decadron 20 mg IV, were all administered per order, and without incident.               IVIG 25 grams (25,000 mg) IV, was administered per order, and without incident.      IVIG was started at 34 ml/hr X 30 minutes, then increased to 68 ml/hr X 30 minutes, then increased to 171 ml/hr X 30 minutes, then increased to 205 ml/hr X 30 minutes, then increased to 239 ml/hr, for the remainder of the IVIG infusion.           After the completion of the IVIG, the PIV was flushed very well per protocol, and then, the PIV was removed and gauze/bandaid was applied. Ms. Josue Schmidt tolerated well, and had no complaints. Ms. Josue Schmidt was discharged from [place] [address] in stable condition at 1515. .. She is to return in 2 weeks, on Tuesday, 4-30-19,  at 1400, for her next appointment, for CBC/Retacrit injection. And then, she is to return in 4 weeks, on Tuesday, 5-14-19, at 1000, for her next appointment, for CBC/Retacrit injection, and IVIG infusion.      Linnette Tejada RN  April 16, 2019  10:08 AM

## 2019-04-30 ENCOUNTER — HOSPITAL ENCOUNTER (OUTPATIENT)
Dept: ONCOLOGY | Age: 54
Discharge: HOME | End: 2019-04-30

## 2019-04-30 ENCOUNTER — CLINICAL SUPPORT (OUTPATIENT)
Dept: ONCOLOGY | Age: 54
End: 2019-04-30

## 2019-04-30 ENCOUNTER — HOSPITAL ENCOUNTER (OUTPATIENT)
Dept: INFUSION THERAPY | Age: 54
Discharge: HOME | End: 2019-04-30
Payer: COMMERCIAL

## 2019-04-30 VITALS
RESPIRATION RATE: 20 BRPM | TEMPERATURE: 98.9 F | SYSTOLIC BLOOD PRESSURE: 140 MMHG | DIASTOLIC BLOOD PRESSURE: 83 MMHG | HEART RATE: 91 BPM

## 2019-04-30 DIAGNOSIS — N18.9 ANEMIA IN CHRONIC KIDNEY DISEASE, UNSPECIFIED CKD STAGE: ICD-10-CM

## 2019-04-30 DIAGNOSIS — D63.1 ANEMIA IN CHRONIC KIDNEY DISEASE, UNSPECIFIED CKD STAGE: Primary | ICD-10-CM

## 2019-04-30 DIAGNOSIS — D63.1 ANEMIA IN CHRONIC KIDNEY DISEASE, UNSPECIFIED CKD STAGE: ICD-10-CM

## 2019-04-30 DIAGNOSIS — N18.9 ANEMIA IN CHRONIC KIDNEY DISEASE, UNSPECIFIED CKD STAGE: Primary | ICD-10-CM

## 2019-04-30 LAB
BASO+EOS+MONOS # BLD AUTO: 0.4 K/UL (ref 0–2.3)
BASO+EOS+MONOS NFR BLD AUTO: 7 % (ref 0.1–17)
DIFFERENTIAL METHOD BLD: ABNORMAL
ERYTHROCYTE [DISTWIDTH] IN BLOOD BY AUTOMATED COUNT: 14.4 % (ref 11.5–14.5)
HCT VFR BLD AUTO: 24.6 % (ref 36–48)
HGB BLD-MCNC: 7.7 G/DL (ref 12–16)
LYMPHOCYTES # BLD: 1.2 K/UL (ref 1.1–5.9)
LYMPHOCYTES NFR BLD: 20 % (ref 14–44)
MCH RBC QN AUTO: 26.5 PG (ref 25–35)
MCHC RBC AUTO-ENTMCNC: 31.3 G/DL (ref 31–37)
MCV RBC AUTO: 84.5 FL (ref 78–102)
NEUTS SEG # BLD: 4.7 K/UL (ref 1.8–9.5)
NEUTS SEG NFR BLD: 74 % (ref 40–70)
PLATELET # BLD AUTO: 101 K/UL (ref 140–440)
RBC # BLD AUTO: 2.91 M/UL (ref 4.1–5.1)
WBC # BLD AUTO: 6.3 K/UL (ref 4.5–13)

## 2019-04-30 PROCEDURE — 36415 COLL VENOUS BLD VENIPUNCTURE: CPT

## 2019-04-30 PROCEDURE — 74011250636 HC RX REV CODE- 250/636: Performed by: NURSE PRACTITIONER

## 2019-04-30 PROCEDURE — 96372 THER/PROPH/DIAG INJ SC/IM: CPT

## 2019-04-30 RX ADMIN — EPOETIN ALFA-EPBX 60000 UNITS: 40000 INJECTION, SOLUTION INTRAVENOUS; SUBCUTANEOUS at 15:52

## 2019-04-30 NOTE — PROGRESS NOTES
[facility] Progress Note    Date: 2019    Name: Gabriella Mtz    MRN: [id number]         : 1965      Ms. Nuno Severino arrived in the [facility] today at [address], in stable condition, here for Q 2 Week, CBC/Retacrit injection. She was assessed and education was provided. Ms. Kaylah Chang vitals were reviewed. Visit Vitals  /83 (BP 1 Location: Right arm, BP Patient Position: Sitting)   Pulse 91   Temp 98.9 °F (37.2 °C)   Resp 20           CBC was drawn from her right AC at 1500, per order, and without incident. Lab results were obtained and reviewed, and were as follows: Shannon Hale Recent Results (from the past 12 hour(s))   CBC WITH 3 PART DIFF    Collection Time: 19  3:00 PM   Result Value Ref Range    WBC 6.3 4.5 - 13.0 K/uL    RBC 2.91 (L) 4.10 - 5.10 M/uL    HGB 7.7 (L) 12.0 - 16.0 g/dL    HCT 24.6 (L) 36 - 48 %    MCV 84.5 78 - 102 FL    MCH 26.5 25.0 - 35.0 PG    MCHC 31.3 31 - 37 g/dL    RDW 14.4 11.5 - 14.5 %    PLATELET 641 (L) 610 - 440 K/uL    NEUTROPHILS 74 (H) 40 - 70 %    MIXED CELLS 7 0.1 - 17 %    LYMPHOCYTES 20 14 - 44 %    ABS. NEUTROPHILS 4.7 1.8 - 9.5 K/UL    ABS. MIXED CELLS 0.4 0.0 - 2.3 K/uL    ABS. LYMPHOCYTES 1.2 1.1 - 5.9 K/UL    DF AUTOMATED           Ms. Nuno Severino stated that she would NOT consent to a blood transfusion at this time, even if one was ordered. Retacrit (Epoetin Mark-epbx) 60,000 units, was administered in 2 divided injections (40,000 units in the 1st injection & 20,000 units in the 2nd injection), in her left arm at 1552, per order, and without incident. Ms. Nuno Severino tolerated well, and had no complaints. Ms. Nuno Severino was discharged from [place] [address] in stable condition at 1600. .. She is to return in 2 weeks, on Tuesday, 19,  at 1000,  for her next appointment, for Q 2 Week CBC/Retacrit injection & Q 4 Week, IVIG Infusion.     Rolf Easton RN  2019  3:38 PM

## 2019-05-14 ENCOUNTER — CLINICAL SUPPORT (OUTPATIENT)
Dept: ONCOLOGY | Age: 54
End: 2019-05-14

## 2019-05-14 ENCOUNTER — HOSPITAL ENCOUNTER (OUTPATIENT)
Dept: ONCOLOGY | Age: 54
Discharge: HOME | End: 2019-05-14

## 2019-05-14 ENCOUNTER — HOSPITAL ENCOUNTER (OUTPATIENT)
Dept: INFUSION THERAPY | Age: 54
Discharge: HOME | End: 2019-05-14
Payer: COMMERCIAL

## 2019-05-14 VITALS
HEIGHT: 69 IN | DIASTOLIC BLOOD PRESSURE: 78 MMHG | TEMPERATURE: 97.5 F | HEART RATE: 84 BPM | SYSTOLIC BLOOD PRESSURE: 140 MMHG | RESPIRATION RATE: 16 BRPM | BODY MASS INDEX: 30.36 KG/M2 | WEIGHT: 205 LBS | OXYGEN SATURATION: 100 %

## 2019-05-14 DIAGNOSIS — D63.1 ANEMIA IN CHRONIC KIDNEY DISEASE, UNSPECIFIED CKD STAGE: ICD-10-CM

## 2019-05-14 DIAGNOSIS — N18.9 ANEMIA IN CHRONIC KIDNEY DISEASE, UNSPECIFIED CKD STAGE: Primary | ICD-10-CM

## 2019-05-14 DIAGNOSIS — D63.1 ANEMIA IN CHRONIC KIDNEY DISEASE, UNSPECIFIED CKD STAGE: Primary | ICD-10-CM

## 2019-05-14 DIAGNOSIS — N18.9 ANEMIA IN CHRONIC KIDNEY DISEASE, UNSPECIFIED CKD STAGE: ICD-10-CM

## 2019-05-14 LAB
BASO+EOS+MONOS # BLD AUTO: 0.3 K/UL (ref 0–2.3)
BASO+EOS+MONOS NFR BLD AUTO: 6 % (ref 0.1–17)
DIFFERENTIAL METHOD BLD: ABNORMAL
ERYTHROCYTE [DISTWIDTH] IN BLOOD BY AUTOMATED COUNT: 13.6 % (ref 11.5–14.5)
HCT VFR BLD AUTO: 25.8 % (ref 36–48)
HGB BLD-MCNC: 7.9 G/DL (ref 12–16)
LYMPHOCYTES # BLD: 0.9 K/UL (ref 1.1–5.9)
LYMPHOCYTES NFR BLD: 16 % (ref 14–44)
MCH RBC QN AUTO: 26 PG (ref 25–35)
MCHC RBC AUTO-ENTMCNC: 30.6 G/DL (ref 31–37)
MCV RBC AUTO: 84.9 FL (ref 78–102)
NEUTS SEG # BLD: 4.5 K/UL (ref 1.8–9.5)
NEUTS SEG NFR BLD: 78 % (ref 40–70)
PLATELET # BLD AUTO: 124 K/UL (ref 140–440)
RBC # BLD AUTO: 3.04 M/UL (ref 4.1–5.1)
WBC # BLD AUTO: 5.7 K/UL (ref 4.5–13)

## 2019-05-14 PROCEDURE — 96366 THER/PROPH/DIAG IV INF ADDON: CPT

## 2019-05-14 PROCEDURE — 74011250636 HC RX REV CODE- 250/636: Performed by: INTERNAL MEDICINE

## 2019-05-14 PROCEDURE — 96372 THER/PROPH/DIAG INJ SC/IM: CPT

## 2019-05-14 PROCEDURE — 74011250637 HC RX REV CODE- 250/637: Performed by: INTERNAL MEDICINE

## 2019-05-14 PROCEDURE — 74011000258 HC RX REV CODE- 258: Performed by: INTERNAL MEDICINE

## 2019-05-14 PROCEDURE — 74011250636 HC RX REV CODE- 250/636: Performed by: NURSE PRACTITIONER

## 2019-05-14 PROCEDURE — 96367 TX/PROPH/DG ADDL SEQ IV INF: CPT

## 2019-05-14 PROCEDURE — 96365 THER/PROPH/DIAG IV INF INIT: CPT

## 2019-05-14 RX ORDER — ACETAMINOPHEN 325 MG/1
650 TABLET ORAL ONCE
Status: COMPLETED | OUTPATIENT
Start: 2019-05-14 | End: 2019-05-14

## 2019-05-14 RX ORDER — DIPHENHYDRAMINE HCL 25 MG
25 CAPSULE ORAL ONCE
Status: COMPLETED | OUTPATIENT
Start: 2019-05-14 | End: 2019-05-14

## 2019-05-14 RX ORDER — SODIUM CHLORIDE 9 MG/ML
250 INJECTION, SOLUTION INTRAVENOUS CONTINUOUS
Status: DISCONTINUED | OUTPATIENT
Start: 2019-05-14 | End: 2019-05-15 | Stop reason: HOSPADM

## 2019-05-14 RX ORDER — SODIUM CHLORIDE 0.9 % (FLUSH) 0.9 %
10-40 SYRINGE (ML) INJECTION AS NEEDED
Status: DISCONTINUED | OUTPATIENT
Start: 2019-05-14 | End: 2019-05-18 | Stop reason: HOSPADM

## 2019-05-14 RX ORDER — LORATADINE 10 MG/1
10 TABLET ORAL
COMMUNITY

## 2019-05-14 RX ADMIN — EPOETIN ALFA-EPBX 60000 UNITS: 40000 INJECTION, SOLUTION INTRAVENOUS; SUBCUTANEOUS at 12:25

## 2019-05-14 RX ADMIN — ACETAMINOPHEN 650 MG: 325 TABLET ORAL at 11:30

## 2019-05-14 RX ADMIN — DEXAMETHASONE SODIUM PHOSPHATE 20 MG: 4 INJECTION, SOLUTION INTRA-ARTICULAR; INTRALESIONAL; INTRAMUSCULAR; INTRAVENOUS; SOFT TISSUE at 11:25

## 2019-05-14 RX ADMIN — SODIUM CHLORIDE 250 ML: 9 INJECTION, SOLUTION INTRAVENOUS at 11:20

## 2019-05-14 RX ADMIN — DIPHENHYDRAMINE HYDROCHLORIDE 25 MG: 25 CAPSULE ORAL at 11:30

## 2019-05-14 RX ADMIN — Medication 10 ML: at 11:00

## 2019-05-14 RX ADMIN — IMMUNE GLOBULIN INTRAVENOUS (HUMAN) 5 G: 5 INJECTION, SOLUTION INTRAVENOUS at 14:05

## 2019-05-14 RX ADMIN — IMMUNE GLOBULIN INTRAVENOUS (HUMAN) 20 G: 5 INJECTION, SOLUTION INTRAVENOUS at 12:15

## 2019-05-14 NOTE — PROGRESS NOTES
[facility]    Date: May 14, 2019    Name: Allyn Chaparro    MRN: [id number]         : 1965      Ms. Aguayo arrived in the [facility] today at 1050, in stable condition, here for Q 2 Week, CBC/Retacrit injection & Q 4 Week, IVIG Infusion. She was assessed and education was provided. Ms. Meggan Sanchez vitals were reviewed. Visit Vitals  /83 (BP 1 Location: Right arm, BP Patient Position: Sitting)   Pulse 97   Temp 97.6 °F (36.4 °C)   Resp 16   Ht 5' 9\" (1.753 m)   Wt 93 kg (205 lb)   SpO2 100%   Breastfeeding? No   BMI 30.27 kg/m²           PIV was established in her left AC at 1100, without incident, and blood was drawn, for a CBC, per order. The CBC results from today were as follows:       Recent Results (from the past 12 hour(s))   CBC WITH 3 PART DIFF    Collection Time: 19 11:00 AM   Result Value Ref Range    WBC 5.7 4.5 - 13.0 K/uL    RBC 3.04 (L) 4.10 - 5.10 M/uL    HGB 7.9 (L) 12.0 - 16.0 g/dL    HCT 25.8 (L) 36 - 48 %    MCV 84.9 78 - 102 FL    MCH 26.0 25.0 - 35.0 PG    MCHC 30.6 (L) 31 - 37 g/dL    RDW 13.6 11.5 - 14.5 %    PLATELET 459 (L) 799 - 440 K/uL    NEUTROPHILS 78 (H) 40 - 70 %    MIXED CELLS 6 0.1 - 17 %    LYMPHOCYTES 16 14 - 44 %    ABS. NEUTROPHILS 4.5 1.8 - 9.5 K/UL    ABS. MIXED CELLS 0.3 0.0 - 2.3 K/uL    ABS. LYMPHOCYTES 0.9 (L) 1.1 - 5.9 K/UL    DF AUTOMATED           Retacrit 60,000 units, was administered SQ, in her left arm at 1225, per order, and without incident.             Pre-medications consisting of  Tylenol 650 mg PO, Benadryl 50 mg PO, and Decadron 20 mg IV, were all administered per order, and without incident.               IVIG 25 grams (25,000 mg) IV, was administered per order, and without incident. IVIG was started at 34 ml/hr X 30 minutes, then increased to 68 ml/hr X 30 minutes, then increased to 171 ml/hr X 30 minutes, then increased to 205 ml/hr X 30 minutes, then increased to 239 ml/hr, for the remainder of the IVIG infusion.         After the completion of the IVIG, the PIV was flushed very well per protocol, and then, the PIV was removed and gauze/bandaid was applied. Ms. Bob Davies tolerated well, and had no complaints. Ms. Bob Davies was discharged from [place] [address] in stable condition at 1450. Lion Stapleton She is to return in 2 weeks, on Tuesday, 5-28-19,  at 1500, for her next appointment, for CBC/Retacrit injection. And then, she is to return in 4 weeks, on Tuesday, 6-11-19, at 0900, for her next appointment, for CBC/Retacrit injection, and IVIG infusion.      Cuate Leary RN  May 14, 2019  10:08 AM

## 2019-05-28 ENCOUNTER — HOSPITAL ENCOUNTER (OUTPATIENT)
Dept: ONCOLOGY | Age: 54
Discharge: HOME | End: 2019-05-28

## 2019-05-28 ENCOUNTER — HOSPITAL ENCOUNTER (OUTPATIENT)
Dept: INFUSION THERAPY | Age: 54
Discharge: HOME | End: 2019-05-28
Payer: COMMERCIAL

## 2019-05-28 ENCOUNTER — CLINICAL SUPPORT (OUTPATIENT)
Dept: ONCOLOGY | Age: 54
End: 2019-05-28

## 2019-05-28 VITALS
DIASTOLIC BLOOD PRESSURE: 89 MMHG | SYSTOLIC BLOOD PRESSURE: 159 MMHG | OXYGEN SATURATION: 100 % | RESPIRATION RATE: 18 BRPM | HEART RATE: 94 BPM | HEIGHT: 69 IN | TEMPERATURE: 102.9 F | BODY MASS INDEX: 28.81 KG/M2 | WEIGHT: 194.5 LBS

## 2019-05-28 DIAGNOSIS — N18.30 ANEMIA IN STAGE 3 CHRONIC KIDNEY DISEASE (HCC): ICD-10-CM

## 2019-05-28 DIAGNOSIS — D69.6 THROMBOCYTOPENIA (HCC): Primary | ICD-10-CM

## 2019-05-28 DIAGNOSIS — D63.1 ANEMIA IN STAGE 3 CHRONIC KIDNEY DISEASE (HCC): ICD-10-CM

## 2019-05-28 LAB
BASO+EOS+MONOS # BLD AUTO: 1.1 K/UL (ref 0–2.3)
BASO+EOS+MONOS NFR BLD AUTO: 4 % (ref 0.1–17)
DIFFERENTIAL METHOD BLD: ABNORMAL
ERYTHROCYTE [DISTWIDTH] IN BLOOD BY AUTOMATED COUNT: 13.9 % (ref 11.5–14.5)
HCT VFR BLD AUTO: 27.5 % (ref 36–48)
HGB BLD-MCNC: 8.6 G/DL (ref 12–16)
LYMPHOCYTES # BLD: 0.8 K/UL (ref 1.1–5.9)
LYMPHOCYTES NFR BLD: 3 % (ref 14–44)
MCH RBC QN AUTO: 25.6 PG (ref 25–35)
MCHC RBC AUTO-ENTMCNC: 31.3 G/DL (ref 31–37)
MCV RBC AUTO: 81.8 FL (ref 78–102)
NEUTS SEG # BLD: 26.4 K/UL (ref 1.8–9.5)
NEUTS SEG NFR BLD: 93 % (ref 40–70)
RBC # BLD AUTO: 3.36 M/UL (ref 4.1–5.1)
WBC # BLD AUTO: 28.3 K/UL (ref 4.5–13)

## 2019-05-28 PROCEDURE — 36415 COLL VENOUS BLD VENIPUNCTURE: CPT

## 2019-05-28 PROCEDURE — 74011250636 HC RX REV CODE- 250/636: Performed by: INTERNAL MEDICINE

## 2019-05-28 PROCEDURE — 96372 THER/PROPH/DIAG INJ SC/IM: CPT

## 2019-05-28 RX ADMIN — EPOETIN ALFA-EPBX 60000 UNITS: 40000 INJECTION, SOLUTION INTRAVENOUS; SUBCUTANEOUS at 15:31

## 2019-05-28 NOTE — PROGRESS NOTES
[address] Progress Note     Date: May 28, 2019    Name: Inder Rodriguez    MRN: [id number]         : 1965      Ms. Yusuf Larsen arrived in the [facility] today at [phone], in stable condition, here for Q 2 Week, CBC/Retacrit injection. She was assessed and education was provided. Patient stated she saw her PCP this am for a stomach virus x 3 days of nausea and vomiting. Patient stated her PCP told her the virus needed to run its course and to continue taking Zofran and to stay well hydrated. Ms. Anne Yara vitals were reviewed. Visit Vitals  /89 (BP 1 Location: Left arm, BP Patient Position: Sitting)   Pulse 94   Temp (!) 102.9 °F (39.4 °C)   Resp 18   Ht 5' 9\" (1.753 m)   Wt 88.2 kg (194 lb 8 oz)   SpO2 100%   BMI 28.72 kg/m²     CBC was drawn from her left AC, per order, and without incident. Lab results were obtained and reviewed, and were as follows: Mitra Campbell Recent Results (from the past 12 hour(s))   CBC WITH 3 PART DIFF    Collection Time: 19  3:19 PM   Result Value Ref Range    WBC 28.3 (HH) 4.5 - 13.0 K/uL    RBC 3.36 (L) 4.10 - 5.10 M/uL    HGB 8.6 (L) 12.0 - 16.0 g/dL    HCT 27.5 (L) 36 - 48 %    MCV 81.8 78 - 102 FL    MCH 25.6 25.0 - 35.0 PG    MCHC 31.3 31 - 37 g/dL    RDW 13.9 11.5 - 14.5 %    NEUTROPHILS 93 (H) 40 - 70 %    MIXED CELLS 4 0.1 - 17 %    LYMPHOCYTES 3 (L) 14 - 44 %    ABS. NEUTROPHILS 26.4 (H) 1.8 - 9.5 K/UL    ABS. MIXED CELLS 1.1 0.0 - 2.3 K/uL    ABS. LYMPHOCYTES 0.8 (L) 1.1 - 5.9 K/UL    DF AUTOMATED       Platelets 882, specimen sent out to laboratory for further processing. WBC critical = 28.3, Dr. Marino Vasquez notified, patient has fever of 102.9 due to current virus, ok to proceed with Retacrit. No further orders received. Retacrit (Epoetin Mark-epbx) 60,000 units, was administered in 2 divided injections (40,000 units in the 1st injection & 20,000 units in the 2nd injection), in her left arm, per order, and without incident.       Ms. Yusuf Larsen tolerated well, and had no complaints. Ms. Lc Kurtz was discharged from [place] [address] in stable condition at 1600. .. She is to return in 2 weeks, on Tuesday, 6-11-19,  at 0900,  for her next appointment, for Q 2 Week CBC/Retacrit injection & Q 4 Week, IVIG Infusion.     Yo Vaca RN  May 28, 2019  0479

## 2019-05-29 ENCOUNTER — HOSPITAL ENCOUNTER (OUTPATIENT)
Dept: LAB | Age: 54
Discharge: HOME | End: 2019-05-29

## 2019-05-29 LAB — SENTARA SPECIMEN COL,SENBCF: NORMAL

## 2019-05-29 PROCEDURE — 99001 SPECIMEN HANDLING PT-LAB: CPT

## 2019-05-30 ENCOUNTER — HOSPITAL ENCOUNTER (INPATIENT)
Age: 54
LOS: 5 days | Discharge: HOME | DRG: 872 | End: 2019-06-04
Attending: EMERGENCY MEDICINE | Admitting: HOSPITALIST
Payer: COMMERCIAL

## 2019-05-30 ENCOUNTER — APPOINTMENT (OUTPATIENT)
Dept: GENERAL RADIOLOGY | Age: 54
DRG: 872 | End: 2019-05-30
Attending: EMERGENCY MEDICINE
Payer: COMMERCIAL

## 2019-05-30 DIAGNOSIS — N17.9 ACUTE RENAL FAILURE SUPERIMPOSED ON STAGE 3 CHRONIC KIDNEY DISEASE, UNSPECIFIED ACUTE RENAL FAILURE TYPE: Primary | ICD-10-CM

## 2019-05-30 DIAGNOSIS — N18.3 ACUTE RENAL FAILURE SUPERIMPOSED ON STAGE 3 CHRONIC KIDNEY DISEASE, UNSPECIFIED ACUTE RENAL FAILURE TYPE: Primary | ICD-10-CM

## 2019-05-30 DIAGNOSIS — E86.0 DEHYDRATION: ICD-10-CM

## 2019-05-30 LAB
ALBUMIN SERPL-MCNC: 1.7 G/DL (ref 3.4–5)
ALBUMIN/GLOB SERPL: 0.3 {RATIO} (ref 0.8–1.7)
ALP SERPL-CCNC: 115 U/L (ref 45–117)
ALT SERPL-CCNC: 12 U/L (ref 13–56)
ANION GAP SERPL CALC-SCNC: 10 MMOL/L (ref 3–18)
APPEARANCE UR: CLEAR
AST SERPL-CCNC: 19 U/L (ref 15–37)
ATRIAL RATE: 102 BPM
BACTERIA URNS QL MICRO: ABNORMAL /HPF
BASOPHILS # BLD: 0 K/UL (ref 0–0.06)
BASOPHILS NFR BLD: 0 % (ref 0–3)
BILIRUB SERPL-MCNC: 0.1 MG/DL (ref 0.2–1)
BILIRUB UR QL: NEGATIVE
BUN SERPL-MCNC: 69 MG/DL (ref 7–18)
BUN/CREAT SERPL: 9 (ref 12–20)
CALCIUM SERPL-MCNC: 8.6 MG/DL (ref 8.5–10.1)
CALCULATED P AXIS, ECG09: 65 DEGREES
CALCULATED R AXIS, ECG10: 31 DEGREES
CALCULATED T AXIS, ECG11: 56 DEGREES
CHLORIDE SERPL-SCNC: 103 MMOL/L (ref 100–108)
CO2 SERPL-SCNC: 19 MMOL/L (ref 21–32)
COLOR UR: YELLOW
CREAT SERPL-MCNC: 7.53 MG/DL (ref 0.6–1.3)
CREAT UR-MCNC: 71.5 MG/DL (ref 30–125)
DIAGNOSIS, 93000: NORMAL
DIFFERENTIAL METHOD BLD: ABNORMAL
EOSINOPHIL # BLD: 0.6 K/UL (ref 0–0.4)
EOSINOPHIL NFR BLD: 3 % (ref 0–5)
EPITH CASTS URNS QL MICRO: ABNORMAL /LPF (ref 0–5)
ERYTHROCYTE [DISTWIDTH] IN BLOOD BY AUTOMATED COUNT: 15.2 % (ref 11.6–14.5)
EST. AVERAGE GLUCOSE BLD GHB EST-MCNC: 180 MG/DL
GLOBULIN SER CALC-MCNC: 6.3 G/DL (ref 2–4)
GLUCOSE SERPL-MCNC: 126 MG/DL (ref 74–99)
GLUCOSE UR STRIP.AUTO-MCNC: 100 MG/DL
HBA1C MFR BLD: 7.9 % (ref 4.2–5.6)
HCT VFR BLD AUTO: 25 % (ref 35–45)
HGB BLD-MCNC: 7.9 G/DL (ref 12–16)
HGB UR QL STRIP: ABNORMAL
KETONES UR QL STRIP.AUTO: NEGATIVE MG/DL
LACTATE SERPL-SCNC: 0.7 MMOL/L (ref 0.4–2)
LEUKOCYTE ESTERASE UR QL STRIP.AUTO: ABNORMAL
LYMPHOCYTES # BLD: 0.6 K/UL (ref 0.8–3.5)
LYMPHOCYTES NFR BLD: 3 % (ref 20–51)
MCH RBC QN AUTO: 24.6 PG (ref 24–34)
MCHC RBC AUTO-ENTMCNC: 31.6 G/DL (ref 31–37)
MCV RBC AUTO: 77.9 FL (ref 74–97)
MONOCYTES # BLD: 1.9 K/UL (ref 0–1)
MONOCYTES NFR BLD: 9 % (ref 2–9)
NEUTS BAND NFR BLD MANUAL: 3 % (ref 0–5)
NEUTS SEG # BLD: 17.9 K/UL (ref 1.8–8)
NEUTS SEG NFR BLD: 82 % (ref 42–75)
NITRITE UR QL STRIP.AUTO: NEGATIVE
OSMOLALITY UR: 271 MOSM/KG H2O (ref 50–1400)
P-R INTERVAL, ECG05: 142 MS
PH UR STRIP: 7 [PH] (ref 5–8)
PLATELET # BLD AUTO: 152 K/UL (ref 135–420)
PLATELET COMMENTS,PCOM: ABNORMAL
POTASSIUM SERPL-SCNC: 4.4 MMOL/L (ref 3.5–5.5)
PROT SERPL-MCNC: 8 G/DL (ref 6.4–8.2)
PROT UR STRIP-MCNC: 300 MG/DL
PROT UR-MCNC: 536 MG/DL
PROT/CREAT UR-RTO: 7.5
Q-T INTERVAL, ECG07: 364 MS
QRS DURATION, ECG06: 90 MS
QTC CALCULATION (BEZET), ECG08: 474 MS
RBC # BLD AUTO: 3.21 M/UL (ref 4.2–5.3)
RBC #/AREA URNS HPF: ABNORMAL /HPF (ref 0–5)
RBC MORPH BLD: ABNORMAL
SODIUM SERPL-SCNC: 132 MMOL/L (ref 136–145)
SP GR UR REFRACTOMETRY: 1.01 (ref 1–1.03)
UROBILINOGEN UR QL STRIP.AUTO: 0.2 EU/DL (ref 0.2–1)
VENTRICULAR RATE, ECG03: 102 BPM
WBC # BLD AUTO: 21 K/UL (ref 4.6–13.2)
WBC URNS QL MICRO: ABNORMAL /HPF (ref 0–4)

## 2019-05-30 PROCEDURE — 74011250637 HC RX REV CODE- 250/637: Performed by: EMERGENCY MEDICINE

## 2019-05-30 PROCEDURE — 65660000000 HC RM CCU STEPDOWN

## 2019-05-30 PROCEDURE — 81001 URINALYSIS AUTO W/SCOPE: CPT

## 2019-05-30 PROCEDURE — 74011250636 HC RX REV CODE- 250/636: Performed by: EMERGENCY MEDICINE

## 2019-05-30 PROCEDURE — 74011250636 HC RX REV CODE- 250/636: Performed by: HOSPITALIST

## 2019-05-30 PROCEDURE — 74011250636 HC RX REV CODE- 250/636

## 2019-05-30 PROCEDURE — 96366 THER/PROPH/DIAG IV INF ADDON: CPT

## 2019-05-30 PROCEDURE — 93005 ELECTROCARDIOGRAM TRACING: CPT

## 2019-05-30 PROCEDURE — 71045 X-RAY EXAM CHEST 1 VIEW: CPT

## 2019-05-30 PROCEDURE — 84156 ASSAY OF PROTEIN URINE: CPT

## 2019-05-30 PROCEDURE — 86160 COMPLEMENT ANTIGEN: CPT

## 2019-05-30 PROCEDURE — 99284 EMERGENCY DEPT VISIT MOD MDM: CPT

## 2019-05-30 PROCEDURE — 74011000250 HC RX REV CODE- 250: Performed by: HOSPITALIST

## 2019-05-30 PROCEDURE — 74011000258 HC RX REV CODE- 258: Performed by: HOSPITALIST

## 2019-05-30 PROCEDURE — 96375 TX/PRO/DX INJ NEW DRUG ADDON: CPT

## 2019-05-30 PROCEDURE — 80053 COMPREHEN METABOLIC PANEL: CPT

## 2019-05-30 PROCEDURE — 96361 HYDRATE IV INFUSION ADD-ON: CPT

## 2019-05-30 PROCEDURE — 83935 ASSAY OF URINE OSMOLALITY: CPT

## 2019-05-30 PROCEDURE — 83605 ASSAY OF LACTIC ACID: CPT

## 2019-05-30 PROCEDURE — 83036 HEMOGLOBIN GLYCOSYLATED A1C: CPT

## 2019-05-30 PROCEDURE — 85025 COMPLETE CBC W/AUTO DIFF WBC: CPT

## 2019-05-30 PROCEDURE — 87086 URINE CULTURE/COLONY COUNT: CPT

## 2019-05-30 PROCEDURE — 96365 THER/PROPH/DIAG IV INF INIT: CPT

## 2019-05-30 PROCEDURE — 87040 BLOOD CULTURE FOR BACTERIA: CPT

## 2019-05-30 RX ORDER — ONDANSETRON 2 MG/ML
4 INJECTION INTRAMUSCULAR; INTRAVENOUS
Status: DISCONTINUED | OUTPATIENT
Start: 2019-05-30 | End: 2019-06-04 | Stop reason: HOSPADM

## 2019-05-30 RX ORDER — ONDANSETRON 2 MG/ML
4 INJECTION INTRAMUSCULAR; INTRAVENOUS
Status: COMPLETED | OUTPATIENT
Start: 2019-05-30 | End: 2019-05-30

## 2019-05-30 RX ORDER — LANOLIN ALCOHOL/MO/W.PET/CERES
325 CREAM (GRAM) TOPICAL 2 TIMES DAILY
Status: DISCONTINUED | OUTPATIENT
Start: 2019-05-31 | End: 2019-06-04

## 2019-05-30 RX ORDER — LORATADINE 10 MG/1
10 TABLET ORAL
Status: DISCONTINUED | OUTPATIENT
Start: 2019-05-30 | End: 2019-06-04 | Stop reason: HOSPADM

## 2019-05-30 RX ORDER — VANCOMYCIN 2 GRAM/500 ML IN 0.9 % SODIUM CHLORIDE INTRAVENOUS
2000 ONCE
Status: COMPLETED | OUTPATIENT
Start: 2019-05-30 | End: 2019-05-31

## 2019-05-30 RX ORDER — HEPARIN SODIUM 5000 [USP'U]/ML
5000 INJECTION, SOLUTION INTRAVENOUS; SUBCUTANEOUS EVERY 8 HOURS
Status: DISCONTINUED | OUTPATIENT
Start: 2019-05-30 | End: 2019-06-01

## 2019-05-30 RX ORDER — MORPHINE SULFATE 10 MG/ML
5 INJECTION, SOLUTION INTRAMUSCULAR; INTRAVENOUS ONCE
Status: COMPLETED | OUTPATIENT
Start: 2019-05-30 | End: 2019-05-30

## 2019-05-30 RX ORDER — AMLODIPINE BESYLATE 10 MG/1
10 TABLET ORAL DAILY
Status: DISCONTINUED | OUTPATIENT
Start: 2019-05-31 | End: 2019-06-04 | Stop reason: HOSPADM

## 2019-05-30 RX ORDER — ONDANSETRON 2 MG/ML
INJECTION INTRAMUSCULAR; INTRAVENOUS
Status: DISPENSED
Start: 2019-05-30 | End: 2019-05-31

## 2019-05-30 RX ORDER — VANCOMYCIN HYDROCHLORIDE
1250 ONCE
Status: DISCONTINUED | OUTPATIENT
Start: 2019-05-30 | End: 2019-05-30 | Stop reason: DRUGHIGH

## 2019-05-30 RX ORDER — LEVOFLOXACIN 5 MG/ML
500 INJECTION, SOLUTION INTRAVENOUS
Status: DISCONTINUED | OUTPATIENT
Start: 2019-05-30 | End: 2019-06-02

## 2019-05-30 RX ORDER — CARVEDILOL 6.25 MG/1
6.25 TABLET ORAL 2 TIMES DAILY WITH MEALS
Status: DISCONTINUED | OUTPATIENT
Start: 2019-05-31 | End: 2019-06-04 | Stop reason: HOSPADM

## 2019-05-30 RX ORDER — PROMETHAZINE HYDROCHLORIDE 25 MG/ML
INJECTION, SOLUTION INTRAMUSCULAR; INTRAVENOUS
Status: DISPENSED
Start: 2019-05-30 | End: 2019-05-31

## 2019-05-30 RX ORDER — SODIUM CHLORIDE 0.9 % (FLUSH) 0.9 %
5-10 SYRINGE (ML) INJECTION AS NEEDED
Status: DISCONTINUED | OUTPATIENT
Start: 2019-05-30 | End: 2019-06-04 | Stop reason: HOSPADM

## 2019-05-30 RX ORDER — MORPHINE SULFATE 10 MG/ML
INJECTION, SOLUTION INTRAMUSCULAR; INTRAVENOUS
Status: DISPENSED
Start: 2019-05-30 | End: 2019-05-31

## 2019-05-30 RX ORDER — SODIUM CHLORIDE 9 MG/ML
125 INJECTION, SOLUTION INTRAVENOUS CONTINUOUS
Status: DISCONTINUED | OUTPATIENT
Start: 2019-05-31 | End: 2019-05-31

## 2019-05-30 RX ORDER — ACETAMINOPHEN 325 MG/1
650 TABLET ORAL
Status: DISCONTINUED | OUTPATIENT
Start: 2019-05-30 | End: 2019-06-04 | Stop reason: HOSPADM

## 2019-05-30 RX ORDER — ACETAMINOPHEN 500 MG
1000 TABLET ORAL ONCE
Status: COMPLETED | OUTPATIENT
Start: 2019-05-30 | End: 2019-05-30

## 2019-05-30 RX ORDER — MORPHINE SULFATE 10 MG/ML
1 INJECTION, SOLUTION INTRAMUSCULAR; INTRAVENOUS
Status: DISCONTINUED | OUTPATIENT
Start: 2019-05-30 | End: 2019-06-04 | Stop reason: HOSPADM

## 2019-05-30 RX ADMIN — PROMETHAZINE HYDROCHLORIDE 12.5 MG: 25 INJECTION INTRAMUSCULAR; INTRAVENOUS at 13:38

## 2019-05-30 RX ADMIN — DEXTROSE MONOHYDRATE: 5 INJECTION, SOLUTION INTRAVENOUS at 12:41

## 2019-05-30 RX ADMIN — MORPHINE SULFATE 1 MG: 10 INJECTION, SOLUTION INTRAMUSCULAR; INTRAVENOUS at 18:22

## 2019-05-30 RX ADMIN — SODIUM CHLORIDE 1000 ML: 900 INJECTION, SOLUTION INTRAVENOUS at 12:18

## 2019-05-30 RX ADMIN — ONDANSETRON 4 MG: 2 INJECTION INTRAMUSCULAR; INTRAVENOUS at 12:41

## 2019-05-30 RX ADMIN — SODIUM CHLORIDE 1000 ML: 900 INJECTION, SOLUTION INTRAVENOUS at 13:18

## 2019-05-30 RX ADMIN — DEXTROSE MONOHYDRATE: 5 INJECTION, SOLUTION INTRAVENOUS at 21:42

## 2019-05-30 RX ADMIN — ONDANSETRON 4 MG: 2 INJECTION INTRAMUSCULAR; INTRAVENOUS at 18:21

## 2019-05-30 RX ADMIN — LEVOFLOXACIN 500 MG: 5 INJECTION, SOLUTION INTRAVENOUS at 21:56

## 2019-05-30 RX ADMIN — ACETAMINOPHEN 1000 MG: 500 TABLET ORAL at 19:38

## 2019-05-30 RX ADMIN — MORPHINE SULFATE 5 MG: 10 INJECTION, SOLUTION INTRAMUSCULAR; INTRAVENOUS at 13:38

## 2019-05-30 RX ADMIN — VANCOMYCIN HYDROCHLORIDE 2000 MG: 10 INJECTION, POWDER, LYOPHILIZED, FOR SOLUTION INTRAVENOUS at 22:59

## 2019-05-30 NOTE — ED PROVIDER NOTES
EMERGENCY DEPARTMENT HISTORY AND PHYSICAL EXAM    12:13 PM      Date: 5/30/2019  Patient Name: Joan Bell    History of Presenting Illness     Chief Complaint   Patient presents with    Chronic Kidney Disease         History Provided By: patient    Additional History (Context): Joan Bell is a 48 y.o. female presents with known stage III kidney disease comes from her nephrologist office, she has had a stomach virus with fever vomiting and diarrhea nonbloody and crampy upper abdominal pain. Labs today at the nephrology office revealed her creatinine is now 6. She is referred here for IV hydration, bicarb drip, admission. For the last several days she is felt slightly lethargic but her symptoms have not been severe. She is making urine,. PCP: Alonso Miller MD    Chief Complaint:   Duration:    Timing:    Location:   Quality:   Severity:   Modifying Factors:   Associated Symptoms:       Current Facility-Administered Medications   Medication Dose Route Frequency Provider Last Rate Last Dose    sodium chloride (NS) flush 5-10 mL  5-10 mL IntraVENous PRN Meghan Izquierdo MD        sodium chloride 0.9 % bolus infusion 1,000 mL  1,000 mL IntraVENous ONCE Tracy Antony MD        Followed by   Kimberley Rodriguez sodium chloride 0.9 % bolus infusion 1,000 mL  1,000 mL IntraVENous ONCE Tracy Antony MD        ondansetron [facility]) injection 4 mg  4 mg IntraVENous NOW Meghan Izquierdo MD        acetaminophen (TYLENOL) tablet 1,000 mg  1,000 mg Oral ONCE Tracy Antony MD        dextrose 5% 1,000 mL with sodium bicarbonate (8.4%) 100 mEq infusion   IntraVENous CONTINUOUS Peg Alegria MD         Current Outpatient Medications   Medication Sig Dispense Refill    loratadine (CLARITIN) 10 mg tablet Take 10 mg by mouth daily as needed for Allergies.  carvedilol (COREG) 6.25 mg tablet Take 1 Tab by mouth two (2) times daily (with meals).  30 Tab 0    acetaminophen (TYLENOL) 500 mg tablet Take 2 Tabs by mouth every six (6) hours as needed for Pain. 20 Tab 0    metFORMIN (GLUCOPHAGE) 500 mg tablet Take 2 Tabs by mouth two (2) times daily (with meals). Indications: TYPE 2 DIABETES MELLITUS 120 Tab 2    lisinopril-hydrochlorothiazide (PRINZIDE, ZESTORETIC) 20-25 mg per tablet Take 1 Tab by mouth daily. Indications: HYPERTENSION      amLODIPine (NORVASC) 10 mg tablet Take 10 mg by mouth daily. Indications: HYPERTENSION      ferrous sulfate 325 mg (65 mg iron) tablet Take 325 mg by mouth two (2) times a day. Past History     Past Medical History:  Past Medical History:   Diagnosis Date    Anemia     Arthritis     Diabetes ([address]     Hypertension     ITP (idiopathic thrombocytopenic purpura)        Past Surgical History:  Past Surgical History:   Procedure Laterality Date    HX GYN      c section 1984    HX HYSTERECTOMY  2017    HX TUBAL LIGATION         Family History:  Family History   Problem Relation Age of Onset    Hypertension Mother     Cancer Father         Colon       Social History:  Social History     Tobacco Use    Smoking status: Never Smoker    Smokeless tobacco: Never Used   Substance Use Topics    Alcohol use: No    Drug use: No       Allergies: Allergies   Allergen Reactions    Rocephin [Ceftriaxone] Itching    Pcn [Penicillins] Itching         Review of Systems     Review of Systems   Constitutional: Negative for diaphoresis and fever. HENT: Negative for congestion and sore throat. Eyes: Negative for pain and itching. Respiratory: Negative for cough and shortness of breath. Cardiovascular: Negative for chest pain and palpitations. Gastrointestinal: Positive for abdominal pain, diarrhea and vomiting. Endocrine: Negative for polydipsia and polyuria. Genitourinary: Negative for dysuria and hematuria. Musculoskeletal: Negative for arthralgias and myalgias. Skin: Negative for rash and wound. Neurological: Negative for seizures and syncope. Hematological: Does not bruise/bleed easily. Psychiatric/Behavioral: Negative for agitation and hallucinations. Physical Exam       Patient Vitals for the past 12 hrs:   Temp Pulse Resp BP SpO2   05/30/19 1113 (!) 100.6 °F (38.1 °C) (!) 111 16 138/64 100 %       Physical Exam   Constitutional: She appears well-developed and well-nourished. HENT:   Head: Normocephalic and atraumatic. Eyes: Conjunctivae are normal. No scleral icterus. Neck: Normal range of motion. Neck supple. No JVD present. Cardiovascular: Regular rhythm and normal heart sounds. 4 intact extremity pulses, tachycardic   Pulmonary/Chest: Effort normal and breath sounds normal.   Abdominal: Soft. She exhibits no mass. There is no tenderness. Musculoskeletal: Normal range of motion. Lymphadenopathy:     She has no cervical adenopathy. Neurological: She is alert. Skin: Skin is warm and dry. Nursing note and vitals reviewed. Diagnostic Study Results   Labs -  Recent Results (from the past 12 hour(s))   METABOLIC PANEL, COMPREHENSIVE    Collection Time: 05/30/19 11:40 AM   Result Value Ref Range    Sodium 132 (L) 136 - 145 mmol/L    Potassium 4.4 3.5 - 5.5 mmol/L    Chloride 103 100 - 108 mmol/L    CO2 19 (L) 21 - 32 mmol/L    Anion gap 10 3.0 - 18 mmol/L    Glucose 126 (H) 74 - 99 mg/dL    BUN 69 (H) 7.0 - 18 MG/DL    Creatinine 7.53 (H) 0.6 - 1.3 MG/DL    BUN/Creatinine ratio 9 (L) 12 - 20      GFR est AA 7 (L) >60 ml/min/1.73m2    GFR est non-AA 6 (L) >60 ml/min/1.73m2    Calcium 8.6 8.5 - 10.1 MG/DL    Bilirubin, total 0.1 (L) 0.2 - 1.0 MG/DL    ALT (SGPT) 12 (L) 13 - 56 U/L    AST (SGOT) 19 15 - 37 U/L    Alk.  phosphatase 115 45 - 117 U/L    Protein, total 8.0 6.4 - 8.2 g/dL    Albumin 1.7 (L) 3.4 - 5.0 g/dL    Globulin 6.3 (H) 2.0 - 4.0 g/dL    A-G Ratio 0.3 (L) 0.8 - 1.7     CBC WITH AUTOMATED DIFF    Collection Time: 05/30/19 11:40 AM   Result Value Ref Range    WBC 21.0 (H) 4.6 - 13.2 K/uL    RBC 3.21 (L) 4.20 - 5.30 M/uL    HGB 7.9 (L) 12.0 - 16.0 g/dL    HCT 25.0 (L) 35.0 - 45.0 %    MCV 77.9 74.0 - 97.0 FL    MCH 24.6 24.0 - 34.0 PG    MCHC 31.6 31.0 - 37.0 g/dL    RDW 15.2 (H) 11.6 - 14.5 %    PLATELET 731 026 - 403 K/uL    NEUTROPHILS 82 (H) 42 - 75 %    BAND NEUTROPHILS 3 0 - 5 %    LYMPHOCYTES 3 (L) 20 - 51 %    MONOCYTES 9 2 - 9 %    EOSINOPHILS 3 0 - 5 %    BASOPHILS 0 0 - 3 %    ABS. NEUTROPHILS 17.9 (H) 1.8 - 8.0 K/UL    ABS. LYMPHOCYTES 0.6 (L) 0.8 - 3.5 K/UL    ABS. MONOCYTES 1.9 (H) 0 - 1.0 K/UL    ABS. EOSINOPHILS 0.6 (H) 0.0 - 0.4 K/UL    ABS. BASOPHILS 0.0 0.0 - 0.06 K/UL    DF MANUAL      PLATELET COMMENTS ADEQUATE PLATELETS      RBC COMMENTS ANISOCYTOSIS  1+        RBC COMMENTS POLYCHROMASIA  1+        RBC COMMENTS HYPOCHROMIA  1+        RBC COMMENTS MICROCYTOSIS  1+       LACTIC ACID    Collection Time: 05/30/19 11:44 AM   Result Value Ref Range    Lactic acid 0.7 0.4 - 2.0 MMOL/L   EKG, 12 LEAD, INITIAL    Collection Time: 05/30/19 11:45 AM   Result Value Ref Range    Ventricular Rate 102 BPM    Atrial Rate 102 BPM    P-R Interval 142 ms    QRS Duration 90 ms    Q-T Interval 364 ms    QTC Calculation (Bezet) 474 ms    Calculated P Axis 65 degrees    Calculated R Axis 31 degrees    Calculated T Axis 56 degrees    Diagnosis       Sinus tachycardia  Septal infarct , age undetermined  Abnormal ECG  When compared with ECG of 03-APR-2019 13:25,  Septal infarct is now present         Radiologic Studies -   XR CHEST PORT    (Results Pending)     No results found.     Medications ordered:   Medications   sodium chloride (NS) flush 5-10 mL (has no administration in time range)   sodium chloride 0.9 % bolus infusion 1,000 mL (has no administration in time range)     Followed by   sodium chloride 0.9 % bolus infusion 1,000 mL (has no administration in time range)   ondansetron (ZOFRAN) injection 4 mg (has no administration in time range)   acetaminophen (TYLENOL) tablet 1,000 mg (has no administration in time range)   dextrose 5% 1,000 mL with sodium bicarbonate (8.4%) 100 mEq infusion (has no administration in time range)         Medical Decision Making   Initial Medical Decision Making and DDx:     Dehydration, worsening renal failure. She is not septic at this time, her tachycardia is due to dehydration. ED Course: Progress Notes, Reevaluation, and Consults:     12:43 PM Dr Deuce Paez nephrology is here in the ER seeing the patient in writing orders. Discussed with [address] [place] [address] hospitalist, will see and admit the patient. Her increasing creatinine is due to dehydration, not severe sepsis. I am the first provider for this patient. I reviewed the vital signs, available nursing notes, past medical history, past surgical history, family history and social history. Patient Vitals for the past 12 hrs:   Temp Pulse Resp BP SpO2   05/30/19 1113 (!) 100.6 °F (38.1 °C) (!) 111 16 138/64 100 %       Vital Signs-Reviewed the patient's vital signs. Pulse Oximetry Analysis, Cardiac Monitor, 12 lead ekg:  Sinus tachycardia at 111, pulse ox 100% room air  Interpreted by the EP. Records Reviewed: Nursing notes reviewed (Time of Review: 12:13 PM)    Procedures:   Critical Care Time:   Aspirin: (was aspirin given for stroke?)    Diagnosis     Clinical Impression:   1. Acute renal failure superimposed on stage 3 chronic kidney disease, unspecified acute renal failure type ([facility] [address]    2. Dehydration        Disposition:       Follow-up Information    None          Patient's Medications   Start Taking    No medications on file   Continue Taking    ACETAMINOPHEN (TYLENOL) 500 MG TABLET    Take 2 Tabs by mouth every six (6) hours as needed for Pain. AMLODIPINE (NORVASC) 10 MG TABLET    Take 10 mg by mouth daily. Indications: HYPERTENSION    CARVEDILOL (COREG) 6.25 MG TABLET    Take 1 Tab by mouth two (2) times daily (with meals). FERROUS SULFATE 325 MG (65 MG IRON) TABLET    Take 325 mg by mouth two (2) times a day. LISINOPRIL-HYDROCHLOROTHIAZIDE (PRINZIDE, ZESTORETIC) 20-25 MG PER TABLET    Take 1 Tab by mouth daily. Indications: HYPERTENSION    LORATADINE (CLARITIN) 10 MG TABLET    Take 10 mg by mouth daily as needed for Allergies. METFORMIN (GLUCOPHAGE) 500 MG TABLET    Take 2 Tabs by mouth two (2) times daily (with meals).  Indications: TYPE 2 DIABETES MELLITUS   These Medications have changed    No medications on file   Stop Taking    No medications on file     _______________________________    Notes:    Pham Knox MD using Dragon dictation      _______________________________

## 2019-05-30 NOTE — PROGRESS NOTES
Admitted with worsening  Acute renal Failure with underlying Stage 3 CRF. Has very Poor Appetite with nauseas/vomiting. Was on Losartan & Metformin until 2 days back. .Lactic acid normal but still acidotic. ChestX-ray  LVH pattern with prominent broncho vascular markings. Quite anemic. Has Hypertension,Type2 DM,had E. Coli UTI on last admission. Also has ITP  & receives Imunoglobulin with Dr. Crystal Melgar. Her Renal function has progressively worsened over last  6 to 8 months. But relatively rapid rise. Will give Hydration with Bicarb drip & see the respond. Patient adamantly declined to see [facility].

## 2019-05-30 NOTE — H&P
History & Physical    Patient: Dolores Garcia MRN: [id number]  CSN: [id number]    YOB: 1965  Age: 48 y.o. Sex: female      DOA: 5/30/2019    Chief Complaint:   Nausea , Vomiting and abdominal Pain. HPI:     48 y.o. AA female with past medical history of DM, HTN   with known stage III kidney disease comes from her nephrologist office for worsening BUN and Creatinine. she has had a stomach virus with fever vomiting and diarrhea nonbloody and crampy upper abdominal pain. Labs today at the nephrology office revealed her creatinine is now 6. She is referred here for IV hydration, bicarb drip, admission. For the last several days she is felt slightly lethargic but her symptoms have not been severe. She is making urine. She did not have diarrhea in the past two hrs. No chest pain. No fever. No chills. No dysuria. No hematuria. Past Medical History:   Diagnosis Date    Anemia     Arthritis     Diabetes ([address]     Hypertension     ITP (idiopathic thrombocytopenic purpura)        Past Surgical History:   Procedure Laterality Date    HX GYN      c section 1984    HX HYSTERECTOMY  2017    HX TUBAL LIGATION         Family History   Problem Relation Age of Onset    Hypertension Mother     Cancer Father         Colon       Social History     Socioeconomic History    Marital status:      Spouse name: Not on file    Number of children: Not on file    Years of education: Not on file    Highest education level: Not on file   Tobacco Use    Smoking status: Never Smoker    Smokeless tobacco: Never Used   Substance and Sexual Activity    Alcohol use: No    Drug use: No       Prior to Admission medications    Medication Sig Start Date End Date Taking? Authorizing Provider   loratadine (CLARITIN) 10 mg tablet Take 10 mg by mouth daily as needed for Allergies. Provider, Historical   carvedilol (COREG) 6.25 mg tablet Take 1 Tab by mouth two (2) times daily (with meals). 12/26/18   Melissa Jackson MD   acetaminophen (TYLENOL) 500 mg tablet Take 2 Tabs by mouth every six (6) hours as needed for Pain. 12/23/18   Uziel Monreal PA-C   metFORMIN (GLUCOPHAGE) 500 mg tablet Take 2 Tabs by mouth two (2) times daily (with meals). Indications: TYPE 2 DIABETES MELLITUS 7/31/16   Bill Rico MD   lisinopril-hydrochlorothiazide (PRINZIDE, ZESTORETIC) 20-25 mg per tablet Take 1 Tab by mouth daily. Indications: HYPERTENSION    Provider, Historical   amLODIPine (NORVASC) 10 mg tablet Take 10 mg by mouth daily. Indications: HYPERTENSION    Provider, Historical   ferrous sulfate 325 mg (65 mg iron) tablet Take 325 mg by mouth two (2) times a day. Provider, Historical       Allergies   Allergen Reactions    Rocephin [Ceftriaxone] Itching    Pcn [Penicillins] Itching         Review of Systems    Pertinent positive as noted in HPI. All other systems reviewed and are negative. Physical Exam:     Physical Exam:  Visit Vitals  /64 (BP 1 Location: Right arm, BP Patient Position: At rest;Sitting)   Pulse (!) 111   Temp (!) 100.6 °F (38.1 °C)   Resp 16   Ht 5' 9\" (1.753 m)   Wt 88 kg (194 lb)   LMP 06/18/2017   SpO2 100%   BMI 28.65 kg/m²      O2 Device: Room air      General:  Patient is awake,  cooperative, no distress, appears stated age. Head: Normocephalic, atraumatic, without obvious abnormality. Eyes:  Conjunctivae/corneas clear. PERRL, EOMs intact. Nose: Nares normal. No drainage or sinus tenderness. Neck: Supple, symmetrical, trachea midline, no adenopathy, thyroid: no enlargement, no carotid bruit and no JVD. Lungs:   Clear to auscultation bilaterally. No rales. No Wheezing. Heart:  Regular rate and rhythm, S1, S2 normal.     Abdomen: Soft, non-tender. Bowel sounds normal. No organomegaly. Extremities: Extremities normal, atraumatic, no cyanosis or edema. Pulses: 2+ and symmetric all extremities.    Skin:  No rashes , Ulcer or lesions Neurologic: AAOx3, No focal motor or sensory deficit. Labs Reviewed and discussed with patient. Procedures/imaging:   See electronic medical records for all procedures/Xrays and details which were not copied into this note but were reviewed prior to creation of Plan      Assessment/Plan     # ARF on CKD-III: Secondary to dehydration because of Nausea , vomiting and Diarrhea:  -Admit to Tele. IV fluid. Avoid nephrotoxic agents. Nephrology Marvin Salomon has already seen her in the ED. He will follow her. # DM-II: Will discontinue metformin. SSI. # Essential HTN: Discontinue HCTZ, Ace and Arb. Cover with Hydralazine PRN. Continue amlodipine and Coreg. # Anemia of chronic disease with Leukocytosis: Her WBC is improving compare to 05/28. Will check CBC at am.       # DVT prophylaxis: Heparin     Further evaluation and treatment per patient's attending, Consultants recommendation, test results and patient's clinical course. Plan of the care was discussed with patient and she is  in agreement. Patient is Full Code.     Lacie Arreola MD  5/30/2019 3:51 PM

## 2019-05-30 NOTE — ED TRIAGE NOTES
Pt arrived to ed via POV with CO acute kidney failure per PCP advised to be seen in ER.  Denies any S/S

## 2019-05-31 ENCOUNTER — APPOINTMENT (OUTPATIENT)
Dept: NON INVASIVE DIAGNOSTICS | Age: 54
DRG: 872 | End: 2019-05-31
Attending: NURSE PRACTITIONER
Payer: COMMERCIAL

## 2019-05-31 LAB
ALBUMIN SERPL-MCNC: 1.3 G/DL (ref 3.4–5)
ALBUMIN/GLOB SERPL: 0.3 {RATIO} (ref 0.8–1.7)
ALP SERPL-CCNC: 86 U/L (ref 45–117)
ALT SERPL-CCNC: 11 U/L (ref 13–56)
AMYLASE SERPL-CCNC: 62 U/L (ref 25–115)
ANION GAP SERPL CALC-SCNC: 10 MMOL/L (ref 3–18)
AST SERPL-CCNC: 13 U/L (ref 15–37)
BACTERIA SPEC CULT: ABNORMAL
BACTERIA SPEC CULT: ABNORMAL
BASOPHILS # BLD: 0 K/UL (ref 0–0.06)
BASOPHILS NFR BLD: 0 % (ref 0–3)
BILIRUB SERPL-MCNC: 0.2 MG/DL (ref 0.2–1)
BUN SERPL-MCNC: 61 MG/DL (ref 7–18)
BUN/CREAT SERPL: 9 (ref 12–20)
C3 SERPL-MCNC: 170 MG/DL (ref 82–167)
C4 SERPL-MCNC: 42 MG/DL (ref 14–44)
CALCIUM SERPL-MCNC: 6.8 MG/DL (ref 8.5–10.1)
CALCIUM SERPL-MCNC: 7.1 MG/DL (ref 8.5–10.1)
CHLORIDE SERPL-SCNC: 101 MMOL/L (ref 100–108)
CO2 SERPL-SCNC: 21 MMOL/L (ref 21–32)
CREAT SERPL-MCNC: 6.48 MG/DL (ref 0.6–1.3)
DIFFERENTIAL METHOD BLD: ABNORMAL
EOSINOPHIL # BLD: 0.2 K/UL (ref 0–0.4)
EOSINOPHIL NFR BLD: 1 % (ref 0–5)
ERYTHROCYTE [DISTWIDTH] IN BLOOD BY AUTOMATED COUNT: 15.4 % (ref 11.6–14.5)
ERYTHROCYTE [SEDIMENTATION RATE] IN BLOOD: >140 MM/HR (ref 0–30)
FERRITIN SERPL-MCNC: 190 NG/ML (ref 8–388)
FOLATE SERPL-MCNC: 15.8 NG/ML (ref 3.1–17.5)
GLOBULIN SER CALC-MCNC: 4.2 G/DL (ref 2–4)
GLUCOSE BLD STRIP.AUTO-MCNC: 112 MG/DL (ref 70–110)
GLUCOSE BLD STRIP.AUTO-MCNC: 141 MG/DL (ref 70–110)
GLUCOSE BLD STRIP.AUTO-MCNC: 197 MG/DL (ref 70–110)
GLUCOSE BLD STRIP.AUTO-MCNC: 246 MG/DL (ref 70–110)
GLUCOSE SERPL-MCNC: 249 MG/DL (ref 74–99)
HCT VFR BLD AUTO: 20.4 % (ref 35–45)
HCT VFR BLD AUTO: 25.1 % (ref 35–45)
HGB BLD-MCNC: 6.5 G/DL (ref 12–16)
HGB BLD-MCNC: 8.1 G/DL (ref 12–16)
IRON SATN MFR SERPL: ABNORMAL %
IRON SERPL-MCNC: <5 UG/DL (ref 50–175)
LIPASE SERPL-CCNC: 312 U/L (ref 73–393)
LYMPHOCYTES # BLD: 1.2 K/UL (ref 0.8–3.5)
LYMPHOCYTES NFR BLD: 7 % (ref 20–51)
MCH RBC QN AUTO: 25.1 PG (ref 24–34)
MCHC RBC AUTO-ENTMCNC: 31.9 G/DL (ref 31–37)
MCV RBC AUTO: 78.8 FL (ref 74–97)
MONOCYTES # BLD: 1.2 K/UL (ref 0–1)
MONOCYTES NFR BLD: 7 % (ref 2–9)
NEUTS BAND NFR BLD MANUAL: 2 % (ref 0–5)
NEUTS SEG # BLD: 15 K/UL (ref 1.8–8)
NEUTS SEG NFR BLD: 83 % (ref 42–75)
PHOSPHATE SERPL-MCNC: 5.4 MG/DL (ref 2.5–4.9)
PLATELET # BLD AUTO: 133 K/UL (ref 135–420)
PLATELET COMMENTS,PCOM: ABNORMAL
POTASSIUM SERPL-SCNC: 3.7 MMOL/L (ref 3.5–5.5)
PROT SERPL-MCNC: 5.5 G/DL (ref 6.4–8.2)
PTH-INTACT SERPL-MCNC: 363.9 PG/ML (ref 18.4–88)
RBC # BLD AUTO: 2.59 M/UL (ref 4.2–5.3)
RBC MORPH BLD: ABNORMAL
SERVICE CMNT-IMP: ABNORMAL
SODIUM SERPL-SCNC: 132 MMOL/L (ref 136–145)
TIBC SERPL-MCNC: 114 UG/DL (ref 250–450)
VANCOMYCIN SERPL-MCNC: 36.3 UG/ML (ref 5–40)
VIT B12 SERPL-MCNC: 315 PG/ML (ref 211–911)
WBC # BLD AUTO: 17.6 K/UL (ref 4.6–13.2)

## 2019-05-31 PROCEDURE — 83540 ASSAY OF IRON: CPT

## 2019-05-31 PROCEDURE — 74011250637 HC RX REV CODE- 250/637: Performed by: HOSPITALIST

## 2019-05-31 PROCEDURE — 80053 COMPREHEN METABOLIC PANEL: CPT

## 2019-05-31 PROCEDURE — 86060 ANTISTREPTOLYSIN O TITER: CPT

## 2019-05-31 PROCEDURE — 86900 BLOOD TYPING SEROLOGIC ABO: CPT

## 2019-05-31 PROCEDURE — 82728 ASSAY OF FERRITIN: CPT

## 2019-05-31 PROCEDURE — 74011250637 HC RX REV CODE- 250/637: Performed by: INTERNAL MEDICINE

## 2019-05-31 PROCEDURE — 85018 HEMOGLOBIN: CPT

## 2019-05-31 PROCEDURE — 82150 ASSAY OF AMYLASE: CPT

## 2019-05-31 PROCEDURE — 85652 RBC SED RATE AUTOMATED: CPT

## 2019-05-31 PROCEDURE — P9016 RBC LEUKOCYTES REDUCED: HCPCS

## 2019-05-31 PROCEDURE — 82607 VITAMIN B-12: CPT

## 2019-05-31 PROCEDURE — 36430 TRANSFUSION BLD/BLD COMPNT: CPT

## 2019-05-31 PROCEDURE — 86923 COMPATIBILITY TEST ELECTRIC: CPT

## 2019-05-31 PROCEDURE — 84100 ASSAY OF PHOSPHORUS: CPT

## 2019-05-31 PROCEDURE — 74011636637 HC RX REV CODE- 636/637: Performed by: NURSE PRACTITIONER

## 2019-05-31 PROCEDURE — 83970 ASSAY OF PARATHORMONE: CPT

## 2019-05-31 PROCEDURE — 74011000250 HC RX REV CODE- 250: Performed by: HOSPITALIST

## 2019-05-31 PROCEDURE — 36415 COLL VENOUS BLD VENIPUNCTURE: CPT

## 2019-05-31 PROCEDURE — 85025 COMPLETE CBC W/AUTO DIFF WBC: CPT

## 2019-05-31 PROCEDURE — 80202 ASSAY OF VANCOMYCIN: CPT

## 2019-05-31 PROCEDURE — 83690 ASSAY OF LIPASE: CPT

## 2019-05-31 PROCEDURE — 74011250636 HC RX REV CODE- 250/636: Performed by: HOSPITALIST

## 2019-05-31 PROCEDURE — 86038 ANTINUCLEAR ANTIBODIES: CPT

## 2019-05-31 PROCEDURE — 82962 GLUCOSE BLOOD TEST: CPT

## 2019-05-31 PROCEDURE — 65660000000 HC RM CCU STEPDOWN

## 2019-05-31 RX ORDER — DIPHENHYDRAMINE HCL 25 MG
25 CAPSULE ORAL
Status: COMPLETED | OUTPATIENT
Start: 2019-05-31 | End: 2019-05-31

## 2019-05-31 RX ORDER — DEXTROSE 50 % IN WATER (D50W) INTRAVENOUS SYRINGE
25-50 AS NEEDED
Status: DISCONTINUED | OUTPATIENT
Start: 2019-05-31 | End: 2019-06-04 | Stop reason: HOSPADM

## 2019-05-31 RX ORDER — INSULIN LISPRO 100 [IU]/ML
INJECTION, SOLUTION INTRAVENOUS; SUBCUTANEOUS
Status: DISCONTINUED | OUTPATIENT
Start: 2019-05-31 | End: 2019-06-04 | Stop reason: HOSPADM

## 2019-05-31 RX ORDER — SODIUM CHLORIDE 9 MG/ML
250 INJECTION, SOLUTION INTRAVENOUS AS NEEDED
Status: DISCONTINUED | OUTPATIENT
Start: 2019-05-31 | End: 2019-06-04 | Stop reason: HOSPADM

## 2019-05-31 RX ORDER — MAGNESIUM SULFATE 100 %
4 CRYSTALS MISCELLANEOUS AS NEEDED
Status: DISCONTINUED | OUTPATIENT
Start: 2019-05-31 | End: 2019-06-04 | Stop reason: HOSPADM

## 2019-05-31 RX ADMIN — CARVEDILOL 6.25 MG: 6.25 TABLET, FILM COATED ORAL at 18:31

## 2019-05-31 RX ADMIN — ALUMINUM HYDROXIDE AND MAGNESIUM HYDROXIDE 15 ML: 200; 200 SUSPENSION ORAL at 01:53

## 2019-05-31 RX ADMIN — HEPARIN SODIUM 5000 UNITS: 5000 INJECTION INTRAVENOUS; SUBCUTANEOUS at 16:19

## 2019-05-31 RX ADMIN — INSULIN LISPRO 2 UNITS: 100 INJECTION, SOLUTION INTRAVENOUS; SUBCUTANEOUS at 11:07

## 2019-05-31 RX ADMIN — ACETAMINOPHEN 650 MG: 325 TABLET ORAL at 13:36

## 2019-05-31 RX ADMIN — DIPHENHYDRAMINE HYDROCHLORIDE 25 MG: 25 CAPSULE ORAL at 13:36

## 2019-05-31 RX ADMIN — DEXTROSE MONOHYDRATE: 5 INJECTION, SOLUTION INTRAVENOUS at 05:37

## 2019-05-31 RX ADMIN — FERROUS SULFATE TAB 325 MG (65 MG ELEMENTAL FE) 325 MG: 325 (65 FE) TAB at 11:09

## 2019-05-31 RX ADMIN — NEPHROCAP 1 CAPSULE: 1 CAP ORAL at 15:52

## 2019-05-31 RX ADMIN — FERROUS SULFATE TAB 325 MG (65 MG ELEMENTAL FE) 325 MG: 325 (65 FE) TAB at 18:31

## 2019-05-31 RX ADMIN — DEXTROSE MONOHYDRATE: 5 INJECTION, SOLUTION INTRAVENOUS at 15:52

## 2019-05-31 RX ADMIN — AMLODIPINE BESYLATE 10 MG: 10 TABLET ORAL at 11:09

## 2019-05-31 RX ADMIN — CARVEDILOL 6.25 MG: 6.25 TABLET, FILM COATED ORAL at 11:09

## 2019-05-31 NOTE — ROUTINE PROCESS
TRANSFER - OUT REPORT:    Verbal report given to receiving nurse on Josefina Wilson  being transferred to [address] [place] [address] for routine progression of care       Report consisted of patients Situation, Background, Assessment and   Recommendations(SBAR). Information from the following report(s) SBAR, ED Summary, Intake/Output and MAR was reviewed with the receiving nurse. Lines:   Peripheral IV 05/30/19 Left Antecubital (Active)   Site Assessment Clean, dry, & intact 5/30/2019 11:47 AM   Phlebitis Assessment 0 5/30/2019 11:47 AM   Infiltration Assessment 0 5/30/2019 11:47 AM   Dressing Status Clean, dry, & intact 5/30/2019 11:47 AM   Dressing Type 4 X 4;Transparent;Tape 5/30/2019 11:47 AM   Hub Color/Line Status Pink;Patent; Flushed 5/30/2019 11:47 AM       Peripheral IV 05/30/19 Right Antecubital (Active)   Site Assessment Clean, dry, & intact 5/30/2019  9:20 PM   Phlebitis Assessment 0 5/30/2019  9:20 PM   Infiltration Assessment 0 5/30/2019  9:20 PM   Dressing Status Clean, dry, & intact; New 5/30/2019  9:20 PM   Dressing Type Transparent 5/30/2019  9:20 PM   Hub Color/Line Status Pink;Patent; Flushed 5/30/2019  9:20 PM        Opportunity for questions and clarification was provided.       Patient transported with:   Monitor  Tech

## 2019-05-31 NOTE — PROGRESS NOTES
Pt started blood and with first vital signs pt temp was 101. Pt has had a fever this morning that MD's were aware. Dr. Ria Leahy was paged about increase in temp. Pt will received tylenol and benadryl per Dr. Violette Burrows order. 1453-pt's temp has come down to 100.7. paged Dr. Ria Leahy to update him. He stated to finish the unit of Blood.

## 2019-05-31 NOTE — DIABETES MGMT
GLYCEMIC CONTROL SCREENING INITIATED:    Lab Results   Component Value Date/Time    Hemoglobin A1c 7.9 (H) 05/30/2019 11:40 AM      Lab Results   Component Value Date/Time    Glucose 249 (H) 05/31/2019 02:43 AM         [x]  Glucose values exceeding inpatient target range: -180mg/dl            non-ICU 70-180mg/dl      [x]  Patient meets criteria for diabetes HbA1c ? 6.5%      [x]  Recommend corrective insulin per IP Insulin order set.       [x]  Standard insulin scale  []  Very insuln resistant scale     Ghada Saini, [address] [place] [address], [id number] [facility]

## 2019-05-31 NOTE — H&P
Received a call on this patient from her nurse. Patient is spiking fevers as high as 103. She has elevated WBC count and GI sxs    She is pen allergic    I will start her on vancomycin and levaquin after repeat blood culture is drawn.

## 2019-05-31 NOTE — ROUTINE PROCESS
Verbal bedside shift report given to Adiel Parker RN oncoming nurse by Sheri Reynoso, EITAN off going nurse including [facility], SB[facility] and [facility]

## 2019-05-31 NOTE — PROGRESS NOTES
[facility]  Progress Note    Patient: Marilu Powell Age: 48 y.o. : 1965 MR#: [id number] SSN: [id number]  Date: 2019     Subjective:     Mild sore throat and epigastric cramping; overall dramatically improved; no SOB, CP    Assessment/Plan:   1. ARF on CKD-III: Worsening in setting of vomiting, dehydration; cont IV fluid. Avoid nephrotoxic agents. Nephrology  following  2. DM Type II - A1c 7.9 on admission, add SSI follow sugars; hold off on long acting for now in setting of #1  3. Essential HTN: Controlled, off HCTZ, Ace and Arb in setting of #1. Cover with Hydralazine PRN. Continue amlodipine and Coreg. 4. Anemia of chronic disease / likely r/t CKD -  give 1 unit PRBC's;  Cont iron oral; Add B12 / folate and hemoccult ordered previously  5. Sepsis - noted leukocytosis, febrile, tachy likely d/t #6; improved, follow  6.  Gastoenteritis - symptoms improved, cont abx in setting of high fevers    Additional Notes:      Case discussed with:  [x]Patient  []Family  [x]Nursing  []Case Management  DVT Prophylaxis:  []Lovenox  [x]Hep SQ  []SCDs  []Coumadin   []On Heparin gtt    Objective:   VS:   Visit Vitals  /80 (BP 1 Location: Right arm, BP Patient Position: At rest)   Pulse (!) 111   Temp (!) 100.6 °F (38.1 °C)   Resp 22   Ht 5' 9\" (1.753 m)   Wt 90.8 kg (200 lb 1.6 oz)   LMP 2017   SpO2 100%   BMI 29.55 kg/m²      Tmax/24hrs: Temp (24hrs), Av.7 °F (38.2 °C), Min:98.4 °F (36.9 °C), Max:103.5 °F (39.7 °C)      Intake/Output Summary (Last 24 hours) at 2019 7248  Last data filed at 2019 9136  Gross per 24 hour   Intake 5507.92 ml   Output 1000 ml   Net 4507.92 ml     General:  Alert, NAD  Cardiovascular:  RRR  Pulmonary:  LSC throughout  GI:  +BS in all four quadrants, soft, mild tenderness to epigastric region  Extremities:  No edema; 2+ dorsalis pedis pulses bilaterally   Neuro: oriented x 3    Labs:    Recent Results (from the past 24 hour(s))   CULTURE, BLOOD    Collection Time: 05/30/19 11:40 AM   Result Value Ref Range    Special Requests: NO SPECIAL REQUESTS      Culture result: NO GROWTH AFTER 18 HOURS     METABOLIC PANEL, COMPREHENSIVE    Collection Time: 05/30/19 11:40 AM   Result Value Ref Range    Sodium 132 (L) 136 - 145 mmol/L    Potassium 4.4 3.5 - 5.5 mmol/L    Chloride 103 100 - 108 mmol/L    CO2 19 (L) 21 - 32 mmol/L    Anion gap 10 3.0 - 18 mmol/L    Glucose 126 (H) 74 - 99 mg/dL    BUN 69 (H) 7.0 - 18 MG/DL    Creatinine 7.53 (H) 0.6 - 1.3 MG/DL    BUN/Creatinine ratio 9 (L) 12 - 20      GFR est AA 7 (L) >60 ml/min/1.73m2    GFR est non-AA 6 (L) >60 ml/min/1.73m2    Calcium 8.6 8.5 - 10.1 MG/DL    Bilirubin, total 0.1 (L) 0.2 - 1.0 MG/DL    ALT (SGPT) 12 (L) 13 - 56 U/L    AST (SGOT) 19 15 - 37 U/L    Alk. phosphatase 115 45 - 117 U/L    Protein, total 8.0 6.4 - 8.2 g/dL    Albumin 1.7 (L) 3.4 - 5.0 g/dL    Globulin 6.3 (H) 2.0 - 4.0 g/dL    A-G Ratio 0.3 (L) 0.8 - 1.7     CBC WITH AUTOMATED DIFF    Collection Time: 05/30/19 11:40 AM   Result Value Ref Range    WBC 21.0 (H) 4.6 - 13.2 K/uL    RBC 3.21 (L) 4.20 - 5.30 M/uL    HGB 7.9 (L) 12.0 - 16.0 g/dL    HCT 25.0 (L) 35.0 - 45.0 %    MCV 77.9 74.0 - 97.0 FL    MCH 24.6 24.0 - 34.0 PG    MCHC 31.6 31.0 - 37.0 g/dL    RDW 15.2 (H) 11.6 - 14.5 %    PLATELET 627 979 - 840 K/uL    NEUTROPHILS 82 (H) 42 - 75 %    BAND NEUTROPHILS 3 0 - 5 %    LYMPHOCYTES 3 (L) 20 - 51 %    MONOCYTES 9 2 - 9 %    EOSINOPHILS 3 0 - 5 %    BASOPHILS 0 0 - 3 %    ABS. NEUTROPHILS 17.9 (H) 1.8 - 8.0 K/UL    ABS. LYMPHOCYTES 0.6 (L) 0.8 - 3.5 K/UL    ABS. MONOCYTES 1.9 (H) 0 - 1.0 K/UL    ABS. EOSINOPHILS 0.6 (H) 0.0 - 0.4 K/UL    ABS.  BASOPHILS 0.0 0.0 - 0.06 K/UL    DF MANUAL      PLATELET COMMENTS ADEQUATE PLATELETS      RBC COMMENTS ANISOCYTOSIS  1+        RBC COMMENTS POLYCHROMASIA  1+        RBC COMMENTS HYPOCHROMIA  1+        RBC COMMENTS MICROCYTOSIS  1+       COMPLEMENT, C3 & C4 Collection Time: 05/30/19 11:40 AM   Result Value Ref Range    Complement C3 170 (H) 82 - 167 mg/dL    Complement C4 42 14 - 44 mg/dL   HEMOGLOBIN A1C WITH EAG    Collection Time: 05/30/19 11:40 AM   Result Value Ref Range    Hemoglobin A1c 7.9 (H) 4.2 - 5.6 %    Est. average glucose 180 mg/dL   LACTIC ACID    Collection Time: 05/30/19 11:44 AM   Result Value Ref Range    Lactic acid 0.7 0.4 - 2.0 MMOL/L   EKG, 12 LEAD, INITIAL    Collection Time: 05/30/19 11:45 AM   Result Value Ref Range    Ventricular Rate 102 BPM    Atrial Rate 102 BPM    P-R Interval 142 ms    QRS Duration 90 ms    Q-T Interval 364 ms    QTC Calculation (Bezet) 474 ms    Calculated P Axis 65 degrees    Calculated R Axis 31 degrees    Calculated T Axis 56 degrees    Diagnosis       Sinus tachycardia  Possible Septal infarct , age undetermined  Abnormal ECG  When compared with ECG of 03-APR-2019 13:25,  Septal infarct is now present  Confirmed by Yvette Pablo MD, ----- ([id number]) on 5/30/2019 6:05:32 PM     CULTURE, BLOOD    Collection Time: 05/30/19 11:55 AM   Result Value Ref Range    Special Requests: NO SPECIAL REQUESTS      Culture result: NO GROWTH AFTER 18 HOURS     URINALYSIS W/ RFLX MICROSCOPIC    Collection Time: 05/30/19  3:45 PM   Result Value Ref Range    Color YELLOW      Appearance CLEAR      Specific gravity 1.012 1.005 - 1.030      pH (UA) 7.0 5.0 - 8.0      Protein 300 (A) NEG mg/dL    Glucose 100 (A) NEG mg/dL    Ketone NEGATIVE  NEG mg/dL    Bilirubin NEGATIVE  NEG      Blood MODERATE (A) NEG      Urobilinogen 0.2 0.2 - 1.0 EU/dL    Nitrites NEGATIVE  NEG      Leukocyte Esterase SMALL (A) NEG     PROTEIN/CREATININE RATIO, URINE    Collection Time: 05/30/19  3:45 PM   Result Value Ref Range    Protein, urine random 536 (H) <11.9 mg/dL    Creatinine, urine 71.50 30 - 125 mg/dL    Protein/Creat.  urine Ratio 7.5     OSMOLALITY, UR    Collection Time: 05/30/19  3:45 PM   Result Value Ref Range    Osmolality,urine 271 50 - 1,400 MOSM/kg H2O   URINE MICROSCOPIC ONLY    Collection Time: 05/30/19  3:45 PM   Result Value Ref Range    WBC 5 to 8 0 - 4 /hpf    RBC 10 to 20 0 - 5 /hpf    Epithelial cells 2+ 0 - 5 /lpf    Bacteria 1+ (A) NEG /hpf   CULTURE, BLOOD    Collection Time: 05/30/19  9:20 PM   Result Value Ref Range    Special Requests: NO SPECIAL REQUESTS      Culture result: NO GROWTH AFTER 9 HOURS     CBC WITH AUTOMATED DIFF    Collection Time: 05/31/19  2:43 AM   Result Value Ref Range    WBC 17.6 (H) 4.6 - 13.2 K/uL    RBC 2.59 (L) 4.20 - 5.30 M/uL    HGB 6.5 (L) 12.0 - 16.0 g/dL    HCT 20.4 (L) 35.0 - 45.0 %    MCV 78.8 74.0 - 97.0 FL    MCH 25.1 24.0 - 34.0 PG    MCHC 31.9 31.0 - 37.0 g/dL    RDW 15.4 (H) 11.6 - 14.5 %    PLATELET 234 (L) 069 - 420 K/uL    NEUTROPHILS 83 (H) 42 - 75 %    BAND NEUTROPHILS 2 0 - 5 %    LYMPHOCYTES 7 (L) 20 - 51 %    MONOCYTES 7 2 - 9 %    EOSINOPHILS 1 0 - 5 %    BASOPHILS 0 0 - 3 %    ABS. NEUTROPHILS 15.0 (H) 1.8 - 8.0 K/UL    ABS. LYMPHOCYTES 1.2 0.8 - 3.5 K/UL    ABS. MONOCYTES 1.2 (H) 0 - 1.0 K/UL    ABS. EOSINOPHILS 0.2 0.0 - 0.4 K/UL    ABS. BASOPHILS 0.0 0.0 - 0.06 K/UL    DF MANUAL      PLATELET COMMENTS DECREASED PLATELETS      RBC COMMENTS ANISOCYTOSIS  1+        RBC COMMENTS MICROCYTOSIS  1+        RBC COMMENTS OVALOCYTES  1+       METABOLIC PANEL, COMPREHENSIVE    Collection Time: 05/31/19  2:43 AM   Result Value Ref Range    Sodium 132 (L) 136 - 145 mmol/L    Potassium 3.7 3.5 - 5.5 mmol/L    Chloride 101 100 - 108 mmol/L    CO2 21 21 - 32 mmol/L    Anion gap 10 3.0 - 18 mmol/L    Glucose 249 (H) 74 - 99 mg/dL    BUN 61 (H) 7.0 - 18 MG/DL    Creatinine 6.48 (H) 0.6 - 1.3 MG/DL    BUN/Creatinine ratio 9 (L) 12 - 20      GFR est AA 8 (L) >60 ml/min/1.73m2    GFR est non-AA 7 (L) >60 ml/min/1.73m2    Calcium 6.8 (L) 8.5 - 10.1 MG/DL    Bilirubin, total 0.2 0.2 - 1.0 MG/DL    ALT (SGPT) 11 (L) 13 - 56 U/L    AST (SGOT) 13 (L) 15 - 37 U/L    Alk.  phosphatase 86 45 - 117 U/L    Protein, total 5.5 (L) 6.4 - 8.2 g/dL    Albumin 1.3 (L) 3.4 - 5.0 g/dL    Globulin 4.2 (H) 2.0 - 4.0 g/dL    A-G Ratio 0.3 (L) 0.8 - 1.7     PHOSPHORUS    Collection Time: 05/31/19  2:43 AM   Result Value Ref Range    Phosphorus 5.4 (H) 2.5 - 4.9 MG/DL   PTH INTACT    Collection Time: 05/31/19  2:43 AM   Result Value Ref Range    Calcium 7.1 (L) 8.5 - 10.1 MG/DL    PTH, Intact 363.9 (H) 18.4 - 88.0 pg/mL   IRON PROFILE    Collection Time: 05/31/19  2:43 AM   Result Value Ref Range    Iron <5 (L) 50 - 175 ug/dL    TIBC 114 (L) 250 - 450 ug/dL    Iron % saturation Cannot be calculated %   FERRITIN    Collection Time: 05/31/19  2:43 AM   Result Value Ref Range    Ferritin 190 8 - 388 NG/ML   LIPASE    Collection Time: 05/31/19  2:43 AM   Result Value Ref Range    Lipase 312 73 - 393 U/L   AMYLASE    Collection Time: 05/31/19  2:43 AM   Result Value Ref Range    Amylase 62 25 - 115 U/L   SED RATE (ESR)    Collection Time: 05/31/19  2:43 AM   Result Value Ref Range    Sed rate, automated >140 (H) 0 - 30 mm/hr   VANCOMYCIN, RANDOM    Collection Time: 05/31/19  2:43 AM   Result Value Ref Range    Vancomycin, random 36.3 5.0 - 40.0 UG/ML   GLUCOSE, POC    Collection Time: 05/31/19  7:51 AM   Result Value Ref Range    Glucose (POC) 246 (H) 70 - 110 mg/dL     Signed By: Patrice Kingsley NP     May 31, 2019

## 2019-05-31 NOTE — PROGRESS NOTES
conducted an initial consultation and Spiritual Assessment for Shari Reza, who is a 48 y.o.,female. Patients Primary Language is: [place]. According to the patients EMR Religion Affiliation is: Anglican. The reason the Patient came to the hospital is:   Patient Active Problem List    Diagnosis Date Noted    [facility] 02/27/2015     Priority: 1 - One    Accelerated essential hypertension 01/23/2015     Priority: 1 - One    Menorrhagia with regular cycle 01/23/2015     Priority: 1 - One    Obesity (BMI 30.0-34.9) 01/23/2015     Priority: 1 - One    Type 2 DM with CKD and hypertension ([facility] 01/23/2015     Priority: 1 - One    ARF (acute renal failure) ([facility] 05/30/2019    Acute renal failure superimposed on stage 5 chronic kidney disease, not on chronic dialysis ([facility] 05/30/2019    Anemia 04/90/9997    Metabolic acidosis 29/65/1469    Proteinuria 05/30/2019    Bacteremia 12/24/2018    Menorrhagia 07/05/2017    Anemia in chronic kidney disease (CKD) 02/28/2017    Anemia due to blood loss, chronic 01/23/2015        The  provided the following Interventions:  Initiated a relationship of care and support. Provided information about Spiritual Care Services. Chart reviewed. Assessment:  Patient does not have any Confucianism/cultural needs that will affect patients preferences in health care. Plan:  Chaplains will continue to follow and will provide pastoral care on an as needed/requested basis.  recommends bedside caregivers page  on duty if patient shows signs of acute spiritual or emotional distress.     [address]  [phone])

## 2019-05-31 NOTE — CONSULTS
[address] [facility]    Name:  Maninder Fitzgerald  MR#:   [id number]  :  1965  ACCOUNT #:  [de-identified]  DATE OF SERVICE:  2019    RENAL CONSULTATION NOTE    Consult was requested by the hospitalist service. REASON FOR CONSULTATION:  Renal failure and the patient is quite symptomatic. HISTORY OF PRESENT ILLNESS:  This pleasant 65-year-old -American female whom I have seeing first time in my office on 2019 on consultation for renal failure. The patient has long-term history of hypertension and type 2 diabetes mellitus for 5 to 6 years as per her and she was in this hospital sometime in 2018. During that time, she was found to have a stage III to stage IV chronic renal failure with proteinuria and she was also found to have urinary tract infections, E. coli that was treated with antibiotic and sent home. Since then, she was not doing good off and on, having low-grade temperature, poor appetite and she was still continuing metformin as well as losartan until she was seen finally by her primary care physician last month. During that time, serum creatinine increased significantly. Then repeat another was done by me and creatinine increased up to more than 7. Her baseline serum creatinine is difficult to say where it was, but has chronic renal insufficiency definitely. As back in 2017, her serum creatinine was 2.0 and in 2018, it was 2.7. Then when she was admitted, creatinine was 3.57 with the eGFR only around 15 mL. Renal ultrasound was done at that time shows right kidney measures 10.1 x 4.7 x 4.8 cm, left kidney measures 11.6 x 5.6 x 5.1 cm. No hydronephrosis or no nephrolithiasis, no solid renal mass, 1.3 x 1.2 x 1.1 cyst in the upper pole of the left kidney. Going on to her chemistry, as I mentioned on , it was 4.11 and then on , on the day of admission, it is 7.53. Today with hydration and the bicarb drip, it has come down to 6.48.   Again, the patient is diabetic, hypertensive and also has proteinuria and also mild hematuria. Protein in the urine on this admission is 300 mg/dL. In 12/2018, more than 1000 mg and in 07/2018, it was 300 mg. On this admission, protein-creatinine ratio is 7.5 indicating nephrotic range proteinuria. As I mentioned, she was also diagnosed with idiopathic thrombocytopenic purpura and receives immunoglobulin with Dr. Richard Kim every month. Next infusion will be in two weeks. As her renal functions progressively worsened, we decided to admit this patient and bring her to the hospital.  Since then, she is also having low-grade fever 100 to 100.4, 100.5. She thinks this is a viral fever, takes Tylenol off and on. PAST MEDICAL HISTORY:  Include hypertension, type 2 diabetes mellitus, ITP, arthritis, anemia. She does not have any history of any nonsteroidal, but has history of anemia and received lot of blood transfusions. PAST SURGICAL HISTORY:  Includes hysterectomy and tubal ligation. FAMILY HISTORY:  Significant for hypertension and colon cancer in father. SOCIAL HISTORY:  She is . Never smoked. Has children. MEDICATIONS:  List medication before admission were Claritin, Coreg, Tylenol, also was on metformin 500 mg two tablets two times daily that was discontinued just before admission and also on the lisinopril along with hydrochlorothiazide that also discontinued just before admission. The patient's luckily the lactic acid was normal.    ALLERGIES:  SHE IS ALLERGIC TO ROCEPHIN AND PENICILLIN. REVIEW OF SYSTEMS:  CONSTITUTIONAL:  In general, woman of her appropriate age, apparently looking tired and weak, clinically looking dry. CARDIOVASCULAR SYSTEM:  No chest pain. No shortness of breath. No palpitation. RESPIRATORY SYSTEM:  No cough. No wheezing. No hemoptysis. GI:  Has nausea, vomiting and little diarrhea before this admission, also having intermittent pain.   :  Denies any hematuria, any flank pain, but has history of UTI. MUSCULOSKELETAL SYSTEM:  Generalized aches and pain. SKIN:  No rashes. LABORATORY DATA:  Relevant labs on admission; WBC of 21.0 with a hemoglobin of 7.9 and hematocrit of 25, and the platelet of 395,205. It is also mentioned that on 05/28, her white cell count was 28,000 and before that on 05/14, it was normal at 5.7. Today, WBC count is 7.6 with a hemoglobin dropped to 6.5, hematocrit 20.4, platelets of 951 with a neutrophil of 83, band of 2, lymphocyte of 7, monocyte of 7. We checked her sed rate and ESR, which is greater than 140 in first hour. Chemistry at the time of admission; sodium 132, potassium 4.4, chloride 103, CO2 of 19, glucose of 126, blood urine nitrogen of 69, creatinine of 7.53, total protein of 8.0 with albumin of 1.7. Estimated average glucose is 180 with a hemoglobin A1c of 7.9. Her cholesterol status is not known. Her iron status; iron less than 5, TIBC 114, cannot calculate iron saturation. Ferritin is 190. Microbiology; urine culture and the blood culture have been sent and is in process. Her intact PTH result is back, it is elevated at 363.9 picogram.  Complimentary level is back; C3 is 107, just slightly elevated; complement 4 is 42, within high normal range, ARLETH is pending. While she was in the hospital in the past, on 02/27/2015, she had an immunoelectrophoresis done and there was no monoclonal paraprotein is identified on serum immunofixation. Beta-2 microglobulin in 2015 was 3.2. Immunoglobulins were done; IgG was 1257, IgA was 357, IgM was 61, within normal limit, that was in 2015. Urinalysis on this admission with specific gravity of 1.012, pH of 7, protein of 300, glucose 100, ketone negative, blood moderate, nitrite negative, leukocyte esterase small, epithelial cells 2, wbc 5-10, rbc 10-20, bacteria 1+.   No mention about any red blood cell cast.  Chest x-ray had been done, shows borderline cardiomegaly, otherwise unremarkable. EKG; normal sinus rhythm with a sinus tachycardia, possible septal infarct. IMPRESSION:  1. Acute-on-chronic or subacute-on-chronic renal failure. Cause of this acute rise of serum creatinine is not clear. 2.  History of type 2 diabetes mellitus. 3.  Hypertension. 4.  Nephrotic range proteinuria. 5.  Low albumin. 6.  Anemia. 7.  Metabolic acidosis. 8.  Secondary hyperparathyroidism. 9.  History of idiopathic thrombocytopenic purpura. PLAN:  The patient started on bicarb drip. With that one, she is feeling good. Clinically with this low-grade fever, intermittent upper abdominal pain with diarrhea, bleeding was suspicious for HSV versus postinfectious IgA glomerulonephritis, also postinfectious glomerulonephritis should be considered with that low-grade fever along with a very high sed rate and worsening renal failure, and the hematuria. Also it could be progression from the chronic renal failure, but the rise of serum creatinine is quite high. We must rule out other causes including HIV and must check her hepatitis status also. For the time being, follow the culture, give antibiotic and give transfusion. Also need to get input from the ID consultant and also request her hematologist to follow while she is in the hospital.  At this time, I do not see any immediate indication for any dialysis. Also as her cause of this worsening renal failure along with her fever and other abnormal lab, we may need to end up with doing kidney biopsy on her. I discussed with her for the need of doing HIV test and she has agreed. She is quite malnutrient and I am going to supplement also the Nephro. Blood pressure should be controlled with the current medications. Thanks for asking me to see this complicated interesting patient.         Ena Persaud MD      [id number]  D:  05/31/2019 12:25  T:  05/31/2019 13:39  JOB #:  [id number]  CC:  Jp Jefferson MD

## 2019-05-31 NOTE — CONSULTS
Infectious Disease Consultation Note    Requested by: Dr. Ghazala Joseph    Reason: sepsis, acute renal failure    Current abx Prior abx   Levofloxacin, vancomycin since 5/30/19      Lines:       Assessment :      48year old lady with h/o Hypertension,Type2 DM (last hgbA1C 7.9 on 5/30/19),had E. Coli UTI/bacteremia 12/2018, ITP s/p IVIG admitted to [address] [place] [address] on 5/30/19 with worsening  acute renal Failure with underlying Stage 3 CRF. Now with high grade fevers    Highly complex cinical picture. Presentation seems most c/w sepsis (POA) due to intercurrent viral gastroenteritis. High grade fevers on admission likely due to resolving viral infection alongwith toxin accumulation from renal failure. ARF: likely multifactorial - volume depletion, metformin, losartan related - nephrology consult appreciated. H/o chronic renal disease and thrombocytopenia; recommend to r/o HIV, hepatitis B/C    Resolved diarrhea. Subjective sense of improvement    Recent viral infection, worsening anemia; monitor for hemophagocytic lymphohistiocytosis    Recommendations:    1. Discontinue levofloxacin, vancomycin after 48 hours if negative cultures and no evidence of bacterial infection  2. Monitor fever, platelet count, h/h. Recommend hematology evaluation to rule out hemophagocytic lymphohistiocytosis if worsening cytopenias noted. 3. Obtain HIV, hepatitis B, C serology  4. Obtain serum Ferritin  5. F/u nephrology recommendations    Advance Care planning: full code; discussed  with patient/surrogate decision maker; Jose Talbert tucker: [phone]      Thank you for consultation request. Above plan was discussed in details with patient,  and dr Ghazala Joseph. Please call me if any further questions or concerns. Will continue to participate in the care of this patient. HPI:    48year old lady with h/o Hypertension,Type2 DM (last hgbA1C 7.9 on 5/30/19),had E. Coli UTI/bacteremia 12/2018, ITP s/p IVIG admitted to [address] [place] [address] on 5/30/19 with worsening  acute renal Failure with underlying Stage 3 CRF. Patient states that she was diagnosed with ITP in 2017 s/p IVIG. Doesn't recollect being tested for HIV/hepatitis at that time. She works in school cafeteria and was relatively well up until last Sunday. [facility] was [facility] since [facility] had fever/sore throat. While leaving her house, she had vomiting, abdominal pain. [facility] - was told that she had stomach virus. She felt weak since then. Had abdominal pain, nausea, diarrhea (2-3 loose watery BM), poor appetite. She was seen in dr. Yonatan Isaac office on 5/30/19  Labs at the nephrology office revealed her creatinine is now 6 (from baseline of 3). She was referred here for IV hydration, bicarb drip, admission. Of note, she was on Losartan & Metformin until 2 days back. .Lactic acid normal .ChestX-ray  LVH pattern with prominent broncho vascular markings. She was started on antibiotics and  I have been consulted for further recommendations. Patient feels better. Denies subjective fever/chills. Diarrhea resolved. C/o scratchy throat. No hiv risk factors. Patient denies headaches, visual disturbances, sore throat, runny nose, earaches, cp, sob, chills, cough, burning micturition, pain or weakness in extremities. denies back pain/flank pain. No h/o recent travel. No known h/o MRSA colonization or infection in the past.                  Past Medical History:   Diagnosis Date    Anemia     Arthritis     Diabetes ([facility] [facility]     Hypertension     ITP (idiopathic thrombocytopenic purpura)        Past Surgical History:   Procedure Laterality Date    HX GYN      c section 1984    HX HYSTERECTOMY  2017    HX TUBAL LIGATION         home Medication List    Details   loratadine (CLARITIN) 10 mg tablet Take 10 mg by mouth daily as needed for Allergies. carvedilol (COREG) 6.25 mg tablet Take 1 Tab by mouth two (2) times daily (with meals).   Qty: 30 Tab, Refills: 0      acetaminophen (TYLENOL) 500 mg tablet Take 2 Tabs by mouth every six (6) hours as needed for Pain. Qty: 20 Tab, Refills: 0      metFORMIN (GLUCOPHAGE) 500 mg tablet Take 2 Tabs by mouth two (2) times daily (with meals). Indications: TYPE 2 DIABETES MELLITUS  Qty: 120 Tab, Refills: 2      lisinopril-hydrochlorothiazide (PRINZIDE, ZESTORETIC) 20-25 mg per tablet Take 1 Tab by mouth daily. Indications: HYPERTENSION      amLODIPine (NORVASC) 10 mg tablet Take 10 mg by mouth daily. Indications: HYPERTENSION      ferrous sulfate 325 mg (65 mg iron) tablet Take 325 mg by mouth two (2) times a day.              Current Facility-Administered Medications   Medication Dose Route Frequency    aluminum-magnesium hydroxide (MAALOX) oral suspension 15 mL  15 mL Oral QID PRN    0.9% sodium chloride infusion 250 mL  250 mL IntraVENous PRN    insulin lispro (HUMALOG) injection   SubCUTAneous AC&HS    glucose chewable tablet 16 g  4 Tab Oral PRN    glucagon (GLUCAGEN) injection 1 mg  1 mg IntraMUSCular PRN    dextrose (D50W) injection syrg 12.5-25 g  25-50 mL IntraVENous PRN    B complex-vitaminC-folic acid (NEPHROCAP) cap  1 Cap Oral DAILY    sodium chloride (NS) flush 5-10 mL  5-10 mL IntraVENous PRN    dextrose 5% 1,000 mL with sodium bicarbonate (8.4%) 100 mEq infusion   IntraVENous CONTINUOUS    acetaminophen (TYLENOL) tablet 650 mg  650 mg Oral Q6H PRN    amLODIPine (NORVASC) tablet 10 mg  10 mg Oral DAILY    carvedilol (COREG) tablet 6.25 mg  6.25 mg Oral BID WITH MEALS    ferrous sulfate tablet 325 mg  325 mg Oral BID    loratadine (CLARITIN) tablet 10 mg  10 mg Oral DAILY PRN    heparin (porcine) injection 5,000 Units  5,000 Units SubCUTAneous Q8H    morphine 10 mg/ml injection 1 mg  1 mg IntraVENous Q6H PRN    ondansetron (ZOFRAN) injection 4 mg  4 mg IntraVENous Q6H PRN    levoFLOXacin (LEVAQUIN) 500 mg in D5W IVPB  500 mg IntraVENous Q48H    VANCOMYCIN INFORMATION NOTE   Other Rx Dosing/Monitoring       Allergies: Rocephin [ceftriaxone] and Pcn [penicillins]    Family History   Problem Relation Age of Onset    Hypertension Mother     Cancer Father         Colon     Social History     Socioeconomic History    Marital status:      Spouse name: Not on file    Number of children: Not on file    Years of education: Not on file    Highest education level: Not on file   Occupational History    Not on file   Social Needs    Financial resource strain: Not on file    Food insecurity:     Worry: Not on file     Inability: Not on file    Transportation needs:     Medical: Not on file     Non-medical: Not on file   Tobacco Use    Smoking status: Never Smoker    Smokeless tobacco: Never Used   Substance and Sexual Activity    Alcohol use: No    Drug use: No    Sexual activity: Not on file   Lifestyle    Physical activity:     Days per week: Not on file     Minutes per session: Not on file    Stress: Not on file   Relationships    Social connections:     Talks on phone: Not on file     Gets together: Not on file     Attends Gnosticism service: Not on file     Active member of club or organization: Not on file     Attends meetings of clubs or organizations: Not on file     Relationship status: Not on file    Intimate partner violence:     Fear of current or ex partner: Not on file     Emotionally abused: Not on file     Physically abused: Not on file     Forced sexual activity: Not on file   Other Topics Concern    Not on file   Social History Narrative    Not on file     Social History     Tobacco Use   Smoking Status Never Smoker   Smokeless Tobacco Never Used        Temp (24hrs), Av.7 °F (38.2 °C), Min:98.4 °F (36.9 °C), Max:103.5 °F (39.7 °C)    Visit Vitals  /88 (BP 1 Location: Right arm, BP Patient Position: At rest)   Pulse (!) 104   Temp (!) 100.8 °F (38.2 °C)   Resp 15   Ht 5' 9\" (1.753 m)   Wt 90.8 kg (200 lb 1.6 oz)   LMP 2017   SpO2 99%   BMI 29.55 kg/m²       ROS: 12 point ROS obtained in details. Pertinent positives as mentioned in HPI,   otherwise negative    Physical Exam:    Constitutional: She appears well-developed and well-nourished. HENT:   Head: Normocephalic and atraumatic. Eyes: Conjunctivae are normal. No scleral icterus. Neck: Normal range of motion. Neck supple. No JVD present. Cardiovascular: Regular rhythm and normal heart sounds. 4 intact extremity pulses, tachycardic   Pulmonary/Chest: Effort normal and breath sounds normal.   Abdominal: Soft. She exhibits no mass. There mild epigastric, periumbilical  tenderness. Musculoskeletal: Normal range of motion. Lymphadenopathy:     She has no cervical adenopathy. Neurological: She is alert. Skin: Skin is warm and dry.    Nursing note and vitals reviewed.              Labs: Results:   Chemistry Recent Labs     05/31/19  0243 05/30/19  1140   * 126*   * 132*   K 3.7 4.4    103   CO2 21 19*   BUN 61* 69*   CREA 6.48* 7.53*   CA 7.1*  6.8* 8.6   AGAP 10 10   BUCR 9* 9*   AP 86 115   TP 5.5* 8.0   ALB 1.3* 1.7*   GLOB 4.2* 6.3*   AGRAT 0.3* 0.3*      CBC w/Diff Recent Labs     05/31/19  0243 05/30/19  1140 05/28/19  1519   WBC 17.6* 21.0* 28.3*   RBC 2.59* 3.21* 3.36*   HGB 6.5* 7.9* 8.6*   HCT 20.4* 25.0* 27.5*   * 152  --    GRANS 83* 82* 93*   LYMPH 7* 3* 3*   EOS 1 3  --       Microbiology Recent Labs     05/30/19  2120 05/30/19  1155 05/30/19  1140   CULT NO GROWTH AFTER 9 HOURS NO GROWTH AFTER 18 HOURS NO GROWTH AFTER 18 HOURS          RADIOLOGY:    All available imaging studies/reports in [facility] care for this admission were reviewed    Dr. Drew Claros, Infectious Disease Specialist  [phone]  May 31, 2019  12:09 PM

## 2019-05-31 NOTE — DIABETES MGMT
Diabetes Patient/Family Education Record  Factors That  May Influence Patients Ability  to Learn or  Comply with Recommendations   []   Language barrier    []   Cultural needs   [x]   Motivation    []   Cognitive limitation    []   Physical   []   Education    []   Physiological factors   []   Hearing/vision/speaking impairment   []   Adventist beliefs    []   Financial factors   [x]  Other: not feeling well    []  No factors identified at this time.      Person Instructed:   [x]   Patient   []   Family   []  Other     Preference for Learning:   [x]   Verbal   [x]   Written   []  Demonstration     Level of Comprehension & Competence:    []  Good                                      [x] Fair                                     []  Poor                             [x]  Needs Reinforcement   [x]  Teachback completed    Education Component:   [x]  Medication management, including how to administer insulin (if appropriate) and potential medication interactions Pt was taking Metformin previously but was discontinued    [x]  Nutritional management -obtain usual meal pattern   [x]  Exercise   [x]  Signs, symptoms, and treatment of hyperglycemia and hypoglycemia   [x] Prevention, recognition and treatment of hyperglycemia and hypoglycemia   [x]  Importance of blood glucose monitoring and how to obtain a blood glucose meter Contour Next EZ glucometer provided   []  Instruction on use of the blood glucose meter   [x]  Discuss the importance of HbA1C monitoring 7.9% (estimated average glucose of 180 mg/dL)   [x]  Sick day guidelines   []  Proper use and disposal of lancets, needles, syringes or insulin pens (if appropriate)   [x]  Potential long-term complications (retinopathy, kidney disease, neuropathy, foot care)   [] Information about whom to contact in case of emergency or for more information    [x]  Goal:  Patient/family will demonstrate understanding of Diabetes Self Management Skills by: 6/06/19  Plan for post-discharge education or self-management support:    [x] Outpatient class schedule provided            [] Patient Declined    [] Scheduled for outpatient classes (date) _______  Verify:  Does patient understand how diabetes medications work? Not currently on any medications for diabetes  Does patient know what their most recent A1c is? reviewed  Does patient monitor glucose at home? No, but has in the past  Does patient have difficulty obtaining diabetes medications or testing supplies?  No difficulty reported      Adelia Benoit, KATHARINA, CDE

## 2019-05-31 NOTE — PROGRESS NOTES
Progress Note    Matt Venegas  48 y.o. Admit Date: 5/30/2019  Active Problems:    Type 2 DM with CKD and hypertension ([facility] (1/23/2015) POA: Yes      ARF (acute renal failure) ([facility] [address] (5/30/2019) POA: Unknown      Acute renal failure superimposed on stage 5 chronic kidney disease, not on chronic dialysis ([facility] (5/30/2019) POA: Yes      Anemia (5/30/2019) POA: Unknown      Metabolic acidosis (2/78/6719) POA: Unknown      Proteinuria (5/30/2019) POA: Unknown            Subjective:     Patient says feel whole lot  Better, still poor appetite, tolerating IVF, no SOB,mild abdominal pain. , no urinary complain, no mor Nausea/ Vomiting. Low grade  Fever,received Vancomycin & also on Levaquin. A comprehensive review of systems was negative except for that written in the History of Present Illness.     Objective:     Visit Vitals  /80 (BP 1 Location: Right arm, BP Patient Position: At rest)   Pulse (!) 111   Temp (!) 100.6 °F (38.1 °C)   Resp 22   Ht 5' 9\" (1.753 m)   Wt 90.8 kg (200 lb 1.6 oz)   LMP 06/18/2017   SpO2 100%   BMI 29.55 kg/m²         Intake/Output Summary (Last 24 hours) at 5/31/2019 1137  Last data filed at 5/31/2019 0616  Gross per 24 hour   Intake 5507.92 ml   Output 1000 ml   Net 4507.92 ml       Current Facility-Administered Medications   Medication Dose Route Frequency Provider Last Rate Last Dose    aluminum-magnesium hydroxide (MAALOX) oral suspension 15 mL  15 mL Oral QID PRN Joshua Yoder MD   15 mL at 05/31/19 0153    0.9% sodium chloride infusion 250 mL  250 mL IntraVENous PRN Ligia Rodriguez NP        insulin lispro (HUMALOG) injection   SubCUTAneous AC&HS Ligia Rodriguez NP   2 Units at 05/31/19 1107    glucose chewable tablet 16 g  4 Tab Oral PRN Ligia Rodriguez NP        glucagon (GLUCAGEN) injection 1 mg  1 mg IntraMUSCular PRN Ligia Rodriguez NP        dextrose (D50W) injection syrg 12.5-25 g  25-50 mL IntraVENous PRN Ligia Rodriguez, NP  B complex-vitaminC-folic acid (NEPHROCAP) cap  1 Cap Oral DAILY Aakash Grover MD        sodium chloride (NS) flush 5-10 mL  5-10 mL IntraVENous PRN Tyron Springer MD        dextrose 5% 1,000 mL with sodium bicarbonate (8.4%) 100 mEq infusion   IntraVENous CONTINUOUS Tyron Springer  mL/hr at 05/31/19 0537      acetaminophen (TYLENOL) tablet 650 mg  650 mg Oral Q6H PRN Tyron Springer MD        amLODIPine (NORVASC) tablet 10 mg  10 mg Oral DAILY Tyron Springer MD   10 mg at 05/31/19 1109    carvedilol (COREG) tablet 6.25 mg  6.25 mg Oral BID WITH MEALS Tyron Springer MD   6.25 mg at 05/31/19 1109    ferrous sulfate tablet 325 mg  325 mg Oral BID Tyron Springer MD   325 mg at 05/31/19 1109    loratadine (CLARITIN) tablet 10 mg  10 mg Oral DAILY PRN Tyron Springer MD        heparin (porcine) injection 5,000 Units  5,000 Units SubCUTAneous Q8H Tyron Springer MD   Stopped at 05/30/19 2316    morphine 10 mg/ml injection 1 mg  1 mg IntraVENous Q6H PRN Tyron Springer MD   1 mg at 05/30/19 1822    ondansetron [facility]) injection 4 mg  4 mg IntraVENous Q6H PRN Tyron Springer MD   4 mg at 05/30/19 1821    levoFLOXacin (LEVAQUIN) 500 mg in D5W IVPB  500 mg IntraVENous Q48H Corbett Ganser, MD   Stopped at 05/30/19 2256    VANCOMYCIN INFORMATION NOTE   Other Rx Dosing/Monitoring Corbett Ganser, MD            Physical Exam:     Physical Exam:   General:  Alert, cooperative, no distress, appears stated age. Eyes:  Conjunctivae/corneas clear. PERRL, EOMs intact. Mouth/Throat: Lips, mucosa, and tongue normal. Teeth and gums normal.   Neck: Supple, symmetrical, trachea midline, no adenopathy, thyroid: no enlargement/tenderness/nodules, no carotid bruit and no JVD. Lungs:   Clear to auscultation bilaterally. Heart:  Regular rate and rhythm, S1, S2 normal, no murmur, click, rub or gallop. Abdomen:   Soft, non-tender. Bowel sounds normal. No masses,  No organomegaly.    Extremities: Extremities normal, atraumatic, no cyanosis or edema. Skin: Skin color, texture, turgor normal. No rashes or lesions         Data Review:    CBC w/Diff    Recent Labs     05/31/19  0243 05/30/19  1140 05/28/19  1519   WBC 17.6* 21.0* 28.3*   RBC 2.59* 3.21* 3.36*   HGB 6.5* 7.9* 8.6*   HCT 20.4* 25.0* 27.5*   MCV 78.8 77.9 81.8   MCH 25.1 24.6 25.6   MCHC 31.9 31.6 31.3   RDW 15.4* 15.2* 13.9    Recent Labs     05/31/19  0243 05/30/19  1140   BANDS 2 3   MONOS 7 9   EOS 1 3   BASOS 0 0   RDW 15.4* 15.2*        Comprehensive Metabolic Profile    Recent Labs     05/31/19  0243 05/30/19  1140   * 132*   K 3.7 4.4    103   CO2 21 19*   BUN 61* 69*   CREA 6.48* 7.53*    Recent Labs     05/31/19  0243 05/30/19  1140   CA 7.1*  6.8* 8.6   PHOS 5.4*  --    ALB 1.3* 1.7*   TP 5.5* 8.0   SGOT 13* 19   TBILI 0.2 0.1*        Results for Oskar Gerardo (MRN [id number]) as of 5/31/2019 11:28   Ref. Range 5/30/2019 15:45   Color Latest Units:   YELLOW   Appearance Latest Units:   CLEAR   Specific gravity Latest Ref Range: 1.005 - 1.030   1.012   pH (UA) Latest Ref Range: 5.0 - 8.0   7.0   Protein Latest Ref Range: NEG mg/dL 300 (A)   Glucose Latest Ref Range: NEG mg/dL 100 (A)   Ketone Latest Ref Range: NEG mg/dL NEGATIVE   Blood Latest Ref Range: NEG   MODERATE (A)   Bilirubin Latest Ref Range: NEG   NEGATIVE   Urobilinogen Latest Ref Range: 0.2 - 1.0 EU/dL 0.2   Nitrites Latest Ref Range: NEG   NEGATIVE   Leukocyte Esterase Latest Ref Range: NEG   SMALL (A)   Epithelial cells Latest Ref Range: 0 - 5 /lpf 2+   WBC Latest Ref Range: 0 - 4 /hpf 5 to 8   RBC Latest Ref Range: 0 - 5 /hpf 10 to 20   Bacteria Latest Ref Range: NEG /hpf 1+ (A)     Results for Oskar Gerardo (MRN [id number]) as of 5/31/2019 11:28   Ref. Range 5/30/2019 15:45   Creatinine, urine Latest Ref Range: 30 - 125 mg/dL 71.50   Protein, urine random Latest Ref Range: <11.9 mg/dL 536 (H)   Protein/Creat.  urine Ratio Latest Units:   7.5   Osmolality,urine Latest Ref Range: 50 - 1,400 MOSM/kg H2O 271     Results for Radha Felton (MRN [id number]) as of 5/31/2019 11:28   Ref. Range 5/31/2019 02:43   Vancomycin, random  Component Value Flag Ref Range Units Status   Complement C3 170  High   82 - 167 mg/dL Final   Complement C4 42   14 - 44 mg/dL Final   Comment:      Latest Ref Range: 5.0 - 40.0 UG/ML 36.3     FINDINGS: There are clear lungs and sharp pleural margins. The  cardiomediastinal silhouette is normal although the heart may be at the upper  limits of normal in size. The bones and soft tissues are unremarkable. There  is little change from the prior study.     IMPRESSION  IMPRESSION:     No acute pulmonic disease. Borderline heart size.     Sed Rate > 140. Impression:       Active Hospital Problems    Diagnosis Date Noted    Type 2 DM with CKD and hypertension ([facility] 01/23/2015     Priority: 1 - One    ARF (acute renal failure) ([facility] 05/30/2019    Acute renal failure superimposed on stage 5 chronic kidney disease, [facility] 05/30/2019    Anemia 43/27/1202    Metabolic acidosis 74/41/6949    Proteinuria 05/30/2019      Acute on CRF with underlying DM & Hypertension, Proteinuria, rise of Serum Creatininie is  Quite Rapid, also having low grade Fever, high WBC, increased ESR. Besides that she is Severely anemic,h/O ITP,intermittent abdominal pain, earlier had GI symptoms. Long list of Differential.  Post Infectious GN, Post Infectious IGA, HSP  Are on the top of the list. Still needs to Check Hep B,Hep C,HIV, she has agreed  To check HIV. Plan:     Continue Hydrate, follow culture, continue antibiotics, ID Consult, Hematology Consult, no Dialysis yet, may do Kidney Biopsy, check relevant lab.       Adele Wick MD

## 2019-06-01 ENCOUNTER — APPOINTMENT (OUTPATIENT)
Dept: NON INVASIVE DIAGNOSTICS | Age: 54
DRG: 872 | End: 2019-06-01
Attending: NURSE PRACTITIONER
Payer: COMMERCIAL

## 2019-06-01 LAB
ANA SER QL: NEGATIVE
ANION GAP SERPL CALC-SCNC: 10 MMOL/L (ref 3–18)
ASO AB SERPL-ACNC: 206 IU/ML (ref 0–200)
BASOPHILS # BLD: 0 K/UL (ref 0–0.1)
BASOPHILS NFR BLD: 0 % (ref 0–2)
BUN SERPL-MCNC: 60 MG/DL (ref 7–18)
BUN/CREAT SERPL: 10 (ref 12–20)
C DIFF GDH STL QL: NEGATIVE
C DIFF TOX A+B STL QL IA: NEGATIVE
CALCIUM SERPL-MCNC: 7.5 MG/DL (ref 8.5–10.1)
CHLORIDE SERPL-SCNC: 97 MMOL/L (ref 100–108)
CHOLEST SERPL-MCNC: 135 MG/DL
CO2 SERPL-SCNC: 27 MMOL/L (ref 21–32)
CREAT SERPL-MCNC: 6.12 MG/DL (ref 0.6–1.3)
DIFFERENTIAL METHOD BLD: ABNORMAL
ECHO AO ROOT DIAM: 2.93 CM
ECHO IVC SNIFF: 2 CM
ECHO LA AREA 4C: 21 CM2
ECHO LA VOL 2C: 88.12 ML (ref 22–52)
ECHO LA VOL 4C: 55.7 ML (ref 22–52)
ECHO LA VOL BP: 79.82 ML (ref 22–52)
ECHO LA VOL/BSA BIPLANE: 38.48 ML/M2 (ref 16–28)
ECHO LA VOLUME INDEX A2C: 42.48 ML/M2 (ref 16–28)
ECHO LA VOLUME INDEX A4C: 26.85 ML/M2 (ref 16–28)
ECHO LV E' LATERAL VELOCITY: 7.17 CM/S
ECHO LV E' SEPTAL VELOCITY: 7.57 CM/S
ECHO LV INTERNAL DIMENSION DIASTOLIC: 5.18 CM (ref 3.9–5.3)
ECHO LV INTERNAL DIMENSION SYSTOLIC: 3.97 CM
ECHO LV IVSD: 1.38 CM (ref 0.6–0.9)
ECHO LV MASS 2D: 360.9 G (ref 67–162)
ECHO LV MASS INDEX 2D: 174 G/M2 (ref 43–95)
ECHO LV POSTERIOR WALL DIASTOLIC: 1.37 CM (ref 0.6–0.9)
ECHO LVOT DIAM: 2.36 CM
ECHO LVOT PEAK GRADIENT: 6 MMHG
ECHO LVOT PEAK VELOCITY: 122.27 CM/S
ECHO LVOT VTI: 23.48 CM
ECHO MV A VELOCITY: 97.51 CM/S
ECHO MV E DECELERATION TIME (DT): 197.5 MS
ECHO MV E VELOCITY: 70.65 CM/S
ECHO MV E/A RATIO: 0.72
ECHO MV E/E' LATERAL: 9.85
ECHO MV E/E' RATIO (AVERAGED): 9.59
ECHO MV E/E' SEPTAL: 9.33
ECHO RV TAPSE: 2.06 CM (ref 1.5–2)
ECHO TV REGURGITANT MAX VELOCITY: 215.01 CM/S
ECHO TV REGURGITANT PEAK GRADIENT: 18.5 MMHG
EOSINOPHIL # BLD: 0.2 K/UL (ref 0–0.4)
EOSINOPHIL NFR BLD: 2 % (ref 0–5)
ERYTHROCYTE [DISTWIDTH] IN BLOOD BY AUTOMATED COUNT: 15 % (ref 11.6–14.5)
GLUCOSE BLD STRIP.AUTO-MCNC: 134 MG/DL (ref 70–110)
GLUCOSE BLD STRIP.AUTO-MCNC: 145 MG/DL (ref 70–110)
GLUCOSE BLD STRIP.AUTO-MCNC: 163 MG/DL (ref 70–110)
GLUCOSE BLD STRIP.AUTO-MCNC: 199 MG/DL (ref 70–110)
GLUCOSE SERPL-MCNC: 138 MG/DL (ref 74–99)
HCT VFR BLD AUTO: 21.3 % (ref 35–45)
HCT VFR BLD AUTO: 21.6 % (ref 35–45)
HCT VFR BLD AUTO: 24.2 % (ref 35–45)
HCT VFR BLD AUTO: 24.5 % (ref 35–45)
HDLC SERPL-MCNC: 19 MG/DL (ref 40–60)
HDLC SERPL: 7.1 {RATIO} (ref 0–5)
HEMOCCULT STL QL: POSITIVE
HGB BLD-MCNC: 6.8 G/DL (ref 12–16)
HGB BLD-MCNC: 7 G/DL (ref 12–16)
HGB BLD-MCNC: 7.7 G/DL (ref 12–16)
HGB BLD-MCNC: 7.8 G/DL (ref 12–16)
INTERPRETATION: NORMAL
LDH SERPL L TO P-CCNC: 270 U/L (ref 81–234)
LDLC SERPL CALC-MCNC: 72.8 MG/DL (ref 0–100)
LIPID PROFILE,FLP: ABNORMAL
LYMPHOCYTES # BLD: 1 K/UL (ref 0.9–3.6)
LYMPHOCYTES NFR BLD: 8 % (ref 21–52)
MCH RBC QN AUTO: 25.1 PG (ref 24–34)
MCHC RBC AUTO-ENTMCNC: 31.9 G/DL (ref 31–37)
MCV RBC AUTO: 78.6 FL (ref 74–97)
MONOCYTES # BLD: 0.9 K/UL (ref 0.05–1.2)
MONOCYTES NFR BLD: 7 % (ref 3–10)
NEUTS SEG # BLD: 11.2 K/UL (ref 1.8–8)
NEUTS SEG NFR BLD: 83 % (ref 40–73)
PHOSPHATE SERPL-MCNC: 5.2 MG/DL (ref 2.5–4.9)
PLATELET # BLD AUTO: 144 K/UL (ref 135–420)
POTASSIUM SERPL-SCNC: 3.2 MMOL/L (ref 3.5–5.5)
RBC # BLD AUTO: 2.71 M/UL (ref 4.2–5.3)
RETICS/RBC NFR AUTO: 0.5 % (ref 0.5–2.3)
SODIUM SERPL-SCNC: 134 MMOL/L (ref 136–145)
TRIGL SERPL-MCNC: 216 MG/DL (ref ?–150)
VANCOMYCIN SERPL-MCNC: 23.3 UG/ML (ref 5–40)
VLDLC SERPL CALC-MCNC: 43.2 MG/DL
WBC # BLD AUTO: 13.4 K/UL (ref 4.6–13.2)

## 2019-06-01 PROCEDURE — 87340 HEPATITIS B SURFACE AG IA: CPT

## 2019-06-01 PROCEDURE — 83010 ASSAY OF HAPTOGLOBIN QUANT: CPT

## 2019-06-01 PROCEDURE — 87389 HIV-1 AG W/HIV-1&-2 AB AG IA: CPT

## 2019-06-01 PROCEDURE — 74011250636 HC RX REV CODE- 250/636: Performed by: NURSE PRACTITIONER

## 2019-06-01 PROCEDURE — 36415 COLL VENOUS BLD VENIPUNCTURE: CPT

## 2019-06-01 PROCEDURE — 74011250636 HC RX REV CODE- 250/636: Performed by: HOSPITALIST

## 2019-06-01 PROCEDURE — 74011250637 HC RX REV CODE- 250/637: Performed by: HOSPITALIST

## 2019-06-01 PROCEDURE — 87449 NOS EACH ORGANISM AG IA: CPT

## 2019-06-01 PROCEDURE — 74011250637 HC RX REV CODE- 250/637: Performed by: INTERNAL MEDICINE

## 2019-06-01 PROCEDURE — 83520 IMMUNOASSAY QUANT NOS NONAB: CPT

## 2019-06-01 PROCEDURE — 83516 IMMUNOASSAY NONANTIBODY: CPT

## 2019-06-01 PROCEDURE — 86706 HEP B SURFACE ANTIBODY: CPT

## 2019-06-01 PROCEDURE — 80202 ASSAY OF VANCOMYCIN: CPT

## 2019-06-01 PROCEDURE — 74011000250 HC RX REV CODE- 250: Performed by: HOSPITALIST

## 2019-06-01 PROCEDURE — 86803 HEPATITIS C AB TEST: CPT

## 2019-06-01 PROCEDURE — 83615 LACTATE (LD) (LDH) ENZYME: CPT

## 2019-06-01 PROCEDURE — 82272 OCCULT BLD FECES 1-3 TESTS: CPT

## 2019-06-01 PROCEDURE — 84100 ASSAY OF PHOSPHORUS: CPT

## 2019-06-01 PROCEDURE — 65660000000 HC RM CCU STEPDOWN

## 2019-06-01 PROCEDURE — 82784 ASSAY IGA/IGD/IGG/IGM EACH: CPT

## 2019-06-01 PROCEDURE — 74011636637 HC RX REV CODE- 636/637: Performed by: NURSE PRACTITIONER

## 2019-06-01 PROCEDURE — 80061 LIPID PANEL: CPT

## 2019-06-01 PROCEDURE — 85025 COMPLETE CBC W/AUTO DIFF WBC: CPT

## 2019-06-01 PROCEDURE — 82962 GLUCOSE BLOOD TEST: CPT

## 2019-06-01 PROCEDURE — 93306 TTE W/DOPPLER COMPLETE: CPT

## 2019-06-01 PROCEDURE — 85045 AUTOMATED RETICULOCYTE COUNT: CPT

## 2019-06-01 PROCEDURE — 85018 HEMOGLOBIN: CPT

## 2019-06-01 PROCEDURE — 80048 BASIC METABOLIC PNL TOTAL CA: CPT

## 2019-06-01 PROCEDURE — 74011250636 HC RX REV CODE- 250/636: Performed by: INTERNAL MEDICINE

## 2019-06-01 RX ORDER — SODIUM CHLORIDE 9 MG/ML
50 INJECTION, SOLUTION INTRAVENOUS CONTINUOUS
Status: DISCONTINUED | OUTPATIENT
Start: 2019-06-01 | End: 2019-06-04

## 2019-06-01 RX ORDER — SODIUM CHLORIDE 9 MG/ML
250 INJECTION, SOLUTION INTRAVENOUS AS NEEDED
Status: DISCONTINUED | OUTPATIENT
Start: 2019-06-01 | End: 2019-06-04 | Stop reason: HOSPADM

## 2019-06-01 RX ORDER — POTASSIUM CHLORIDE 1.5 G/1.77G
20 POWDER, FOR SOLUTION ORAL
Status: COMPLETED | OUTPATIENT
Start: 2019-06-01 | End: 2019-06-01

## 2019-06-01 RX ORDER — SODIUM BICARBONATE 650 MG/1
650 TABLET ORAL 3 TIMES DAILY
Status: DISCONTINUED | OUTPATIENT
Start: 2019-06-01 | End: 2019-06-04 | Stop reason: HOSPADM

## 2019-06-01 RX ADMIN — INSULIN LISPRO 2 UNITS: 100 INJECTION, SOLUTION INTRAVENOUS; SUBCUTANEOUS at 11:49

## 2019-06-01 RX ADMIN — DEXTROSE MONOHYDRATE: 5 INJECTION, SOLUTION INTRAVENOUS at 00:04

## 2019-06-01 RX ADMIN — SODIUM BICARBONATE 650 MG TABLET 650 MG: at 22:23

## 2019-06-01 RX ADMIN — FERROUS SULFATE TAB 325 MG (65 MG ELEMENTAL FE) 325 MG: 325 (65 FE) TAB at 09:41

## 2019-06-01 RX ADMIN — CARVEDILOL 6.25 MG: 6.25 TABLET, FILM COATED ORAL at 09:41

## 2019-06-01 RX ADMIN — INSULIN LISPRO 2 UNITS: 100 INJECTION, SOLUTION INTRAVENOUS; SUBCUTANEOUS at 22:29

## 2019-06-01 RX ADMIN — AMLODIPINE BESYLATE 10 MG: 10 TABLET ORAL at 09:41

## 2019-06-01 RX ADMIN — CARVEDILOL 6.25 MG: 6.25 TABLET, FILM COATED ORAL at 18:28

## 2019-06-01 RX ADMIN — EPOETIN ALFA-EPBX 12000 UNITS: 10000 INJECTION, SOLUTION INTRAVENOUS; SUBCUTANEOUS at 22:22

## 2019-06-01 RX ADMIN — ACETAMINOPHEN 650 MG: 325 TABLET ORAL at 00:03

## 2019-06-01 RX ADMIN — SODIUM CHLORIDE 50 ML/HR: 900 INJECTION, SOLUTION INTRAVENOUS at 14:59

## 2019-06-01 RX ADMIN — LEVOFLOXACIN 500 MG: 5 INJECTION, SOLUTION INTRAVENOUS at 22:23

## 2019-06-01 RX ADMIN — HEPARIN SODIUM 5000 UNITS: 5000 INJECTION INTRAVENOUS; SUBCUTANEOUS at 09:40

## 2019-06-01 RX ADMIN — POTASSIUM CHLORIDE 20 MEQ: 1.5 POWDER, FOR SOLUTION ORAL at 16:31

## 2019-06-01 RX ADMIN — SODIUM BICARBONATE 650 MG TABLET 650 MG: at 18:28

## 2019-06-01 RX ADMIN — FERROUS SULFATE TAB 325 MG (65 MG ELEMENTAL FE) 325 MG: 325 (65 FE) TAB at 18:28

## 2019-06-01 RX ADMIN — NEPHROCAP 1 CAPSULE: 1 CAP ORAL at 11:50

## 2019-06-01 NOTE — PROGRESS NOTES
[facility] Hospitalist Group  Progress Note    Patient: Hari Acosta Age: 48 y.o. : 1965 MR#: [id number] SSN: [id number]  Date: 2019     Subjective:     Pt feels lot better, abd pain and N/V better. C/o diarrhea, green and watery stool, no melena or bloody stool     Assessment/Plan:   1. ARF on CKD-III: Worsening in setting of vomiting, dehydration; cont IV fluid. Avoid nephrotoxic agents. D/w , will need renal Bx  2. Diarrhea with heme positive stool: r/o C diff vs viral GE. 3. Sepsis - noted leukocytosis, febrile, tachy likely d/t viral vs # 2; improving, follow  4. DM Type II - A1c 7.9 on admission, add SSI follow sugars; hold off on long acting for now in setting of #1  5. Essential HTN: Controlled, off HCTZ, Ace and Arb in setting of #1. Cover with Hydralazine PRN. Continue amlodipine and Coreg. 6. Anemia of chronic disease / likely r/t CKD: heme positive stool but no active bleeding, s/p 1 unit PRBC's;   7. Heme positive stool, no active bleed: dont think its GI bleed, ?  Related to # 2, will consider GI eval if stool work up neg   D/w pt and family at bedside in detail       Case discussed with:  [x]Patient  [x]Family  [x]Nursing  []Case Management  DVT Prophylaxis:  []Lovenox  []Hep SQ  []SCDs  []Coumadin   []On Heparin gtt    Objective:   VS:   Visit Vitals  /80 (BP 1 Location: Left arm, BP Patient Position: At rest)   Pulse 89   Temp 99.2 °F (37.3 °C)   Resp 18   Ht 5' 9\" (1.753 m)   Wt 91.6 kg (202 lb)   LMP 2017   SpO2 98%   BMI 29.83 kg/m²      Tmax/24hrs: Temp (24hrs), Av.4 °F (37.4 °C), Min:97.9 °F (36.6 °C), Max:101.4 °F (38.6 °C)      Intake/Output Summary (Last 24 hours) at 2019 1452  Last data filed at 2019 0555  Gross per 24 hour   Intake 525 ml   Output 1700 ml   Net -1175 ml     General:  Alert, NAD  Cardiovascular:  RRR  Pulmonary: B/L clear   GI:  +BS in all four quadrants, soft, no tenderness to epigastric region  Extremities: No edema  Neuro: oriented x 3    Labs:    Recent Results (from the past 24 hour(s))   GLUCOSE, POC    Collection Time: 05/31/19  4:14 PM   Result Value Ref Range    Glucose (POC) 112 (H) 70 - 110 mg/dL   HGB & HCT    Collection Time: 05/31/19  6:13 PM   Result Value Ref Range    HGB 8.1 (L) 12.0 - 16.0 g/dL    HCT 25.1 (L) 35.0 - 45.0 %   GLUCOSE, POC    Collection Time: 05/31/19  9:31 PM   Result Value Ref Range    Glucose (POC) 141 (H) 70 - 110 mg/dL   VANCOMYCIN, RANDOM    Collection Time: 06/01/19  2:15 AM   Result Value Ref Range    Vancomycin, random 23.3 5.0 - 61.2 UG/ML   METABOLIC PANEL, BASIC    Collection Time: 06/01/19  2:15 AM   Result Value Ref Range    Sodium 134 (L) 136 - 145 mmol/L    Potassium 3.2 (L) 3.5 - 5.5 mmol/L    Chloride 97 (L) 100 - 108 mmol/L    CO2 27 21 - 32 mmol/L    Anion gap 10 3.0 - 18 mmol/L    Glucose 138 (H) 74 - 99 mg/dL    BUN 60 (H) 7.0 - 18 MG/DL    Creatinine 6.12 (H) 0.6 - 1.3 MG/DL    BUN/Creatinine ratio 10 (L) 12 - 20      GFR est AA 9 (L) >60 ml/min/1.73m2    GFR est non-AA 7 (L) >60 ml/min/1.73m2    Calcium 7.5 (L) 8.5 - 10.1 MG/DL   CBC WITH AUTOMATED DIFF    Collection Time: 06/01/19  2:15 AM   Result Value Ref Range    WBC 13.4 (H) 4.6 - 13.2 K/uL    RBC 2.71 (L) 4.20 - 5.30 M/uL    HGB 6.8 (L) 12.0 - 16.0 g/dL    HCT 21.3 (L) 35.0 - 45.0 %    MCV 78.6 74.0 - 97.0 FL    MCH 25.1 24.0 - 34.0 PG    MCHC 31.9 31.0 - 37.0 g/dL    RDW 15.0 (H) 11.6 - 14.5 %    PLATELET 154 409 - 181 K/uL    NEUTROPHILS 83 (H) 40 - 73 %    LYMPHOCYTES 8 (L) 21 - 52 %    MONOCYTES 7 3 - 10 %    EOSINOPHILS 2 0 - 5 %    BASOPHILS 0 0 - 2 %    ABS. NEUTROPHILS 11.2 (H) 1.8 - 8.0 K/UL    ABS. LYMPHOCYTES 1.0 0.9 - 3.6 K/UL    ABS. MONOCYTES 0.9 0.05 - 1.2 K/UL    ABS. EOSINOPHILS 0.2 0.0 - 0.4 K/UL    ABS.  BASOPHILS 0.0 0.0 - 0.1 K/UL    DF AUTOMATED     LD    Collection Time: 06/01/19  2:15 AM   Result Value Ref Range     (H) 81 - 234 U/L   RETICULOCYTE COUNT Collection Time: 06/01/19  2:15 AM   Result Value Ref Range    Reticulocyte count 0.5 0.5 - 2.3 %   PHOSPHORUS    Collection Time: 06/01/19  2:15 AM   Result Value Ref Range    Phosphorus 5.2 (H) 2.5 - 4.9 MG/DL   LIPID PANEL    Collection Time: 06/01/19  2:15 AM   Result Value Ref Range    LIPID PROFILE          Cholesterol, total 135 <200 MG/DL    Triglyceride 216 (H) <150 MG/DL    HDL Cholesterol 19 (L) 40 - 60 MG/DL    LDL, calculated 72.8 0 - 100 MG/DL    VLDL, calculated 43.2 MG/DL    CHOL/HDL Ratio 7.1 (H) 0 - 5.0     GLUCOSE, POC    Collection Time: 06/01/19  8:08 AM   Result Value Ref Range    Glucose (POC) 145 (H) 70 - 110 mg/dL   ECHO ADULT COMPLETE    Collection Time: 06/01/19 11:09 AM   Result Value Ref Range    LA Volume 79.82 22 - 52 mL    LV E' Lateral Velocity 7.17 cm/s    LV E' Septal Velocity 7.57 cm/s    Tapse 2.06 (A) 1.5 - 2.0 cm    Ao Root D 2.93 cm    LVIDd 5.18 3.9 - 5.3 cm    LVPWd 1.37 (A) 0.6 - 0.9 cm    LVIDs 3.97 cm    IVSd 1.38 (A) 0.6 - 0.9 cm    LVOT d 2.36 cm    LVOT Peak Velocity 122.27 cm/s    LVOT Peak Gradient 6.0 mmHg    LVOT VTI 23.48 cm    MV A Marco A 97.51 cm/s    MV E Marco A 70.65 cm/s    MV E/A 0.72     Inferior Vena Cava Diameter Sniffing 2.00 cm    LA Vol 4C 55.70 (A) 22 - 52 mL    LA Vol 2C 88.12 (A) 22 - 52 mL    LA Area 4C 21.0 cm2    LV Mass .9 (A) 67 - 162 g    LV Mass AL Index 174.0 (A) 43 - 95 g/m2    E/E' lateral 9.85     E/E' septal 9.33     E/E' ratio (averaged) 9.59     Mitral Valve E Wave Deceleration Time 197.5 ms    Triscuspid Valve Regurgitation Peak Gradient 18.5 mmHg    TR Max Velocity 215.01 cm/s    LA Vol Index 38.48 16 - 28 ml/m2    LA Vol Index 42.48 16 - 28 ml/m2    LA Vol Index 26.85 16 - 28 ml/m2   GLUCOSE, POC    Collection Time: 06/01/19 11:27 AM   Result Value Ref Range    Glucose (POC) 163 (H) 70 - 110 mg/dL   OCCULT BLOOD, STOOL    Collection Time: 06/01/19 11:40 AM   Result Value Ref Range    Occult blood, stool POSITIVE (A) NEG     HGB & HCT    Collection Time: 06/01/19 11:46 AM   Result Value Ref Range    HGB 7.8 (L) 12.0 - 16.0 g/dL    HCT 24.5 (L) 35.0 - 45.0 %     Signed By: Iain Rosales MD     June 1, 2019

## 2019-06-01 NOTE — ROUTINE PROCESS
Bedside shift change report given to Yamilet (oncoming nurse) by Guerline Sosa (offgoing nurse). Report included the following information SBAR, Kardex and MAR.

## 2019-06-01 NOTE — ROUTINE PROCESS
Report received from [facility]. Patient is alert, in bed. No distress noted. Call bell in reach. 2230  Alert, meds given, no distress noted. 0400   Patient in bed, no distress noted, receiving IV fluids as ordered. Call bell in reach. 0730  Bedside, Verbal and Written shift change report given to [address] (oncoming nurse) by Sandi Brand (offgoing nurse). Report included the following information SBAR, Kardex, MAR and Accordion.

## 2019-06-01 NOTE — CONSULTS
Hematology / Oncology Consult Note      Reason for Consultation:  Judy Mckeon is a 48 y.o. female who I've been asked to consult for assistance with the management of her anemia in the setting of her Acute Renal Failure on CKD stage 3 as well as assistance with the monitoring an management of her Chronic thrombocytopenia/ITP. Subjective:     HPI:  Ms. Ayan Rizvi is a 72-year-old woman who has anemia and thrombocytopenia for which Dr. Ashley Cotton follows as an outpatient. Her current therapy is IVIG monthly, ferrous sulfate BID, and Venofer PRN. In addition she has a PMHx of DM, HTN and stage III kidney disease. She was sent to the ED  for worsening BUN and Creatinine. Creatine was 6 thus she has been admitted for IV hydration and bicarb gtt. On admission her WBC was 21.0, Hgb 7.9, Hct 25.0, Platelet count 807,249  Sodium 134, Potassium 3.2, Cl 97, Glucose 138, BUN 60, Creatinine 6.12 Calcium 7.5 Phosphorus 5.2,     Iron <5% TIBC 114, Iron Sat% could not be calculated and a Ferritin of 190    CXR 5/30/19 demonstrated no acute pulmonic disease. Today the patient states that she had been running a fever off and on prior to this hospitalization and was told by her PCP that she had a stomach virus. She adds that she feel much better regarding her stomach virus symptoms, her only c/o today is some sore throat. Review of Systems:   Constitutional: Positive for fever. HENT: Negative for nosebleeds, congestion, facial swelling, mouth sores, trouble swallowing, neck pain, neck stiffness, voice change and postnasal drip. Positive for sore throat. Eyes: Negative for photophobia, pain, discharge and itching. Respiratory: Negative for apnea, cough, choking, chest tightness, wheezing and stridor. Cardiovascular: Negative for chest pain, palpitations and leg swelling. Gastrointestinal: Negative for abdominal pain.  Negative for nausea, diarrhea, constipation, blood in stool and rectal pain. Genitourinary: Negative for dysuria, urgency, hematuria, flank pain and difficulty urinating. Musculoskeletal: Negative for back pain, joint swelling, arthralgias and gait problem. Skin: Negative for color change, pallor, rash and wound. Neurological:  Negative for dizziness, facial asymmetry, speech difficulty, light-headedness and headaches. Hematological: Negative for adenopathy. Does not bruise/bleed easily. Psychiatric/Behavioral: Negative for hallucinations, confusion, disturbed wake/sleep cycle and agitation. Physical Assessment:     Visit Vitals  /80   Pulse 92   Temp 98.2 °F (36.8 °C)   Resp 19   Ht 5' 9\" (1.753 m)   Wt 91.6 kg (202 lb)   LMP 2017   SpO2 100%   BMI 29.83 kg/m²       Temp (24hrs), Av.4 °F (38 °C), Min:97.9 °F (36.6 °C), Max:101.9 °F (38.8 °C)      Physical Exam:    General: Appears fatigued, NAD  HEENT:  Anicteric sclerae; pink palpebral conjunctivae; mucosa moist  Resp:  Symmetrical chest expansion, no accessory muscle use; good airway entry; no rales/ wheezing/ rhonchi noted  CV:  S1, S2 present; regular rate and rhythm  GI:  Abdomen soft, non-tender; (+) active bowel sounds  Extremities:  +2 pulses on all extremities; no edema/ cyanosis/ clubbing noted  Skin:  Warm; no rashes/ lesions noted  Neurologic:  Non-focal        Assessment:   · ARF on CKD stage 3  · DM2  · HTN  · Anemia of chronic disease  · Thrombocytopenia/ITP      Plan:   · Nephrology following:Tentative plan is for renal bx on Monday. Pt has UTI  · DM and HTN- primary following  · Anemia of chronic disease: H/H today 6.8/21.3 Pt has received 1 units of PRBC on yesterday and is scheduled to receive 2 additional units today. Recommend PRBC transfusion for Hgb <7g/dl. This patient receives procrit 60,000units Q 2 weeks as outpatient for H/H less than 10/30. We will continue Retracrit 12,000units 3 X per week for hgb less than 10. Ferritin is 190, Iron % Sat cannot be calculated, TIBC is 114. Continue Ferrous Sulfate 325mg BID. · Thrombocytopenia/ITP: Platelet count today is 144,000, no intervention warranted. · Echo results pending. Past Medical History:   Diagnosis Date    Anemia     Arthritis     Diabetes ([address]     Hypertension     ITP (idiopathic thrombocytopenic purpura)      Past Surgical History:   Procedure Laterality Date    HX GYN      c section 1984    HX HYSTERECTOMY  2017    HX TUBAL LIGATION         Family History   Problem Relation Age of Onset    Hypertension Mother     Cancer Father         Colon     Allergies   Allergen Reactions    Rocephin [Ceftriaxone] Itching    Pcn [Penicillins] Itching       Home Medications:   Home Meds reviewed with patient    Hospital Medications:   Current hospital medications reviewed    Labs:  Recent Labs     06/01/19  0215 05/31/19  1813 05/31/19  0243 05/30/19  1140   WBC 13.4*  --  17.6* 21.0*   RBC 2.71*  --  2.59* 3.21*   HCT 21.3* 25.1* 20.4* 25.0*   MCV 78.6  --  78.8 77.9   MCH 25.1  --  25.1 24.6   MCHC 31.9  --  31.9 31.6   RDW 15.0*  --  15.4* 15.2*       Recent Labs     06/01/19  0215 05/31/19  0243 05/30/19  1140   CO2 27 21 19*   BUN 60* 61* 69*       No results for input(s): ALBUMIN in the last 72 hours. No lab exists for component: [facility]    Thank you for requesting us to consult on your patient. We appreciate the opportunity to participate in your patient's care. Please call if you have questions.       Harish Alvarado NP

## 2019-06-01 NOTE — PROGRESS NOTES
Echocardiogram completed. Patient returned to room with armband in place. Report to follow.     [address]

## 2019-06-01 NOTE — PROGRESS NOTES
Progress Note    Marilu Heads  48 y.o. Admit Date: 5/30/2019  Active Problems:    Type 2 DM with CKD and hypertension ([facility] (1/23/2015) POA: Yes      ARF (acute renal failure) ([facility] (5/30/2019) POA: Unknown      Acute renal failure superimposed on stage 5 chronic kidney disease, not on chronic dialysis ([facility] (5/30/2019) POA: Yes      Anemia (5/30/2019) POA: Unknown      Metabolic acidosis (9/92/4018) POA: Unknown      Proteinuria (5/30/2019) POA: Unknown            Subjective:     Patient says she feels good but emotionally crying, having low grade fever. , no urinary complain, on Bicarb drip. A comprehensive review of systems was negative except for that written in the History of Present Illness.     Objective:     Visit Vitals  /80 (BP 1 Location: Left arm, BP Patient Position: At rest)   Pulse 89   Temp 99.2 °F (37.3 °C)   Resp 18   Ht 5' 9\" (1.753 m)   Wt 91.6 kg (202 lb)   LMP 06/18/2017   SpO2 98%   BMI 29.83 kg/m²         Intake/Output Summary (Last 24 hours) at 6/1/2019 1214  Last data filed at 6/1/2019 0555  Gross per 24 hour   Intake 525 ml   Output 1700 ml   Net -1175 ml       Current Facility-Administered Medications   Medication Dose Route Frequency Provider Last Rate Last Dose    0.9% sodium chloride infusion 250 mL  250 mL IntraVENous PRN Barbara Solomon MD        epoetin kelsie-epbx (RETACRIT) 12,000 Units combo injection  12,000 Units SubCUTAneous Q TUE, THU & SAT Soledad Sweeney NP        aluminum-magnesium hydroxide (MAALOX) oral suspension 15 mL  15 mL Oral QID PRN Barbara Solomon MD   15 mL at 05/31/19 0153    0.9% sodium chloride infusion 250 mL  250 mL IntraVENous PRN Yvette Anderson NP        insulin lispro (HUMALOG) injection   SubCUTAneous AC&HS Yvette Anderson NP   2 Units at 06/01/19 1149    glucose chewable tablet 16 g  4 Tab Oral PRN Yvette Anderson NP        glucagon (GLUCAGEN) injection 1 mg  1 mg IntraMUSCular PRN Yvette Anderson, NP        dextrose (D50W) injection syrg 12.5-25 g  25-50 mL IntraVENous PRN Jose Gonzalez NP        B complex-vitaminC-folic acid (NEPHROCAP) cap  1 Cap Oral DAILY Marco A Sinclair MD   1 Cap at 06/01/19 1150    sodium chloride (NS) flush 5-10 mL  5-10 mL IntraVENous PRN Osvaldo Contreras MD        dextrose 5% 1,000 mL with sodium bicarbonate (8.4%) 100 mEq infusion   IntraVENous CONTINUOUS Osvaldo Contreras  mL/hr at 06/01/19 0004      acetaminophen (TYLENOL) tablet 650 mg  650 mg Oral Q6H PRN Osvaldo Contreras MD   650 mg at 06/01/19 0003    amLODIPine (NORVASC) tablet 10 mg  10 mg Oral DAILY Osvaldo Contreras MD   10 mg at 06/01/19 0941    carvedilol (COREG) tablet 6.25 mg  6.25 mg Oral BID WITH MEALS Osvaldo Contreras MD   6.25 mg at 06/01/19 0941    ferrous sulfate tablet 325 mg  325 mg Oral BID Osvaldo Contreras MD   325 mg at 06/01/19 0941    loratadine (CLARITIN) tablet 10 mg  10 mg Oral DAILY PRN Osvaldo Contreras MD        heparin (porcine) injection 5,000 Units  5,000 Units SubCUTAneous Q8H Osvaldo Contreras MD   5,000 Units at 06/01/19 0940    morphine 10 mg/ml injection 1 mg  1 mg IntraVENous Q6H PRN Osvaldo Contreras MD   1 mg at 05/30/19 1822    ondansetron [facility]) injection 4 mg  4 mg IntraVENous Q6H PRN Osvaldo Contreras MD   4 mg at 05/30/19 1821    levoFLOXacin (LEVAQUIN) 500 mg in D5W IVPB  500 mg IntraVENous Q48H Wayne Ngo MD   Stopped at 05/30/19 2251    VANCOMYCIN INFORMATION NOTE   Other Rx Dosing/Monitoring Wayne Ngo MD            Physical Exam:     Physical Exam:   General:  Alert, cooperative, no distress, appears stated age. Mouth/Throat: Lips, mucosa, and tongue normal. Teeth and gums normal.   Neck: Supple, symmetrical, trachea midline, no adenopathy, thyroid: no enlargement/tenderness/nodules, no carotid bruit and no JVD. Lungs:   Clear to auscultation bilaterally. Heart:  Regular rate and rhythm, S1, S2 normal, no murmur, click, rub or gallop.    Abdomen:   Soft, non-tender. Bowel sounds normal. No masses,  No organomegaly. Extremities: Extremities normal, atraumatic, no cyanosis or edema. Skin: Skin color, texture, turgor normal. No rashes or lesions         Data Review:    CBC w/Diff    Recent Labs     06/01/19 0215 05/31/19 1813 05/31/19 0243 05/30/19  1140   WBC 13.4*  --  17.6* 21.0*   RBC 2.71*  --  2.59* 3.21*   HGB 6.8* 8.1* 6.5* 7.9*   HCT 21.3* 25.1* 20.4* 25.0*   MCV 78.6  --  78.8 77.9   MCH 25.1  --  25.1 24.6   MCHC 31.9  --  31.9 31.6   RDW 15.0*  --  15.4* 15.2*    Recent Labs     06/01/19 0215 05/31/19  0243 05/30/19  1140   BANDS  --  2 3   MONOS 7 7 9   EOS 2 1 3   BASOS 0 0 0   RDW 15.0* 15.4* 15.2*        Comprehensive Metabolic Profile    Recent Labs     06/01/19 0215 05/31/19  0243 05/30/19  1140   * 132* 132*   K 3.2* 3.7 4.4   CL 97* 101 103   CO2 27 21 19*   BUN 60* 61* 69*   CREA 6.12* 6.48* 7.53*    Recent Labs     06/01/19 0215 05/31/19 0243 05/30/19  1140   CA 7.5* 7.1*  6.8* 8.6   PHOS 5.2* 5.4*  --    ALB  --  1.3* 1.7*   TP  --  5.5* 8.0   SGOT  --  13* 19   TBILI  --  0.2 0.1*                        Impression:       Active Hospital Problems    Diagnosis Date Noted    Type 2 DM with CKD and hypertension ([facility] 01/23/2015     Priority: 1 - One    [facility]) [facility] 05/30/2019    Acute renal failure superimposed on stage 5 chronic kidney disease, not on chronic dialysis ([facility] 05/30/2019    Anemia 98/29/7539    Metabolic acidosis 10/20/2372    Proteinuria 05/30/2019      Not acidotic any more, slow improvement of Renal function, very low albumin      Plan:     DC Bicarb drip, give oral Bicarb, change IVF to  NS at 50 cc /hour, if she given Transfusion  Recommend to hold IVF, start Retacrit. Await Pending Lab report, scheduled Renal biopsy on 06/03/19, if Renal function does not improve then will have to consider Renal Replacement therapy.   Encouraged her to eat     Arik Gallegos MD

## 2019-06-01 NOTE — PROGRESS NOTES
Hold blood transfusion pending results of stat h/h per Dr. Kam Fleming. IV fluids held before and during blood draw. Patient has no signs of bleeding. Occult blood stool sample obtained as ordered. Liquid stool noted. Patient states this is normal - her stools are always liquid or soft since she began taking metformin. Dr. Kam Fleming aware. Lab ordered to r/o [facility]. Microbiology notified. Another stool sample to be sent due to first specimen containing urine. Patient placed on Enteric precautions.

## 2019-06-02 PROBLEM — N25.81 SECONDARY HYPERPARATHYROIDISM OF RENAL ORIGIN (HCC): Status: ACTIVE | Noted: 2019-06-02

## 2019-06-02 LAB
ANION GAP SERPL CALC-SCNC: 10 MMOL/L (ref 3–18)
APPEARANCE UR: CLEAR
BACTERIA URNS QL MICRO: ABNORMAL /HPF
BASOPHILS # BLD: 0 K/UL (ref 0–0.06)
BASOPHILS NFR BLD: 0 % (ref 0–3)
BILIRUB UR QL: NEGATIVE
BUN SERPL-MCNC: 65 MG/DL (ref 7–18)
BUN/CREAT SERPL: 11 (ref 12–20)
CALCIUM SERPL-MCNC: 7.9 MG/DL (ref 8.5–10.1)
CHLORIDE SERPL-SCNC: 98 MMOL/L (ref 100–108)
CO2 SERPL-SCNC: 26 MMOL/L (ref 21–32)
COLOR UR: YELLOW
CREAT SERPL-MCNC: 6.05 MG/DL (ref 0.6–1.3)
DIFFERENTIAL METHOD BLD: ABNORMAL
EOSINOPHIL # BLD: 0 K/UL (ref 0–0.4)
EOSINOPHIL NFR BLD: 0 % (ref 0–5)
EPITH CASTS URNS QL MICRO: ABNORMAL /LPF (ref 0–5)
ERYTHROCYTE [DISTWIDTH] IN BLOOD BY AUTOMATED COUNT: 15 % (ref 11.6–14.5)
GLUCOSE BLD STRIP.AUTO-MCNC: 123 MG/DL (ref 70–110)
GLUCOSE BLD STRIP.AUTO-MCNC: 135 MG/DL (ref 70–110)
GLUCOSE BLD STRIP.AUTO-MCNC: 154 MG/DL (ref 70–110)
GLUCOSE BLD STRIP.AUTO-MCNC: 179 MG/DL (ref 70–110)
GLUCOSE SERPL-MCNC: 107 MG/DL (ref 74–99)
GLUCOSE UR STRIP.AUTO-MCNC: 100 MG/DL
HCT VFR BLD AUTO: 21.5 % (ref 35–45)
HCT VFR BLD AUTO: 21.9 % (ref 35–45)
HCT VFR BLD AUTO: 22.8 % (ref 35–45)
HCT VFR BLD AUTO: 23 % (ref 35–45)
HGB BLD-MCNC: 6.8 G/DL (ref 12–16)
HGB BLD-MCNC: 7 G/DL (ref 12–16)
HGB BLD-MCNC: 7 G/DL (ref 12–16)
HGB BLD-MCNC: 7.1 G/DL (ref 12–16)
HGB UR QL STRIP: ABNORMAL
IGA SERPL-MCNC: 317 MG/DL (ref 87–352)
IGG SERPL-MCNC: 1311 MG/DL (ref 700–1600)
IGM SERPL-MCNC: 48 MG/DL (ref 26–217)
KETONES UR QL STRIP.AUTO: NEGATIVE MG/DL
LEUKOCYTE ESTERASE UR QL STRIP.AUTO: ABNORMAL
LYMPHOCYTES # BLD: 1.9 K/UL (ref 0.8–3.5)
LYMPHOCYTES NFR BLD: 18 % (ref 20–51)
MCH RBC QN AUTO: 25.6 PG (ref 24–34)
MCHC RBC AUTO-ENTMCNC: 31.6 G/DL (ref 31–37)
MCV RBC AUTO: 80.8 FL (ref 74–97)
MONOCYTES # BLD: 1 K/UL (ref 0–1)
MONOCYTES NFR BLD: 9 % (ref 2–9)
NEUTS BAND NFR BLD MANUAL: 1 % (ref 0–5)
NEUTS SEG # BLD: 7.8 K/UL (ref 1.8–8)
NEUTS SEG NFR BLD: 72 % (ref 42–75)
NITRITE UR QL STRIP.AUTO: NEGATIVE
PH UR STRIP: 8 [PH] (ref 5–8)
PHOSPHATE SERPL-MCNC: 5.8 MG/DL (ref 2.5–4.9)
PLATELET # BLD AUTO: 205 K/UL (ref 135–420)
PLATELET COMMENTS,PCOM: ABNORMAL
PMV BLD AUTO: 12.9 FL (ref 9.2–11.8)
POTASSIUM SERPL-SCNC: 3.4 MMOL/L (ref 3.5–5.5)
PROT UR STRIP-MCNC: 300 MG/DL
RBC # BLD AUTO: 2.66 M/UL (ref 4.2–5.3)
RBC #/AREA URNS HPF: ABNORMAL /HPF (ref 0–5)
RBC MORPH BLD: ABNORMAL
SODIUM SERPL-SCNC: 134 MMOL/L (ref 136–145)
SP GR UR REFRACTOMETRY: 1.01 (ref 1–1.03)
UROBILINOGEN UR QL STRIP.AUTO: 0.2 EU/DL (ref 0.2–1)
VANCOMYCIN SERPL-MCNC: 17.8 UG/ML (ref 5–40)
WBC # BLD AUTO: 10.7 K/UL (ref 4.6–13.2)
WBC URNS QL MICRO: ABNORMAL /HPF (ref 0–4)

## 2019-06-02 PROCEDURE — 74011000250 HC RX REV CODE- 250: Performed by: HOSPITALIST

## 2019-06-02 PROCEDURE — 85025 COMPLETE CBC W/AUTO DIFF WBC: CPT

## 2019-06-02 PROCEDURE — 81001 URINALYSIS AUTO W/SCOPE: CPT

## 2019-06-02 PROCEDURE — 84100 ASSAY OF PHOSPHORUS: CPT

## 2019-06-02 PROCEDURE — 74011250637 HC RX REV CODE- 250/637: Performed by: HOSPITALIST

## 2019-06-02 PROCEDURE — 74011250636 HC RX REV CODE- 250/636: Performed by: HOSPITALIST

## 2019-06-02 PROCEDURE — 74011250637 HC RX REV CODE- 250/637: Performed by: INTERNAL MEDICINE

## 2019-06-02 PROCEDURE — C9113 INJ PANTOPRAZOLE SODIUM, VIA: HCPCS | Performed by: HOSPITALIST

## 2019-06-02 PROCEDURE — 74011250636 HC RX REV CODE- 250/636: Performed by: INTERNAL MEDICINE

## 2019-06-02 PROCEDURE — 80202 ASSAY OF VANCOMYCIN: CPT

## 2019-06-02 PROCEDURE — 74011636637 HC RX REV CODE- 636/637: Performed by: NURSE PRACTITIONER

## 2019-06-02 PROCEDURE — 80048 BASIC METABOLIC PNL TOTAL CA: CPT

## 2019-06-02 PROCEDURE — 85018 HEMOGLOBIN: CPT

## 2019-06-02 PROCEDURE — 36415 COLL VENOUS BLD VENIPUNCTURE: CPT

## 2019-06-02 PROCEDURE — 82962 GLUCOSE BLOOD TEST: CPT

## 2019-06-02 PROCEDURE — 65660000000 HC RM CCU STEPDOWN

## 2019-06-02 RX ORDER — VANCOMYCIN/0.9 % SOD CHLORIDE 1 G/100 ML
1000 PLASTIC BAG, INJECTION (ML) INTRAVENOUS ONCE
Status: DISCONTINUED | OUTPATIENT
Start: 2019-06-02 | End: 2019-06-02

## 2019-06-02 RX ORDER — CALCITRIOL 0.25 UG/1
0.25 CAPSULE ORAL
Status: DISCONTINUED | OUTPATIENT
Start: 2019-06-03 | End: 2019-06-04 | Stop reason: HOSPADM

## 2019-06-02 RX ORDER — CALCIUM ACETATE 667 MG/1
1 CAPSULE ORAL
Status: DISCONTINUED | OUTPATIENT
Start: 2019-06-02 | End: 2019-06-04 | Stop reason: HOSPADM

## 2019-06-02 RX ORDER — POTASSIUM CHLORIDE 20 MEQ/1
20 TABLET, EXTENDED RELEASE ORAL
Status: COMPLETED | OUTPATIENT
Start: 2019-06-02 | End: 2019-06-02

## 2019-06-02 RX ORDER — PANTOPRAZOLE SODIUM 40 MG/10ML
40 INJECTION, POWDER, LYOPHILIZED, FOR SOLUTION INTRAVENOUS EVERY 12 HOURS
Status: DISCONTINUED | OUTPATIENT
Start: 2019-06-02 | End: 2019-06-02 | Stop reason: SDUPTHER

## 2019-06-02 RX ADMIN — INSULIN LISPRO 2 UNITS: 100 INJECTION, SOLUTION INTRAVENOUS; SUBCUTANEOUS at 12:02

## 2019-06-02 RX ADMIN — SODIUM CHLORIDE 40 MG: 9 INJECTION INTRAMUSCULAR; INTRAVENOUS; SUBCUTANEOUS at 12:02

## 2019-06-02 RX ADMIN — CALCIUM ACETATE 667 MG: 667 CAPSULE ORAL at 12:22

## 2019-06-02 RX ADMIN — SODIUM BICARBONATE 650 MG TABLET 650 MG: at 09:11

## 2019-06-02 RX ADMIN — FERROUS SULFATE TAB 325 MG (65 MG ELEMENTAL FE) 325 MG: 325 (65 FE) TAB at 17:42

## 2019-06-02 RX ADMIN — CALCIUM ACETATE 667 MG: 667 CAPSULE ORAL at 17:42

## 2019-06-02 RX ADMIN — CARVEDILOL 6.25 MG: 6.25 TABLET, FILM COATED ORAL at 17:42

## 2019-06-02 RX ADMIN — FERROUS SULFATE TAB 325 MG (65 MG ELEMENTAL FE) 325 MG: 325 (65 FE) TAB at 09:11

## 2019-06-02 RX ADMIN — MORPHINE SULFATE 1 MG: 10 INJECTION, SOLUTION INTRAMUSCULAR; INTRAVENOUS at 12:21

## 2019-06-02 RX ADMIN — SODIUM CHLORIDE 40 MG: 9 INJECTION INTRAMUSCULAR; INTRAVENOUS; SUBCUTANEOUS at 22:40

## 2019-06-02 RX ADMIN — SODIUM BICARBONATE 650 MG TABLET 650 MG: at 22:39

## 2019-06-02 RX ADMIN — AMLODIPINE BESYLATE 10 MG: 10 TABLET ORAL at 09:11

## 2019-06-02 RX ADMIN — SODIUM BICARBONATE 650 MG TABLET 650 MG: at 17:42

## 2019-06-02 RX ADMIN — NEPHROCAP 1 CAPSULE: 1 CAP ORAL at 09:11

## 2019-06-02 RX ADMIN — CARVEDILOL 6.25 MG: 6.25 TABLET, FILM COATED ORAL at 09:11

## 2019-06-02 RX ADMIN — SODIUM CHLORIDE 50 ML/HR: 900 INJECTION, SOLUTION INTRAVENOUS at 12:31

## 2019-06-02 RX ADMIN — POTASSIUM CHLORIDE 20 MEQ: 20 TABLET, EXTENDED RELEASE ORAL at 12:02

## 2019-06-02 NOTE — PROGRESS NOTES
[facility]  Progress Note    Patient: Melissa Newman Age: 48 y.o. : 1965 MR#: [id number] SSN: [id number]  Date: 2019     Subjective:     Pt feels lot better, no more abd pain and N/V. Diarrhea better     Assessment/Plan:   1. ARF on CKD-III: Worsening in setting of vomiting, dehydration; cont IV fluid. Avoid nephrotoxic agents. D/w , renal Bx tomorrow   2. Diarrhea with heme positive stool: r/o C diff vs viral GE. 3. Sepsis - noted leukocytosis, febrile, tachy likely d/t viral vs # 2; improved now, all Cx neg, d/w ID, will d/c Abx and monitor   4. DM Type II: cont SSI. 5. Essential HTN: Controlled, off HCTZ, Ace and Arb in setting of #1. Cover with Hydralazine PRN. Continue amlodipine and Coreg. 6. Anemia of chronic disease / likely r/t CKD: heme positive stool but no active bleeding, s/p 1 unit PRBC's; will monitor H/H, transfuse if Hb<7.0   7. Heme positive stool, no active bleed: ? GI bleed, start PPI.  d/w Dr. Viktoria Rock for GI eval. Plan for colonoscopy after renal Bx   D/w pt and family at bedside in detail       Case discussed with:  [x]Patient  [x]Family  [x]Nursing  []Case Management  DVT Prophylaxis:  []Lovenox  []Hep SQ  []SCDs  []Coumadin   []On Heparin gtt    Objective:   VS:   Visit Vitals  /62 (BP 1 Location: Left arm, BP Patient Position: Sitting)   Pulse 76   Temp 97.5 °F (36.4 °C)   Resp 18   Ht 5' 9\" (1.753 m)   Wt 91.6 kg (202 lb)   LMP 2017   SpO2 100%   BMI 29.83 kg/m²      Tmax/24hrs: Temp (24hrs), Av.7 °F (37.1 °C), Min:97.5 °F (36.4 °C), Max:99.9 °F (37.7 °C)      Intake/Output Summary (Last 24 hours) at 2019 1405  Last data filed at 2019 1000  Gross per 24 hour   Intake 1095 ml   Output 600 ml   Net 495 ml     General:  Alert, NAD  Cardiovascular:  RRR  Pulmonary: B/L clear   GI:  +BS in all four quadrants, soft, no tenderness to epigastric region  Extremities: No edema  Neuro: oriented x 3, moves all ext     Labs:    Recent Results (from the past 24 hour(s))   C.  DIFFICILE AG & TOXIN A/B    Collection Time: 06/01/19  3:00 PM   Result Value Ref Range    GDH ANTIGEN NEGATIVE  NEG      C. difficile toxin NEGATIVE  NEG      INTERPRETATION NEGATIVE FOR TOXIGENIC C. DIFFICILE NTXCD     HGB & HCT    Collection Time: 06/01/19  3:55 PM   Result Value Ref Range    HGB 7.7 (L) 12.0 - 16.0 g/dL    HCT 24.2 (L) 35.0 - 45.0 %   GLUCOSE, POC    Collection Time: 06/01/19  4:40 PM   Result Value Ref Range    Glucose (POC) 134 (H) 70 - 110 mg/dL   GLUCOSE, POC    Collection Time: 06/01/19  9:30 PM   Result Value Ref Range    Glucose (POC) 199 (H) 70 - 110 mg/dL   HGB & HCT    Collection Time: 06/01/19 11:08 PM   Result Value Ref Range    HGB 7.0 (L) 12.0 - 16.0 g/dL    HCT 21.6 (L) 35.0 - 45.0 %   VANCOMYCIN, RANDOM    Collection Time: 06/02/19  4:45 AM   Result Value Ref Range    Vancomycin, random 17.8 5.0 - 60.7 UG/ML   METABOLIC PANEL, BASIC    Collection Time: 06/02/19  4:45 AM   Result Value Ref Range    Sodium 134 (L) 136 - 145 mmol/L    Potassium 3.4 (L) 3.5 - 5.5 mmol/L    Chloride 98 (L) 100 - 108 mmol/L    CO2 26 21 - 32 mmol/L    Anion gap 10 3.0 - 18 mmol/L    Glucose 107 (H) 74 - 99 mg/dL    BUN 65 (H) 7.0 - 18 MG/DL    Creatinine 6.05 (H) 0.6 - 1.3 MG/DL    BUN/Creatinine ratio 11 (L) 12 - 20      GFR est AA 9 (L) >60 ml/min/1.73m2    GFR est non-AA 7 (L) >60 ml/min/1.73m2    Calcium 7.9 (L) 8.5 - 10.1 MG/DL   CBC WITH AUTOMATED DIFF    Collection Time: 06/02/19  4:45 AM   Result Value Ref Range    WBC 10.7 4.6 - 13.2 K/uL    RBC 2.66 (L) 4.20 - 5.30 M/uL    HGB 6.8 (L) 12.0 - 16.0 g/dL    HCT 21.5 (L) 35.0 - 45.0 %    MCV 80.8 74.0 - 97.0 FL    MCH 25.6 24.0 - 34.0 PG    MCHC 31.6 31.0 - 37.0 g/dL    RDW 15.0 (H) 11.6 - 14.5 %    PLATELET 639 715 - 168 K/uL    MPV 12.9 (H) 9.2 - 11.8 FL    NEUTROPHILS 72 42 - 75 %    BAND NEUTROPHILS 1 0 - 5 %    LYMPHOCYTES 18 (L) 20 - 51 %    MONOCYTES 9 2 - 9 %    EOSINOPHILS 0 0 - 5 %    BASOPHILS 0 0 - 3 %    ABS. NEUTROPHILS 7.8 1.8 - 8.0 K/UL    ABS. LYMPHOCYTES 1.9 0.8 - 3.5 K/UL    ABS. MONOCYTES 1.0 0 - 1.0 K/UL    ABS. EOSINOPHILS 0.0 0.0 - 0.4 K/UL    ABS.  BASOPHILS 0.0 0.0 - 0.06 K/UL    DF MANUAL      PLATELET COMMENTS ADEQUATE PLATELETS      RBC COMMENTS ANISOCYTOSIS  1+        RBC COMMENTS MICROCYTOSIS  1+        RBC COMMENTS OVALOCYTES  1+       PHOSPHORUS    Collection Time: 06/02/19  4:45 AM   Result Value Ref Range    Phosphorus 5.8 (H) 2.5 - 4.9 MG/DL   HGB & HCT    Collection Time: 06/02/19  7:31 AM   Result Value Ref Range    HGB 7.0 (L) 12.0 - 16.0 g/dL    HCT 21.9 (L) 35.0 - 45.0 %   GLUCOSE, POC    Collection Time: 06/02/19  7:49 AM   Result Value Ref Range    Glucose (POC) 123 (H) 70 - 110 mg/dL   GLUCOSE, POC    Collection Time: 06/02/19 11:16 AM   Result Value Ref Range    Glucose (POC) 179 (H) 70 - 110 mg/dL   URINALYSIS W/ RFLX MICROSCOPIC    Collection Time: 06/02/19 12:18 PM   Result Value Ref Range    Color YELLOW      Appearance CLEAR      Specific gravity 1.009 1.005 - 1.030      pH (UA) 8.0 5.0 - 8.0      Protein 300 (A) NEG mg/dL    Glucose 100 (A) NEG mg/dL    Ketone NEGATIVE  NEG mg/dL    Bilirubin NEGATIVE  NEG      Blood SMALL (A) NEG      Urobilinogen 0.2 0.2 - 1.0 EU/dL    Nitrites NEGATIVE  NEG      Leukocyte Esterase SMALL (A) NEG     URINE MICROSCOPIC ONLY    Collection Time: 06/02/19 12:18 PM   Result Value Ref Range    WBC 1 to 3 0 - 4 /hpf    RBC 0 to 1 0 - 5 /hpf    Epithelial cells FEW 0 - 5 /lpf    Bacteria FEW (A) NEG /hpf     Signed By: Rock Sagastume MD     June 2, 2019

## 2019-06-02 NOTE — PROGRESS NOTES
Progress Note    Meliton Main  48 y.o. Admit Date: 5/30/2019  Active Problems:    Type 2 DM with CKD and hypertension ([facility] [place] [facility] (1/23/2015) POA: Yes      ARF (acute renal failure) ([facility] [place] [facility] (5/30/2019) POA: Unknown      Acute renal failure superimposed on stage 5 chronic kidney disease, not on chronic dialysis ([facility] [place] [facility] (5/30/2019) POA: Yes      Anemia (5/30/2019) POA: Unknown      Metabolic acidosis (7/15/7103) POA: Unknown      Proteinuria (5/30/2019) POA: Unknown      Secondary hyperparathyroidism of renal origin ([facility] (6/2/2019) POA: Unknown            Subjective:     Patient feels better,OOB to chair,says her Mother is admitted to the hospital with Stroke. Margarito Gaitan Having intermittent Sharp Right Upper quadrant . Abdominal pain. Stool positive for hemoccuit, on PPI, GI to see      A comprehensive review of systems was negative except for that written in the History of Present Illness.     Objective:     Visit Vitals  /62 (BP 1 Location: Left arm, BP Patient Position: Sitting)   Pulse 76   Temp 97.5 °F (36.4 °C)   Resp 18   Ht 5' 9\" (1.753 m)   Wt 91.6 kg (202 lb)   LMP 06/18/2017   SpO2 100%   BMI 29.83 kg/m²         Intake/Output Summary (Last 24 hours) at 6/2/2019 1125  Last data filed at 6/2/2019 0519  Gross per 24 hour   Intake 340 ml   Output 300 ml   Net 40 ml       Current Facility-Administered Medications   Medication Dose Route Frequency Provider Last Rate Last Dose    pantoprazole (PROTONIX) 40 mg in sodium chloride 0.9% 10 mL injection  40 mg IntraVENous Q12H Jacek Urena MD        [START ON 6/3/2019] calcitRIOL (ROCALTROL) capsule 0.25 mcg  0.25 mcg Oral Q MON, WED & Raymond Perry MD        potassium chloride (K-DUR, KLOR-CON) SR tablet 20 mEq  20 mEq Oral NOW Lawyer Franicsco MD        0.9% sodium chloride infusion 250 mL  250 mL IntraVENous PRN Consuelo Maguire MD        epoetin kelsie-epbx (RETACRIT) 12,000 Units combo injection  12,000 Units SubCUTAneous Q TUE, THU & SAT Pamela Hanna, Cheikh Ray, NP   12,000 Units at 06/01/19 2222    0.9% sodium chloride infusion  50 mL/hr IntraVENous CONTINUOUS Evita Bonds MD 50 mL/hr at 06/01/19 1459 50 mL/hr at 06/01/19 1459    sodium bicarbonate tablet 650 mg  650 mg Oral TID Evita Bonds MD   650 mg at 06/02/19 0911    aluminum-magnesium hydroxide (MAALOX) oral suspension 15 mL  15 mL Oral QID PRN Garfield Douglas MD   15 mL at 05/31/19 0153    0.9% sodium chloride infusion 250 mL  250 mL IntraVENous PRN Norrine RAFFY Franz        insulin lispro (HUMALOG) injection   SubCUTAneous AC&HS Javed Franz, RAFFY   Stopped at 06/02/19 0730    glucose chewable tablet 16 g  4 Tab Oral PRN Norrine RAFFY Franz        glucagon (GLUCAGEN) injection 1 mg  1 mg IntraMUSCular PRN Norrine RAFFY Franz        dextrose (D50W) injection syrg 12.5-25 g  25-50 mL IntraVENous PRN Javed Franz, NP        B complex-vitaminC-folic acid (NEPHROCAP) cap  1 Cap Oral DAILY Evita Bonds MD   1 Cap at 06/02/19 0911    sodium chloride (NS) flush 5-10 mL  5-10 mL IntraVENous PRN Vero Allen MD        acetaminophen (TYLENOL) tablet 650 mg  650 mg Oral Q6H PRN Vero Allen MD   650 mg at 06/01/19 0003    amLODIPine (NORVASC) tablet 10 mg  10 mg Oral DAILY Vero Allen MD   10 mg at 06/02/19 0911    carvedilol (COREG) tablet 6.25 mg  6.25 mg Oral BID WITH MEALS Vero Allen MD   6.25 mg at 06/02/19 7138    ferrous sulfate tablet 325 mg  325 mg Oral BID Vero Allen MD   325 mg at 06/02/19 0911    loratadine (CLARITIN) tablet 10 mg  10 mg Oral DAILY PRN Vero Allen MD        morphine 10 mg/ml injection 1 mg  1 mg IntraVENous Q6H PRN Vero Allen MD   1 mg at 05/30/19 1822    ondansetron [facility]) injection 4 mg  4 mg IntraVENous Q6H PRN Vero Allen MD   4 mg at 05/30/19 4841        Physical Exam:     Physical Exam:   General:  Alert, cooperative, no distress, appears stated age.    Neck: Supple, symmetrical, trachea midline, no adenopathy, thyroid: no enlargement/tenderness/nodules, no carotid bruit and no JVD. Lungs:   Clear to auscultation bilaterally. Heart:  Regular rate and rhythm, S1, S2 normal, no murmur, click, rub or gallop. Abdomen:   Soft, non-tender. Bowel sounds normal. No masses,  No organomegaly. Extremities: Extremities normal, atraumatic, no cyanosis or edema. Skin: Skin color, texture, turgor normal. No rashes or lesions         Data Review:    CBC w/Diff    Recent Labs     06/02/19  0731 06/02/19 0445 06/01/19  2308  06/01/19 0215 05/31/19  0243   WBC  --  10.7  --   --  13.4*  --  17.6*   RBC  --  2.66*  --   --  2.71*  --  2.59*   HGB 7.0* 6.8* 7.0*   < > 6.8*   < > 6.5*   HCT 21.9* 21.5* 21.6*   < > 21.3*   < > 20.4*   MCV  --  80.8  --   --  78.6  --  78.8   MCH  --  25.6  --   --  25.1  --  25.1   MCHC  --  31.6  --   --  31.9  --  31.9   RDW  --  15.0*  --   --  15.0*  --  15.4*    < > = values in this interval not displayed. Recent Labs     06/02/19 0445 06/01/19 0215 05/31/19 0243 05/30/19  1140   BANDS 1  --  2 3   MONOS 9 7 7 9   EOS 0 2 1 3   BASOS 0 0 0 0   RDW 15.0* 15.0* 15.4* 15.2*        Comprehensive Metabolic Profile    Recent Labs     06/02/19 0445 06/01/19 0215 05/31/19  0243   * 134* 132*   K 3.4* 3.2* 3.7   CL 98* 97* 101   CO2 26 27 21   BUN 65* 60* 61*   CREA 6.05* 6.12* 6.48*    Recent Labs     06/02/19 0445 06/01/19 0215 05/31/19 0243 05/30/19  1140   CA 7.9* 7.5* 7.1*  6.8* 8.6   PHOS 5.8* 5.2* 5.4*  --    ALB  --   --  1.3* 1.7*   TP  --   --  5.5* 8.0   SGOT  --   --  13* 19   TBILI  --   --  0.2 0.1*          · Left Ventricle: Mild concentric hypertrophy. Estimated left ventricular ejection fraction is 56 - 60%. Visually measured ejection fraction. No regional wall motion abnormality noted. Age-appropriate left ventricular diastolic function. · Left Atrium: Mildly dilated left atrium. · Pulmonary Artery: Pulmonary arterial systolic pressure is 21 mmHg.   · No Pericardial Effusion     Anti-streptolysin O Ab 206.0  High   0.0 - 200.0 IU/mL Final   Comment:   (NOTE)                      Impression:       Active Hospital Problems    Diagnosis Date Noted    Type 2 [facility] with CKD and [facility] 01/23/2015     Priority: 1 - One    Secondary hyperparathyroidism of renal origin ([facility] 06/02/2019    ARF (acute renal failure) ([facility] 05/30/2019    Acute renal failure superimposed on stage 5 chronic kidney disease, not on chronic dialysis ([facility] 05/30/2019    Anemia 33/07/0519    Metabolic acidosis 45/62/7763    Proteinuria 05/30/2019      Hyperphosphatemia. Hypokalemia. Renal Function improved to some extent. Has Acute on CRF. 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 Plan:     Start Rocaltrol, Phosphorus Binders, 1 dose K supplement. Get US of Right Upper quadrant to look for gall stone. Kidney Biopsy tomorrow. If Renal function does not improve & e GFR remains < 15 cc /mint then will have to consider Renal Replacement Treatment. Christ Ordoñez MD

## 2019-06-02 NOTE — ROUTINE PROCESS
Bedside shift change report given to Yamilet (oncoming nurse) by Courtney Goel (offgoing nurse). Report included the following information SBAR, Kardex and MAR.

## 2019-06-02 NOTE — PROGRESS NOTES
Patient reports right flank pain, achey quality, level 8/10 when standing for 5 minutes or more that began yesterday. No pain when sitting. Also reports vaginal itching since beginning antibiotic.

## 2019-06-02 NOTE — PROGRESS NOTES
Hematology/Medical Oncology Progress Note             Name: Zoë Plascencia   : 1965   MRN: [id number]   Date: 2019 9:17 AM     [x]I have reviewed the flowsheet and previous days notes. Events overnight reviewed and discussed with nursing staff. Vital signs and records reviewed. Ms. Eulalia Forbes is a 59-year-old woman who has anemia and thrombocytopenia for which Dr. Lio Rosa follows as an outpatient. Her current therapy is IVIG monthly, ferrous sulfate BID, and Venofer PRN. In addition she has a PMHx of DM, HTN and stage III kidney disease. She has been admitted with ARF on CKD-III. Echo 19  · Left Ventricle: Mild concentric hypertrophy. Estimated left ventricular ejection fraction is 56 - 60%. Today the patient states that she feels much better. She does state however, that she gets a sharp right flank pain when standing but has no pain when sitting. ROS:  Constitutional: Positive for fever,resolved. HENT: Negative for nosebleeds, congestion, facial swelling, mouth sores, trouble swallowing, neck pain, neck stiffness, voice change and postnasal drip. Positive for sore throat,improved. Eyes: Negative for photophobia, pain, discharge and itching. Respiratory: Negative for apnea, cough, choking, chest tightness, wheezing and stridor. Cardiovascular: Negative for chest pain, palpitations and leg swelling. Gastrointestinal: Negative for abdominal pain. Negative for nausea, diarrhea, constipation, blood in stool and rectal pain. Genitourinary: Negative for dysuria, urgency, hematuria, flank pain and difficulty urinating. Musculoskeletal: Positive for back pain only when standing. Skin: Negative for color change, pallor, rash and wound. Neurological:  Negative for dizziness, facial asymmetry, speech difficulty, light-headedness and headaches. Hematological: Negative for adenopathy. Does not bruise/bleed easily.    Psychiatric/Behavioral: Negative for hallucinations, confusion, disturbed wake/sleep cycle and agitation.                  Vital Signs:    Visit Vitals  /79 (BP 1 Location: Left arm, BP Patient Position: At rest)   Pulse 80   Temp 98.1 °F (36.7 °C)   Resp 18   Ht 5' 9\" (1.753 m)   Wt 91.6 kg (202 lb)   LMP 2017   SpO2 97%   BMI 29.83 kg/m²       O2 Device: Room air       Temp (24hrs), Av °F (37.2 °C), Min:98.1 °F (36.7 °C), Max:99.9 °F (37.7 °C)       Intake/Output:   Last shift:      No intake/output data recorded.   Last 3 shifts:  190 -  0700  In: 100 [I.V.:100]  Out: 1100 [Urine:1100]    Intake/Output Summary (Last 24 hours) at 2019 9650  Last data filed at 2019 0444  Gross per 24 hour   Intake 100 ml   Output 300 ml   Net -200 ml       Physical Exam:    General: Appears comfortable, NAD  HEENT:  Anicteric sclerae; pink palpebral conjunctivae; mucosa moist  Resp:  Symmetrical chest expansion, no accessory muscle use; good airway entry; no rales/ wheezing/ rhonchi noted  CV:  S1, S2 present; regular rate and rhythm  GI:  Abdomen soft, non-tender; (+) active bowel sounds  Extremities:  +2 pulses on all extremities; no edema/ cyanosis/ clubbing noted  Skin:  Warm; no rashes/ lesions noted  Neurologic:  Non-focal          DATA:   Current Facility-Administered Medications   Medication Dose Route Frequency    vancomycin (VANCOCIN) 1000 mg in  ml infusion  1,000 mg IntraVENous ONCE    0.9% sodium chloride infusion 250 mL  250 mL IntraVENous PRN    epoetin kelsie-epbx (RETACRIT) 12,000 Units combo injection  12,000 Units SubCUTAneous Q TUE, THU & SAT    0.9% sodium chloride infusion  50 mL/hr IntraVENous CONTINUOUS    sodium bicarbonate tablet 650 mg  650 mg Oral TID    aluminum-magnesium hydroxide (MAALOX) oral suspension 15 mL  15 mL Oral QID PRN    0.9% sodium chloride infusion 250 mL  250 mL IntraVENous PRN    insulin lispro (HUMALOG) injection   SubCUTAneous AC&HS    glucose chewable tablet 16 g  4 Tab Oral PRN    glucagon (GLUCAGEN) injection 1 mg  1 mg IntraMUSCular PRN    dextrose (D50W) injection syrg 12.5-25 g  25-50 mL IntraVENous PRN    B complex-vitaminC-folic acid (NEPHROCAP) cap  1 Cap Oral DAILY    sodium chloride (NS) flush 5-10 mL  5-10 mL IntraVENous PRN    acetaminophen (TYLENOL) tablet 650 mg  650 mg Oral Q6H PRN    amLODIPine (NORVASC) tablet 10 mg  10 mg Oral DAILY    carvedilol (COREG) tablet 6.25 mg  6.25 mg Oral BID WITH MEALS    ferrous sulfate tablet 325 mg  325 mg Oral BID    loratadine (CLARITIN) tablet 10 mg  10 mg Oral DAILY PRN    morphine 10 mg/ml injection 1 mg  1 mg IntraVENous Q6H PRN    ondansetron (ZOFRAN) injection 4 mg  4 mg IntraVENous Q6H PRN    levoFLOXacin (LEVAQUIN) 500 mg in D5W IVPB  500 mg IntraVENous Q48H    VANCOMYCIN INFORMATION NOTE   Other Rx Dosing/Monitoring                    Labs:  Recent Labs     06/02/19  0731 06/02/19 0445 06/01/19  2308  06/01/19 0215  05/31/19  0243   WBC  --  10.7  --   --  13.4*  --  17.6*   HGB 7.0* 6.8* 7.0*   < > 6.8*   < > 6.5*   HCT 21.9* 21.5* 21.6*   < > 21.3*   < > 20.4*   PLT  --  205  --   --  144  --  133*    < > = values in this interval not displayed. Recent Labs     06/02/19  0445 06/01/19  0215 05/31/19  0243 05/30/19  1140   * 134* 132* 132*   K 3.4* 3.2* 3.7 4.4   CL 98* 97* 101 103   CO2 26 27 21 19*   * 138* 249* 126*   BUN 65* 60* 61* 69*   CREA 6.05* 6.12* 6.48* 7.53*   CA 7.9* 7.5* 7.1*  6.8* 8.6   PHOS 5.8* 5.2* 5.4*  --    ALB  --   --  1.3* 1.7*   SGOT  --   --  13* 19   ALT  --   --  11* 12*     No results for input(s): PH, PCO2, PO2, HCO3, FIO2 in the last 72 hours. IMPRESSION:   · ARF on CKD stage 3  · DM2  · HTN  · Anemia of chronic disease  · Thrombocytopenia/ITP      ·         PLAN:   · Nephrology following:Tentative plan is for renal bx on Monday. Pt has UTI  · DM and HTN- primary following  1. Anemia of chronic disease: H/H today 7.0/21.9 s/p 1 unit prbc.  Recommend PRBC transfusion for Hgb <7g/dl. We will continue Retracrit 12,000 units 3 X per week for hgb less than 10. Ferritin is 190, Iron % Sat cannot be calculated, TIBC is 114. Continue Ferrous Sulfate 325mg BID. heme positive stool but no active bleeding  2. Thrombocytopenia/ITP: Platelet count today is 205,000, no intervention warranted.    ·        The patient is: [x] acutely ill Risk of deterioration: [] moderate    [] critically ill  [] high         My assessment/plan was discussed with:  [x]nursing []PT/OT    []respiratory therapy [x]Dr.Lloyd Barry Méndez MD      []family []       Kira Smith NP

## 2019-06-02 NOTE — CONSULTS
[facility]   [email or web address]      Impression:   1. N/V and diarrhea suggestive of gastroenteritis now improved   2. Heme pos stool likely due to the above but has never had a colo at age 49  3. Renal failure scheduled for bx tomorrow   4. Hx of chronic anemia and iron def   5. htn  6. Diabetes   7. Obesity        Plan:     1. Clearly needs colo but timing is the issue here with pending renal bx. Would like to do while she is an inpatient in light of her renal issues if possible. Will see how she does after her bx. Case discussed with Dr Esme Diop. Chief Complaint: n/v diarrhea heme pos BENJAMIN no CRCS      HPI:  Macario Ochoa is a 48 y.o. female who is being seen on consult for heme pos stool without clinical gi bleeding in the setting of a bout of n/v and diarrhea that is markedly improved today . She has a hx of chronic anemia and benjamin but no hx of colonoscopy.        PMH:   Past Medical History:   Diagnosis Date    Anemia     Arthritis     Diabetes ([address]     Hypertension     ITP (idiopathic thrombocytopenic purpura)        PSH:   Past Surgical History:   Procedure Laterality Date    HX GYN      c section 1984    HX HYSTERECTOMY  2017    HX TUBAL LIGATION         Social HX:   Social History     Socioeconomic History    Marital status:      Spouse name: Not on file    Number of children: Not on file    Years of education: Not on file    Highest education level: Not on file   Occupational History    Not on file   Social Needs    Financial resource strain: Not on file    Food insecurity:     Worry: Not on file     Inability: Not on file    Transportation needs:     Medical: Not on file     Non-medical: Not on file   Tobacco Use    Smoking status: Never Smoker    Smokeless tobacco: Never Used   Substance and Sexual Activity    Alcohol use: No    Drug use: No    Sexual activity: Not on file   Lifestyle    Physical activity: Days per week: Not on file     Minutes per session: Not on file    Stress: Not on file   Relationships    Social connections:     Talks on phone: Not on file     Gets together: Not on file     Attends Mandaeism service: Not on file     Active member of club or organization: Not on file     Attends meetings of clubs or organizations: Not on file     Relationship status: Not on file    Intimate partner violence:     Fear of current or ex partner: Not on file     Emotionally abused: Not on file     Physically abused: Not on file     Forced sexual activity: Not on file   Other Topics Concern    Not on file   Social History Narrative    Not on file       FHX:   Family History   Problem Relation Age of Onset    Hypertension Mother     Cancer Father         Colon       Allergy:   Allergies   Allergen Reactions    Rocephin [Ceftriaxone] Itching    Pcn [Penicillins] Itching       Home Medications:     Medications Prior to Admission   Medication Sig    loratadine (CLARITIN) 10 mg tablet Take 10 mg by mouth daily as needed for Allergies.  carvedilol (COREG) 6.25 mg tablet Take 1 Tab by mouth two (2) times daily (with meals).  acetaminophen (TYLENOL) 500 mg tablet Take 2 Tabs by mouth every six (6) hours as needed for Pain.  metFORMIN (GLUCOPHAGE) 500 mg tablet Take 2 Tabs by mouth two (2) times daily (with meals). Indications: TYPE 2 DIABETES MELLITUS    lisinopril-hydrochlorothiazide (PRINZIDE, ZESTORETIC) 20-25 mg per tablet Take 1 Tab by mouth daily. Indications: HYPERTENSION    amLODIPine (NORVASC) 10 mg tablet Take 10 mg by mouth daily. Indications: HYPERTENSION    ferrous sulfate 325 mg (65 mg iron) tablet Take 325 mg by mouth two (2) times a day. Review of Systems:     Constitutional: No fevers, chills, weight loss, fatigue. Skin: No rashes, pruritis, jaundice, ulcerations, erythema. HENT: No headaches, nosebleeds, sinus pressure, rhinorrhea, sore throat.    Eyes: No visual changes, blurred vision, eye pain, photophobia, jaundice. Cardiovascular: No chest pain, heart palpitations. Respiratory: No cough, SOB, wheezing, chest discomfort, orthopnea. Gastrointestinal: N/v vomiting diarrhea no melena or gross bleeding    Genitourinary: No dysuria, bleeding, discharge, pyuria. Musculoskeletal: No weakness, arthralgias, wasting. Endo: No sweats. Heme: No bruising, easy bleeding. Allergies: As noted. Neurological: Cranial nerves intact. Alert and oriented. Gait not assessed. Psychiatric:  No anxiety, depression, hallucinations. Visit Vitals  /79 (BP 1 Location: Left arm, BP Patient Position: At rest)   Pulse 80   Temp 98.1 °F (36.7 °C)   Resp 18   Ht 5' 9\" (1.753 m)   Wt 91.6 kg (202 lb)   LMP 06/18/2017   SpO2 97%   BMI 29.83 kg/m²       Physical Assessment:     constitutional: appearance: well developed, well nourished, obese habitus, no deformities, in no acute distress. skin: inspection: no rashes, ulcers, icterus or other lesions; no clubbing or telangiectasias. palpation: no induration or subcutaneos nodules. eyes: inspection: normal conjunctivae and lids; no jaundice pupils: symmetrical, normoreactive to light, normal accommodation and size. ENMT: mouth: normal oral mucosa,lips and gums; good dentition. oropharynx: normal tongue, hard and soft palate; posterior pharynx without erythema, exudate or lesions. neck: no masses organomegaly or tenderness. respiratory: effort: normal chest excursion; no intercostal retraction or accessory muscle use. cardiovascular: abdominal aorta: normal size and position; no bruits. palpation: PMI of normal size and position; normal rhythm; no thrill or murmurs. abdominal: abdomen: normal consistency; no tenderness or masses. hernias: no hernias appreciated. liver: normal size and consistency. spleen: not palpable. rectal: hemoccult/guaiac: not performed.    musculoskeletal: no deformities or muscle wasting lymphatic: axilae: not palpable. groin: not palpable. neck: within normal limits. other: not palpable. neurologic: cranial nerves: II-XII normal.   psychiatric: judgement/insight: within normal limits. memory: within normal limits for recent and remote events. mood and affect: no evidence of depression, anxiety or agitation. orientation: oriented to time, space and person. Basic Metabolic Profile   Recent Labs     06/02/19  0445   *   K 3.4*   CL 98*   CO2 26   BUN 65*   *   CA 7.9*   PHOS 5.8*         CBC w/Diff    Recent Labs     06/02/19  0731 06/02/19  0445   WBC  --  10.7   RBC  --  2.66*   HGB 7.0* 6.8*   HCT 21.9* 21.5*   MCV  --  80.8   MCH  --  25.6   MCHC  --  31.6   RDW  --  15.0*   PLT  --  205    Recent Labs     06/02/19  0445   GRANS 72   LYMPH 18*   EOS 0        Hepatic Function   Recent Labs     05/31/19  0243   ALB 1.3*   TP 5.5*   TBILI 0.2   SGOT 13*   AP 86   AML 62   LPSE 312          J Mayela Lunsford MD, M.D. [facility], [address] [facility]  [email or web address]

## 2019-06-03 ENCOUNTER — APPOINTMENT (OUTPATIENT)
Dept: ULTRASOUND IMAGING | Age: 54
DRG: 872 | End: 2019-06-03
Attending: INTERNAL MEDICINE
Payer: COMMERCIAL

## 2019-06-03 ENCOUNTER — HOSPITAL ENCOUNTER (INPATIENT)
Dept: CT IMAGING | Age: 54
Discharge: HOME | DRG: 872 | End: 2019-06-03
Attending: PHYSICIAN ASSISTANT
Payer: COMMERCIAL

## 2019-06-03 LAB
ANION GAP SERPL CALC-SCNC: 9 MMOL/L (ref 3–18)
APTT PPP: 41.8 SEC (ref 23–36.4)
BASOPHILS # BLD: 0 K/UL (ref 0–0.06)
BASOPHILS NFR BLD: 0 % (ref 0–3)
BUN SERPL-MCNC: 65 MG/DL (ref 7–18)
BUN/CREAT SERPL: 11 (ref 12–20)
CALCIUM SERPL-MCNC: 8.2 MG/DL (ref 8.5–10.1)
CHLORIDE SERPL-SCNC: 103 MMOL/L (ref 100–108)
CO2 SERPL-SCNC: 26 MMOL/L (ref 21–32)
CREAT SERPL-MCNC: 5.92 MG/DL (ref 0.6–1.3)
DIFFERENTIAL METHOD BLD: ABNORMAL
EOSINOPHIL # BLD: 0.1 K/UL (ref 0–0.4)
EOSINOPHIL NFR BLD: 1 % (ref 0–5)
ERYTHROCYTE [DISTWIDTH] IN BLOOD BY AUTOMATED COUNT: 15.1 % (ref 11.6–14.5)
GBM IGG SER-ACNC: 3 UNITS (ref 0–20)
GLUCOSE BLD STRIP.AUTO-MCNC: 100 MG/DL (ref 70–110)
GLUCOSE BLD STRIP.AUTO-MCNC: 121 MG/DL (ref 70–110)
GLUCOSE BLD STRIP.AUTO-MCNC: 130 MG/DL (ref 70–110)
GLUCOSE BLD STRIP.AUTO-MCNC: 156 MG/DL (ref 70–110)
GLUCOSE SERPL-MCNC: 110 MG/DL (ref 74–99)
HAPTOGLOB SERPL-MCNC: 388 MG/DL (ref 30–200)
HBV SURFACE AB SER QL IA: POSITIVE
HBV SURFACE AB SERPL IA-ACNC: 38.99 MIU/ML
HBV SURFACE AG SER QL: <0.1 INDEX
HBV SURFACE AG SER QL: NEGATIVE
HCT VFR BLD AUTO: 22.4 % (ref 35–45)
HCT VFR BLD AUTO: 23.6 % (ref 35–45)
HCT VFR BLD AUTO: 26.6 % (ref 35–45)
HCV AB SER IA-ACNC: 0.03 INDEX
HCV AB SERPL QL IA: NEGATIVE
HCV COMMENT,HCGAC: NORMAL
HEP BS AB COMMENT,HBSAC: NORMAL
HGB BLD-MCNC: 7 G/DL (ref 12–16)
HGB BLD-MCNC: 7.4 G/DL (ref 12–16)
HGB BLD-MCNC: 7.9 G/DL (ref 12–16)
HIV 1+2 AB+HIV1 P24 AG SERPL QL IA: NONREACTIVE
HIV12 RESULT COMMENT, HHIVC: NORMAL
INR PPP: 1.2 (ref 0.8–1.2)
LYMPHOCYTES # BLD: 1.3 K/UL (ref 0.8–3.5)
LYMPHOCYTES NFR BLD: 14 % (ref 20–51)
MCH RBC QN AUTO: 25.5 PG (ref 24–34)
MCHC RBC AUTO-ENTMCNC: 31.3 G/DL (ref 31–37)
MCV RBC AUTO: 81.5 FL (ref 74–97)
MONOCYTES # BLD: 0.5 K/UL (ref 0–1)
MONOCYTES NFR BLD: 6 % (ref 2–9)
NEUTS BAND NFR BLD MANUAL: 1 % (ref 0–5)
NEUTS SEG # BLD: 7.1 K/UL (ref 1.8–8)
NEUTS SEG NFR BLD: 78 % (ref 42–75)
NRBC BLD-RTO: 1 PER 100 WBC
PHOSPHATE SERPL-MCNC: 6.1 MG/DL (ref 2.5–4.9)
PLATELET # BLD AUTO: 244 K/UL (ref 135–420)
PLATELET COMMENTS,PCOM: ABNORMAL
PMV BLD AUTO: 12.2 FL (ref 9.2–11.8)
POTASSIUM SERPL-SCNC: 3.7 MMOL/L (ref 3.5–5.5)
PROTHROMBIN TIME: 14.5 SEC (ref 11.5–15.2)
RBC # BLD AUTO: 2.75 M/UL (ref 4.2–5.3)
RBC MORPH BLD: ABNORMAL
SODIUM SERPL-SCNC: 138 MMOL/L (ref 136–145)
WBC # BLD AUTO: 9 K/UL (ref 4.6–13.2)

## 2019-06-03 PROCEDURE — C9113 INJ PANTOPRAZOLE SODIUM, VIA: HCPCS | Performed by: HOSPITALIST

## 2019-06-03 PROCEDURE — 74011000250 HC RX REV CODE- 250: Performed by: HOSPITALIST

## 2019-06-03 PROCEDURE — 74011250636 HC RX REV CODE- 250/636: Performed by: HOSPITALIST

## 2019-06-03 PROCEDURE — 88305 TISSUE EXAM BY PATHOLOGIST: CPT

## 2019-06-03 PROCEDURE — 84100 ASSAY OF PHOSPHORUS: CPT

## 2019-06-03 PROCEDURE — 74011250636 HC RX REV CODE- 250/636

## 2019-06-03 PROCEDURE — 74011250637 HC RX REV CODE- 250/637: Performed by: HOSPITALIST

## 2019-06-03 PROCEDURE — 36415 COLL VENOUS BLD VENIPUNCTURE: CPT

## 2019-06-03 PROCEDURE — 85730 THROMBOPLASTIN TIME PARTIAL: CPT

## 2019-06-03 PROCEDURE — 82962 GLUCOSE BLOOD TEST: CPT

## 2019-06-03 PROCEDURE — 74011250637 HC RX REV CODE- 250/637: Performed by: INTERNAL MEDICINE

## 2019-06-03 PROCEDURE — 76705 ECHO EXAM OF ABDOMEN: CPT

## 2019-06-03 PROCEDURE — 85025 COMPLETE CBC W/AUTO DIFF WBC: CPT

## 2019-06-03 PROCEDURE — 65660000000 HC RM CCU STEPDOWN

## 2019-06-03 PROCEDURE — 88350 IMFLUOR EA ADDL 1ANTB STN PX: CPT

## 2019-06-03 PROCEDURE — 85610 PROTHROMBIN TIME: CPT

## 2019-06-03 PROCEDURE — 85018 HEMOGLOBIN: CPT

## 2019-06-03 PROCEDURE — 0TB13ZX EXCISION OF LEFT KIDNEY, PERCUTANEOUS APPROACH, DIAGNOSTIC: ICD-10-PCS | Performed by: RADIOLOGY

## 2019-06-03 PROCEDURE — 74011000272 HC RX REV CODE- 272

## 2019-06-03 PROCEDURE — 88348 ELECTRON MICROSCOPY DX: CPT

## 2019-06-03 PROCEDURE — 74011250636 HC RX REV CODE- 250/636: Performed by: INTERNAL MEDICINE

## 2019-06-03 PROCEDURE — 88313 SPECIAL STAINS GROUP 2: CPT

## 2019-06-03 PROCEDURE — 88346 IMFLUOR 1ST 1ANTB STAIN PX: CPT

## 2019-06-03 PROCEDURE — 50200 RENAL BIOPSY PERQ: CPT

## 2019-06-03 PROCEDURE — 80048 BASIC METABOLIC PNL TOTAL CA: CPT

## 2019-06-03 RX ORDER — FLUMAZENIL 0.1 MG/ML
0.2 INJECTION INTRAVENOUS
Status: DISCONTINUED | OUTPATIENT
Start: 2019-06-03 | End: 2019-06-04 | Stop reason: HOSPADM

## 2019-06-03 RX ORDER — FENTANYL CITRATE 50 UG/ML
50 INJECTION, SOLUTION INTRAMUSCULAR; INTRAVENOUS
Status: DISCONTINUED | OUTPATIENT
Start: 2019-06-03 | End: 2019-06-03

## 2019-06-03 RX ORDER — SODIUM CHLORIDE 0.9 % (FLUSH) 0.9 %
5-40 SYRINGE (ML) INJECTION EVERY 8 HOURS
Status: DISCONTINUED | OUTPATIENT
Start: 2019-06-03 | End: 2019-06-04 | Stop reason: HOSPADM

## 2019-06-03 RX ORDER — FENTANYL CITRATE 50 UG/ML
INJECTION, SOLUTION INTRAMUSCULAR; INTRAVENOUS
Status: COMPLETED
Start: 2019-06-03 | End: 2019-06-03

## 2019-06-03 RX ORDER — SODIUM CHLORIDE 0.9 % (FLUSH) 0.9 %
5-40 SYRINGE (ML) INJECTION AS NEEDED
Status: DISCONTINUED | OUTPATIENT
Start: 2019-06-03 | End: 2019-06-04 | Stop reason: HOSPADM

## 2019-06-03 RX ORDER — MIDAZOLAM HYDROCHLORIDE 1 MG/ML
INJECTION, SOLUTION INTRAMUSCULAR; INTRAVENOUS
Status: COMPLETED
Start: 2019-06-03 | End: 2019-06-03

## 2019-06-03 RX ORDER — NALOXONE HYDROCHLORIDE 0.4 MG/ML
0.1 INJECTION, SOLUTION INTRAMUSCULAR; INTRAVENOUS; SUBCUTANEOUS
Status: DISCONTINUED | OUTPATIENT
Start: 2019-06-03 | End: 2019-06-04 | Stop reason: HOSPADM

## 2019-06-03 RX ORDER — FLUCONAZOLE 100 MG/1
200 TABLET ORAL DAILY
Status: DISCONTINUED | OUTPATIENT
Start: 2019-06-04 | End: 2019-06-03

## 2019-06-03 RX ORDER — FLUCONAZOLE 200 MG/1
200 TABLET ORAL DAILY
Status: DISCONTINUED | OUTPATIENT
Start: 2019-06-03 | End: 2019-06-04 | Stop reason: HOSPADM

## 2019-06-03 RX ORDER — MIDAZOLAM HYDROCHLORIDE 1 MG/ML
1 INJECTION, SOLUTION INTRAMUSCULAR; INTRAVENOUS
Status: DISCONTINUED | OUTPATIENT
Start: 2019-06-03 | End: 2019-06-03

## 2019-06-03 RX ADMIN — MORPHINE SULFATE 1 MG: 10 INJECTION, SOLUTION INTRAMUSCULAR; INTRAVENOUS at 21:49

## 2019-06-03 RX ADMIN — FERROUS SULFATE TAB 325 MG (65 MG ELEMENTAL FE) 325 MG: 325 (65 FE) TAB at 09:41

## 2019-06-03 RX ADMIN — FENTANYL CITRATE 50 MCG: 50 INJECTION, SOLUTION INTRAMUSCULAR; INTRAVENOUS at 14:15

## 2019-06-03 RX ADMIN — Medication 5 ML: at 22:00

## 2019-06-03 RX ADMIN — CALCITRIOL CAPSULES 0.25 MCG 0.25 MCG: 0.25 CAPSULE ORAL at 21:48

## 2019-06-03 RX ADMIN — FENTANYL CITRATE 50 MCG: 50 INJECTION INTRAMUSCULAR; INTRAVENOUS at 14:15

## 2019-06-03 RX ADMIN — SODIUM BICARBONATE 650 MG TABLET 650 MG: at 17:53

## 2019-06-03 RX ADMIN — FLUCONAZOLE 200 MG: 200 TABLET ORAL at 18:51

## 2019-06-03 RX ADMIN — MIDAZOLAM HYDROCHLORIDE 1 MG: 1 INJECTION, SOLUTION INTRAMUSCULAR; INTRAVENOUS at 14:25

## 2019-06-03 RX ADMIN — GELATIN ABSORBABLE SPONGE 12-7 MM 1 EACH: 12-7 MISC at 14:31

## 2019-06-03 RX ADMIN — SODIUM BICARBONATE 650 MG TABLET 650 MG: at 21:49

## 2019-06-03 RX ADMIN — MIDAZOLAM HYDROCHLORIDE 1 MG: 2 INJECTION, SOLUTION INTRAMUSCULAR; INTRAVENOUS at 14:15

## 2019-06-03 RX ADMIN — SODIUM CHLORIDE 40 MG: 9 INJECTION INTRAMUSCULAR; INTRAVENOUS; SUBCUTANEOUS at 09:41

## 2019-06-03 RX ADMIN — MIDAZOLAM HYDROCHLORIDE 1 MG: 1 INJECTION, SOLUTION INTRAMUSCULAR; INTRAVENOUS at 14:30

## 2019-06-03 RX ADMIN — NEPHROCAP 1 CAPSULE: 1 CAP ORAL at 09:41

## 2019-06-03 RX ADMIN — FENTANYL CITRATE 50 MCG: 50 INJECTION, SOLUTION INTRAMUSCULAR; INTRAVENOUS at 14:30

## 2019-06-03 RX ADMIN — CALCIUM ACETATE 667 MG: 667 CAPSULE ORAL at 17:53

## 2019-06-03 RX ADMIN — AMLODIPINE BESYLATE 10 MG: 10 TABLET ORAL at 09:41

## 2019-06-03 RX ADMIN — MIDAZOLAM HYDROCHLORIDE 1 MG: 1 INJECTION, SOLUTION INTRAMUSCULAR; INTRAVENOUS at 14:15

## 2019-06-03 RX ADMIN — SODIUM BICARBONATE 650 MG TABLET 650 MG: at 09:41

## 2019-06-03 RX ADMIN — CARVEDILOL 6.25 MG: 6.25 TABLET, FILM COATED ORAL at 17:53

## 2019-06-03 RX ADMIN — FERROUS SULFATE TAB 325 MG (65 MG ELEMENTAL FE) 325 MG: 325 (65 FE) TAB at 17:53

## 2019-06-03 RX ADMIN — CARVEDILOL 6.25 MG: 6.25 TABLET, FILM COATED ORAL at 09:41

## 2019-06-03 RX ADMIN — Medication 10 ML: at 15:00

## 2019-06-03 RX ADMIN — FENTANYL CITRATE 50 MCG: 50 INJECTION, SOLUTION INTRAMUSCULAR; INTRAVENOUS at 14:20

## 2019-06-03 RX ADMIN — FENTANYL CITRATE 50 MCG: 50 INJECTION, SOLUTION INTRAMUSCULAR; INTRAVENOUS at 14:25

## 2019-06-03 RX ADMIN — SODIUM CHLORIDE 40 MG: 9 INJECTION INTRAMUSCULAR; INTRAVENOUS; SUBCUTANEOUS at 21:50

## 2019-06-03 RX ADMIN — SODIUM CHLORIDE 50 ML/HR: 900 INJECTION, SOLUTION INTRAVENOUS at 09:39

## 2019-06-03 RX ADMIN — MIDAZOLAM HYDROCHLORIDE 1 MG: 1 INJECTION, SOLUTION INTRAMUSCULAR; INTRAVENOUS at 14:20

## 2019-06-03 NOTE — PROGRESS NOTES
Reason for Admission:  ARF (acute renal failure) ([facility] [N17.9]                 RRAT Score:    18            Plan for utilizing home health:    Not at this time, pt reports independence and family support. Likelihood of Readmission:   Moderate                         Do you (patient/family) have any concerns for transition/discharge?  no    Transition of Care Plan:       Initial assessment completed with patient. Cognitive status of patient: oriented to time, place, person and situation. Face sheet information confirmed:  yes. The patient designates Matilda Dodd, , to participate in her discharge plan and to receive any needed information. This patient lives in a single family home with  and kids. Patient is able to navigate steps as needed. Prior to hospitalization, patient was considered to be independent with ADLs/IADLS : yes . Patient has a current ACP document on file: no  The /family will be available to transport patient home upon discharge. The patient has NO medical equipment available in the home. Patient is not currently active with home health. Patient has not stayed in a skilled nursing facility or rehab. This patient is on dialysis :no     Currently, the discharge plan is Home. The patient states that she can obtain her medications from the pharmacy, and take her medications as directed. Patient's current insurance is [employer]      Care Management Interventions  PCP Verified by CM:  Yes  Mode of Transport at Discharge: Self  Transition of Care Consult (CM Consult): Discharge Planning  Current Support Network: Lives with Spouse, Family Lives Nearby  Confirm Follow Up Transport: Family  Plan discussed with Pt/Family/Caregiver: Yes  Discharge Location  Discharge Placement: Home        KARI Johnson  Case Management  [phone]

## 2019-06-03 NOTE — PROGRESS NOTES
Infectious Disease progress Note    Requested by: Dr. Shana Wick    Reason: sepsis, acute renal failure    Current abx Prior abx    Levofloxacin, vancomycin 5/30/19-6/2/19     Lines:       Assessment :      48year old lady with h/o Hypertension,Type2 DM (last hgbA1C 7.9 on 5/30/19),had E. Coli UTI/bacteremia 12/2018, ITP s/p IVIG admitted to [facility] on 5/30/19 with worsening  acute renal Failure with underlying Stage 3 CRF. Now with high grade fevers    Highly complex cinical picture. Presentation seems most c/w sepsis (POA) due to intercurrent viral gastroenteritis. High grade fevers on admission likely due to resolving viral infection alongwith toxin accumulation from renal failure. Resolved. Doing fine off abx. Acute on chronic renal failure: likely multifactorial - volume depletion, metformin, losartan related - nephrology consult appreciated. Plans for renal biopsy noted. Negative  HIV, hepatitis B/C serology    Resolved diarrhea. Subjective sense of improvement      Recommendations:    1.hold off abx  2. F/u hematology and nephrology recommendations    Will sign off. F/u prn. thanks    Advance Care planning: full code; discussed  with patient/surrogate decision maker; caitlin sheikh: [phone]      . Above plan was discussed in details with patient. Please call me if any further questions or concerns. Will continue to participate in the care of this patient. HPI:      Patient feels better. Denies subjective fever/chills. Diarrhea resolved. C/o scratchy throat. No hiv risk factors. Patient denies headaches, visual disturbances, sore throat, runny nose, earaches, cp, sob, chills, cough, burning micturition, pain or weakness in extremities. denies back pain/flank pain. home Medication List    Details   loratadine (CLARITIN) 10 mg tablet Take 10 mg by mouth daily as needed for Allergies. carvedilol (COREG) 6.25 mg tablet Take 1 Tab by mouth two (2) times daily (with meals).   Qty: 30 Tab, Refills: 0      acetaminophen (TYLENOL) 500 mg tablet Take 2 Tabs by mouth every six (6) hours as needed for Pain. Qty: 20 Tab, Refills: 0      metFORMIN (GLUCOPHAGE) 500 mg tablet Take 2 Tabs by mouth two (2) times daily (with meals). Indications: TYPE 2 DIABETES MELLITUS  Qty: 120 Tab, Refills: 2      lisinopril-hydrochlorothiazide (PRINZIDE, ZESTORETIC) 20-25 mg per tablet Take 1 Tab by mouth daily. Indications: HYPERTENSION      amLODIPine (NORVASC) 10 mg tablet Take 10 mg by mouth daily. Indications: HYPERTENSION      ferrous sulfate 325 mg (65 mg iron) tablet Take 325 mg by mouth two (2) times a day.              Current Facility-Administered Medications   Medication Dose Route Frequency    pantoprazole (PROTONIX) 40 mg in sodium chloride 0.9% 10 mL injection  40 mg IntraVENous Q12H    calcitRIOL (ROCALTROL) capsule 0.25 mcg  0.25 mcg Oral Q MON, WED & FRI    calcium acetate (PHOSLO) capsule 667 mg  1 Cap Oral TID WITH MEALS    0.9% sodium chloride infusion 250 mL  250 mL IntraVENous PRN    epoetin kelsie-epbx (RETACRIT) 12,000 Units combo injection  12,000 Units SubCUTAneous Q TUE, THU & SAT    0.9% sodium chloride infusion  50 mL/hr IntraVENous CONTINUOUS    sodium bicarbonate tablet 650 mg  650 mg Oral TID    aluminum-magnesium hydroxide (MAALOX) oral suspension 15 mL  15 mL Oral QID PRN    0.9% sodium chloride infusion 250 mL  250 mL IntraVENous PRN    insulin lispro (HUMALOG) injection   SubCUTAneous AC&HS    glucose chewable tablet 16 g  4 Tab Oral PRN    glucagon (GLUCAGEN) injection 1 mg  1 mg IntraMUSCular PRN    dextrose (D50W) injection syrg 12.5-25 g  25-50 mL IntraVENous PRN    B complex-vitaminC-folic acid (NEPHROCAP) cap  1 Cap Oral DAILY    sodium chloride (NS) flush 5-10 mL  5-10 mL IntraVENous PRN    acetaminophen (TYLENOL) tablet 650 mg  650 mg Oral Q6H PRN    amLODIPine (NORVASC) tablet 10 mg  10 mg Oral DAILY    carvedilol (COREG) tablet 6.25 mg  6.25 mg Oral BID WITH MEALS  ferrous sulfate tablet 325 mg  325 mg Oral BID    loratadine (CLARITIN) tablet 10 mg  10 mg Oral DAILY PRN    morphine 10 mg/ml injection 1 mg  1 mg IntraVENous Q6H PRN    ondansetron (ZOFRAN) injection 4 mg  4 mg IntraVENous Q6H PRN       Allergies: Rocephin [ceftriaxone] and Pcn [penicillins]    Temp (24hrs), Av.2 °F (36.8 °C), Min:97.5 °F (36.4 °C), Max:98.6 °F (37 °C)    Visit Vitals  /78 (BP 1 Location: Left arm, BP Patient Position: At rest)   Pulse 80   Temp 97.9 °F (36.6 °C)   Resp 18   Ht 5' 9\" (1.753 m)   Wt 92.7 kg (204 lb 6.4 oz)   LMP 2017   SpO2 97%   BMI 30.18 kg/m²       ROS: 12 point ROS obtained in details. Pertinent positives as mentioned in HPI,   otherwise negative    Physical Exam:    Constitutional: She appears well-developed and well-nourished. HENT:   Head: Normocephalic and atraumatic. Eyes: Conjunctivae are normal. No scleral icterus. Neck: Normal range of motion. Neck supple. No JVD present. Cardiovascular: Regular rhythm and normal heart sounds. 4 intact extremity pulses, tachycardic   Pulmonary/Chest: Effort normal and breath sounds normal.   Abdominal: Soft. She exhibits no mass. There mild epigastric, periumbilical  tenderness. Musculoskeletal: Normal range of motion. Lymphadenopathy:     She has no cervical adenopathy. Neurological: She is alert. Skin: Skin is warm and dry.    Nursing note and vitals reviewed.              Labs: Results:   Chemistry Recent Labs     19  0515 19  0445 19  0215   * 107* 138*    134* 134*   K 3.7 3.4* 3.2*    98* 97*   CO2 26 26 27   BUN 65* 65* 60*   CREA 5.92* 6.05* 6.12*   CA 8.2* 7.9* 7.5*   AGAP 9 10 10   BUCR 11* 11* 10*      CBC w/Diff Recent Labs     19  0515 19  2306 19  1600  19  0445  19  0215   WBC 9.0  --   --   --  10.7  --  13.4*   RBC 2.75*  --   --   --  2.66*  --  2.71*   HGB 7.0* 7.0* 7.1*   < > 6.8*   < > 6.8*   HCT 22.4* 22.8* 23.0*   < > 21.5*   < > 21.3*     --   --   --  205  --  144   GRANS 78*  --   --   --  72  --  83*   LYMPH 14*  --   --   --  18*  --  8*   EOS 1  --   --   --  0  --  2    < > = values in this interval not displayed. Microbiology No results for input(s): CULT in the last 72 hours.        RADIOLOGY:    All available imaging studies/reports in [facility] care for this admission were reviewed    Dr. Marietta Larson, Infectious Disease Specialist  [phone]  Ramona 3, 2019  12:09 PM

## 2019-06-03 NOTE — PROGRESS NOTES
Patient discharged home in stable condition. AVS and prescriptions reviewed and provided. Signature obtained and placed in chart.

## 2019-06-03 NOTE — PROGRESS NOTES
Progress Note    Brianne Qiu  48 y.o. Admit Date: 5/30/2019  Active Problems:    Type 2 DM with CKD and hypertension [facility] (1/23/2015) POA: Yes      ARF (acute renal failure) ([facility] [address] (5/30/2019) POA: Unknown      Acute renal failure superimposed on stage 5 chronic kidney disease, not on chronic dialysis ([facility] [facility] (5/30/2019) POA: Yes      Anemia (5/30/2019) POA: Unknown      Metabolic acidosis (4/03/3033) POA: Unknown      Proteinuria (5/30/2019) POA: Unknown      Secondary hyperparathyroidism of renal origin ([facility] (6/2/2019) POA: Unknown            Subjective:     Patient feels good,awaiting for Kidney Biopsy. by IR, no new complain . No gilbert Hematuria      A comprehensive review of systems was negative except for that written in the History of Present Illness.     Objective:     Visit Vitals  /78 (BP 1 Location: Left arm, BP Patient Position: At rest)   Pulse 80   Temp 97.9 °F (36.6 °C)   Resp 18   Ht 5' 9\" (1.753 m)   Wt 92.7 kg (204 lb 6.4 oz)   LMP 06/18/2017   SpO2 97%   BMI 30.18 kg/m²         Intake/Output Summary (Last 24 hours) at 6/3/2019 1055  Last data filed at 6/3/2019 0945  Gross per 24 hour   Intake 1260 ml   Output 1700 ml   Net -440 ml       Current Facility-Administered Medications   Medication Dose Route Frequency Provider Last Rate Last Dose    pantoprazole (PROTONIX) 40 mg in sodium chloride 0.9% 10 mL injection  40 mg IntraVENous Q12H Holly Urena MD   40 mg at 06/03/19 0941    calcitRIOL (ROCALTROL) capsule 0.25 mcg  0.25 mcg Oral Q MON, WED & Rashaun Roche MD        calcium acetate (PHOSLO) capsule 667 mg  1 Cap Oral TID WITH MEALS Yg Lutz MD   Stopped at 06/03/19 0800    0.9% sodium chloride infusion 250 mL  250 mL IntraVENous PRN Areli Narayanan MD        epoetin kelsie-epbx (RETACRIT) 12,000 Units combo injection  12,000 Units SubCUTAneous Q MICHAELE, THU & SAT Daria Pillai NP   12,000 Units at 06/01/19 2082    0.9% sodium chloride infusion  50 mL/hr IntraVENous CONTINUOUS Martina Saini MD 50 mL/hr at 06/03/19 0939 50 mL/hr at 06/03/19 0939    sodium bicarbonate tablet 650 mg  650 mg Oral TID Martina Saini MD   650 mg at 06/03/19 0941    aluminum-magnesium hydroxide (MAALOX) oral suspension 15 mL  15 mL Oral QID PRN Clemente Tamez MD   15 mL at 05/31/19 0153    0.9% sodium chloride infusion 250 mL  250 mL IntraVENous PRN [facility], NP        insulin lispro (HUMALOG) injection   SubCUTAneous AC&HS Shelagh Cover, NP   Stopped at 06/02/19 1630    glucose chewable tablet 16 g  4 Tab Oral PRN [facility] Cover, NP        glucagon (GLUCAGEN) injection 1 mg  1 mg IntraMUSCular PRN Maryam Pilon B, NP        dextrose (D50W) injection syrg 12.5-25 g  25-50 mL IntraVENous PRN [facility] Cover, NP        B complex-vitaminC-folic acid (NEPHROCAP) cap  1 Cap Oral DAILY Martina Saini MD   1 Cap at 06/03/19 0941    sodium chloride (NS) flush 5-10 mL  5-10 mL IntraVENous PRN Janie Fortune MD        acetaminophen (TYLENOL) tablet 650 mg  650 mg Oral Q6H PRN Janie Fortune MD   650 mg at 06/01/19 0003    amLODIPine (NORVASC) tablet 10 mg  10 mg Oral DAILY Janie Fortune MD   10 mg at 06/03/19 0941    carvedilol (COREG) tablet 6.25 mg  6.25 mg Oral BID WITH MEALS Janie Fortune MD   6.25 mg at 06/03/19 2228    ferrous sulfate tablet 325 mg  325 mg Oral BID Janie Fortune MD   325 mg at 06/03/19 0941    loratadine (CLARITIN) tablet 10 mg  10 mg Oral DAILY PRN Janie Fortune MD        morphine 10 mg/ml injection 1 mg  1 mg IntraVENous Q6H PRN Janie Fortune MD   1 mg at 06/02/19 1221    ondansetron (ZOFRAN) injection 4 mg  4 mg IntraVENous Q6H PRN Janie Fortune MD   4 mg at 05/30/19 1821        Physical Exam:     Physical Exam:   General:  Alert, cooperative, no distress, appears stated age. Lungs:   Clear to auscultation bilaterally. Heart:  Regular rate and rhythm, S1, S2 normal, no murmur, click, rub or gallop.    Abdomen: Soft, non-tender. Bowel sounds normal. No masses,  No organomegaly. Extremities: Extremities normal, atraumatic, no cyanosis or edema. Skin: Skin color, texture, turgor normal. No rashes or lesions         Data Review:    CBC w/Diff    Recent Labs     06/03/19  1005 06/03/19  0515 06/02/19  2306  06/02/19 0445 06/01/19 0215   WBC  --  9.0  --   --  10.7  --  13.4*   RBC  --  2.75*  --   --  2.66*  --  2.71*   HGB 7.4* 7.0* 7.0*   < > 6.8*   < > 6.8*   HCT 23.6* 22.4* 22.8*   < > 21.5*   < > 21.3*   MCV  --  81.5  --   --  80.8  --  78.6   MCH  --  25.5  --   --  25.6  --  25.1   MCHC  --  31.3  --   --  31.6  --  31.9   RDW  --  15.1*  --   --  15.0*  --  15.0*    < > = values in this interval not displayed.     Recent Labs     06/03/19  0515 06/02/19  0445 06/01/19  0215   BANDS 1 1  --    MONOS 6 9 7   EOS 1 0 2   BASOS 0 0 0   RDW 15.1* 15.0* 15.0*        Comprehensive Metabolic Profile    Recent Labs     06/03/19 0515 06/02/19 0445 06/01/19 0215    134* 134*   K 3.7 3.4* 3.2*    98* 97*   CO2 26 26 27   BUN 65* 65* 60*   CREA 5.92* 6.05* 6.12*    Recent Labs     06/03/19  0515 06/02/19  0445 06/01/19  0215   CA 8.2* 7.9* 7.5*   PHOS 6.1* 5.8* 5.2*        Component Value Flag Ref Range Units Status   ARLETH, Direct NEGATIVE    NEGATIVE   Final   Comment     Complement C3 170  High   82 - 167 mg/dL Final   Complement C4 42   14 - 44 mg/dL Final   Comment:     Component Value Flag Ref Range Units Status   Myeloperoxidase (MPO) Ab <9.0   0.0 - 9.0 U/mL Final   Proteinase 3 (WV-3) Ab <3.5   0.0 - 3.5 U/mL Final   Comment:   (NOTE)   Performed At: [address] [id number]   Mily Rendon MD [id number]    Cytoplasmic (C-ANCA) Ab PENDING    titer Incomplete   Perinuclear (P-ANCA) PENDING    titer Incomplete   Atypical pANCA PENDING          Component Value Flag Ref Range Units Status   Hepatitis B surface Ag <0.10   <1.00 Index Final   Hep B surface Ag Interp. NEGATIVE                          Impression:       Active Hospital Problems    Diagnosis Date Noted    Type 2 DM with CKD and hypertension ([place] [id number] 01/23/2015     Priority: 1 - One    Secondary hyperparathyroidism of renal origin ([facility] 06/02/2019    ARF (acute renal failure) ([place] [id number] 05/30/2019    Acute renal failure superimposed on stage 5 chronic kidney disease, not on chronic dialysis ([place] [id number] 05/30/2019    Anemia 34/48/9590    Metabolic acidosis 56/30/1331    Proteinuria 05/30/2019        Component Value Flag Ref Range Units Status   Hepatitis C virus Ab 0.03   <0.80 Index Final   Hep C  virus Ab Interp. NEGATIVE    NEG   Final   Hep C  virus Ab comment          Final   Comment:   Index <0.80. ......................... Mabeline Sink Negative   Index > or = to 0.80 and <1.00. .... Mabeline Sink Mabeline Sink Equivocal   Index >1.00. ......................... Mabeline Sink Positive            Immunoglobulin G, Qt. 1,311   700 - 1,600 mg/dL Final   Immunoglobulin A, Qt. 317   87 - 352 mg/dL Final   Immunoglobulin M, Qt. 48   26 - 217 mg/dL Final   Comment:     Anti-streptolysin O Ab 206.0  High   0.0 - 200.0 IU/mL Final   Comment:     Results for Sebas Banuelos (MRN [id number]) as of 6/3/2019 11:03   Ref.  Range 12/23/2018 18:00 5/30/2019 15:45 6/2/2019 12:18   Color Latest Units:   YELLOW YELLOW YELLOW   Appearance Latest Units:   CLOUDY CLEAR CLEAR   Specific gravity Latest Ref Range: 1.005 - 1.030   1.018 1.012 1.009   pH (UA) Latest Ref Range: 5.0 - 8.0   6.0 7.0 8.0   Protein Latest Ref Range: NEG mg/dL >1,000 (A) 300 (A) 300 (A)   Glucose Latest Ref Range: NEG mg/dL 500 (A) 100 (A) 100 (A)   Ketone Latest Ref Range: NEG mg/dL NEGATIVE NEGATIVE NEGATIVE   Blood Latest Ref Range: NEG   LARGE (A) MODERATE (A) SMALL (A)   Bilirubin Latest Ref Range: NEG   NEGATIVE NEGATIVE NEGATIVE   Urobilinogen Latest Ref Range: 0.2 - 1.0 EU/dL 0.2 0.2 0.2   Nitrites Latest Ref Range: NEG   NEGATIVE NEGATIVE NEGATIVE   Leukocyte Esterase Latest Ref Range: NEG   MODERATE (A) SMALL (A) SMALL (A)   Epithelial cells Latest Ref Range: 0 - 5 /lpf 2+ 2+ FEW   WBC Latest Ref Range: 0 - 4 /hpf TOO NUMEROUS TO C. .. 5 to 8 1 to 3   RBC Latest Ref Range: 0 - 5 /hpf 4 to 10 10 to 20 0 to 1   Bacteria Latest Ref Range: NEG /hpf 4+ (A) 1+ (A) FEW (A)           Plan:   Kidney Biopsy today, await pending lab data, suspect Post Infectious GN /IgA on the top possible Diabetic Nephrosclerosis. Will  Decide about dialysis  Based on Biopsy report & her clinical progression, will als need Colonoscopy. Started Rocaltrol, Phosphorus Binders, continue oral Bicarb.       Mary Clancy MD

## 2019-06-03 NOTE — PROGRESS NOTES
Preprocedure Assessment      Today 6/3/2019     Indication/Symptoms:   Dolores Garcia is a 48 y.o. female with a history of SHAHANA who presents to IR for an image-guided random renal biopsy with moderate sedation. Medications and labs reviewed. Patient has been NPO since midnight. Blood thinners held. The H & P and/or progress notes and any available imaging were reviewed. The risks, indications and possible alternatives to the procedure, including doing nothing, were discussed and informed consent was obtained. Physical Exam:      Heart:  Regular rate   Lungs:  Normal respiratory effort    The patient is an appropriate candidate to undergo the planned procedure and sedation.     Wilfredo Bowden, [address]

## 2019-06-03 NOTE — ROUTINE PROCESS
Report received from [facility]. Patient is alert, visitor is at the bedside. No distress noted. 2300  Patient is alert, she is aware that she is npo after midnight for procedures in the am.  Receiving IV fluids as ordered. Call bell in reach. 0600  Patient is alert, no c/o and has been npo past midnight. 0745  Bedside, Verbal and Written shift change report given to Radha Guzman (oncoming nurse) by Aure Hernandez (offgoing nurse). Report included the following information SBAR, Kardex, MAR and Accordion.

## 2019-06-03 NOTE — CONSULTS
Consult Note    Patient: Geronimo Pinedo               Sex: female          DOA: 5/30/2019         YOB: 1965      Age:  48 y.o.        LOS:  LOS: 4 days              HPI:     Geronimo Pinedo is a 48 y.o. female who has been seen in evaluation of SHAHANA at the request of Dr. Jose Garcia. Patient has a past medical history of HTN, DM2, anemia, idiopathic thrombocytopenic purpura and stage 4 CKD with proteinuria who presented from her Nephrologist' office with worsening kidney function (Cr 6). Of note, she also had complaints of a stomach virus with fever, nausea, vomiting, decreased appetite and fatigue. She was found to have nephrotic range proteinuria with a creatinine of 7.53. Nephrology consulted and she was admitted for further management. Past Medical History:   Diagnosis Date    Anemia     Arthritis     Diabetes ([address]     Hypertension     ITP (idiopathic thrombocytopenic purpura)        Past Surgical History:   Procedure Laterality Date    HX GYN      c section 1984    HX HYSTERECTOMY  2017    HX TUBAL LIGATION         Family History   Problem Relation Age of Onset    Hypertension Mother     Cancer Father         Colon       Social History     Socioeconomic History    Marital status:      Spouse name: Not on file    Number of children: Not on file    Years of education: Not on file    Highest education level: Not on file   Tobacco Use    Smoking status: Never Smoker    Smokeless tobacco: Never Used   Substance and Sexual Activity    Alcohol use: No    Drug use: No       Prior to Admission medications    Medication Sig Start Date End Date Taking? Authorizing Provider   loratadine (CLARITIN) 10 mg tablet Take 10 mg by mouth daily as needed for Allergies. Provider, Jass   carvedilol (COREG) 6.25 mg tablet Take 1 Tab by mouth two (2) times daily (with meals).  12/26/18   Paige Whitley MD   acetaminophen (TYLENOL) 500 mg tablet Take 2 Tabs by mouth every six (6) hours as needed for Pain. 12/23/18   Marilyn Peralta PA-C   metFORMIN (GLUCOPHAGE) 500 mg tablet Take 2 Tabs by mouth two (2) times daily (with meals). Indications: TYPE 2 DIABETES MELLITUS 7/31/16   Robel Gaitan MD   lisinopril-hydrochlorothiazide (PRINZIDE, ZESTORETIC) 20-25 mg per tablet Take 1 Tab by mouth daily. Indications: HYPERTENSION    Provider, Historical   amLODIPine (NORVASC) 10 mg tablet Take 10 mg by mouth daily. Indications: HYPERTENSION    Provider, Historical   ferrous sulfate 325 mg (65 mg iron) tablet Take 325 mg by mouth two (2) times a day. Provider, Historical       Allergies   Allergen Reactions    Rocephin [Ceftriaxone] Itching    Pcn [Penicillins] Itching       Review of Systems  Pertinent items are noted in the History of Present Illness. Physical Exam:      Visit Vitals  /75 (BP 1 Location: Right arm, BP Patient Position: At rest)   Pulse 76   Temp 98 °F (36.7 °C)   Resp 18   Ht 5' 9\" (1.753 m)   Wt 92.7 kg (204 lb 6.4 oz)   SpO2 100%   BMI 30.18 kg/m²       Physical Exam:  Constitutional: NAD. A&Ox4. No distress. Respiratory: Normal respiratory effort. Symmetrical rise and fall of chest.   Cardiovascular: Regular rate. Gastrointestinal: Soft, NT, ND.      Labs Reviewed:  CMP:   Lab Results   Component Value Date/Time     06/03/2019 05:15 AM    K 3.7 06/03/2019 05:15 AM     06/03/2019 05:15 AM    CO2 26 06/03/2019 05:15 AM    AGAP 9 06/03/2019 05:15 AM     (H) 06/03/2019 05:15 AM    BUN 65 (H) 06/03/2019 05:15 AM    CREA 5.92 (H) 06/03/2019 05:15 AM    GFRAA 9 (L) 06/03/2019 05:15 AM    GFRNA 8 (L) 06/03/2019 05:15 AM    CA 8.2 (L) 06/03/2019 05:15 AM    PHOS 6.1 (H) 06/03/2019 05:15 AM     CBC:   Lab Results   Component Value Date/Time    WBC 9.0 06/03/2019 05:15 AM    HGB 7.4 (L) 06/03/2019 10:05 AM    HCT 23.6 (L) 06/03/2019 10:05 AM     06/03/2019 05:15 AM     COAGS:   Lab Results   Component Value Date/Time    APTT 41.8 (H) 06/03/2019 10:05 AM    PTP 14.5 06/03/2019 10:05 AM    INR 1.2 06/03/2019 10:05 AM       Assessment/Plan   Active Problems:    Type 2 DM with CKD and hypertension ([facility] (1/23/2015)      ARF (acute renal failure) ([facility] (5/30/2019)      Acute renal failure superimposed on stage 5 chronic kidney disease, not on chronic dialysis ([facility] [address] (5/30/2019)      Anemia (8/34/9313)      Metabolic acidosis (7/18/4819)      Proteinuria (5/30/2019)      Secondary hyperparathyroidism of renal origin ([facility] [address] (6/2/2019)      Rene Guajardo is a 48 y.o. female with a history of CKD 4, anemia, ITP, DM2, HTN presenting with worsening renal function and associated nephrotic-range proteinuria. Plan     Case and images reviewed by Dr. Rg Lima. Given that the etiology of her worsening renal function is unknown and she may require additional intervention (i.e. dialysis) while in-house, will plan for image-guided random renal biopsy with moderate sedation as IR schedule allows. Patient to remain NPO with blood thinning medications held. Consent to be obtained prior to procedure.

## 2019-06-03 NOTE — PROCEDURES
RADIOLOGY POST PROCEDURE NOTE     Ramona 3, 2019       4:36 PM     Preoperative Diagnosis:   SHAHANA. Postoperative Diagnosis:  Same. :  Dr. Dakota Serrano    Assistant:  None. Type of Anesthesia: 1% plain lidocaine and IV moderate sedation with Versed and Fentanyl. Procedure/Description:  Image guided random renal Bx. Findings:   No bleeding. Estimated blood Loss:  Minimal    Specimen Removed:   yes    Blood transfusions:  None. Implants:  None.     Complications: None    Condition: Stable    Discharge Plan:  continue present therapy    James Patterson MD

## 2019-06-03 NOTE — ROUTINE PROCESS
Bedside shift change report given to Yamilet (oncoming nurse) by Derril Severance (offgoing nurse). Report included the following information SBAR, Kardex and MAR.

## 2019-06-03 NOTE — PROGRESS NOTES
Hospitalist Progress Note    NAME:  Hari Acosta   :   1965   MRN:   [id number]     Date/Time:  6/3/2019  Subjective:   Chief Complaint:    Pt has no chest pain no sob. Await renal bx then colonscopy. Review of Systems:  Y  N       Y  N  []   [x]    Fever/chills                                               []   [x]    Chest Pain  []   [x]    Cough                                                       []   [x]    Diarrhea   []   [x]    Sputum                                                     []   [x]    Constipation  []   [x]    SOB/TRIVEDI                                                []   [x]    Nausea/Vomit  []   [x]    Abd Pain                                                    []   []    Tolerating PT  []   []    Dysuria                                                      []   []    Tolerating Diet     []  Unable to obtain  ROS due to  []  mental status change  []  sedated   []  intubated     Past Med History and Social history reviewed. No changes.    [x]  Current Medication list and allergies reviewed*    Objective:   Vitals:  Visit Vitals  /78 (BP 1 Location: Left arm, BP Patient Position: At rest)   Pulse 80   Temp 97.9 °F (36.6 °C)   Resp 18   Ht 5' 9\" (1.753 m)   Wt 92.7 kg (204 lb 6.4 oz)   LMP 2017   SpO2 97%   BMI 30.18 kg/m²     Temp (24hrs), Av.2 °F (36.8 °C), Min:97.5 °F (36.4 °C), Max:98.6 °F (37 °C)      Last 24hr Input/Output:    Intake/Output Summary (Last 24 hours) at 6/3/2019 1027  Last data filed at 6/3/2019 0945  Gross per 24 hour   Intake 1260 ml   Output 1700 ml   Net -440 ml        PHYSICAL EXAM:  Gen:  []  WD [x]  WN  []  cachectic  []  thin  [x]  obese  []  disheveled             []   Critically ill or  []  Chronically ill appearing    HEENT:   []   red/pink conjunctivae [x]  pale conjunctivae                  PERRL  []  yes  []  no        hearing intact to voice []  yes  []  No               []  moist or  []  dry  Mucosa  NECK:   supple [x]  yes  []  no masses []  yes  []  No               thyroid    []  non tender []  tender    RESP:   use of accessory muscles []  yes [x]  no                BS    [x]  clear  []  wheezing []  rhonchi []  crackles   CARD:  murmur  []  yes [x]  no   []  thrill  []  carotid bruits                 LE edema []  yes  []  no    []  JVD present    ABD:    tenderness []  yes [x]  no   Liver/spleen enlargement []   yes []   no                distended []   yes []  no    bowel sound []  normal []  hypo or  []  hyperactive    MSKL:   cyanosis/clubbing []  yes [x]  no         []   Spastic []  Flaccid []  Cog wheeling    SKIN:   Rashes []  yes [x]  no     Ulcers []  present ___               []  normal []  tight to palpitation        skin turgor []  good []  poor []  decreased    NEUR:   [x]  cranial nerves intact       []  L []  R weakness    []   follows commands     []   aphasic    []  alert  []  lethargic  []  stuporous  []  sedated             Alert and Oriented  []  time  []  place   []  person  PSYCH:   insight []  poor [x]  good     mood []  depressed []  anxious []  agitated                   []  Telemetry Reviewed     []  NSR []  PAC/PVCs   []  Afib  []  Paced   []  NSVT   []  Griffith []  NGT  []  Intubated on vent    Lab Data Reviewed:  No components found for: [place]  Recent Labs     06/03/19  1005 06/03/19  0515 06/02/19  2306  06/02/19  0445  06/01/19  0215   NA  --  138  --   --  134*  --  134*   K  --  3.7  --   --  3.4*  --  3.2*   CL  --  103  --   --  98*  --  97*   CO2  --  26  --   --  26  --  27   BUN  --  65*  --   --  65*  --  60*   CREA  --  5.92*  --   --  6.05*  --  6.12*   GLU  --  110*  --   --  107*  --  138*   PHOS  --  6.1*  --   --  5.8*  --  5.2*   CA  --  8.2*  --   --  7.9*  --  7.5*   WBC  --  9.0  --   --  10.7  --  13.4*   HGB 7.4* 7.0* 7.0*   < > 6.8*   < > 6.8*   HCT 23.6* 22.4* 22.8*   < > 21.5*   < > 21.3*   PLT  --  244  --   --  205  --  144    < > = values in this interval not displayed.         [] I have personally reviewed the   []  xray  []  CT scan    Assessment/Plan:        Assessment/Plan:   1. ARF on CKD-III: cr is 5.9 today ; await renal bx.  2. Diarrhea with heme positive stool: c diff is negative; resolved. 3. Sepsis - noted leukocytosis, febrile, tachy likely d/t viral vs # 2; improved now, all Cx neg, d/w ID, will d/c Abx and monitor   4. DM Type II: cont SSI. 5. Essential HTN: Controlled, off HCTZ, Ace and Arb in setting of #1. Cover with Hydralazine PRN. Continue amlodipine and Coreg.   6. Anemia of chronic disease / likely r/t CKD: heme positive stool but no active bleeding, s/p 1 unit PRBC's; will monitor H/H, transfuse if Hb<7.0   7. Heme positive stool, no active bleed: ? GI bleed, start PPI. d/w Dr. Elliott Lopez for GI eval. Plan for colonoscopy after renal Bx   8. H/o ITP and anemia; pt gets IVIG tx with DR Mi Philippe. Platelets are fine right now.             Total time spent with patient:     minutes    []  Critical Care time:      minutes    Care Plan discussed with:    [x]  Patient   []  Family    []  Care Manager  []  Nursing   []  Consultant/Specialist :      []    >50% of visit spent in counseling and coordination of care   (Discussed []  CODE status,  []  Care Plan, []  D/C Planning)    Prophylaxis:  []  Lovenox  []  Coumadin  []  Hep SQ  [x]  SCDs  []  H2B/PPI    Disposition:  [x]  Home w/ Family   []   PT,OT,RN   []  SNF/LTC   []  SAH/Rehab  ___________________________________________________    Hospitalist: Abbie Mccallum MD     ___________________________________________________    *Medications reviewed:  Current Facility-Administered Medications   Medication Dose Route Frequency    pantoprazole (PROTONIX) 40 mg in sodium chloride 0.9% 10 mL injection  40 mg IntraVENous Q12H    calcitRIOL (ROCALTROL) capsule 0.25 mcg  0.25 mcg Oral Q MON, WED & FRI    calcium acetate (PHOSLO) capsule 667 mg  1 Cap Oral TID WITH MEALS    0.9% sodium chloride infusion 250 mL  250 mL IntraVENous PRN    epoetin kelsie-epbx (RETACRIT) 12,000 Units combo injection  12,000 Units SubCUTAneous Q TUE, THU & SAT    0.9% sodium chloride infusion  50 mL/hr IntraVENous CONTINUOUS    sodium bicarbonate tablet 650 mg  650 mg Oral TID    aluminum-magnesium hydroxide (MAALOX) oral suspension 15 mL  15 mL Oral QID PRN    0.9% sodium chloride infusion 250 mL  250 mL IntraVENous PRN    insulin lispro (HUMALOG) injection   SubCUTAneous AC&HS    glucose chewable tablet 16 g  4 Tab Oral PRN    glucagon (GLUCAGEN) injection 1 mg  1 mg IntraMUSCular PRN    dextrose (D50W) injection syrg 12.5-25 g  25-50 mL IntraVENous PRN    B complex-vitaminC-folic acid (NEPHROCAP) cap  1 Cap Oral DAILY    sodium chloride (NS) flush 5-10 mL  5-10 mL IntraVENous PRN    acetaminophen (TYLENOL) tablet 650 mg  650 mg Oral Q6H PRN    amLODIPine (NORVASC) tablet 10 mg  10 mg Oral DAILY    carvedilol (COREG) tablet 6.25 mg  6.25 mg Oral BID WITH MEALS    ferrous sulfate tablet 325 mg  325 mg Oral BID    loratadine (CLARITIN) tablet 10 mg  10 mg Oral DAILY PRN    morphine 10 mg/ml injection 1 mg  1 mg IntraVENous Q6H PRN    ondansetron (ZOFRAN) injection 4 mg  4 mg IntraVENous Q6H PRN

## 2019-06-03 NOTE — PROGRESS NOTES
Hematology/Medical Oncology Progress Note             Name: Brendan Diaz   : 1965   MRN: [id number]   Date: 6/3/2019 9:17 AM     [x]I have reviewed the flowsheet and previous days notes. Events overnight reviewed and discussed with nursing staff. Vital signs and records reviewed. Ms. Mendel Morris is a 51-year-old woman who has anemia and thrombocytopenia for which Dr. Yina Matias follows as an outpatient. Her current therapy is IVIG monthly, ferrous sulfate BID, and Venofer PRN. In addition she has a PMHx of DM, HTN and stage III kidney disease. She has been admitted with ARF on CKD-III. Voices no new c/o today, awaiting biopsy. ROS:  Constitutional: Negative for fever  HENT: Negative for nosebleeds, congestion, facial swelling, mouth sores, trouble swallowing, neck pain, neck stiffness, voice change and postnasal drip. Eyes: Negative for photophobia, pain, discharge and itching. Respiratory: Negative for apnea, cough, choking, chest tightness, wheezing and stridor. Cardiovascular: Negative for chest pain, palpitations and leg swelling. Gastrointestinal: Negative for abdominal pain. Negative for nausea, diarrhea, constipation, blood in stool and rectal pain. Genitourinary: Negative for dysuria, urgency, hematuria, flank pain and difficulty urinating. Musculoskeletal: Positive for back pain, resolved. Skin: Negative for color change, pallor, rash and wound. Neurological:  Negative for dizziness, facial asymmetry, speech difficulty, light-headedness and headaches. Hematological: Negative for adenopathy. Does not bruise/bleed easily.    Psychiatric/Behavioral: Negative for hallucinations, confusion, disturbed wake/sleep cycle and agitation.                  Vital Signs:    Visit Vitals  /78 (BP 1 Location: Left arm, BP Patient Position: At rest)   Pulse 80   Temp 97.9 °F (36.6 °C)   Resp 18   Ht 5' 9\" (1.753 m)   Wt 92.7 kg (204 lb 6.4 oz)   LMP 2017   SpO2 97%   BMI 30.18 kg/m²       O2 Device: Room air       Temp (24hrs), Av.2 °F (36.8 °C), Min:97.5 °F (36.4 °C), Max:98.6 °F (37 °C)       Intake/Output:   Last shift:      701 - 1900  In: 50 [I.V.:50]  Out: 300 [Urine:300]  Last 3 shifts: 1901 -  07  In: 5303 [P. O.:780; I.V.:1425]  Out: 2000 [Urine:2000]    Intake/Output Summary (Last 24 hours) at 6/3/2019 09  Last data filed at 6/3/2019 1791  Gross per 24 hour   Intake 1915 ml   Output 2000 ml   Net -85 ml       Physical Exam:    General: Appears comfortable, NAD  HEENT:  Anicteric sclerae; pink palpebral conjunctivae; mucosa moist  Resp:  Symmetrical chest expansion, no accessory muscle use; good airway entry; no rales/ wheezing/ rhonchi noted  CV:  S1, S2 present; regular rate and rhythm  GI:  Abdomen soft, non-tender; (+) active bowel sounds  Extremities:  +2 pulses on all extremities; no edema/ cyanosis/ clubbing noted  Skin:  Warm; no rashes/ lesions noted  Neurologic:  Non-focal          DATA:   Current Facility-Administered Medications   Medication Dose Route Frequency    pantoprazole (PROTONIX) 40 mg in sodium chloride 0.9% 10 mL injection  40 mg IntraVENous Q12H    calcitRIOL (ROCALTROL) capsule 0.25 mcg  0.25 mcg Oral Q MON, WED & FRI    calcium acetate (PHOSLO) capsule 667 mg  1 Cap Oral TID WITH MEALS    0.9% sodium chloride infusion 250 mL  250 mL IntraVENous PRN    epoetin kelsie-epbx (RETACRIT) 12,000 Units combo injection  12,000 Units SubCUTAneous Q TUE, THU & SAT    0.9% sodium chloride infusion  50 mL/hr IntraVENous CONTINUOUS    sodium bicarbonate tablet 650 mg  650 mg Oral TID    aluminum-magnesium hydroxide (MAALOX) oral suspension 15 mL  15 mL Oral QID PRN    0.9% sodium chloride infusion 250 mL  250 mL IntraVENous PRN    insulin lispro (HUMALOG) injection   SubCUTAneous AC&HS    glucose chewable tablet 16 g  4 Tab Oral PRN    glucagon (GLUCAGEN) injection 1 mg  1 mg IntraMUSCular PRN    dextrose (D50W) injection syrg 12.5-25 g  25-50 mL IntraVENous PRN    B complex-vitaminC-folic acid (NEPHROCAP) cap  1 Cap Oral DAILY    sodium chloride (NS) flush 5-10 mL  5-10 mL IntraVENous PRN    acetaminophen (TYLENOL) tablet 650 mg  650 mg Oral Q6H PRN    amLODIPine (NORVASC) tablet 10 mg  10 mg Oral DAILY    carvedilol (COREG) tablet 6.25 mg  6.25 mg Oral BID WITH MEALS    ferrous sulfate tablet 325 mg  325 mg Oral BID    loratadine (CLARITIN) tablet 10 mg  10 mg Oral DAILY PRN    morphine 10 mg/ml injection 1 mg  1 mg IntraVENous Q6H PRN    ondansetron (ZOFRAN) injection 4 mg  4 mg IntraVENous Q6H PRN                    Labs:  Recent Labs     06/03/19  0515 06/02/19  2306 06/02/19  1600  06/02/19  0445  06/01/19  0215   WBC 9.0  --   --   --  10.7  --  13.4*   HGB 7.0* 7.0* 7.1*   < > 6.8*   < > 6.8*   HCT 22.4* 22.8* 23.0*   < > 21.5*   < > 21.3*     --   --   --  205  --  144    < > = values in this interval not displayed. Recent Labs     06/03/19  0515 06/02/19  0445 06/01/19  0215    134* 134*   K 3.7 3.4* 3.2*    98* 97*   CO2 26 26 27   * 107* 138*   BUN 65* 65* 60*   CREA 5.92* 6.05* 6.12*   CA 8.2* 7.9* 7.5*   PHOS 6.1* 5.8* 5.2*     No results for input(s): PH, PCO2, PO2, HCO3, FIO2 in the last 72 hours. IMPRESSION:   · ARF on CKD stage 3  · DM2  · HTN  · Anemia of chronic disease  · Thrombocytopenia/ITP      ·         PLAN:   · Nephrology following:Tentative plan is for renal bx today. · DM and HTN- primary following  1. Anemia of chronic disease: H/H today 7.0/22. 4. Recommend PRBC transfusion for Hgb <7g/dl. We will continue Retracrit 12,000 units 3 X per week for hgb less than 10. Ferritin is 190, Iron % Sat cannot be calculated, TIBC is 114. Continue Ferrous Sulfate 325mg BID. heme positive stool but no active bleeding  2. Thrombocytopenia/ITP: Platelet count today is 244,000, no intervention warranted. 3. ANCA panel results pending. Nephrology following  4.  IgG, IgA and IgM- wnl   ·        The patient is: [x] acutely ill Risk of deterioration: [] moderate    [] critically ill  [] high         My assessment/plan was discussed with:  [x]nursing []PT/OT    []respiratory therapy [x]Dr.Lloyd Armani Engle MD      []family []       Justin Randall NP

## 2019-06-03 NOTE — PROGRESS NOTES
TRANSFER - OUT REPORT:    Verbal report given to [place], RN(name) on Geronimo Youngbloods  being transferred to (unit) for routine post - op       Report consisted of patients Situation, Background, Assessment and   Recommendations(SBAR). Information from the following report(s) SBAR, Kardex and MAR was reviewed with the receiving nurse. Lines:   Peripheral IV 05/30/19 Left Antecubital (Active)   Site Assessment Clean, dry, & intact 6/3/2019  7:30 AM   Phlebitis Assessment 0 6/3/2019  7:30 AM   Infiltration Assessment 0 6/3/2019  7:30 AM   Dressing Status Clean, dry, & intact 6/3/2019  7:30 AM   Dressing Type Transparent 6/3/2019  7:30 AM   Hub Color/Line Status Pink 6/3/2019  7:30 AM   Alcohol Cap Used Yes 6/3/2019  7:30 AM       Peripheral IV 05/30/19 Right Antecubital (Active)   Site Assessment Clean, dry, & intact 6/3/2019  7:30 AM   Phlebitis Assessment 0 6/3/2019  7:30 AM   Infiltration Assessment 0 6/3/2019  7:30 AM   Dressing Status Clean, dry, & intact 6/3/2019  7:30 AM   Dressing Type Transparent 6/3/2019  7:30 AM   Hub Color/Line Status Pink; Infusing 6/3/2019  7:30 AM   Alcohol Cap Used Yes 6/3/2019  7:30 AM        Opportunity for questions and clarification was provided.       Patient transported with:   [email or web address]

## 2019-06-03 NOTE — PROGRESS NOTES
Patient received back to unit drowsy and reports no pain. Will remain on bedrest for 4 hours and will collect samples of urine per orders.

## 2019-06-04 VITALS
HEIGHT: 69 IN | SYSTOLIC BLOOD PRESSURE: 138 MMHG | WEIGHT: 200.8 LBS | DIASTOLIC BLOOD PRESSURE: 80 MMHG | HEART RATE: 78 BPM | OXYGEN SATURATION: 100 % | RESPIRATION RATE: 18 BRPM | BODY MASS INDEX: 29.74 KG/M2 | TEMPERATURE: 97.6 F

## 2019-06-04 LAB
ABO + RH BLD: NORMAL
ALBUMIN SERPL-MCNC: 1.4 G/DL (ref 3.4–5)
ALBUMIN/GLOB SERPL: 0.3 {RATIO} (ref 0.8–1.7)
ALP SERPL-CCNC: 73 U/L (ref 45–117)
ALT SERPL-CCNC: 15 U/L (ref 13–56)
ANION GAP SERPL CALC-SCNC: 10 MMOL/L (ref 3–18)
AST SERPL-CCNC: 18 U/L (ref 15–37)
BASOPHILS # BLD: 0 K/UL (ref 0–0.06)
BASOPHILS NFR BLD: 0 % (ref 0–3)
BILIRUB SERPL-MCNC: 0.2 MG/DL (ref 0.2–1)
BLD PROD TYP BPU: NORMAL
BLOOD GROUP ANTIBODIES SERPL: NORMAL
BPU ID: NORMAL
BUN SERPL-MCNC: 60 MG/DL (ref 7–18)
BUN/CREAT SERPL: 11 (ref 12–20)
C-ANCA TITR SER IF: NORMAL TITER
CALCIUM SERPL-MCNC: 8.2 MG/DL (ref 8.5–10.1)
CALLED TO:,BCALL1: NORMAL
CALLED TO:,BCALL2: NORMAL
CHLORIDE SERPL-SCNC: 106 MMOL/L (ref 100–108)
CO2 SERPL-SCNC: 25 MMOL/L (ref 21–32)
CREAT SERPL-MCNC: 5.61 MG/DL (ref 0.6–1.3)
CROSSMATCH RESULT,%XM: NORMAL
DIFFERENTIAL METHOD BLD: ABNORMAL
EOSINOPHIL # BLD: 0 K/UL (ref 0–0.4)
EOSINOPHIL NFR BLD: 0 % (ref 0–5)
ERYTHROCYTE [DISTWIDTH] IN BLOOD BY AUTOMATED COUNT: 14.9 % (ref 11.6–14.5)
GLOBULIN SER CALC-MCNC: 5.2 G/DL (ref 2–4)
GLUCOSE BLD STRIP.AUTO-MCNC: 121 MG/DL (ref 70–110)
GLUCOSE BLD STRIP.AUTO-MCNC: 132 MG/DL (ref 70–110)
GLUCOSE BLD STRIP.AUTO-MCNC: 160 MG/DL (ref 70–110)
GLUCOSE SERPL-MCNC: 140 MG/DL (ref 74–99)
HCT VFR BLD AUTO: 23.4 % (ref 35–45)
HCT VFR BLD AUTO: 26.3 % (ref 35–45)
HGB BLD-MCNC: 7.1 G/DL (ref 12–16)
HGB BLD-MCNC: 7.9 G/DL (ref 12–16)
LYMPHOCYTES # BLD: 1.1 K/UL (ref 0.8–3.5)
LYMPHOCYTES NFR BLD: 11 % (ref 20–51)
MCH RBC QN AUTO: 25 PG (ref 24–34)
MCHC RBC AUTO-ENTMCNC: 30.3 G/DL (ref 31–37)
MCV RBC AUTO: 82.4 FL (ref 74–97)
MONOCYTES # BLD: 0.7 K/UL (ref 0–1)
MONOCYTES NFR BLD: 7 % (ref 2–9)
MYELOPEROXIDASE AB SER IA-ACNC: <9 U/ML (ref 0–9)
NEUTS BAND NFR BLD MANUAL: 2 % (ref 0–5)
NEUTS SEG # BLD: 8.3 K/UL (ref 1.8–8)
NEUTS SEG NFR BLD: 80 % (ref 42–75)
P-ANCA ATYPICAL TITR SER IF: NORMAL TITER
P-ANCA TITR SER IF: NORMAL TITER
PHOSPHATE SERPL-MCNC: 5.5 MG/DL (ref 2.5–4.9)
PLATELET # BLD AUTO: 249 K/UL (ref 135–420)
PLATELET COMMENTS,PCOM: ABNORMAL
PMV BLD AUTO: 12.3 FL (ref 9.2–11.8)
POTASSIUM SERPL-SCNC: 3.8 MMOL/L (ref 3.5–5.5)
PROT SERPL-MCNC: 6.6 G/DL (ref 6.4–8.2)
PROTEINASE3 AB SER IA-ACNC: <3.5 U/ML (ref 0–3.5)
RBC # BLD AUTO: 2.84 M/UL (ref 4.2–5.3)
RBC MORPH BLD: ABNORMAL
SODIUM SERPL-SCNC: 141 MMOL/L (ref 136–145)
SPECIMEN EXP DATE BLD: NORMAL
STATUS OF UNIT,%ST: NORMAL
UNIT DIVISION, %UDIV: 0
WBC # BLD AUTO: 10.1 K/UL (ref 4.6–13.2)

## 2019-06-04 PROCEDURE — C9113 INJ PANTOPRAZOLE SODIUM, VIA: HCPCS | Performed by: HOSPITALIST

## 2019-06-04 PROCEDURE — 36415 COLL VENOUS BLD VENIPUNCTURE: CPT

## 2019-06-04 PROCEDURE — 74011250637 HC RX REV CODE- 250/637: Performed by: INTERNAL MEDICINE

## 2019-06-04 PROCEDURE — 85018 HEMOGLOBIN: CPT

## 2019-06-04 PROCEDURE — 85025 COMPLETE CBC W/AUTO DIFF WBC: CPT

## 2019-06-04 PROCEDURE — 80053 COMPREHEN METABOLIC PANEL: CPT

## 2019-06-04 PROCEDURE — 74011000250 HC RX REV CODE- 250: Performed by: HOSPITALIST

## 2019-06-04 PROCEDURE — 74011250636 HC RX REV CODE- 250/636: Performed by: HOSPITALIST

## 2019-06-04 PROCEDURE — 74011250636 HC RX REV CODE- 250/636: Performed by: INTERNAL MEDICINE

## 2019-06-04 PROCEDURE — 84100 ASSAY OF PHOSPHORUS: CPT

## 2019-06-04 PROCEDURE — 74011250637 HC RX REV CODE- 250/637: Performed by: HOSPITALIST

## 2019-06-04 PROCEDURE — 74011636637 HC RX REV CODE- 636/637: Performed by: NURSE PRACTITIONER

## 2019-06-04 PROCEDURE — 82962 GLUCOSE BLOOD TEST: CPT

## 2019-06-04 RX ORDER — PANTOPRAZOLE SODIUM 40 MG/1
40 TABLET, DELAYED RELEASE ORAL
Qty: 30 TAB | Refills: 1 | Status: ON HOLD | OUTPATIENT
Start: 2019-06-05 | End: 2019-08-12

## 2019-06-04 RX ORDER — SODIUM BICARBONATE 650 MG/1
650 TABLET ORAL 3 TIMES DAILY
Qty: 90 TAB | Refills: 1 | Status: SHIPPED | OUTPATIENT
Start: 2019-06-04 | End: 2020-03-11

## 2019-06-04 RX ORDER — PANTOPRAZOLE SODIUM 40 MG/1
40 TABLET, DELAYED RELEASE ORAL
Status: DISCONTINUED | OUTPATIENT
Start: 2019-06-05 | End: 2019-06-04 | Stop reason: HOSPADM

## 2019-06-04 RX ORDER — CALCITRIOL 0.25 UG/1
0.25 CAPSULE ORAL
Qty: 30 CAP | Refills: 1 | Status: SHIPPED | OUTPATIENT
Start: 2019-06-05 | End: 2020-03-11

## 2019-06-04 RX ORDER — CALCIUM ACETATE 667 MG/1
1 CAPSULE ORAL
Qty: 90 CAP | Refills: 3 | Status: SHIPPED | OUTPATIENT
Start: 2019-06-04

## 2019-06-04 RX ADMIN — Medication 10 ML: at 09:27

## 2019-06-04 RX ADMIN — FLUCONAZOLE 200 MG: 200 TABLET ORAL at 09:26

## 2019-06-04 RX ADMIN — CALCIUM ACETATE 667 MG: 667 CAPSULE ORAL at 16:25

## 2019-06-04 RX ADMIN — CARVEDILOL 6.25 MG: 6.25 TABLET, FILM COATED ORAL at 16:25

## 2019-06-04 RX ADMIN — SODIUM CHLORIDE 50 ML/HR: 900 INJECTION, SOLUTION INTRAVENOUS at 12:45

## 2019-06-04 RX ADMIN — CALCIUM ACETATE 667 MG: 667 CAPSULE ORAL at 09:25

## 2019-06-04 RX ADMIN — NEPHROCAP 1 CAPSULE: 1 CAP ORAL at 09:26

## 2019-06-04 RX ADMIN — CARVEDILOL 6.25 MG: 6.25 TABLET, FILM COATED ORAL at 09:25

## 2019-06-04 RX ADMIN — FERROUS SULFATE TAB 325 MG (65 MG ELEMENTAL FE) 325 MG: 325 (65 FE) TAB at 09:25

## 2019-06-04 RX ADMIN — SODIUM BICARBONATE 650 MG TABLET 650 MG: at 09:25

## 2019-06-04 RX ADMIN — SODIUM CHLORIDE 40 MG: 9 INJECTION INTRAMUSCULAR; INTRAVENOUS; SUBCUTANEOUS at 09:26

## 2019-06-04 RX ADMIN — INSULIN LISPRO 2 UNITS: 100 INJECTION, SOLUTION INTRAVENOUS; SUBCUTANEOUS at 12:42

## 2019-06-04 RX ADMIN — AMLODIPINE BESYLATE 10 MG: 10 TABLET ORAL at 09:26

## 2019-06-04 RX ADMIN — CALCIUM ACETATE 667 MG: 667 CAPSULE ORAL at 12:10

## 2019-06-04 RX ADMIN — SODIUM BICARBONATE 650 MG TABLET 650 MG: at 16:25

## 2019-06-04 NOTE — DISCHARGE INSTRUCTIONS
DISCHARGE SUMMARY from Nurse    PATIENT INSTRUCTIONS:    After general anesthesia or intravenous sedation, for 24 hours or while taking prescription Narcotics:  · Limit your activities  · Do not drive and operate hazardous machinery  · Do not make important personal or business decisions  · Do  not drink alcoholic beverages  · If you have not urinated within 8 hours after discharge, please contact your surgeon on call. Report the following to your surgeon:  · Excessive pain, swelling, redness or odor of or around the surgical area  · Temperature over 100.5  · Nausea and vomiting lasting longer than 4 hours or if unable to take medications  · Any signs of decreased circulation or nerve impairment to extremity: change in color, persistent  numbness, tingling, coldness or increase pain  · Any questions    What to do at Home:  Recommended activity: Activity as tolerated. If you experience any of the following symptoms bleeding, swelling, fever,chestpain/pain, shortness of breath or any other concern please follow up with PCP or go to the nearest ED. *  Please give a list of your current medications to your Primary Care Provider. *  Please update this list whenever your medications are discontinued, doses are      changed, or new medications (including over-the-counter products) are added. *  Please carry medication information at all times in case of emergency situations. These are general instructions for a healthy lifestyle:    No smoking/ No tobacco products/ Avoid exposure to second hand smoke  Surgeon General's Warning:  Quitting smoking now greatly reduces serious risk to your health.     Obesity, smoking, and sedentary lifestyle greatly increases your risk for illness    A healthy diet, regular physical exercise & weight monitoring are important for maintaining a healthy lifestyle    You may be retaining fluid if you have a history of heart failure or if you experience any of the following symptoms:  Weight gain of 3 pounds or more overnight or 5 pounds in a week, increased swelling in our hands or feet or shortness of breath while lying flat in bed. Please call your doctor as soon as you notice any of these symptoms; do not wait until your next office visit. Recognize signs and symptoms of STROKE:    F-face looks uneven    A-arms unable to move or move unevenly    S-speech slurred or non-existent    T-time-call 911 as soon as signs and symptoms begin-DO NOT go       Back to bed or wait to see if you get better-TIME IS BRAIN. Warning Signs of HEART ATTACK     Call 911 if you have these symptoms:   Chest discomfort. Most heart attacks involve discomfort in the center of the chest that lasts more than a few minutes, or that goes away and comes back. It can feel like uncomfortable pressure, squeezing, fullness, or pain.  Discomfort in other areas of the upper body. Symptoms can include pain or discomfort in one or both arms, the back, neck, jaw, or stomach.  Shortness of breath with or without chest discomfort.  Other signs may include breaking out in a cold sweat, nausea, or lightheadedness. Don't wait more than five minutes to call 911 - MINUTES MATTER! Fast action can save your life. Calling 911 is almost always the fastest way to get lifesaving treatment. Emergency Medical Services staff can begin treatment when they arrive -- up to an hour sooner than if someone gets to the hospital by car. The discharge information has been reviewed with the patient. The patient verbalized understanding. Discharge medications reviewed with the patient and appropriate educational materials and side effects teaching were provided.   ___________________________________________________________________________________________________________________________________

## 2019-06-04 NOTE — PROGRESS NOTES
Hematology/Medical Oncology Progress Note             Name: Panchito Levy   : 1965   MRN: [id number]   Date: 2019 8:52 AM     [x]I have reviewed the flowsheet and previous days notes. Events overnight reviewed and discussed with nursing staff. Vital signs and records reviewed. Ms. Ronni Orozco is a 72-year-old woman who has anemia and thrombocytopenia for which she has been followed as an outpatient. Her current therapy is IVIG monthly, ferrous sulfate BID, and Venofer PRN. In addition she has a PMHx of DM, HTN and stage III kidney disease. She has been admitted with ARF on CKD-III. Voices no new c/o today, awaiting biopsy. ROS:  Constitutional: Negative for fever  HENT: Negative for nosebleeds, congestion, facial swelling, mouth sores, trouble swallowing, neck pain, neck stiffness, voice change and postnasal drip. Eyes: Negative for photophobia, pain, discharge and itching. Respiratory: Negative for apnea, cough, choking, chest tightness, wheezing and stridor. Cardiovascular: Negative for chest pain, palpitations and leg swelling. Gastrointestinal: Negative for abdominal pain. Negative for nausea, diarrhea, constipation, blood in stool and rectal pain. Genitourinary: Negative for dysuria, urgency, hematuria, flank pain and difficulty urinating. Musculoskeletal: Positive for back pain, resolved. Skin: Negative for color change, pallor, rash and wound. Neurological:  Negative for dizziness, facial asymmetry, speech difficulty, light-headedness and headaches. Hematological: Negative for adenopathy. Does not bruise/bleed easily.    Psychiatric/Behavioral: Negative for hallucinations, confusion, disturbed wake/sleep cycle and agitation.                  Vital Signs:    Visit Vitals  /82 (BP 1 Location: Left arm, BP Patient Position: At rest)   Pulse 80   Temp 98 °F (36.7 °C)   Resp 18   Ht 5' 9\" (1.753 m)   Wt 91.1 kg (200 lb 12.8 oz)   LMP 2017   SpO2 98%   BMI 29.65 kg/m²       O2 Device: Room air   O2 Flow Rate (L/min): 2 l/min   Temp (24hrs), Av °F (36.7 °C), Min:97.2 °F (36.2 °C), Max:98.4 °F (36.9 °C)       Intake/Output:   Last shift:      No intake/output data recorded.   Last 3 shifts:  1901 -  0700  In: 1960 [P.O.:460; I.V.:1500]  Out: 3800 [Urine:3800]    Intake/Output Summary (Last 24 hours) at 2019 0852  Last data filed at 2019 0323  Gross per 24 hour   Intake 800 ml   Output 2100 ml   Net -1300 ml       Physical Exam:    General: Appears comfortable, NAD  HEENT:  Anicteric sclerae; pink palpebral conjunctivae; mucosa moist  Resp:  Symmetrical chest expansion, no accessory muscle use; good airway entry; no rales/ wheezing/ rhonchi noted  CV:  S1, S2 present; regular rate and rhythm  GI:  Abdomen soft, non-tender; (+) active bowel sounds  Extremities:  +2 pulses on all extremities; no edema/ cyanosis/ clubbing noted  Skin:  Warm; no rashes/ lesions noted  Neurologic:  Non-focal          DATA:   Current Facility-Administered Medications   Medication Dose Route Frequency    sodium chloride (NS) flush 5-40 mL  5-40 mL IntraVENous Q8H    sodium chloride (NS) flush 5-40 mL  5-40 mL IntraVENous PRN    gelatin adsorbable (GELFOAM) 12-7 mm sponge 1 Each  1 Each Topical PRN    flumazenil (ROMAZICON) 0.1 mg/mL injection 0.2 mg  0.2 mg IntraVENous Multiple    naloxone (NARCAN) injection 0.1 mg  0.1 mg IntraVENous Multiple    fluconazole (DIFLUCAN) tablet 200 mg  200 mg Oral DAILY    pantoprazole (PROTONIX) 40 mg in sodium chloride 0.9% 10 mL injection  40 mg IntraVENous Q12H    calcitRIOL (ROCALTROL) capsule 0.25 mcg  0.25 mcg Oral Q MON, WED & FRI    calcium acetate (PHOSLO) capsule 667 mg  1 Cap Oral TID WITH MEALS    0.9% sodium chloride infusion 250 mL  250 mL IntraVENous PRN    epoetin kelsie-epbx (RETACRIT) 12,000 Units combo injection  12,000 Units SubCUTAneous Q TUE, THU & SAT    0.9% sodium chloride infusion  50 mL/hr IntraVENous CONTINUOUS    sodium bicarbonate tablet 650 mg  650 mg Oral TID    aluminum-magnesium hydroxide (MAALOX) oral suspension 15 mL  15 mL Oral QID PRN    0.9% sodium chloride infusion 250 mL  250 mL IntraVENous PRN    insulin lispro (HUMALOG) injection   SubCUTAneous AC&HS    glucose chewable tablet 16 g  4 Tab Oral PRN    glucagon (GLUCAGEN) injection 1 mg  1 mg IntraMUSCular PRN    dextrose (D50W) injection syrg 12.5-25 g  25-50 mL IntraVENous PRN    B complex-vitaminC-folic acid (NEPHROCAP) cap  1 Cap Oral DAILY    sodium chloride (NS) flush 5-10 mL  5-10 mL IntraVENous PRN    acetaminophen (TYLENOL) tablet 650 mg  650 mg Oral Q6H PRN    amLODIPine (NORVASC) tablet 10 mg  10 mg Oral DAILY    carvedilol (COREG) tablet 6.25 mg  6.25 mg Oral BID WITH MEALS    ferrous sulfate tablet 325 mg  325 mg Oral BID    loratadine (CLARITIN) tablet 10 mg  10 mg Oral DAILY PRN    morphine 10 mg/ml injection 1 mg  1 mg IntraVENous Q6H PRN    ondansetron (ZOFRAN) injection 4 mg  4 mg IntraVENous Q6H PRN                    Labs:  Recent Labs     06/04/19  0259 06/03/19  1909 06/03/19  1005 06/03/19  0515  06/02/19  0445   WBC 10.1  --   --  9.0  --  10.7   HGB 7.1* 7.9* 7.4* 7.0*   < > 6.8*   HCT 23.4* 26.6* 23.6* 22.4*   < > 21.5*     --   --  244  --  205    < > = values in this interval not displayed. Recent Labs     06/04/19  0259 06/03/19  1005 06/03/19  0515 06/02/19  0445     --  138 134*   K 3.8  --  3.7 3.4*     --  103 98*   CO2 25  --  26 26   *  --  110* 107*   BUN 60*  --  65* 65*   CREA 5.61*  --  5.92* 6.05*   CA 8.2*  --  8.2* 7.9*   PHOS 5.5*  --  6.1* 5.8*   ALB 1.4*  --   --   --    SGOT 18  --   --   --    ALT 15  --   --   --    INR  --  1.2  --   --      No results for input(s): PH, PCO2, PO2, HCO3, FIO2 in the last 72 hours.      IMPRESSION:   · ARF on CKD stage 3  · DM2  · HTN  · Anemia of chronic disease  · Thrombocytopenia/ITP      ·         PLAN:   · Nephrology following: The patient had her renal biopsy completed yesterday. · DM and HTN- primary following  1. Anemia of chronic disease: H/H today 7.0/22. 4. Recommend PRBC transfusion for Hgb <7g/dl. We will continue Retracrit 12,000 units 3 X per week for hgb less than 10. Ferritin is 190, Iron % Sat cannot be calculated, TIBC is 114. Continue Ferrous Sulfate 325mg BID. heme positive stool but no active bleeding  2. Thrombocytopenia/ITP: Platelet count today is 244,000, no intervention warranted. 3. ANCA panel results pending. Nephrology following  4.  IgG, IgA and IgM- wnl   ·        The patient is: [x] acutely ill Risk of deterioration: [] moderate    [] critically ill  [] high         My assessment/plan was discussed with:  [x]nursing []PT/OT    []respiratory therapy []      []family []       Jacky Jane MD

## 2019-06-04 NOTE — PROGRESS NOTES
Progress Note    Alesha Risk  48 y.o. Admit Date: 5/30/2019  Active Problems:    Type 2 DM with CKD and hypertension ([facility] [address] (1/23/2015) POA: Yes      ARF (acute renal failure) ([facility] [address] (5/30/2019) POA: Unknown      Acute renal failure superimposed on stage 5 chronic kidney disease, not on chronic dialysis ([facility] [address] (5/30/2019) POA: Yes      Anemia (5/30/2019) POA: Unknown      Metabolic acidosis (4/97/9221) POA: Unknown      Proteinuria (5/30/2019) POA: Unknown      Secondary hyperparathyroidism of renal origin ([facility] [address] (6/2/2019) POA: Unknown            Subjective:     Patient is sad wants to go home, wants o do rest of the work as OP. Reviewed serial Urine samples at the bed site. 3rd Urine sample has blood, 1st 2 samples are clear. Undergone Kidney Biopsy  [facility] ,they received the sample today, not read yet, preiminary report may be this afternoon. ARLETH, C3,C4 Hep B,Hep C, HIV, Anti GBM all are negative. WBC count is Coming down ,Blood Cultures are negative, Urine Culture is  Contaminated sample, No more Fever but has high ASO titer, & Last ESR was > 140. Continued Improvement of Renal function with Hydration, still eGFR is < 10 to 11cc/hour. Needs dialysis, does not want now, wants to discuss as OP, wants transplant. A comprehensive review of systems was negative except for that written in the History of Present Illness.     Objective:     Visit Vitals  /81 (BP 1 Location: Right arm, BP Patient Position: At rest)   Pulse 78   Temp 98.4 °F (36.9 °C)   Resp 18   Ht 5' 9\" (1.753 m)   Wt 91.1 kg (200 lb 12.8 oz)   LMP 06/18/2017   SpO2 100%   BMI 29.65 kg/m²         Intake/Output Summary (Last 24 hours) at 6/4/2019 1322  Last data filed at 6/4/2019 0917  Gross per 24 hour   Intake 1180 ml   Output 2100 ml   Net -920 ml       Current Facility-Administered Medications   Medication Dose Route Frequency Provider Last Rate Last Dose    sodium chloride (NS) flush 5-40 mL  5-40 mL IntraVENous Q8H Bushra He MD   10 mL at 06/04/19 4238    sodium chloride (NS) flush 5-40 mL  5-40 mL IntraVENous PRN Bushra He MD        gelatin adsorbable (GELFOAM) 12-7 mm sponge 1 Each  1 Each Topical PRN Bushra He MD   1 Each at 06/03/19 1431    flumazenil (ROMAZICON) 0.1 mg/mL injection 0.2 mg  0.2 mg IntraVENous Johnnie Roberson MD        naloxone (NARCAN) injection 0.1 mg  0.1 mg IntraVENous Multiple Johnnie Parker MD        fluconazole (DIFLUCAN) tablet 200 mg  200 mg Oral DAILY Jayme Cedeño MD   200 mg at 06/04/19 0926    pantoprazole (PROTONIX) 40 mg in sodium chloride 0.9% 10 mL injection  40 mg IntraVENous Q12H Ma Scheuermann, MD   40 mg at 06/04/19 0926    calcitRIOL (ROCALTROL) capsule 0.25 mcg  0.25 mcg Oral Q MON, WED & Rometta Sydni Stanford MD   0.25 mcg at 06/03/19 2148    calcium acetate (PHOSLO) capsule 667 mg  1 Cap Oral TID WITH MEALS Timmy Jennings MD   667 mg at 06/04/19 1210    0.9% sodium chloride infusion 250 mL  250 mL IntraVENous PRN Saeed Hinds MD        epoetin kelsie-epbx (RETACRIT) 12,000 Units combo injection  12,000 Units SubCUTAneous Q TUE, THU & SAT Cele Shay NP   12,000 Units at 06/01/19 2222    0.9% sodium chloride infusion  50 mL/hr IntraVENous CONTINUOUS Timmy Jennings MD 50 mL/hr at 06/04/19 1245 50 mL/hr at 06/04/19 1245    sodium bicarbonate tablet 650 mg  650 mg Oral TID Timmy Jennings MD   650 mg at 06/04/19 0925    aluminum-magnesium hydroxide (MAALOX) oral suspension 15 mL  15 mL Oral QID PRN Saeed Hinds MD   15 mL at 05/31/19 0153    0.9% sodium chloride infusion 250 mL  250 mL IntraVENous PRN Yoav Kauffman NP        insulin lispro (HUMALOG) injection   SubCUTAneous AC&HS Yoav Kauffman NP   2 Units at 06/04/19 1242    glucose chewable tablet 16 g  4 Tab Oral PRN Yoav Kauffman NP        glucagon (GLUCAGEN) injection 1 mg  1 mg IntraMUSCular PRN Yoav Kauffman NP  dextrose (D50W) injection syrg 12.5-25 g  25-50 mL IntraVENous PRN Krystyna Thomas NP        B complex-vitaminC-folic acid (NEPHROCAP) cap  1 Cap Oral DAILY Leatha Lindsey MD   1 Cap at 06/04/19 0926    sodium chloride (NS) flush 5-10 mL  5-10 mL IntraVENous PRN Teri Camejo MD        acetaminophen (TYLENOL) tablet 650 mg  650 mg Oral Q6H PRN Teri Camejo MD   650 mg at 06/01/19 0003    amLODIPine (NORVASC) tablet 10 mg  10 mg Oral DAILY Teri Camejo MD   10 mg at 06/04/19 8308    carvedilol (COREG) tablet 6.25 mg  6.25 mg Oral BID WITH MEALS Teri Camejo MD   6.25 mg at 06/04/19 5284    ferrous sulfate tablet 325 mg  325 mg Oral BID Teri Camejo MD   325 mg at 06/04/19 0925    loratadine (CLARITIN) tablet 10 mg  10 mg Oral DAILY PRN Teri Camejo MD        morphine 10 mg/ml injection 1 mg  1 mg IntraVENous Q6H PRN Teri Camejo MD   1 mg at 06/03/19 2149    ondansetron (ZOFRAN) injection 4 mg  4 mg IntraVENous Q6H PRN Teri Camejo MD   4 mg at 05/30/19 1821        Physical Exam:     Physical Exam:   General:  Alert, cooperative, no distress, appears stated age. Eyes:  Conjunctivae/corneas clear. PERRL, EOMs intact. Mouth/Throat: Lips, mucosa, and tongue normal. Teeth and gums normal.   Neck: Supple, symmetrical, trachea midline, no adenopathy, thyroid: no enlargement/tenderness/nodules, no carotid bruit and no JVD. Lungs:   Clear to auscultation bilaterally. Heart:  Regular rate and rhythm, S1, S2 normal, no murmur, click, rub or gallop. Abdomen:   Soft, non-tender. Bowel sounds normal. No masses,  No organomegaly. Extremities: Extremities normal, atraumatic, no cyanosis or edema.    Skin: Skin color, texture, turgor normal. No rashes or lesions         Data Review:    CBC w/Diff    Recent Labs     06/04/19  0259 06/03/19  1909 06/03/19  1005 06/03/19  0515  06/02/19  0445   WBC 10.1  --   --  9.0  --  10.7   RBC 2.84*  --   --  2.75*  --  2.66*   HGB 7.1* 7.9* 7.4* 7.0* < > 6.8*   HCT 23.4* 26.6* 23.6* 22.4*   < > 21.5*   MCV 82.4  --   --  81.5  --  80.8   MCH 25.0  --   --  25.5  --  25.6   MCHC 30.3*  --   --  31.3  --  31.6   RDW 14.9*  --   --  15.1*  --  15.0*    < > = values in this interval not displayed. Recent Labs     06/04/19 0259 06/03/19 0515 06/02/19 0445   BANDS 2 1 1   MONOS 7 6 9   EOS 0 1 0   BASOS 0 0 0   RDW 14.9* 15.1* 15.0*        Comprehensive Metabolic Profile    Recent Labs     06/04/19 0259 06/03/19 0515 06/02/19 0445    138 134*   K 3.8 3.7 3.4*    103 98*   CO2 25 26 26   BUN 60* 65* 65*   CREA 5.61* 5.92* 6.05*    Recent Labs     06/04/19 0259 06/03/19 0515 06/02/19 0445   CA 8.2* 8.2* 7.9*   PHOS 5.5* 6.1* 5.8*   ALB 1.4*  --   --    TP 6.6  --   --    SGOT 18  --   --    TBILI 0.2  --   --                         Impression:       Active Hospital Problems    Diagnosis Date Noted    Type 2 DM with CKD and hypertension ([facility] 01/23/2015     Priority: 1 - One    Secondary hyperparathyroidism of renal origin ([facility] 06/02/2019    ARF (acute renal failure) ([facility] 05/30/2019    Acute renal failure superimposed on stage 5 chronic kidney disease, not on chronic dialysis ([facility] 05/30/2019    Anemia 96/48/0368    Metabolic acidosis 60/28/4603    Proteinuria 05/30/2019            Plan:     High Risk patient to DC , has hematuria Post Biopsy, blood in the stool, recommend to continue to watch H/H . Start Ot/PT, If H/H does not drop then may be Discharged provided cleared by GI. Gi preferred  for Colonoscopy in hospital setting given her poor Renal status. Will DC IVF, start Ot/PT. Told her to stay at least  1 more day until her H/ H is stabilized, otherwise option will be AMA ,. Needs Follow up with me,no ARB/No ACEI in this stage. No more Metformin.   Discussed with Dr. Venice Nobles MD

## 2019-06-04 NOTE — DISCHARGE SUMMARY
Hospitalist Discharge Summary     Patient ID:  Judy Mckeon  [id number]  85 y.o.  1965    PCP on record: Hanny Roland MD    Admit date: 5/30/2019  Discharge date and time: 6/4/2019    HPI;    48 y.o. AA female with past medical history of DM, HTN   with known stage III kidney disease comes from her nephrologist office for worsening BUN and Creatinine. she has had a stomach virus with fever vomiting and diarrhea nonbloody and crampy upper abdominal pain.  Labs today at the nephrology office revealed her creatinine is now 6.      She is referred here for IV hydration, bicarb drip, admission.  For the last several days she is felt slightly lethargic but her symptoms have not been severe. Anthony Menon is making urine. She did not have diarrhea in the past two hrs. No chest pain. No fever. No chills. No dysuria. No hematuria. Discharge Diagnoses:        Hospital Course by problems:                              Sepsis (POA) due to intercurrent viral gastroenteritis. Resolved; High grade fevers on admission likely due to resolving viral infection alongwith toxin accumulation from renal failure. Resolved. Doing fine off abx. ARF on CKD-III: cr is 5.6 today ; s/p  renal bx. Follow up path as out pt with Dr Olimpia Meraz. Continue Po bicarb. Follow up ANCA results. US  Showed;  IMPRESSION  IMPRESSION:   Medical renal disease. Diarrhea with heme positive stool: c diff is negative; resolved. Sepsis - noted leukocytosis, febrile, tachy likely d/t viral syndrome ; resolved. All culture are negative. DM Type II: stable ;dc metformin. Essential HTN: Controlled, off HCTZ, Ace and Arb in setting of #1. Continue amlodipine and Coreg.     Echo was fine;  · Left Ventricle: Mild concentric hypertrophy. Estimated left ventricular ejection fraction is 56 - 60%. Visually measured ejection fraction. No regional wall motion abnormality noted.  Age-appropriate left ventricular diastolic function. · Left Atrium: Mildly dilated left atrium. · Pulmonary Artery: Pulmonary arterial systolic pressure is 21 mmHg. Anemia of chronic disease / likely r/t CKD: heme positive stool but no active bleeding, s/p 1 unit PRBC's; h/h is up to 7.9; follow with GI as out pt for colonoscopy. Heme positive stool, no active bleed: continue PPI. H/o ITP and anemia; pt gets IVIG tx with DR Ashley Cotton. Platelets are fine right now. Consults: ID, GI, Nephrology and Hematology/Oncology            Pertinent Lab Data:  Recent Labs     06/04/19  1511 06/04/19  0259 06/03/19  1909  06/03/19  0515  06/02/19  0445   WBC  --  10.1  --   --  9.0  --  10.7   HGB 7.9* 7.1* 7.9*   < > 7.0*   < > 6.8*   HCT 26.3* 23.4* 26.6*   < > 22.4*   < > 21.5*   PLT  --  249  --   --  244  --  205    < > = values in this interval not displayed. Recent Labs     06/04/19  0259 06/03/19  1005 06/03/19  0515 06/02/19  0445     --  138 134*   K 3.8  --  3.7 3.4*     --  103 98*   CO2 25  --  26 26   *  --  110* 107*   BUN 60*  --  65* 65*   CREA 5.61*  --  5.92* 6.05*   CA 8.2*  --  8.2* 7.9*   PHOS 5.5*  --  6.1* 5.8*   ALB 1.4*  --   --   --    SGOT 18  --   --   --    ALT 15  --   --   --    INR  --  1.2  --   --        DISCHARGE MEDICATIONS:  @  Current Discharge Medication List      START taking these medications    Details   b complex-vitamin c-folic acid (NEPHROCAPS) 1 mg capsule Take 1 Cap by mouth daily. Qty: 30 Cap, Refills: 1      calcitRIOL (ROCALTROL) 0.25 mcg capsule Take 1 Cap by mouth every Monday, Wednesday, Friday. Qty: 30 Cap, Refills: 1      calcium acetate (PHOSLO) 667 mg cap Take 1 Cap by mouth three (3) times daily (with meals). Qty: 90 Cap, Refills: 3      pantoprazole (PROTONIX) 40 mg tablet Take 1 Tab by mouth Daily (before breakfast). Qty: 30 Tab, Refills: 1      sodium bicarbonate 650 mg tablet Take 1 Tab by mouth three (3) times daily.   Qty: 90 Tab, Refills: 1         CONTINUE these medications which have NOT CHANGED    Details   loratadine (CLARITIN) 10 mg tablet Take 10 mg by mouth daily as needed for Allergies. carvedilol (COREG) 6.25 mg tablet Take 1 Tab by mouth two (2) times daily (with meals). Qty: 30 Tab, Refills: 0      acetaminophen (TYLENOL) 500 mg tablet Take 2 Tabs by mouth every six (6) hours as needed for Pain. Qty: 20 Tab, Refills: 0      amLODIPine (NORVASC) 10 mg tablet Take 10 mg by mouth daily. Indications: HYPERTENSION      ferrous sulfate 325 mg (65 mg iron) tablet Take 325 mg by mouth two (2) times a day. STOP taking these medications       metFORMIN (GLUCOPHAGE) 500 mg tablet Comments:   Reason for Stopping:         lisinopril-hydrochlorothiazide (PRINZIDE, ZESTORETIC) 20-25 mg per tablet Comments:   Reason for Stopping:                   My Recommended Diet, Activity, Wound Care, and follow-up labs are listed in the patient's Discharge Insturctions which I have personally completed and reviewed.       Disposition:     [x] Home with family     [] [facility] PT/RN   [] SNF/NH   [] Inpatient Rehab/STEVE  Condition at Discharge:  Stable    Follow up with:   PCP : Isa Claros MD          >30 minutes spent coordinating this discharge (review instructions/follow-up, prescriptions, preparing report for sign off)    Signed:  Maria E Jonas MD  6/4/2019  4:01 PM

## 2019-06-04 NOTE — PROGRESS NOTES
[email or web address]  [phone]    Gastroenterology follow up-Progress note    Impression:  1. Heme (+) stool (6/1/2019). No prior colonoscopy evaluation. 2. Renal biopsy 6/3/2019. Pathology pending  3. Personal history of chronic anemia   - H/H relatively stable  - 1 unit PRBC this admission  4. ARF on stage 2 CKD - not requiring dialysis      Plan:  1. Colonoscopy is appropriate and will be done outpatient per patient's preference. Will arrange for outpatient consultation. 2. Monitor for GI bleed. 3. Transfuse per protocol      Will sign off-Thank you for this consultation and the opportunity to participate in the care of this patient. Please do not hesitate to call with any questions or concerns, or should event occur that may necessitate additional GI evaluation. Subjective:  Resolved gastroenteritis. Wants to go home. Tolerating oral intake without difficulty or emesis. No rectal bleeding, abdominal pain, nausea, vomiting, fever. Denies family history of colon cancer. Eyes: normal   Neck: normal   Cardiovascular: normal rate and regular rhythm   Pulmonary/Chest Wall: breath sounds normal and effort normal   Abdominal: appearance normal, bowel sounds normal and soft, non-acute, non-tender     Patient Active Problem List   Diagnosis Code    Anemia due to blood loss, chronic D50.0    Accelerated essential hypertension I10    Menorrhagia with regular cycle N92.0    Obesity (BMI 30.0-34. 9) E66.9    Type 2 DM with CKD and hypertension (HCC) E11.22, I12.9    Thrombocytopenia (HCC) D69.6    Anemia in chronic kidney disease (CKD) N18.9, D63.1    Menorrhagia N92.0    Bacteremia R78.81    ARF (acute renal failure) (HCC) N17.9    Acute renal failure superimposed on stage 5 chronic kidney disease, not on chronic dialysis (HCC) N17.9, N18.5    Anemia D52.0    Metabolic acidosis T11.4    Proteinuria R80.9    Secondary hyperparathyroidism of renal origin ([facility] [address] N25.81         Visit Vitals  /81 (BP 1 Location: Right arm, BP Patient Position: At rest)   Pulse 78   Temp 98.4 °F (36.9 °C)   Resp 18   Ht 5' 9\" (1.753 m)   Wt 91.1 kg (200 lb 12.8 oz)   LMP 06/18/2017   SpO2 100%   BMI 29.65 kg/m²           Intake/Output Summary (Last 24 hours) at 6/4/2019 1418  Last data filed at 6/4/2019 0917  Gross per 24 hour   Intake 1180 ml   Output 2100 ml   Net -920 ml       CBC w/Diff    Lab Results   Component Value Date/Time    WBC 10.1 06/04/2019 02:59 AM    RBC 2.84 (L) 06/04/2019 02:59 AM    HGB 7.1 (L) 06/04/2019 02:59 AM    HCT 23.4 (L) 06/04/2019 02:59 AM    MCV 82.4 06/04/2019 02:59 AM    MCH 25.0 06/04/2019 02:59 AM    MCHC 30.3 (L) 06/04/2019 02:59 AM    RDW 14.9 (H) 06/04/2019 02:59 AM     06/04/2019 02:59 AM    Lab Results   Component Value Date/Time    GRANS 80 (H) 06/04/2019 02:59 AM    LYMPH 11 (L) 06/04/2019 02:59 AM    EOS 0 06/04/2019 02:59 AM    BANDS 2 06/04/2019 02:59 AM    BASOS 0 06/04/2019 02:59 AM      Basic Metabolic Profile   Recent Labs     06/04/19  0259      K 3.8      CO2 25   BUN 60*   CA 8.2*   PHOS 5.5*        Hepatic Function    Lab Results   Component Value Date/Time    ALB 1.4 (L) 06/04/2019 02:59 AM    TP 6.6 06/04/2019 02:59 AM    AP 73 06/04/2019 02:59 AM    Lab Results   Component Value Date/Time    SGOT 18 06/04/2019 02:59 AM          Coags   Recent Labs     06/03/19  1005   PTP 14.5   INR 1.2   APTT 41.8*               MANDY Salgado    [facility]. [email or web address]  Phone: [phone]  Pager: [phone]

## 2019-06-05 LAB
BACTERIA SPEC CULT: NORMAL
SERVICE CMNT-IMP: NORMAL

## 2019-06-11 ENCOUNTER — HOSPITAL ENCOUNTER (OUTPATIENT)
Dept: INFUSION THERAPY | Age: 54
Discharge: HOME | End: 2019-06-11
Payer: COMMERCIAL

## 2019-06-11 ENCOUNTER — CLINICAL SUPPORT (OUTPATIENT)
Dept: ONCOLOGY | Age: 54
End: 2019-06-11

## 2019-06-11 ENCOUNTER — OFFICE VISIT (OUTPATIENT)
Dept: ONCOLOGY | Age: 54
End: 2019-06-11

## 2019-06-11 ENCOUNTER — HOSPITAL ENCOUNTER (OUTPATIENT)
Dept: ONCOLOGY | Age: 54
Discharge: HOME | End: 2019-06-11

## 2019-06-11 VITALS
OXYGEN SATURATION: 98 % | RESPIRATION RATE: 16 BRPM | DIASTOLIC BLOOD PRESSURE: 80 MMHG | HEIGHT: 69 IN | SYSTOLIC BLOOD PRESSURE: 137 MMHG | HEART RATE: 76 BPM | TEMPERATURE: 97.4 F | WEIGHT: 201 LBS | BODY MASS INDEX: 29.77 KG/M2

## 2019-06-11 DIAGNOSIS — D63.1 ANEMIA IN CHRONIC KIDNEY DISEASE, UNSPECIFIED CKD STAGE: ICD-10-CM

## 2019-06-11 DIAGNOSIS — N18.30 ANEMIA IN STAGE 3 CHRONIC KIDNEY DISEASE (HCC): Primary | ICD-10-CM

## 2019-06-11 DIAGNOSIS — N92.0 MENORRHAGIA WITH REGULAR CYCLE: ICD-10-CM

## 2019-06-11 DIAGNOSIS — N18.9 ANEMIA IN CHRONIC KIDNEY DISEASE, UNSPECIFIED CKD STAGE: ICD-10-CM

## 2019-06-11 DIAGNOSIS — D69.6 THROMBOCYTOPENIA (HCC): ICD-10-CM

## 2019-06-11 DIAGNOSIS — D63.1 ANEMIA IN STAGE 3 CHRONIC KIDNEY DISEASE (HCC): Primary | ICD-10-CM

## 2019-06-11 DIAGNOSIS — D63.1 ANEMIA IN CHRONIC KIDNEY DISEASE, UNSPECIFIED CKD STAGE: Primary | ICD-10-CM

## 2019-06-11 DIAGNOSIS — N18.9 ANEMIA IN CHRONIC KIDNEY DISEASE, UNSPECIFIED CKD STAGE: Primary | ICD-10-CM

## 2019-06-11 LAB
BASO+EOS+MONOS # BLD AUTO: 0.2 K/UL (ref 0–2.3)
BASO+EOS+MONOS NFR BLD AUTO: 3 % (ref 0.1–17)
DIFFERENTIAL METHOD BLD: ABNORMAL
ERYTHROCYTE [DISTWIDTH] IN BLOOD BY AUTOMATED COUNT: 14.2 % (ref 11.5–14.5)
HCT VFR BLD AUTO: 25.4 % (ref 36–48)
HGB BLD-MCNC: 7.7 G/DL (ref 12–16)
LYMPHOCYTES # BLD: 1 K/UL (ref 1.1–5.9)
LYMPHOCYTES NFR BLD: 13 % (ref 14–44)
MCH RBC QN AUTO: 25.4 PG (ref 25–35)
MCHC RBC AUTO-ENTMCNC: 30.3 G/DL (ref 31–37)
MCV RBC AUTO: 83.8 FL (ref 78–102)
NEUTS SEG # BLD: 6.7 K/UL (ref 1.8–9.5)
NEUTS SEG NFR BLD: 85 % (ref 40–70)
PLATELET # BLD AUTO: 176 K/UL (ref 140–440)
RBC # BLD AUTO: 3.03 M/UL (ref 4.1–5.1)
WBC # BLD AUTO: 7.9 K/UL (ref 4.5–13)

## 2019-06-11 PROCEDURE — 74011000258 HC RX REV CODE- 258: Performed by: INTERNAL MEDICINE

## 2019-06-11 PROCEDURE — 96372 THER/PROPH/DIAG INJ SC/IM: CPT

## 2019-06-11 PROCEDURE — 74011250637 HC RX REV CODE- 250/637: Performed by: INTERNAL MEDICINE

## 2019-06-11 PROCEDURE — 74011250636 HC RX REV CODE- 250/636: Performed by: NURSE PRACTITIONER

## 2019-06-11 PROCEDURE — 96367 TX/PROPH/DG ADDL SEQ IV INF: CPT

## 2019-06-11 PROCEDURE — 96366 THER/PROPH/DIAG IV INF ADDON: CPT

## 2019-06-11 PROCEDURE — 74011250636 HC RX REV CODE- 250/636: Performed by: INTERNAL MEDICINE

## 2019-06-11 PROCEDURE — 96365 THER/PROPH/DIAG IV INF INIT: CPT

## 2019-06-11 RX ORDER — SODIUM CHLORIDE 0.9 % (FLUSH) 0.9 %
10-40 SYRINGE (ML) INJECTION AS NEEDED
Status: DISCONTINUED | OUTPATIENT
Start: 2019-06-11 | End: 2019-06-15 | Stop reason: HOSPADM

## 2019-06-11 RX ORDER — SODIUM CHLORIDE 9 MG/ML
250 INJECTION, SOLUTION INTRAVENOUS CONTINUOUS
Status: DISCONTINUED | OUTPATIENT
Start: 2019-06-11 | End: 2019-06-15 | Stop reason: HOSPADM

## 2019-06-11 RX ORDER — DIPHENHYDRAMINE HCL 25 MG
25 CAPSULE ORAL ONCE
Status: COMPLETED | OUTPATIENT
Start: 2019-06-11 | End: 2019-06-11

## 2019-06-11 RX ORDER — ACETAMINOPHEN 325 MG/1
650 TABLET ORAL ONCE
Status: COMPLETED | OUTPATIENT
Start: 2019-06-11 | End: 2019-06-11

## 2019-06-11 RX ADMIN — DEXAMETHASONE SODIUM PHOSPHATE 20 MG: 4 INJECTION, SOLUTION INTRA-ARTICULAR; INTRALESIONAL; INTRAMUSCULAR; INTRAVENOUS; SOFT TISSUE at 10:15

## 2019-06-11 RX ADMIN — DIPHENHYDRAMINE HYDROCHLORIDE 25 MG: 25 CAPSULE ORAL at 10:10

## 2019-06-11 RX ADMIN — IMMUNE GLOBULIN INTRAVENOUS (HUMAN) 20 G: 5 INJECTION, SOLUTION INTRAVENOUS at 11:15

## 2019-06-11 RX ADMIN — IMMUNE GLOBULIN INTRAVENOUS (HUMAN) 5 G: 5 INJECTION, SOLUTION INTRAVENOUS at 13:05

## 2019-06-11 RX ADMIN — EPOETIN ALFA-EPBX 60000 UNITS: 40000 INJECTION, SOLUTION INTRAVENOUS; SUBCUTANEOUS at 10:50

## 2019-06-11 RX ADMIN — ACETAMINOPHEN 650 MG: 325 TABLET ORAL at 10:10

## 2019-06-11 RX ADMIN — Medication 10 ML: at 09:45

## 2019-06-11 RX ADMIN — SODIUM CHLORIDE 250 ML: 9 INJECTION, SOLUTION INTRAVENOUS at 10:00

## 2019-06-11 NOTE — PROGRESS NOTES
[facility] Progress Note    Date: 2019    Name: Talia Thomas    MRN: [id number]         : 1965      Ms. Bola Collins arrived in the [facility] today at [address], in stable condition, here for Q 2 Week, CBC/Retacrit injection & Q 4 Week, IVIG Infusion. She was assessed and education was provided. Ms. Shubham Villagomez vitals were reviewed. Visit Vitals  /87 (BP 1 Location: Left arm, BP Patient Position: Sitting)   Pulse 90   Temp 97.2 °F (36.2 °C)   Resp 16   Ht 5' 9\" (1.753 m)   Wt 91.2 kg (201 lb)   SpO2 98%   Breastfeeding? No   BMI 29.68 kg/m²           PIV ( # 22 G) was established in her left AC at 0945, without incident, and blood was drawn, for a CBC, per order. (Also, extra tubes of blood were drawn in preparation for her office visit later today, with Dr. Elise Yarbrough lavender top tube, 2 SST tubes & 1 green top tube). The CBC results from today were as follows:       Recent Results (from the past 12 hour(s))   CBC WITH 3 PART DIFF    Collection Time: 19  9:45 AM   Result Value Ref Range    WBC 7.9 4.5 - 13.0 K/uL    RBC 3.03 (L) 4.10 - 5.10 M/uL    HGB 7.7 (L) 12.0 - 16.0 g/dL    HCT 25.4 (L) 36 - 48 %    MCV 83.8 78 - 102 FL    MCH 25.4 25.0 - 35.0 PG    MCHC 30.3 (L) 31 - 37 g/dL    RDW 14.2 11.5 - 14.5 %    PLATELET 852 138 - 682 K/uL    NEUTROPHILS 85 (H) 40 - 70 %    MIXED CELLS 3 0.1 - 17 %    LYMPHOCYTES 13 (L) 14 - 44 %    ABS. NEUTROPHILS 6.7 1.8 - 9.5 K/UL    ABS. MIXED CELLS 0.2 0.0 - 2.3 K/uL    ABS. LYMPHOCYTES 1.0 (L) 1.1 - 5.9 K/UL    DF AUTOMATED           Retacrit 60,000 units, was administered SQ, in her right arm at 1050, per order, and without incident.             Pre-medications consisting of  Tylenol 650 mg PO, Benadryl 50 mg PO, and Decadron 20 mg IV, were all administered per order, and without incident.               IVIG 25 grams (25,000 mg) IV, was administered per order, and without incident.      IVIG was started at 34 ml/hr X 30 minutes, then increased to 68 ml/hr X 30 minutes, then increased to 171 ml/hr X 30 minutes, then increased to 205 ml/hr X 30 minutes, then increased to 239 ml/hr, for the remainder of the IVIG infusion.           After the completion of the IVIG, the PIV was flushed very well per protocol, and then, the PIV was removed and gauze/bandaid was applied. Ms. Crystal Theodore tolerated well, and had no complaints. Ms. Crystal Theodore was discharged from [place] [address] in stable condition at 1400. .. She is to return in 2 weeks, on Tuesday, 6-25-19,  at 1600, for her next appointment, for CBC/Retacrit injection. And then, she is to return in 4 weeks, on Tuesday, 7-9-19, at 0900, for her next appointment, for CBC/Retacrit injection, and IVIG infusion.      Gadiel Londono RN  June 11, 2019  10:08 AM

## 2019-06-11 NOTE — PATIENT INSTRUCTIONS
Iron Deficiency Anemia: Care Instructions  Your Care Instructions    Anemia means that you do not have enough red blood cells. Red blood cells carry oxygen around your body. When you have anemia, it can make you pale, weak, and tired. Many things can cause anemia. The most common cause is loss of blood. This can happen if you have heavy menstrual periods. It can also happen if you have bleeding in your stomach or bowel. You can also get anemia if you don't have enough iron in your diet or if it's hard for your body to absorb iron. In some cases, pregnancy causes anemia. That's because a pregnant woman needs more iron. Your doctor may do more tests to find the cause of your anemia. If a disease or other health problem is causing it, your doctor will treat that problem. It's important to follow up with your doctor to make sure that your iron level returns to normal.  Follow-up care is a key part of your treatment and safety. Be sure to make and go to all appointments, and call your doctor if you are having problems. It's also a good idea to know your test results and keep a list of the medicines you take. How can you care for yourself at home? · If your doctor recommended iron pills, take them as directed. ? Try to take the pills on an empty stomach. You can do this about 1 hour before or 2 hours after meals. But you may need to take iron with food to avoid an upset stomach. ? Do not take antacids or drink milk or anything with caffeine within 2 hours of when you take your iron. They can keep your body from absorbing the iron well. ? Vitamin C helps your body absorb iron. You may want to take iron pills with a glass of orange juice or some other food high in vitamin C.  ? Iron pills may cause stomach problems. These include heartburn, nausea, diarrhea, constipation, and cramps. It can help to drink plenty of fluids and include fruits, vegetables, and fiber in your diet.   ? It's normal for iron pills to make your stool a greenish or grayish black. But internal bleeding can also cause dark stool. So it's important to tell your doctor about any color changes. ? Call your doctor if you think you are having a problem with your iron pills. Even after you start to feel better, it will take several months for your body to build up its supply of iron. ? If you miss a pill, don't take a double dose. ? Keep iron pills out of the reach of small children. Too much iron can be very dangerous. · Eat foods with a lot of iron. These include red meat, shellfish, poultry, and eggs. They also include beans, raisins, whole-grain bread, and leafy green vegetables. · Steam your vegetables. This is the best way to prepare them if you want to get as much iron as possible. · Be safe with medicines. Do not take nonsteroidal anti-inflammatory pain relievers unless your doctor tells you to. These include aspirin, naproxen (Aleve), and ibuprofen (Advil, Motrin). · Liquid iron can stain your teeth. But you can mix it with water or juice and drink it with a straw. Then it won't get on your teeth. When should you call for help? Call 911 anytime you think you may need emergency care. For example, call if:    · You passed out (lost consciousness).    Call your doctor now or seek immediate medical care if:    · You are short of breath.     · You are dizzy or light-headed, or you feel like you may faint.     · You have new or worse bleeding.    Watch closely for changes in your health, and be sure to contact your doctor if:    · You feel weaker or more tired than usual.     · You do not get better as expected. Where can you learn more? Go to http://michela-ivan.info/. Enter O128 in the search box to learn more about \"Iron Deficiency Anemia: Care Instructions. \"  Current as of: May 6, 2018  Content Version: 11.9  © 5918-0726 [employer].  Care instructions adapted under license by [employer] (which disclaims liability or warranty for this information). If you have questions about a medical condition or this instruction, always ask your healthcare professional. [address] any warranty or liability for your use of this information.

## 2019-06-11 NOTE — PROGRESS NOTES
Hematology/Oncology  Progress Note    Name: Marilu Heads  Date: 2019  : 1965    PCP: Avery Jimenez MD     Ms. Susan Flores is a 48year old female who was seen for management of her underlying anemia. The patient also past chronic thrombocytopenia/ITP. Current therapy: IVIG monthly, Retacrit 60, 000 whenever HCT is below 30%, ferrous sulfate BID, and Venofer PRN. Subjective:     Ms. Susan Flores is a 61-year-old woman with a past medical history of CKD, DM type 2, anemia, and idiopathic thrombocytopenia. She is here today for f/u. She received Retacrit 60,000 iu and IVIG today. She complains of fatigue and weakness. Denies unexplained bruising/bleeding, blood in stool or urine. She reports that she now has a kidney doctor that she sees on a regular basis. She has no acute concerns or complaints to report today. Past medical history, family history, and social history: these were reviewed and remains unchanged.     Past Medical History:   Diagnosis Date    Anemia     Arthritis     Diabetes ([address]     Hypertension     ITP (idiopathic thrombocytopenic purpura)      Past Surgical History:   Procedure Laterality Date    HX GYN      c section 1984    HX HYSTERECTOMY  2017    HX TUBAL LIGATION       Social History     Socioeconomic History    Marital status:      Spouse name: Not on file    Number of children: Not on file    Years of education: Not on file    Highest education level: Not on file   Occupational History    Not on file   Social Needs    Financial resource strain: Not on file    Food insecurity:     Worry: Not on file     Inability: Not on file    Transportation needs:     Medical: Not on file     Non-medical: Not on file   Tobacco Use    Smoking status: Never Smoker    Smokeless tobacco: Never Used   Substance and Sexual Activity    Alcohol use: No    Drug use: No    Sexual activity: Not on file   Lifestyle    Physical activity:     Days per week: Not on file Minutes per session: Not on file    Stress: Not on file   Relationships    Social connections:     Talks on phone: Not on file     Gets together: Not on file     Attends Mandaen service: Not on file     Active member of club or organization: Not on file     Attends meetings of clubs or organizations: Not on file     Relationship status: Not on file    Intimate partner violence:     Fear of current or ex partner: Not on file     Emotionally abused: Not on file     Physically abused: Not on file     Forced sexual activity: Not on file   Other Topics Concern    Not on file   Social History Narrative    Not on file     Family History   Problem Relation Age of Onset    Hypertension Mother     Cancer Father         Colon     Current Outpatient Medications   Medication Sig Dispense Refill    calcitRIOL (ROCALTROL) 0.25 mcg capsule Take 1 Cap by mouth every Monday, Wednesday, Friday. 30 Cap 1    calcium acetate (PHOSLO) 667 mg cap Take 1 Cap by mouth three (3) times daily (with meals). 90 Cap 3    pantoprazole (PROTONIX) 40 mg tablet Take 1 Tab by mouth Daily (before breakfast). 30 Tab 1    sodium bicarbonate 650 mg tablet Take 1 Tab by mouth three (3) times daily. 90 Tab 1    loratadine (CLARITIN) 10 mg tablet Take 10 mg by mouth daily as needed for Allergies.  acetaminophen (TYLENOL) 500 mg tablet Take 2 Tabs by mouth every six (6) hours as needed for Pain. 20 Tab 0    amLODIPine (NORVASC) 10 mg tablet Take 10 mg by mouth daily. Indications: HYPERTENSION      ferrous sulfate 325 mg (65 mg iron) tablet Take 325 mg by mouth two (2) times a day.        Facility-Administered Medications Ordered in Other Visits   Medication Dose Route Frequency Provider Last Rate Last Dose    sodium chloride (NS) flush 10-40 mL  10-40 mL IntraVENous PRN Naz Caraballo MD   10 mL at 06/11/19 0945    0.9% sodium chloride infusion 250 mL  250 mL IntraVENous CONTINUOUS Naz Caraballo MD   Stopped at 06/11/19 1350 Review of Systems  Constitutional: The patient denies fatigue or acute distress  HEENT: The patient denies recent head trauma, eye pain, blurred vision,  hearing deficit, oropharyngeal mucosal pain or lesions, and the patient denies throat pain or discomfort. Lymphatics: The patient denies palpable peripheral lymphadenopathy. Hematologic: The patient denies having bruising, bleeding, or progressive fatigue. Respiratory: Patient denies having shortness of breath, cough, sputum production, fever, or dyspnea on exertion. Cardiovascular: The patient denies having leg pain, leg swelling, heart palpitations, chest permit, chest pain, or lightheadedness. The patient denies having dyspnea on exertion. Gastrointestinal: The patient denies having nausea, emesis, or diarrhea. The patient denies having any hematemesis or blood in the stool. Genitourinary: Patient denies having urinary urgency, frequency, or dysuria. The patient denies having blood in the urine. Psychological: The patient denies having symptoms of nervousness, anxiety, depression, or thoughts of harming himself some of this. Skin: Patient denies having skin rashes, skin, ulcerations, or unexplained itching or pruritus. Musculoskeletal: The patient denies having pain in the joints or bones. Objective:     Visit Vitals  Last Menstrual Period 06/18/2017     ECOG 0 Pain 0/10  Physical Exam:   Gen. Appearance: The patient is in no acute distress. Skin: There is no bruise or rash. HEENT: The exam is unremarkable. Neck: Supple without lymphadenopathy or thyromegaly. Lungs: Clear to auscultation and percussion; there are no wheezes or rhonchi. Heart: Regular rate and rhythm; there are no murmurs, gallops, or rubs. Anterior chest wall and breasts: Deferred. Abdomen: Bowel sounds are present and normal.  There is no guarding, tenderness, or hepatosplenomegaly. Extremities: There is no clubbing, cyanosis, or edema.   Neurologic: There are no focal neurologic deficits. Lymphatics: There is no palpable peripheral lymphadenopathy. Musculoskeletal: The patient has full range of motion at all joints. There is no evidence of joint deformity or effusions. There is no focal joint tenderness. Psychological/psychiatric: There is no clinical evidence of anxiety, depression, or melancholy. Lab data:      Results for orders placed or performed during the hospital encounter of 06/11/19   CBC WITH 3 PART DIFF     Status: Abnormal   Result Value Ref Range Status    WBC 7.9 4.5 - 13.0 K/uL Final    RBC 3.03 (L) 4.10 - 5.10 M/uL Final    HGB 7.7 (L) 12.0 - 16.0 g/dL Final    HCT 25.4 (L) 36 - 48 % Final    MCV 83.8 78 - 102 FL Final    MCH 25.4 25.0 - 35.0 PG Final    MCHC 30.3 (L) 31 - 37 g/dL Final    RDW 14.2 11.5 - 14.5 % Final    PLATELET 622 427 - 348 K/uL Final    NEUTROPHILS 85 (H) 40 - 70 % Final    MIXED CELLS 3 0.1 - 17 % Final    LYMPHOCYTES 13 (L) 14 - 44 % Final    ABS. NEUTROPHILS 6.7 1.8 - 9.5 K/UL Final    ABS. MIXED CELLS 0.2 0.0 - 2.3 K/uL Final    ABS. LYMPHOCYTES 1.0 (L) 1.1 - 5.9 K/UL Final     Comment: Test performed at [address] [place] [address] or Outpatient Infusion Center Location. Reviewed by Medical Director. DF AUTOMATED   Final         Assessment:     1. Anemia in stage 3 chronic kidney disease ([facility] [address]    2. Thrombocytopenia ([facility] [address]    3. Menorrhagia with regular cycle      Plan:   Anemia due to blood loss/menorrhagia and chronic kidney disease: The patient is continuing to receive Procrit every 2 weeks at a dose of 60,000 units subcutaneous whenever the hemoglobin is below 10 g/dL. 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 The platelets are continuing to respond to intravenous gamma globulin. The current CBC shows that her platelet count is 319,892.     Menorrhagia (resolved problem: Patient underwent a hysterectomy in 2017 and no longer has evidence of vaginal bleeding or genitourinary blood loss. The patient will return to clinic for complete reassessment again in 12 weeks. Orders Placed This Encounter    METABOLIC PANEL, COMPREHENSIVE     Standing Status:   Future     Number of Occurrences:   1     Standing Expiration Date:   6/11/2020    IRON PROFILE     Standing Status:   Future     Number of Occurrences:   1     Standing Expiration Date:   12/11/2019    FERRITIN     Standing Status:   Future     Number of Occurrences:   1     Standing Expiration Date:   12/11/2019       Ta Smoker, NP  6/11/2019    I have assessed the patient independently and  agree with the full assessment as outlined.   Eliza Olguin MD, Michelle Shabazz

## 2019-06-12 LAB
A-G RATIO,AGRAT: 0.7 RATIO (ref 1.1–2.6)
ALBUMIN SERPL-MCNC: 2.9 G/DL (ref 3.5–5)
ALP SERPL-CCNC: 78 U/L (ref 25–115)
ALT SERPL-CCNC: 12 U/L (ref 5–40)
ANION GAP SERPL CALC-SCNC: 15 MMOL/L
AST SERPL W P-5'-P-CCNC: 15 U/L (ref 10–37)
BILIRUB SERPL-MCNC: 0.1 MG/DL (ref 0.2–1.2)
BUN SERPL-MCNC: 45 MG/DL (ref 6–22)
CALCIUM SERPL-MCNC: 8.4 MG/DL (ref 8.4–10.5)
CHLORIDE SERPL-SCNC: 108 MMOL/L (ref 98–110)
CO2 SERPL-SCNC: 21 MMOL/L (ref 20–32)
CREAT SERPL-MCNC: 4.1 MG/DL (ref 0.5–1.2)
FE % SATURATION,PSAT: 16 % (ref 20–50)
FERRITIN SERPL-MCNC: 87 NG/ML (ref 10–291)
GFRAA, 66117: 14.1
GFRNA, 66118: 11.6
GLOBULIN,GLOB: 4.3 G/DL (ref 2–4)
GLUCOSE SERPL-MCNC: 134 MG/DL (ref 70–99)
IRON,IRN: 33 MCG/DL (ref 30–160)
POTASSIUM SERPL-SCNC: 4.2 MMOL/L (ref 3.5–5.5)
PROT SERPL-MCNC: 7.2 G/DL (ref 6.4–8.3)
SODIUM SERPL-SCNC: 144 MMOL/L (ref 133–145)
TIBC,TIBC: 210 MCG/DL (ref 228–428)
UIBC SERPL-MCNC: 177 MCG/DL (ref 110–370)

## 2019-06-14 ENCOUNTER — HOSPITAL ENCOUNTER (OUTPATIENT)
Dept: LAB | Age: 54
Discharge: HOME | End: 2019-06-14

## 2019-06-14 LAB — SENTARA SPECIMEN COL,SENBCF: NORMAL

## 2019-06-14 PROCEDURE — 99001 SPECIMEN HANDLING PT-LAB: CPT

## 2019-06-25 ENCOUNTER — HOSPITAL ENCOUNTER (OUTPATIENT)
Dept: ONCOLOGY | Age: 54
Discharge: HOME | End: 2019-06-25

## 2019-06-25 ENCOUNTER — HOSPITAL ENCOUNTER (OUTPATIENT)
Dept: INFUSION THERAPY | Age: 54
Discharge: HOME | End: 2019-06-25
Payer: COMMERCIAL

## 2019-06-25 ENCOUNTER — CLINICAL SUPPORT (OUTPATIENT)
Dept: ONCOLOGY | Age: 54
End: 2019-06-25

## 2019-06-25 VITALS
HEART RATE: 77 BPM | DIASTOLIC BLOOD PRESSURE: 98 MMHG | RESPIRATION RATE: 16 BRPM | TEMPERATURE: 99 F | SYSTOLIC BLOOD PRESSURE: 184 MMHG | OXYGEN SATURATION: 100 %

## 2019-06-25 DIAGNOSIS — N18.9 ANEMIA IN CHRONIC KIDNEY DISEASE, UNSPECIFIED CKD STAGE: ICD-10-CM

## 2019-06-25 DIAGNOSIS — D63.1 ANEMIA IN CHRONIC KIDNEY DISEASE, UNSPECIFIED CKD STAGE: Primary | ICD-10-CM

## 2019-06-25 DIAGNOSIS — D63.1 ANEMIA IN CHRONIC KIDNEY DISEASE, UNSPECIFIED CKD STAGE: ICD-10-CM

## 2019-06-25 DIAGNOSIS — N18.9 ANEMIA IN CHRONIC KIDNEY DISEASE, UNSPECIFIED CKD STAGE: Primary | ICD-10-CM

## 2019-06-25 LAB
BASO+EOS+MONOS # BLD AUTO: 0.2 K/UL (ref 0–2.3)
BASO+EOS+MONOS NFR BLD AUTO: 4 % (ref 0.1–17)
DIFFERENTIAL METHOD BLD: ABNORMAL
ERYTHROCYTE [DISTWIDTH] IN BLOOD BY AUTOMATED COUNT: 15.9 % (ref 11.5–14.5)
HCT VFR BLD AUTO: 29.4 % (ref 36–48)
HGB BLD-MCNC: 9.1 G/DL (ref 12–16)
LYMPHOCYTES # BLD: 1 K/UL (ref 1.1–5.9)
LYMPHOCYTES NFR BLD: 18 % (ref 14–44)
MCH RBC QN AUTO: 25.3 PG (ref 25–35)
MCHC RBC AUTO-ENTMCNC: 31 G/DL (ref 31–37)
MCV RBC AUTO: 81.9 FL (ref 78–102)
NEUTS SEG # BLD: 4.5 K/UL (ref 1.8–9.5)
NEUTS SEG NFR BLD: 78 % (ref 40–70)
PLATELET # BLD AUTO: 61 K/UL (ref 135–420)
RBC # BLD AUTO: 3.59 M/UL (ref 4.1–5.1)
WBC # BLD AUTO: 5.7 K/UL (ref 4.5–13)

## 2019-06-25 PROCEDURE — 36415 COLL VENOUS BLD VENIPUNCTURE: CPT

## 2019-06-25 PROCEDURE — 74011250636 HC RX REV CODE- 250/636: Performed by: INTERNAL MEDICINE

## 2019-06-25 PROCEDURE — 96372 THER/PROPH/DIAG INJ SC/IM: CPT

## 2019-06-25 PROCEDURE — 85049 AUTOMATED PLATELET COUNT: CPT

## 2019-06-25 RX ADMIN — EPOETIN ALFA-EPBX 60000 UNITS: 40000 INJECTION, SOLUTION INTRAVENOUS; SUBCUTANEOUS at 16:40

## 2019-06-25 NOTE — PROGRESS NOTES
[facility] Progress Note    Date: 2019    Name: Darnell Key    MRN: [id number]         : 1965      Ms. Vielka Ye arrived in the [facility] today at 80, in stable condition, here for Q 2 Week, CBC/Retacrit injection. She was assessed and education was provided. Ms. Jabari Pennington vitals were reviewed. Visit Vitals  BP (!) 184/98 (BP 1 Location: Left arm, BP Patient Position: Sitting)   Pulse 77   Temp 99 °F (37.2 °C)   Resp 16   SpO2 100%           CBC was drawn from her right AC at 1625, per order, and without incident. Lab results were obtained and reviewed, and were as follows: Ynes Aguirre (The preliminary Platelet count was noted to be 88,000.)    Recent Results (from the past 12 hour(s))   CBC WITH 3 PART DIFF    Collection Time: 19  4:25 PM   Result Value Ref Range    WBC 5.7 4.5 - 13.0 K/uL    RBC 3.59 (L) 4.10 - 5.10 M/uL    HGB 9.1 (L) 12.0 - 16.0 g/dL    HCT 29.4 (L) 36 - 48 %    MCV 81.9 78 - 102 FL    MCH 25.3 25.0 - 35.0 PG    MCHC 31.0 31 - 37 g/dL    RDW 15.9 (H) 11.5 - 14.5 %    NEUTROPHILS 78 (H) 40 - 70 %    MIXED CELLS 4 0.1 - 17 %    LYMPHOCYTES 18 14 - 44 %    ABS. NEUTROPHILS 4.5 1.8 - 9.5 K/UL    ABS. MIXED CELLS 0.2 0.0 - 2.3 K/uL    ABS. LYMPHOCYTES 1.0 (L) 1.1 - 5.9 K/UL    DF AUTOMATED             Retacrit (Epoetin Mark-epbx) 60,000 units, was administered in 2 divided injections (40,000 units in the 1st injection & 20,000 units in the 2nd injection), in her right arm at 1640, per order, and without incident. Ms. Vielka Ye tolerated well, and had no complaints. Ms. Vielka Ye was discharged from [place] [address] in stable condition at 66 91 21. .. She is to return in 2 weeks, on Tuesday, 19,  at 0900,  for her next appointment, for Q 2 Week CBC/Retacrit injection & Q 4 Week, IVIG Infusion.     Konstantin Umanzor RN  2019  3:38 PM

## 2019-07-09 ENCOUNTER — HOSPITAL ENCOUNTER (OUTPATIENT)
Dept: INFUSION THERAPY | Age: 54
Discharge: HOME | End: 2019-07-09
Payer: COMMERCIAL

## 2019-07-09 ENCOUNTER — CLINICAL SUPPORT (OUTPATIENT)
Dept: ONCOLOGY | Age: 54
End: 2019-07-09

## 2019-07-09 ENCOUNTER — HOSPITAL ENCOUNTER (OUTPATIENT)
Dept: ONCOLOGY | Age: 54
Discharge: HOME | End: 2019-07-09

## 2019-07-09 VITALS
BODY MASS INDEX: 29.77 KG/M2 | TEMPERATURE: 97.8 F | HEIGHT: 69 IN | OXYGEN SATURATION: 99 % | HEART RATE: 68 BPM | WEIGHT: 201 LBS | DIASTOLIC BLOOD PRESSURE: 85 MMHG | SYSTOLIC BLOOD PRESSURE: 177 MMHG | RESPIRATION RATE: 16 BRPM

## 2019-07-09 DIAGNOSIS — N18.9 ANEMIA IN CHRONIC KIDNEY DISEASE, UNSPECIFIED CKD STAGE: ICD-10-CM

## 2019-07-09 DIAGNOSIS — D63.1 ANEMIA IN CHRONIC KIDNEY DISEASE, UNSPECIFIED CKD STAGE: ICD-10-CM

## 2019-07-09 DIAGNOSIS — D63.1 ANEMIA IN CHRONIC KIDNEY DISEASE, UNSPECIFIED CKD STAGE: Primary | ICD-10-CM

## 2019-07-09 DIAGNOSIS — N18.9 ANEMIA IN CHRONIC KIDNEY DISEASE, UNSPECIFIED CKD STAGE: Primary | ICD-10-CM

## 2019-07-09 LAB
BASO+EOS+MONOS # BLD AUTO: 0.4 K/UL (ref 0–2.3)
BASO+EOS+MONOS NFR BLD AUTO: 7 % (ref 0.1–17)
DIFFERENTIAL METHOD BLD: ABNORMAL
ERYTHROCYTE [DISTWIDTH] IN BLOOD BY AUTOMATED COUNT: 16.6 % (ref 11.5–14.5)
HCT VFR BLD AUTO: 25.2 % (ref 36–48)
HGB BLD-MCNC: 7.9 G/DL (ref 12–16)
LYMPHOCYTES # BLD: 1.1 K/UL (ref 1.1–5.9)
LYMPHOCYTES NFR BLD: 16 % (ref 14–44)
MCH RBC QN AUTO: 25.6 PG (ref 25–35)
MCHC RBC AUTO-ENTMCNC: 31.3 G/DL (ref 31–37)
MCV RBC AUTO: 81.6 FL (ref 78–102)
NEUTS SEG # BLD: 5.2 K/UL (ref 1.8–9.5)
NEUTS SEG NFR BLD: 77 % (ref 40–70)
PLATELET # BLD AUTO: 111 K/UL (ref 135–420)
RBC # BLD AUTO: 3.09 M/UL (ref 4.1–5.1)
WBC # BLD AUTO: 6.7 K/UL (ref 4.5–13)

## 2019-07-09 PROCEDURE — 96365 THER/PROPH/DIAG IV INF INIT: CPT

## 2019-07-09 PROCEDURE — 96366 THER/PROPH/DIAG IV INF ADDON: CPT

## 2019-07-09 PROCEDURE — 74011000258 HC RX REV CODE- 258: Performed by: INTERNAL MEDICINE

## 2019-07-09 PROCEDURE — 36415 COLL VENOUS BLD VENIPUNCTURE: CPT

## 2019-07-09 PROCEDURE — 96372 THER/PROPH/DIAG INJ SC/IM: CPT

## 2019-07-09 PROCEDURE — 74011250637 HC RX REV CODE- 250/637: Performed by: INTERNAL MEDICINE

## 2019-07-09 PROCEDURE — 96367 TX/PROPH/DG ADDL SEQ IV INF: CPT

## 2019-07-09 PROCEDURE — 74011250636 HC RX REV CODE- 250/636: Performed by: INTERNAL MEDICINE

## 2019-07-09 RX ORDER — SODIUM CHLORIDE 9 MG/ML
250 INJECTION, SOLUTION INTRAVENOUS CONTINUOUS
Status: DISCONTINUED | OUTPATIENT
Start: 2019-07-09 | End: 2019-07-10 | Stop reason: HOSPADM

## 2019-07-09 RX ORDER — SODIUM CHLORIDE 0.9 % (FLUSH) 0.9 %
10-40 SYRINGE (ML) INJECTION AS NEEDED
Status: DISCONTINUED | OUTPATIENT
Start: 2019-07-09 | End: 2019-07-13 | Stop reason: HOSPADM

## 2019-07-09 RX ORDER — ACETAMINOPHEN 325 MG/1
650 TABLET ORAL ONCE
Status: COMPLETED | OUTPATIENT
Start: 2019-07-09 | End: 2019-07-09

## 2019-07-09 RX ORDER — DIPHENHYDRAMINE HCL 25 MG
25 CAPSULE ORAL ONCE
Status: COMPLETED | OUTPATIENT
Start: 2019-07-09 | End: 2019-07-09

## 2019-07-09 RX ADMIN — Medication 10 ML: at 09:30

## 2019-07-09 RX ADMIN — IMMUNE GLOBULIN INTRAVENOUS (HUMAN) 20 G: 5 INJECTION, SOLUTION INTRAVENOUS at 11:00

## 2019-07-09 RX ADMIN — ACETAMINOPHEN 650 MG: 325 TABLET ORAL at 10:02

## 2019-07-09 RX ADMIN — SODIUM CHLORIDE 250 ML: 9 INJECTION, SOLUTION INTRAVENOUS at 09:55

## 2019-07-09 RX ADMIN — EPOETIN ALFA-EPBX 60000 UNITS: 40000 INJECTION, SOLUTION INTRAVENOUS; SUBCUTANEOUS at 10:15

## 2019-07-09 RX ADMIN — IMMUNE GLOBULIN INTRAVENOUS (HUMAN) 5 G: 5 INJECTION, SOLUTION INTRAVENOUS at 12:45

## 2019-07-09 RX ADMIN — DEXAMETHASONE SODIUM PHOSPHATE 20 MG: 4 INJECTION, SOLUTION INTRA-ARTICULAR; INTRALESIONAL; INTRAMUSCULAR; INTRAVENOUS; SOFT TISSUE at 10:10

## 2019-07-09 RX ADMIN — DIPHENHYDRAMINE HYDROCHLORIDE 25 MG: 25 CAPSULE ORAL at 10:02

## 2019-07-09 NOTE — PROGRESS NOTES
[facility] Progress Note    Date: 2019    Name: Carmen Lubin    MRN: [id number]         : 1965      Ms. Gutierrez Felton arrived in the [facility] today at 0910, in stable condition, here for Q 2 Week, CBC/Retacrit injection & Q 4 Week, IVIG Infusion. She was assessed and education was provided. Ms. Re Quinn vitals were reviewed. Visit Vitals  BP (!) 188/92 (BP 1 Location: Left arm, BP Patient Position: Sitting)   Pulse 76   Temp 98.4 °F (36.9 °C)   Resp 16   Ht 5' 9\" (1.753 m)   Wt 91.2 kg (201 lb)   SpO2 99%   Breastfeeding? No   BMI 29.68 kg/m²           PIV ( # 24 G) was established in her left AC at 0930, without incident, and blood was drawn, for a CBC, per order. The CBC results from today were as follows:   (The preliminary Platelet count was noted to be 113,000. )      Recent Results (from the past 12 hour(s))   CBC WITH 3 PART DIFF    Collection Time: 19  9:30 AM   Result Value Ref Range    WBC 6.7 4.5 - 13.0 K/uL    RBC 3.09 (L) 4.10 - 5.10 M/uL    HGB 7.9 (L) 12.0 - 16.0 g/dL    HCT 25.2 (L) 36 - 48 %    MCV 81.6 78 - 102 FL    MCH 25.6 25.0 - 35.0 PG    MCHC 31.3 31 - 37 g/dL    RDW 16.6 (H) 11.5 - 14.5 %    NEUTROPHILS 77 (H) 40 - 70 %    MIXED CELLS 7 0.1 - 17 %    LYMPHOCYTES 16 14 - 44 %    ABS. NEUTROPHILS 5.2 1.8 - 9.5 K/UL    ABS. MIXED CELLS 0.4 0.0 - 2.3 K/uL    ABS. LYMPHOCYTES 1.1 1.1 - 5.9 K/UL    DF AUTOMATED           Retacrit 60,000 units, was administered SQ, in her left arm at 1015, per order, and without incident.             Pre-medications consisting of  Tylenol 650 mg PO, Benadryl 50 mg PO, and Decadron 20 mg IV, were all administered per order, and without incident.               IVIG 25 grams (25,000 mg) IV, was administered per order, and without incident.      IVIG was started at 34 ml/hr X 30 minutes, then increased to 68 ml/hr X 30 minutes, then increased to 171 ml/hr X 30 minutes, then increased to 205 ml/hr X 30 minutes, then increased to 239 ml/hr, for the remainder of the IVIG infusion.           After the completion of the IVIG, the PIV was flushed very well per protocol, and then, the PIV was removed and gauze/bandaid was applied. Ms. Jasiel Chang tolerated well, and had no complaints. Ms. Jasiel Chang was discharged from [place] [address] in stable condition at 1325. .. She is to return in 2 weeks, on Tuesday, 7-23-19,  at 1300, for her next appointment, for CBC/Retacrit injection. And then, she is to return in 4 weeks, on Tuesday, 8-6-19, at 0900, for her next appointment, for CBC/Retacrit injection, and IVIG infusion.      Karine John RN  July 9, 2019  10:08 AM

## 2019-07-19 ENCOUNTER — HOSPITAL ENCOUNTER (OUTPATIENT)
Dept: LAB | Age: 54
Discharge: HOME | End: 2019-07-19

## 2019-07-19 LAB — SENTARA SPECIMEN COL,SENBCF: NORMAL

## 2019-07-19 PROCEDURE — 99001 SPECIMEN HANDLING PT-LAB: CPT

## 2019-07-23 ENCOUNTER — HOSPITAL ENCOUNTER (OUTPATIENT)
Dept: INFUSION THERAPY | Age: 54
Discharge: HOME | End: 2019-07-23
Payer: COMMERCIAL

## 2019-07-23 ENCOUNTER — CLINICAL SUPPORT (OUTPATIENT)
Dept: ONCOLOGY | Age: 54
End: 2019-07-23

## 2019-07-23 ENCOUNTER — HOSPITAL ENCOUNTER (OUTPATIENT)
Dept: ONCOLOGY | Age: 54
Discharge: HOME | End: 2019-07-23

## 2019-07-23 VITALS
DIASTOLIC BLOOD PRESSURE: 98 MMHG | RESPIRATION RATE: 16 BRPM | HEART RATE: 92 BPM | TEMPERATURE: 98.5 F | SYSTOLIC BLOOD PRESSURE: 186 MMHG | OXYGEN SATURATION: 99 %

## 2019-07-23 DIAGNOSIS — D69.6 THROMBOCYTOPENIA, UNSPECIFIED (HCC): Primary | ICD-10-CM

## 2019-07-23 DIAGNOSIS — D69.6 THROMBOCYTOPENIA, UNSPECIFIED (HCC): ICD-10-CM

## 2019-07-23 LAB
BASO+EOS+MONOS # BLD AUTO: 0.3 K/UL (ref 0–2.3)
BASO+EOS+MONOS NFR BLD AUTO: 5 % (ref 0.1–17)
DIFFERENTIAL METHOD BLD: ABNORMAL
ERYTHROCYTE [DISTWIDTH] IN BLOOD BY AUTOMATED COUNT: 16.4 % (ref 11.5–14.5)
HCT VFR BLD AUTO: 30.2 % (ref 36–48)
HGB BLD-MCNC: 9.7 G/DL (ref 12–16)
LYMPHOCYTES # BLD: 1 K/UL (ref 1.1–5.9)
LYMPHOCYTES NFR BLD: 16 % (ref 14–44)
MCH RBC QN AUTO: 25.8 PG (ref 25–35)
MCHC RBC AUTO-ENTMCNC: 32.1 G/DL (ref 31–37)
MCV RBC AUTO: 80.3 FL (ref 78–102)
NEUTS SEG # BLD: 5 K/UL (ref 1.8–9.5)
NEUTS SEG NFR BLD: 80 % (ref 40–70)
PLATELET # BLD AUTO: 77 K/UL (ref 135–420)
RBC # BLD AUTO: 3.76 M/UL (ref 4.1–5.1)
WBC # BLD AUTO: 6.3 K/UL (ref 4.5–13)

## 2019-07-23 PROCEDURE — 36415 COLL VENOUS BLD VENIPUNCTURE: CPT

## 2019-07-23 PROCEDURE — 85049 AUTOMATED PLATELET COUNT: CPT

## 2019-07-23 NOTE — PROGRESS NOTES
[facility] OPIC Progress Note    Date: 2019    Name: Sharon Harvey    MRN: [id number]         : 1965      Ms. Oskar Nicole arrived in the [address] [place] [address] today at 1315, in stable condition, here for Q 2 Week, CBC/Retacrit injection. She was assessed and education was provided. Ms. Jean Sanchez vitals were reviewed. Visit Vitals  BP (!) 186/98 (BP 1 Location: Left arm, BP Patient Position: Sitting)   Pulse 92   Temp 98.5 °F (36.9 °C)   Resp 16   SpO2 99%           CBC was drawn from her left AC at 1330, per order, and without incident. Lab results were obtained and reviewed, and were as follows: [place] (The preliminary Platelet count was noted to be 90,000.)    Recent Results (from the past 12 hour(s))   CBC WITH 3 PART DIFF    Collection Time: 19  1:30 PM   Result Value Ref Range    WBC 6.3 4.5 - 13.0 K/uL    RBC 3.76 (L) 4.10 - 5.10 M/uL    HGB 9.7 (L) 12.0 - 16.0 g/dL    HCT 30.2 (L) 36 - 48 %    MCV 80.3 78 - 102 FL    MCH 25.8 25.0 - 35.0 PG    MCHC 32.1 31 - 37 g/dL    RDW 16.4 (H) 11.5 - 14.5 %    NEUTROPHILS 80 (H) 40 - 70 %    MIXED CELLS 5 0.1 - 17 %    LYMPHOCYTES 16 14 - 44 %    ABS. NEUTROPHILS 5.0 1.8 - 9.5 K/UL    ABS. MIXED CELLS 0.3 0.0 - 2.3 K/uL    ABS. LYMPHOCYTES 1.0 (L) 1.1 - 5.9 K/UL    DF AUTOMATED             Retacrit Injection was HELD today per order, for HCT 30.2. Ms. Oskar Nicole tolerated well, and had no complaints. Ms. Oskar Nicole was discharged from [place] [address] in stable condition at 5. [place] She is to return in 2 weeks, on Tuesday, 19,  at 0800 ([facility]),  for her next appointment, for Q 2 Week CBC/Retacrit injection & Q 4 Week, IVIG Infusion.     Lorenza Grady RN  2019  3:38 PM

## 2019-08-01 ENCOUNTER — HOSPITAL ENCOUNTER (OUTPATIENT)
Dept: LAB | Age: 54
Discharge: HOME | End: 2019-08-01

## 2019-08-01 LAB — SENTARA SPECIMEN COL,SENBCF: NORMAL

## 2019-08-01 PROCEDURE — 99001 SPECIMEN HANDLING PT-LAB: CPT

## 2019-08-02 RX ORDER — DIPHENHYDRAMINE HCL 25 MG
25 CAPSULE ORAL ONCE
Status: CANCELLED | OUTPATIENT
Start: 2019-08-06 | End: 2019-08-06

## 2019-08-02 RX ORDER — ACETAMINOPHEN 325 MG/1
650 TABLET ORAL ONCE
Status: CANCELLED | OUTPATIENT
Start: 2019-08-06 | End: 2019-08-06

## 2019-08-06 ENCOUNTER — HOSPITAL ENCOUNTER (OUTPATIENT)
Dept: INFUSION THERAPY | Age: 54
Discharge: HOME | End: 2019-08-06
Payer: COMMERCIAL

## 2019-08-06 ENCOUNTER — HOSPITAL ENCOUNTER (OUTPATIENT)
Dept: LAB | Age: 54
Discharge: HOME | End: 2019-08-06

## 2019-08-06 VITALS
RESPIRATION RATE: 16 BRPM | DIASTOLIC BLOOD PRESSURE: 99 MMHG | HEART RATE: 83 BPM | OXYGEN SATURATION: 100 % | SYSTOLIC BLOOD PRESSURE: 190 MMHG | TEMPERATURE: 98.1 F

## 2019-08-06 LAB
BASO+EOS+MONOS # BLD AUTO: 0.5 K/UL (ref 0–2.3)
BASO+EOS+MONOS NFR BLD AUTO: 8 % (ref 0.1–17)
DIFFERENTIAL METHOD BLD: ABNORMAL
ERYTHROCYTE [DISTWIDTH] IN BLOOD BY AUTOMATED COUNT: 15.5 % (ref 11.5–14.5)
HCT VFR BLD AUTO: 29.6 % (ref 36–48)
HGB BLD-MCNC: 9.5 G/DL (ref 12–16)
LYMPHOCYTES # BLD: 1 K/UL (ref 1.1–5.9)
LYMPHOCYTES NFR BLD: 16 % (ref 14–44)
MCH RBC QN AUTO: 25.4 PG (ref 25–35)
MCHC RBC AUTO-ENTMCNC: 32.1 G/DL (ref 31–37)
MCV RBC AUTO: 79.1 FL (ref 78–102)
NEUTS SEG # BLD: 4.9 K/UL (ref 1.8–9.5)
NEUTS SEG NFR BLD: 77 % (ref 40–70)
PLATELET # BLD AUTO: 91 K/UL (ref 135–420)
RBC # BLD AUTO: 3.74 M/UL (ref 4.1–5.1)
SENTARA SPECIMEN COL,SENBCF: NORMAL
WBC # BLD AUTO: 6.4 K/UL (ref 4.5–13)

## 2019-08-06 PROCEDURE — 99001 SPECIMEN HANDLING PT-LAB: CPT

## 2019-08-06 PROCEDURE — 85025 COMPLETE CBC W/AUTO DIFF WBC: CPT

## 2019-08-06 PROCEDURE — 36415 COLL VENOUS BLD VENIPUNCTURE: CPT

## 2019-08-06 PROCEDURE — 85049 AUTOMATED PLATELET COUNT: CPT

## 2019-08-06 PROCEDURE — 96372 THER/PROPH/DIAG INJ SC/IM: CPT

## 2019-08-06 PROCEDURE — 74011250636 HC RX REV CODE- 250/636: Performed by: INTERNAL MEDICINE

## 2019-08-06 RX ADMIN — EPOETIN ALFA-EPBX 60000 UNITS: 40000 INJECTION, SOLUTION INTRAVENOUS; SUBCUTANEOUS at 08:56

## 2019-08-06 NOTE — PROGRESS NOTES
[facility]IC Progress Note    Date: 2019    Name: Helene Rowland    MRN: [id number]         : 1965      Ms. Lorenzo Epps arrived in the [facility] today at [address] [facility], in stable condition, here for Q 2 Week, CBC/Retacrit injection & Q 4 Week, IVIG Infusion refused. She was assessed and education was provided. Patient stated she was not receiving her IVIG today due her new nephology  physician Dr. Yamilex Dominguez [phone] informed her that she was in kidney failure. Informed Shazia NP, she stated she would inform Dr. Carla Wallis. Ms. Narinder Madera vitals were reviewed. Visit Vitals  BP (!) 190/99 (BP 1 Location: Left arm, BP Patient Position: At rest)   Pulse 83   Temp 98.1 °F (36.7 °C)   Resp 16   SpO2 100%   Breastfeeding? No      Blood was drawn in [facility] for a CBC, per order. The CBC results from today were as follows:       Recent Results (from the past 12 hour(s))   CBC WITH 3 PART DIFF    Collection Time: 19  8:20 AM   Result Value Ref Range    WBC 6.4 4.5 - 13.0 K/uL    RBC 3.74 (L) 4.10 - 5.10 M/uL    HGB 9.5 (L) 12.0 - 16 g/dL    HCT 29.6 (L) 36 - 48 %    MCV 79.1 78 - 102 FL    MCH 25.4 25.0 - 35.0 PG    MCHC 32.1 31 - 37 g/dL    RDW 15.5 (H) 11.5 - 14.5 %    NEUTROPHILS 77 (H) 40 - 70 %    MIXED CELLS 8 0.1 - 17 %    LYMPHOCYTES 16 14 - 44 %    ABS. NEUTROPHILS 4.9 1.8 - 9.5 K/UL    ABS. MIXED CELLS 0.5 0.0 - 2.3 K/uL    ABS. LYMPHOCYTES 1.0 (L) 1.1 - 5.9 K/UL    DF AUTOMATED     PLATELET COUNT    Collection Time: 19  8:20 AM   Result Value Ref Range    PLATELET 91 (L) 495 - 420 K/uL   [facility]. Collection Time: 19 10:32 AM   Result Value Ref Range    [facility] Specimens collected/sent to [facility]           Retacrit 60,000 units, was administered SQ, 40,000 units in her right arm  And 20,00 units in left arm at per order, and without incident.           Ms. Aguayo tolerated well, and had no complaints.     [facility]. Lorenzo [facility]pps [facility] condition at 0900. She is to return in 2 weeks, on 8/20/19 @ 1300 for CBC/Retacrit injection.       Elle Emmanuel RN  August 6, 2019

## 2019-08-06 NOTE — PROGRESS NOTES
Problem: Pain  Goal: *Control of Pain  Outcome: Progressing Towards Goal     Problem: Knowledge Deficit  Goal: *Verbalizes understanding of procedures and medications  Outcome: Progressing Towards Goal     Problem: Patient Education:  Go to Education Activity  Goal: Patient/Family Education  Outcome: Progressing Towards Goal

## 2019-08-09 ENCOUNTER — HOSPITAL ENCOUNTER (OUTPATIENT)
Dept: GENERAL RADIOLOGY | Age: 54
Discharge: HOME | End: 2019-08-09
Payer: COMMERCIAL

## 2019-08-09 ENCOUNTER — HOSPITAL ENCOUNTER (OUTPATIENT)
Dept: LAB | Age: 54
Discharge: HOME | End: 2019-08-09

## 2019-08-09 DIAGNOSIS — N18.6 END STAGE RENAL DISEASE (HCC): ICD-10-CM

## 2019-08-09 LAB — SENTARA SPECIMEN COL,SENBCF: NORMAL

## 2019-08-09 PROCEDURE — 71046 X-RAY EXAM CHEST 2 VIEWS: CPT

## 2019-08-09 PROCEDURE — 99001 SPECIMEN HANDLING PT-LAB: CPT

## 2019-08-12 ENCOUNTER — HOSPITAL ENCOUNTER (OUTPATIENT)
Dept: INTERVENTIONAL RADIOLOGY/VASCULAR | Age: 54
Discharge: HOME | End: 2019-08-12
Attending: RADIOLOGY | Admitting: RADIOLOGY
Payer: COMMERCIAL

## 2019-08-12 VITALS
RESPIRATION RATE: 16 BRPM | HEIGHT: 69 IN | BODY MASS INDEX: 30.07 KG/M2 | DIASTOLIC BLOOD PRESSURE: 80 MMHG | TEMPERATURE: 97.9 F | HEART RATE: 72 BPM | SYSTOLIC BLOOD PRESSURE: 166 MMHG | WEIGHT: 203 LBS | OXYGEN SATURATION: 97 %

## 2019-08-12 DIAGNOSIS — R80.1 PERSISTENT PROTEINURIA, UNSPECIFIED: ICD-10-CM

## 2019-08-12 DIAGNOSIS — E11.22 TYPE 2 DIABETES MELLITUS WITH DIABETIC CHRONIC KIDNEY DISEASE (HCC): ICD-10-CM

## 2019-08-12 DIAGNOSIS — N18.30 CHRONIC KIDNEY DISEASE, STAGE 3 (MODERATE): ICD-10-CM

## 2019-08-12 LAB
ANION GAP SERPL CALC-SCNC: 7 MMOL/L (ref 3–18)
APTT PPP: 30.5 SEC (ref 23–36.4)
BUN SERPL-MCNC: 58 MG/DL (ref 7–18)
BUN/CREAT SERPL: 5 (ref 12–20)
CALCIUM SERPL-MCNC: 8.3 MG/DL (ref 8.5–10.1)
CHLORIDE SERPL-SCNC: 110 MMOL/L (ref 100–111)
CO2 SERPL-SCNC: 26 MMOL/L (ref 21–32)
CREAT SERPL-MCNC: 10.9 MG/DL (ref 0.6–1.3)
ERYTHROCYTE [DISTWIDTH] IN BLOOD BY AUTOMATED COUNT: 16.7 % (ref 11.6–14.5)
GLUCOSE SERPL-MCNC: 127 MG/DL (ref 74–99)
HCT VFR BLD AUTO: 28 % (ref 35–45)
HGB BLD-MCNC: 8.9 G/DL (ref 12–16)
INR PPP: 1 (ref 0.8–1.2)
MCH RBC QN AUTO: 25.3 PG (ref 24–34)
MCHC RBC AUTO-ENTMCNC: 31.8 G/DL (ref 31–37)
MCV RBC AUTO: 79.5 FL (ref 74–97)
PLATELET # BLD AUTO: 117 K/UL (ref 135–420)
POTASSIUM SERPL-SCNC: 3.7 MMOL/L (ref 3.5–5.5)
PROTHROMBIN TIME: 12.5 SEC (ref 11.5–15.2)
RBC # BLD AUTO: 3.52 M/UL (ref 4.2–5.3)
SODIUM SERPL-SCNC: 143 MMOL/L (ref 136–145)
WBC # BLD AUTO: 7.3 K/UL (ref 4.6–13.2)

## 2019-08-12 PROCEDURE — 36558 INSERT TUNNELED CV CATH: CPT

## 2019-08-12 PROCEDURE — 80048 BASIC METABOLIC PNL TOTAL CA: CPT

## 2019-08-12 PROCEDURE — 85610 PROTHROMBIN TIME: CPT

## 2019-08-12 PROCEDURE — 74011000250 HC RX REV CODE- 250: Performed by: RADIOLOGY

## 2019-08-12 PROCEDURE — 85730 THROMBOPLASTIN TIME PARTIAL: CPT

## 2019-08-12 PROCEDURE — 74011250636 HC RX REV CODE- 250/636: Performed by: RADIOLOGY

## 2019-08-12 PROCEDURE — 85027 COMPLETE CBC AUTOMATED: CPT

## 2019-08-12 RX ORDER — HEPARIN SODIUM 1000 [USP'U]/ML
5000 INJECTION, SOLUTION INTRAVENOUS; SUBCUTANEOUS ONCE
Status: COMPLETED | OUTPATIENT
Start: 2019-08-12 | End: 2019-08-12

## 2019-08-12 RX ORDER — FENTANYL CITRATE 50 UG/ML
12.5-5 INJECTION, SOLUTION INTRAMUSCULAR; INTRAVENOUS
Status: DISCONTINUED | OUTPATIENT
Start: 2019-08-12 | End: 2019-08-12 | Stop reason: HOSPADM

## 2019-08-12 RX ORDER — VANCOMYCIN/0.9 % SOD CHLORIDE 1 G/100 ML
1000 PLASTIC BAG, INJECTION (ML) INTRAVENOUS ONCE
Status: COMPLETED | OUTPATIENT
Start: 2019-08-12 | End: 2019-08-12

## 2019-08-12 RX ORDER — SODIUM CHLORIDE 9 MG/ML
20 INJECTION, SOLUTION INTRAVENOUS CONTINUOUS
Status: DISCONTINUED | OUTPATIENT
Start: 2019-08-12 | End: 2019-08-12 | Stop reason: HOSPADM

## 2019-08-12 RX ORDER — MIDAZOLAM HYDROCHLORIDE 1 MG/ML
1 INJECTION, SOLUTION INTRAMUSCULAR; INTRAVENOUS
Status: DISCONTINUED | OUTPATIENT
Start: 2019-08-12 | End: 2019-08-12 | Stop reason: HOSPADM

## 2019-08-12 RX ADMIN — VANCOMYCIN HYDROCHLORIDE 1000 MG: 10 INJECTION, POWDER, LYOPHILIZED, FOR SOLUTION INTRAVENOUS at 12:45

## 2019-08-12 RX ADMIN — FENTANYL CITRATE 50 MCG: 50 INJECTION INTRAMUSCULAR; INTRAVENOUS at 12:55

## 2019-08-12 RX ADMIN — MIDAZOLAM HYDROCHLORIDE 1 MG: 2 INJECTION, SOLUTION INTRAMUSCULAR; INTRAVENOUS at 12:45

## 2019-08-12 RX ADMIN — LIDOCAINE HYDROCHLORIDE 300 MG: 10; .005 INJECTION, SOLUTION EPIDURAL; INFILTRATION; INTRACAUDAL; PERINEURAL at 12:45

## 2019-08-12 RX ADMIN — FENTANYL CITRATE 50 MCG: 50 INJECTION INTRAMUSCULAR; INTRAVENOUS at 12:45

## 2019-08-12 RX ADMIN — HEPARIN SODIUM 5000 UNITS: 1000 INJECTION INTRAVENOUS; SUBCUTANEOUS at 13:05

## 2019-08-12 RX ADMIN — MIDAZOLAM HYDROCHLORIDE 1 MG: 2 INJECTION, SOLUTION INTRAMUSCULAR; INTRAVENOUS at 12:55

## 2019-08-12 NOTE — PROGRESS NOTES
A+Ox4, ambulatory without difficulty. Right upper chest dressing intact, no bleeding or swelling. Discharge information reviewed. Escorted to car, tot her  for transport.

## 2019-08-12 NOTE — H&P
OUTPATIENT HISTORY AND PHYSICAL      Today 8/12/2019     Indication/Symptoms:   Gema Villegas is a 48 y.o. female with a history of CKD requiring hemodialysis who presents for an image-guided tunneled dialysis catheter placement with moderate sedation. Patient has been NPO since midnight and takes no blood thinning medications. Current Meds:    Prior to Admission medications    Medication Sig Start Date End Date Taking? Authorizing Provider   calcitRIOL (ROCALTROL) 0.25 mcg capsule Take 1 Cap by mouth every Monday, Wednesday, Friday. 6/5/19  Yes Paul Maravilla MD   calcium acetate (PHOSLO) 667 mg cap Take 1 Cap by mouth three (3) times daily (with meals). 6/4/19  Yes Paul Maravilla MD   sodium bicarbonate 650 mg tablet Take 1 Tab by mouth three (3) times daily. 6/4/19  Yes Paul Maravilla MD   acetaminophen (TYLENOL) 500 mg tablet Take 2 Tabs by mouth every six (6) hours as needed for Pain. 12/23/18  Yes Dick Rinne., PA-C   ferrous sulfate 325 mg (65 mg iron) tablet Take 325 mg by mouth two (2) times a day. Yes Provider, Historical   loratadine (CLARITIN) 10 mg tablet Take 10 mg by mouth daily as needed for Allergies. Provider, Historical       Allergies: Allergies   Allergen Reactions    Rocephin [Ceftriaxone] Itching    Pcn [Penicillins] Itching       Comorbid Conditions:    Past Medical History:   Diagnosis Date    Anemia     Arthritis     Diabetes ([facility] [address]     Hypertension     ITP (idiopathic thrombocytopenic purpura)           Past Surgical History:   Procedure Laterality Date    HX GYN      c section 1984    HX HYSTERECTOMY  2017    HX TUBAL LIGATION       Data:    Visit Vitals  /81 (BP 1 Location: Left arm, BP Patient Position: Head of bed elevated (Comment degrees))   Pulse 80   Temp 97.9 °F (36.6 °C)   Resp 16   Ht 5' 9\" (1.753 m)   Wt 92.1 kg (203 lb)   LMP 06/18/2017   SpO2 99%   Breastfeeding?  No   BMI 29.98 kg/m²   :  Recent Labs 08/12/19  1120   *     Recent Labs     08/12/19  1120   INR 1.0   APTT 30.5       The H & P and/or progress notes and any available imaging were reviewed. The risks, indications and possible alternatives to the procedure, including doing nothing, were discussed and informed consent was obtained. Physical Exam:      Mental status:   Alert and oriented. Examination specific to the procedure proposed to be performed and any co morbid conditions:   Mallampati classification 2 ,  ASA 3   Heart:   Regular rate. Lungs:   Normal respiratory effort. Symmetrical rise and fall of chest    The patient is an appropriate candidate to undergo the planned procedure and sedation.     Li James

## 2019-08-12 NOTE — PROGRESS NOTES
TRANSFER - OUT REPORT:    Verbal report given to Orquidea RN(name) on Walter Keating  being transferred to [facility](unit) for ordered procedure       Report consisted of patients Situation, Background, Assessment and   Recommendations(SBAR). Information from the following report(s) SBAR, Kardex, Procedure Summary and MAR was reviewed with the receiving nurse. Lines:   Peripheral IV 08/12/19 Right Antecubital (Active)   Site Assessment Clean, dry, & intact 8/12/2019 11:22 AM   Phlebitis Assessment 0 8/12/2019 11:22 AM   Infiltration Assessment 0 8/12/2019 11:22 AM   Dressing Status Clean, dry, & intact 8/12/2019 11:22 AM   Dressing Type Transparent 8/12/2019 11:22 AM   Hub Color/Line Status Pink;Flushed 8/12/2019 11:22 AM        Opportunity for questions and clarification was provided.       Patient transported with:   Registered Nurse

## 2019-08-12 NOTE — PROCEDURES
Interventional Radiology Brief Procedure Note    Patient: Ambrosio Valenzuela MRN: [id number]  SSN: [id number]    YOB: 1965  Age: 48 y.o. Sex: female      Date of Procedure: 8/12/2019    Procedure(s): Tunneled dialysis catheter placement    Performed By: MANDY Luevano     none    Anesthesia: Lidocaine and Moderate Sedation    Estimated Blood Loss: Less than 10ml    Specimens: None    Findings:   - Written and verbal informed consent was obtained. - Time Out was performed. - Permanent image storage performed on PACS. - Image guided right IJ double lumen TDC placement with ultrasound and fluoroscopic guidance. - Please refer to report on PACS for full details    Plan: Okay for immediate use. Follow Up: Routine exchange Q3 months.     Signed By: MANDY Luevano     August 12, 2019

## 2019-08-12 NOTE — DISCHARGE INSTRUCTIONS
Patient Education        Hemodialysis Access: What to Expect at [address] [facility]  Hemodialysis is a way to remove wastes from the blood when your kidneys can no longer do the job. It is not a cure, but it can help you live longer and feel better. It is a lifesaving treatment when you have kidney failure. Hemodialysis is often called dialysis. Your doctor created a place (called an access) in your arm for your blood to flow in and out of your body during your dialysis sessions. Your arm will probably be bruised and swollen. It may hurt. The cut (incision) may bleed. The pain and bleeding will get better over several days. You will probably need only over-the-counter pain medicine. You can reduce swelling by propping your arm on 1 or 2 pillows and keeping your elbow straight. You will have stitches. These may dissolve on their own, or your doctor will tell you when to come in to have them removed. You should also be able to return to work in a few days. You may feel some coolness or numbness in your hand. These feelings usually go away in a few weeks. Your doctor may suggest squeezing a soft object. This will strengthen your access and may make hemodialysis faster and easier. You should always be able to feel blood rushing through the fistula or graft. It feels like a slight vibration when you put your fingers on the skin over the fistula or graft. This feeling is called a thrill or pulse. This care sheet gives you a general idea about how long it will take for you to recover. But each person recovers at a different pace. Follow the steps below to get better as quickly as possible. How can you care for yourself at home? Activity    · Rest when you feel tired. Getting enough sleep will help you recover.  Do not lie on or sleep on the arm with the access.     · Avoid activities such as washing windows or gardening that put stress on the arm with the access.     · You may use your arm, but do not lift anything that weighs more than about 15 pounds. This may include a child, heavy grocery bags, a heavy briefcase or backpack, cat litter or dog food bags, or a vacuum .     · You can shower, but keep the access dry for the first 2 days. Cover the area with a plastic bag to keep it dry.     · Do not soak or scrub the incision until it has healed.     · Wear an arm guard to protect the area if you play sports or work with your arms.     · You may drive when your doctor says it is okay. This is usually in 1 to 2 days.     · Most people are able to return to work about 1 or 2 days after surgery. Diet    · Follow an eating plan that is good for your kidneys. A registered dietitian can help you make a meal plan that is right for you. You may need to limit protein, salt, fluids, and certain foods. Medicines    · Your doctor will tell you if and when you can restart your medicines. He or she will also give you instructions about taking any new medicines.     · If you take blood thinners, such as warfarin (Coumadin), clopidogrel (Plavix), or aspirin, be sure to talk to your doctor. He or she will tell you if and when to start taking those medicines again. Make sure that you understand exactly what your doctor wants you to do.     · Take pain medicines exactly as directed. ? If the doctor gave you a prescription medicine for pain, take it as prescribed. ? If you are not taking a prescription pain medicine, ask your doctor if you can take acetaminophen (Tylenol). Do not take ibuprofen (Advil, Motrin) or naproxen (Aleve), or similar medicines, unless your doctor tells you to. They may make chronic kidney disease worse. ? Do not take two or more pain medicines at the same time unless the doctor told you to. Many pain medicines have acetaminophen, which is Tylenol.  Too much acetaminophen (Tylenol) can be harmful.     · If you think your pain medicine is making you sick to your stomach:  ? Take your medicine after meals (unless your doctor has told you not to). ? Ask your doctor for a different pain medicine.     · If your doctor prescribed antibiotics, take them as directed. Do not stop taking them just because you feel better. You need to take the full course of antibiotics. Incision care    · Keep the area dry for 2 days. After 2 days, wash the area with soap and water every day, and always before dialysis.     · Do not soak or scrub the incision until it has healed.     · If you have a bandage, change it every day or as your doctor recommends. Your doctor will tell you when you can remove it. Exercise    · Squeeze a soft ball or other object as your doctor tells you. This will help blood flow through the access and help prevent blood clots.    Elevation    · Prop up the sore arm on a pillow anytime you sit or lie down during the next 3 days. Try to keep it above the level of your heart. This will help reduce swelling. Other instructions    · Every day, check your access for a pulse or thrill in the fistula or graft area. A thrill is a vibration. To feel a pulse or thrill, place the first two fingers of your hand over the access.     · Do not bump your arm.     · Do not wear tight clothing, jewelry, or anything else that may squeeze the access.     · Use your other arm to have blood drawn or blood pressure taken.     · Do not put cream or lotion on or near the access.     · Make sure all doctors you deal with know you have a vascular access. Follow-up care is a key part of your treatment and safety. Be sure to make and go to all appointments, and call your doctor if you are having problems. It's also a good idea to know your test results and keep a list of the medicines you take. When should you call for help? Call 911 anytime you think you may need emergency care.  For example, call if:    · You passed out (lost consciousness).     · You have chest pain, are short of breath, or cough up blood.    Call your doctor now or seek immediate medical care if:    · Your hand or arm is cold or dark-colored.     · You have no pulse in your access.     · You have nausea or you vomit.     · You have pain that does not get better after you take pain medicine.     · You have loose stitches, or your incision comes open.     · You are bleeding from the incision.     · You have signs of infection, such as:  ? Increased pain, swelling, warmth, or redness. ? Red streaks leading from the area. ? Pus draining from the area. ? A fever.     · You have signs of a blood clot in your leg (called a deep vein thrombosis), such as:  ? Pain in your calf, back of the knee, thigh, or groin. ? Redness or swelling in your leg.    Watch closely for changes in your health, and be sure to contact your doctor if you have any problems. Where can you learn more? Go to http://michela-ivan.info/. Enter P616 in the search box to learn more about \"Hemodialysis Access: What to Expect at Home. \"  Current as of: October 31, 2018  Content Version: 12.1  © 9399-0896 Healthwise, Incorporated. Care instructions adapted under license by [employer] (which disclaims liability or warranty for this information). If you have questions about a medical condition or this instruction, always ask your healthcare professional. [address] any warranty or liability for your use of this information. DISCHARGE SUMMARY from Nurse:    Please resume taking your home medication as prescribed. PATIENT INSTRUCTIONS:    After general anesthesia or intravenous sedation, for 24 hours or while taking prescription Narcotics:  · Limit your activities  · Do not drive and operate hazardous machinery  · Do not make important personal or business decisions  · Do  not drink alcoholic beverages  · If you have not urinated within 8 hours after discharge, please contact your surgeon on call.     Report the following to your surgeon:  · Excessive pain, swelling, redness or odor of or around the surgical area  · Temperature over 100.5  · Nausea and vomiting lasting longer than 4 hours or if unable to take medications  · Any signs of decreased circulation or nerve impairment to extremity: change in color, persistent  numbness, tingling, coldness or increase pain  · Any questions    What to do at Home:  Recommended activity: Activity as tolerated. *  Please give a list of your current medications to your Primary Care Provider. *  Please update this list whenever your medications are discontinued, doses are      changed, or new medications (including over-the-counter products) are added. *  Please carry medication information at all times in case of emergency situations. These are general instructions for a healthy lifestyle:    No smoking/ No tobacco products/ Avoid exposure to second hand smoke  Surgeon General's Warning:  Quitting smoking now greatly reduces serious risk to your health. Obesity, smoking, and sedentary lifestyle greatly increases your risk for illness    A healthy diet, regular physical exercise & weight monitoring are important for maintaining a healthy lifestyle    You may be retaining fluid if you have a history of heart failure or if you experience any of the following symptoms:  Weight gain of 3 pounds or more overnight or 5 pounds in a week, increased swelling in our hands or feet or shortness of breath while lying flat in bed. Please call your doctor as soon as you notice any of these symptoms; do not wait until your next office visit. The discharge information has been reviewed with the patient. The patient verbalized understanding. Discharge medications reviewed with the patient and appropriate educational materials and side effects teaching were provided. Patient armband removed and shredded  MyChart Activation    Thank you for requesting access to [employer].  Please follow the instructions below to securely access and download your online medical record. [employer] allows you to send messages to your doctor, view your test results, renew your prescriptions, schedule appointments, and more. How Do I Sign Up? 1. In your internet browser, go to [email or web address]. 2. Click on the First Time User? Click Here link in the Sign In box. You will see the New Member Sign Up page. 3. Enter your [employer] Access Code exactly as it appears below. You will not need to use this code after youve completed the sign-up process. If you do not sign up before the expiration date, you must request a new code. [employer] Access Code: [email or web address]  Expires: 2019  7:51 AM (This is the date your [employer] access code will )    4. Enter the last four digits of your Social Security Number (xxxx) and Date of Birth (mm/dd/yyyy) as indicated and click Submit. You will be taken to the next sign-up page. 5. Create a [employer] ID. This will be your [employer] login ID and cannot be changed, so think of one that is secure and easy to remember. 6. Create a [employer] password. You can change your password at any time. 7. Enter your Password Reset Question and Answer. This can be used at a later time if you forget your password. 8. Enter your e-mail address. You will receive e-mail notification when new information is available in [address] 9. Click Sign Up. You can now view and download portions of your medical record. 10. Click the Download Summary menu link to download a portable copy of your medical information. Additional Information    If you have questions, please visit the Frequently Asked Questions section of the [employer] website at [email or web address] Remember, [employer] is NOT to be used for urgent needs.  For medical emergencies, dial 911.    ___________________________________________________________________________________________________________________________________

## 2019-08-20 ENCOUNTER — HOSPITAL ENCOUNTER (OUTPATIENT)
Dept: INFUSION THERAPY | Age: 54
Discharge: HOME | End: 2019-08-20
Payer: COMMERCIAL

## 2019-08-20 ENCOUNTER — CLINICAL SUPPORT (OUTPATIENT)
Dept: ONCOLOGY | Age: 54
End: 2019-08-20

## 2019-08-20 ENCOUNTER — HOSPITAL ENCOUNTER (OUTPATIENT)
Dept: ONCOLOGY | Age: 54
Discharge: HOME | End: 2019-08-20

## 2019-08-20 VITALS
DIASTOLIC BLOOD PRESSURE: 93 MMHG | TEMPERATURE: 97.7 F | SYSTOLIC BLOOD PRESSURE: 171 MMHG | RESPIRATION RATE: 16 BRPM | OXYGEN SATURATION: 97 % | HEART RATE: 87 BPM

## 2019-08-20 DIAGNOSIS — D63.1 ANEMIA IN CHRONIC KIDNEY DISEASE, UNSPECIFIED CKD STAGE: ICD-10-CM

## 2019-08-20 DIAGNOSIS — N18.9 ANEMIA IN CHRONIC KIDNEY DISEASE, UNSPECIFIED CKD STAGE: Primary | ICD-10-CM

## 2019-08-20 DIAGNOSIS — N18.9 ANEMIA IN CHRONIC KIDNEY DISEASE, UNSPECIFIED CKD STAGE: ICD-10-CM

## 2019-08-20 DIAGNOSIS — D63.1 ANEMIA IN CHRONIC KIDNEY DISEASE, UNSPECIFIED CKD STAGE: Primary | ICD-10-CM

## 2019-08-20 LAB
BASO+EOS+MONOS # BLD AUTO: 0.2 K/UL (ref 0–2.3)
BASO+EOS+MONOS NFR BLD AUTO: 5 % (ref 0.1–17)
DIFFERENTIAL METHOD BLD: ABNORMAL
ERYTHROCYTE [DISTWIDTH] IN BLOOD BY AUTOMATED COUNT: 15.4 % (ref 11.5–14.5)
HCT VFR BLD AUTO: 29.1 % (ref 36–48)
HGB BLD-MCNC: 8.7 G/DL (ref 12–16)
LYMPHOCYTES # BLD: 0.9 K/UL (ref 1.1–5.9)
LYMPHOCYTES NFR BLD: 20 % (ref 14–44)
MCH RBC QN AUTO: 25.5 PG (ref 25–35)
MCHC RBC AUTO-ENTMCNC: 29.9 G/DL (ref 31–37)
MCV RBC AUTO: 85.3 FL (ref 78–102)
NEUTS SEG # BLD: 3.4 K/UL (ref 1.8–9.5)
NEUTS SEG NFR BLD: 76 % (ref 40–70)
PLATELET # BLD AUTO: 107 K/UL (ref 140–440)
RBC # BLD AUTO: 3.41 M/UL (ref 4.1–5.1)
WBC # BLD AUTO: 4.5 K/UL (ref 4.5–13)

## 2019-08-20 PROCEDURE — 36415 COLL VENOUS BLD VENIPUNCTURE: CPT

## 2019-08-20 PROCEDURE — 96372 THER/PROPH/DIAG INJ SC/IM: CPT

## 2019-08-20 PROCEDURE — 99211 OFF/OP EST MAY X REQ PHY/QHP: CPT

## 2019-08-20 PROCEDURE — 74011250636 HC RX REV CODE- 250/636: Performed by: INTERNAL MEDICINE

## 2019-08-20 RX ADMIN — EPOETIN ALFA-EPBX 60000 UNITS: 40000 INJECTION, SOLUTION INTRAVENOUS; SUBCUTANEOUS at 14:20

## 2019-08-20 NOTE — PROGRESS NOTES
[facility] Progress Note    Date: 2019    Name: Mannie Armijo    MRN: [id number]         : 1965      Ms. Fabiano Lau arrived in the [facility] today at 1330, in stable condition, here for Q 2 Week, CBC/Retacrit injection. She was assessed and education was provided. Upon arrival to the [facility] today, Ms. Aguayo informed me that she now had a new dialysis catheter that was inserted in her right chest on last Monday, 19, and that she was now on dialysis every Sxjhwe-Mpovhiozl-Fbghtk. She also stated that she was told that she was not supposed to get any more IVIG, due to her renal failure. So, I informed Dr. Paulie Braroso, that she is now on dialysis, and he gave the order to discontinue her IVIG. Also, he ordered me to give her dose of Retacrit today, based on the CBC results listed below, and then, have his MAMere call & make sure that her dialysis center initiates the administration of her Retacrit after today. Rajani Rico, was made aware, that Ms. Fabiano Lau receives her dialysis treatments at [facility] on [place] [address] in [place], and she stated that she would call and make them aware, to initiate Retacrit during her dialysis treatments. Ms. Shay Jimenez vitals were reviewed. Visit Vitals  BP (!) 171/93 (BP 1 Location: Right arm, BP Patient Position: Sitting)   Pulse 87   Temp 97.7 °F (36.5 °C)   Resp 16   SpO2 97%           CBC was drawn from her left AC at 1350, per order, and without incident.            Lab results were obtained and reviewed, and were as follows:    Recent Results (from the past 12 hour(s))   CBC WITH 3 PART DIFF    Collection Time: 19  1:50 PM   Result Value Ref Range    WBC 4.5 4.5 - 13.0 K/uL    RBC 3.41 (L) 4.10 - 5.10 M/uL    HGB 8.7 (L) 12.0 - 16.0 g/dL    HCT 29.1 (L) 36 - 48 %    MCV 85.3 78 - 102 FL    MCH 25.5 25.0 - 35.0 PG    MCHC 29.9 (L) 31 - 37 g/dL    RDW 15.4 (H) 11.5 - 14.5 %    PLATELET 671 (L) 651 - 440 K/uL    NEUTROPHILS 76 (H) 40 - 70 %    MIXED CELLS 5 0.1 - 17 % LYMPHOCYTES 20 14 - 44 %    ABS. NEUTROPHILS 3.4 1.8 - 9.5 K/UL    ABS. MIXED CELLS 0.2 0.0 - 2.3 K/uL    ABS. LYMPHOCYTES 0.9 (L) 1.1 - 5.9 K/UL    DF AUTOMATED               Retacrit (Epoetin Mark-epbx) 60,000 units, was administered in 2 divided injections SQ, at 1420, (40,000 units in the 1st injection, SQ, in her right arm, & 20,000 units SQ, in the 2nd injection), in her left arm, per order, and without incident. Ms. Paul Mcelroy tolerated well, and had no complaints. Ms. Paul Mcelroy was discharged from [place] [address] in stable condition at 1430. .. She is tentatively scheduled to return to the [facility] in 2 weeks, on Tuesday, 9-3-19 at 1300, for CBC/Retacrit Injection. However, she was instructed to call the [facility] and let us know, if her Retacrit Injections have been initiated during her dialysis treatments by then. All future IVIG infusion appointments have been cancelled accordingly.      Sylvia Ye RN  August 20, 2019  3:38 PM

## 2019-08-29 NOTE — PROGRESS NOTES
[facility] Progress Note    Date: 2019    Name: [place]    MRN: [id number]         : 1965      Ms. Aguayo confirmed today, that she has now been started on Procrit/Retacrit with her dialysis treatments, every [facility]. So, Mackie Gitelman, NP, was made aware, and gave the order to discontinue Retacrit Injections in the [facility]. The order was scanned into [facility], and all future OPIC appointments were cancelled accordingly.          Cuate Leary RN  2019  9:55 AM

## 2019-09-03 ENCOUNTER — HOSPITAL ENCOUNTER (OUTPATIENT)
Dept: INFUSION THERAPY | Age: 54
Discharge: HOME | End: 2019-09-03

## 2019-09-10 ENCOUNTER — OFFICE VISIT (OUTPATIENT)
Dept: ONCOLOGY | Age: 54
End: 2019-09-10

## 2019-09-10 VITALS
DIASTOLIC BLOOD PRESSURE: 92 MMHG | OXYGEN SATURATION: 98 % | WEIGHT: 205.8 LBS | HEART RATE: 106 BPM | BODY MASS INDEX: 30.48 KG/M2 | HEIGHT: 69 IN | TEMPERATURE: 98.6 F | SYSTOLIC BLOOD PRESSURE: 174 MMHG

## 2019-09-10 DIAGNOSIS — D63.1 ANEMIA IN CHRONIC KIDNEY DISEASE, UNSPECIFIED CKD STAGE: Primary | ICD-10-CM

## 2019-09-10 DIAGNOSIS — D63.1 ANEMIA IN CHRONIC KIDNEY DISEASE, UNSPECIFIED CKD STAGE: ICD-10-CM

## 2019-09-10 DIAGNOSIS — N18.9 ANEMIA IN CHRONIC KIDNEY DISEASE, UNSPECIFIED CKD STAGE: Primary | ICD-10-CM

## 2019-09-10 DIAGNOSIS — D69.6 THROMBOCYTOPENIA, UNSPECIFIED (HCC): ICD-10-CM

## 2019-09-10 DIAGNOSIS — N18.9 ANEMIA IN CHRONIC KIDNEY DISEASE, UNSPECIFIED CKD STAGE: ICD-10-CM

## 2019-09-10 NOTE — PATIENT INSTRUCTIONS
Complete Blood Count (CBC): About This Test  What is it? A complete blood count (CBC) is a blood test that gives important information about your blood cells, especially red blood cells, white blood cells, and platelets. Why is this test done? A CBC may be done as part of a regular physical exam. There are many other reasons that a doctor may want this blood test, including to:  · Find the cause of symptoms such as fatigue, weakness, fever, bruising, or weight loss. · Find anemia or an infection. · See how much blood has been lost if there is bleeding. · Diagnose diseases of the blood, such as leukemia or polycythemia. How can you prepare for the test?  You do not need to do anything before having this test.  What happens during the test?  The health professional taking a sample of your blood will:  · Wrap an elastic band around your upper arm. This makes the veins below the band larger so it is easier to put a needle into the vein. · Clean the needle site with alcohol. · Put the needle into the vein. · Attach a tube to the needle to fill it with blood. · Remove the band from your arm when enough blood is collected. · Put a gauze pad or cotton ball over the needle site as the needle is removed. · Put pressure on the site and then put on a bandage. If this blood test is done on a baby, a heel stick may be done instead of a blood draw from a vein. What happens after the test?  · You will probably be able to go home right away. · You can go back to your usual activities right away. Follow-up care is a key part of your treatment and safety. Be sure to make and go to all appointments, and call your doctor if you are having problems. It's also a good idea to keep a list of the medicines you take. Ask your doctor when you can expect to have your test results. Where can you learn more? Go to http://michela-ivan.info/.   Enter C610 in the search box to learn more about \"Complete Blood Count (CBC): About This Test.\"  Current as of: March 28, 2019  Content Version: 12.1  © 5377-2692 [employer], [employer]. Care instructions adapted under license by [employer] (which disclaims liability or warranty for this information). If you have questions about a medical condition or this instruction, always ask your healthcare professional. [address] any warranty or liability for your use of this information.

## 2019-09-10 NOTE — PROGRESS NOTES
Hematology/Oncology  Progress Note    Name: Ellie Dale  Date: 09/10/2019  : 1965    PCP: Vero Yoo MD     Ms. Rosalia Smith is a 48year old female who was seen for management of her underlying anemia. The patient also past chronic thrombocytopenia/ITP. Current therapy: Retacrit 60,000 whenever hemoglobin is below 10g/dL and hematocrit is below 30%, ferrous sulfate BID, and Venofer PRN. Subjective:     Ms. Rosalia Smith is a 65-year-old woman with  CKD, DM type 2, anemia, and idiopathic thrombocytopenia. She is here today for follow up office visit. She reports she already started dialysis on  and this will occur 3x a week on , , and . She reports she is feeling better now that she has started dialysis. She reports of some degree of fatigue but this is better compared to last visit. She denies chest pain, dizziness, or any discomfort. She does not have any concerns or complaints to report at this time. Past medical history, family history, and social history: these were reviewed and remains unchanged.     Past Medical History:   Diagnosis Date    Anemia     Arthritis     Diabetes ([facility] [address]     Hypertension     ITP (idiopathic thrombocytopenic purpura)      Past Surgical History:   Procedure Laterality Date    HX GYN      c section     HX HYSTERECTOMY      HX TUBAL LIGATION      IR INSERT TUNL CVC W/O PORT OVER 5 YR  2019     Social History     Socioeconomic History    Marital status:      Spouse name: Not on file    Number of children: Not on file    Years of education: Not on file    Highest education level: Not on file   Occupational History    Not on file   Social Needs    Financial resource strain: Not on file    Food insecurity:     Worry: Not on file     Inability: Not on file    Transportation needs:     Medical: Not on file     Non-medical: Not on file   Tobacco Use    Smoking status: Never Smoker    Smokeless tobacco: Never Used   Substance and Sexual Activity    Alcohol use: No    Drug use: No    Sexual activity: Not on file   Lifestyle    Physical activity:     Days per week: Not on file     Minutes per session: Not on file    Stress: Not on file   Relationships    Social connections:     Talks on phone: Not on file     Gets together: Not on file     Attends Buddhist service: Not on file     Active member of club or organization: Not on file     Attends meetings of clubs or organizations: Not on file     Relationship status: Not on file    Intimate partner violence:     Fear of current or ex partner: Not on file     Emotionally abused: Not on file     Physically abused: Not on file     Forced sexual activity: Not on file   Other Topics Concern    Not on file   Social History Narrative    Not on file     Family History   Problem Relation Age of Onset    Hypertension Mother     Cancer Father         Colon     Current Outpatient Medications   Medication Sig Dispense Refill    calcitRIOL (ROCALTROL) 0.25 mcg capsule Take 1 Cap by mouth every Monday, Wednesday, Friday. 30 Cap 1    calcium acetate (PHOSLO) 667 mg cap Take 1 Cap by mouth three (3) times daily (with meals). 90 Cap 3    sodium bicarbonate 650 mg tablet Take 1 Tab by mouth three (3) times daily. 90 Tab 1    loratadine (CLARITIN) 10 mg tablet Take 10 mg by mouth daily as needed for Allergies.  acetaminophen (TYLENOL) 500 mg tablet Take 2 Tabs by mouth every six (6) hours as needed for Pain. 20 Tab 0    ferrous sulfate 325 mg (65 mg iron) tablet Take 325 mg by mouth two (2) times a day. Review of Systems  Constitutional: The patient denies fatigue or acute distress  HEENT: The patient denies recent head trauma, eye pain, blurred vision,  hearing deficit, oropharyngeal mucosal pain or lesions, and the patient denies throat pain or discomfort. Lymphatics: The patient denies palpable peripheral lymphadenopathy.   Hematologic: The patient denies having bruising, bleeding, or progressive fatigue. Respiratory: Patient denies having shortness of breath, cough, sputum production, fever, or dyspnea on exertion. Cardiovascular: The patient denies having leg pain, leg swelling, heart palpitations, chest permit, chest pain, or lightheadedness. The patient denies having dyspnea on exertion. Gastrointestinal: The patient denies having nausea, emesis, or diarrhea. The patient denies having any hematemesis or blood in the stool. Genitourinary: Patient denies having urinary urgency, frequency, or dysuria. The patient denies having blood in the urine. Psychological: The patient denies having symptoms of nervousness, anxiety, depression, or thoughts of harming himself some of this. Skin: Patient denies having skin rashes, skin, ulcerations, or unexplained itching or pruritus. Musculoskeletal: The patient denies having pain in the joints or bones. Objective:     Visit Vitals  BP (!) 174/92   Pulse (!) 106   Temp 98.6 °F (37 °C)   Ht 5' 9\" (1.753 m)   Wt 93.4 kg (205 lb 12.8 oz)   LMP 06/18/2017   SpO2 98%   BMI 30.39 kg/m²     ECOG 0 Pain 0/10  Physical Exam:   Gen. Appearance: The patient is in no acute distress. Skin: There is no bruise or rash. HEENT: The exam is unremarkable. Neck: Supple without lymphadenopathy or thyromegaly. Lungs: Clear to auscultation and percussion; there are no wheezes or rhonchi. Heart: Regular rate and rhythm; there are no murmurs, gallops, or rubs. Anterior chest wall and breasts: Deferred. Abdomen: Bowel sounds are present and normal.  There is no guarding, tenderness, or hepatosplenomegaly. Extremities: There is no clubbing, cyanosis, or edema. Neurologic: There are no focal neurologic deficits. Lymphatics: There is no palpable peripheral lymphadenopathy. Musculoskeletal: The patient has full range of motion at all joints. There is no evidence of joint deformity or effusions.   There is no focal joint tenderness. Psychological/psychiatric: There is no clinical evidence of anxiety, depression, or melancholy. Lab data:      Results for orders placed or performed during the hospital encounter of 08/20/19   CBC WITH 3 PART DIFF     Status: Abnormal   Result Value Ref Range Status    WBC 4.5 4.5 - 13.0 K/uL Final    RBC 3.41 (L) 4.10 - 5.10 M/uL Final    HGB 8.7 (L) 12.0 - 16.0 g/dL Final    HCT 29.1 (L) 36 - 48 % Final    MCV 85.3 78 - 102 FL Final    MCH 25.5 25.0 - 35.0 PG Final    MCHC 29.9 (L) 31 - 37 g/dL Final    RDW 15.4 (H) 11.5 - 14.5 % Final    PLATELET 699 (L) 977 - 440 K/uL Final    NEUTROPHILS 76 (H) 40 - 70 % Final    MIXED CELLS 5 0.1 - 17 % Final    LYMPHOCYTES 20 14 - 44 % Final    ABS. NEUTROPHILS 3.4 1.8 - 9.5 K/UL Final    ABS. MIXED CELLS 0.2 0.0 - 2.3 K/uL Final    ABS. LYMPHOCYTES 0.9 (L) 1.1 - 5.9 K/UL Final     Comment: Test performed at [address] [place] [address] or Outpatient Infusion Center Location. Reviewed by Medical Director. DF AUTOMATED   Final         Assessment:     1. Anemia in chronic kidney disease, unspecified CKD stage    2. Thrombocytopenia, unspecified ([facility] [address]      Plan:   Anemia due to blood loss/menorrhagia and chronic kidney disease: The patient started dialysis 3x a week on Aug 14, 2019. This will be continued as recommended. Iron Profile and Ferritin will be obtained at this time. 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 CBC today is still pending. Follow up in 12 weeks or sooner if indicated.     Orders Placed This Encounter    IRON PROFILE     Standing Status:   Future     Standing Expiration Date:   0/93/7115    METABOLIC PANEL, COMPREHENSIVE     Standing Status:   Future     Standing Expiration Date:   9/10/2020    FERRITIN     Standing Status:   Future     Standing Expiration Date:   9/10/2020       Terrial Space, NP  09/10/2019    I have assessed the patient independently and  agree with the full assessment as outlined.   Dylon Mckeon MD, [address]

## 2019-09-11 LAB
A-G RATIO,AGRAT: 0.9 RATIO (ref 1.1–2.6)
ABSOLUTE LYMPHOCYTE COUNT, 10803: 0.8 K/UL (ref 1–4.8)
ALBUMIN SERPL-MCNC: 3 G/DL (ref 3.5–5)
ALP SERPL-CCNC: 82 U/L (ref 25–115)
ALT SERPL-CCNC: 11 U/L (ref 5–40)
ANION GAP SERPL CALC-SCNC: 14 MMOL/L
AST SERPL W P-5'-P-CCNC: 19 U/L (ref 10–37)
BASOPHILS # BLD: 0 K/UL (ref 0–0.2)
BASOPHILS NFR BLD: 0 % (ref 0–2)
BILIRUB SERPL-MCNC: 0.1 MG/DL (ref 0.2–1.2)
BUN SERPL-MCNC: 29 MG/DL (ref 6–22)
CALCIUM SERPL-MCNC: 8.2 MG/DL (ref 8.4–10.5)
CHLORIDE SERPL-SCNC: 103 MMOL/L (ref 98–110)
CO2 SERPL-SCNC: 25 MMOL/L (ref 20–32)
CREAT SERPL-MCNC: 7.6 MG/DL (ref 0.5–1.2)
EOSINOPHIL # BLD: 0.1 K/UL (ref 0–0.5)
EOSINOPHIL NFR BLD: 3 % (ref 0–6)
ERYTHROCYTE [DISTWIDTH] IN BLOOD BY AUTOMATED COUNT: 15.9 % (ref 10–15.5)
FE % SATURATION,PSAT: 21 % (ref 20–50)
FERRITIN SERPL-MCNC: 225 NG/ML (ref 10–291)
GFRAA, 66117: 6.9
GFRNA, 66118: 5.7
GLOBULIN,GLOB: 3.5 G/DL (ref 2–4)
GLUCOSE SERPL-MCNC: 82 MG/DL (ref 70–99)
GRANULOCYTES,GRANS: 76 % (ref 40–75)
HCT VFR BLD AUTO: 31.8 % (ref 35.1–48)
HGB BLD-MCNC: 9.5 G/DL (ref 11.7–16)
IMMATURE PLATELET FRACTION: 12.6 % (ref 1.1–7.1)
IRON,IRN: 40 MCG/DL (ref 30–160)
LYMPHOCYTES, LYMLT: 16 % (ref 20–45)
MCH RBC QN AUTO: 27 PG (ref 26–34)
MCHC RBC AUTO-ENTMCNC: 30 G/DL (ref 31–36)
MCV RBC AUTO: 90 FL (ref 80–95)
MONOCYTES # BLD: 0.3 K/UL (ref 0.1–1)
MONOCYTES NFR BLD: 5 % (ref 3–12)
NEUTROPHILS # BLD AUTO: 3.7 K/UL (ref 1.8–7.7)
PLATELET # BLD AUTO: 74 K/UL (ref 140–440)
POTASSIUM SERPL-SCNC: 4 MMOL/L (ref 3.5–5.5)
PROT SERPL-MCNC: 6.5 G/DL (ref 6.4–8.3)
RBC # BLD AUTO: 3.55 M/UL (ref 3.8–5.2)
SODIUM SERPL-SCNC: 142 MMOL/L (ref 133–145)
TIBC,TIBC: 190 MCG/DL (ref 228–428)
UIBC SERPL-MCNC: 150 MCG/DL (ref 110–370)
WBC # BLD AUTO: 4.9 K/UL (ref 4–11)

## 2019-09-17 ENCOUNTER — APPOINTMENT (OUTPATIENT)
Dept: INFUSION THERAPY | Age: 54
End: 2019-09-17

## 2019-10-01 ENCOUNTER — APPOINTMENT (OUTPATIENT)
Dept: INFUSION THERAPY | Age: 54
End: 2019-10-01

## 2019-12-10 ENCOUNTER — OFFICE VISIT (OUTPATIENT)
Dept: ONCOLOGY | Age: 54
End: 2019-12-10

## 2019-12-10 ENCOUNTER — HOSPITAL ENCOUNTER (OUTPATIENT)
Dept: ONCOLOGY | Age: 54
Discharge: HOME | End: 2019-12-10

## 2019-12-10 VITALS
DIASTOLIC BLOOD PRESSURE: 112 MMHG | SYSTOLIC BLOOD PRESSURE: 197 MMHG | HEIGHT: 69 IN | WEIGHT: 201.2 LBS | TEMPERATURE: 98.3 F | HEART RATE: 119 BPM | RESPIRATION RATE: 16 BRPM | BODY MASS INDEX: 29.8 KG/M2 | OXYGEN SATURATION: 99 %

## 2019-12-10 DIAGNOSIS — D63.1 ANEMIA IN CHRONIC KIDNEY DISEASE, UNSPECIFIED CKD STAGE: ICD-10-CM

## 2019-12-10 DIAGNOSIS — D69.6 THROMBOCYTOPENIA, UNSPECIFIED (HCC): ICD-10-CM

## 2019-12-10 DIAGNOSIS — N18.9 ANEMIA IN CHRONIC KIDNEY DISEASE, UNSPECIFIED CKD STAGE: ICD-10-CM

## 2019-12-10 DIAGNOSIS — N18.9 ANEMIA IN CHRONIC KIDNEY DISEASE, UNSPECIFIED CKD STAGE: Primary | ICD-10-CM

## 2019-12-10 DIAGNOSIS — D63.1 ANEMIA IN CHRONIC KIDNEY DISEASE, UNSPECIFIED CKD STAGE: Primary | ICD-10-CM

## 2019-12-10 LAB
BASO+EOS+MONOS # BLD AUTO: 0.3 K/UL (ref 0–2.3)
BASO+EOS+MONOS NFR BLD AUTO: 6 % (ref 0.1–17)
DIFFERENTIAL METHOD BLD: ABNORMAL
ERYTHROCYTE [DISTWIDTH] IN BLOOD BY AUTOMATED COUNT: 14.1 % (ref 11.5–14.5)
HCT VFR BLD AUTO: 38.6 % (ref 36–48)
HGB BLD-MCNC: 12 G/DL (ref 12–16)
LYMPHOCYTES # BLD: 0.6 K/UL (ref 1.1–5.9)
LYMPHOCYTES NFR BLD: 14 % (ref 14–44)
MCH RBC QN AUTO: 28.9 PG (ref 25–35)
MCHC RBC AUTO-ENTMCNC: 31.1 G/DL (ref 31–37)
MCV RBC AUTO: 93 FL (ref 78–102)
NEUTS SEG # BLD: 3.1 K/UL (ref 1.8–9.5)
NEUTS SEG NFR BLD: 80 % (ref 40–70)
PLATELET # BLD AUTO: 83 K/UL (ref 140–440)
RBC # BLD AUTO: 4.15 M/UL (ref 4.1–5.1)
WBC # BLD AUTO: 4 K/UL (ref 4.5–13)

## 2019-12-10 NOTE — PROGRESS NOTES
Hematology/Oncology  Progress Note    Name: Darnell Key  Date: 12/10/2019  : 1965    PCP: Maria E Nguyen MD     Ms. Vileka Ye is a 48year old female who was seen for management of her underlying anemia. The patient also past chronic thrombocytopenia/ITP. Current therapy: Procrit at the D[facility] Center  Dialysis 3x a week on [facility]    Ferrous sulfate BID, and Venofer PRN. Subjective:     Ms. Vielka Ye is a 77-year-old woman with CKD, DM type 2, anemia, and idiopathic thrombocytopenia. She was diagnosed in 2015. She is here today for follow up office visit. She reports she already started dialysis on  and this will occur 3x a week on , , and . She reports she is feeling better now that she has started dialysis. She denies fatigue, shortness of breath, and weakness. She denies chest pain or dizziness. She denies pain or any discomfort. She does not have any concerns or complaints to report at this time. Past medical history, family history, and social history: these were reviewed and remains unchanged.     Past Medical History:   Diagnosis Date    Anemia     Arthritis     Diabetes ([address]     Hypertension     ITP (idiopathic thrombocytopenic purpura)      Past Surgical History:   Procedure Laterality Date    HX GYN      c section     HX HYSTERECTOMY      HX TUBAL LIGATION      IR INSERT TUNL CVC W/O PORT OVER 5 YR  2019     Social History     Socioeconomic History    Marital status:      Spouse name: Not on file    Number of children: Not on file    Years of education: Not on file    Highest education level: Not on file   Occupational History    Not on file   Social Needs    Financial resource strain: Not on file    Food insecurity:     Worry: Not on file     Inability: Not on file    Transportation needs:     Medical: Not on file     Non-medical: Not on file   Tobacco Use    Smoking status: Never Smoker    Smokeless tobacco: Never Used   Substance and Sexual Activity    Alcohol use: No    Drug use: No    Sexual activity: Not on file   Lifestyle    Physical activity:     Days per week: Not on file     Minutes per session: Not on file    Stress: Not on file   Relationships    Social connections:     Talks on phone: Not on file     Gets together: Not on file     Attends [employer] service: Not on file     Active member of club or organization: Not on file     Attends meetings of clubs or organizations: Not on file     Relationship status: Not on file    Intimate partner violence:     Fear of current or ex partner: Not on file     Emotionally abused: Not on file     Physically abused: Not on file     Forced sexual activity: Not on file   Other Topics Concern    Not on file   Social History Narrative    Not on file     Family History   Problem Relation Age of Onset    Hypertension Mother     Cancer Father         Colon     Current Outpatient Medications   Medication Sig Dispense Refill    calcitRIOL (ROCALTROL) 0.25 mcg capsule Take 1 Cap by mouth every Monday, Wednesday, Friday. 30 Cap 1    calcium acetate (PHOSLO) 667 mg cap Take 1 Cap by mouth three (3) times daily (with meals). 90 Cap 3    sodium bicarbonate 650 mg tablet Take 1 Tab by mouth three (3) times daily. 90 Tab 1    loratadine (CLARITIN) 10 mg tablet Take 10 mg by mouth daily as needed for Allergies.  acetaminophen (TYLENOL) 500 mg tablet Take 2 Tabs by mouth every six (6) hours as needed for Pain. 20 Tab 0    ferrous sulfate 325 mg (65 mg iron) tablet Take 325 mg by mouth two (2) times a day. Review of Systems  Constitutional: The patient denies fatigue or acute distress  HEENT: The patient denies recent head trauma, eye pain, blurred vision,  hearing deficit, oropharyngeal mucosal pain or lesions, and the patient denies throat pain or discomfort. Lymphatics: The patient denies palpable peripheral lymphadenopathy.   Hematologic: The patient denies having bruising, bleeding, or progressive fatigue. Respiratory: Patient denies having shortness of breath, cough, sputum production, fever, or dyspnea on exertion. Cardiovascular: The patient denies having leg pain, leg swelling, heart palpitations, chest permit, chest pain, or lightheadedness. The patient denies having dyspnea on exertion. Gastrointestinal: The patient denies having nausea, emesis, or diarrhea. The patient denies having any hematemesis or blood in the stool. Genitourinary: Patient denies having urinary urgency, frequency, or dysuria. The patient denies having blood in the urine. Psychological: The patient denies having symptoms of nervousness, anxiety, depression, or thoughts of harming himself some of this. Skin: Patient denies having skin rashes, skin, ulcerations, or unexplained itching or pruritus. Musculoskeletal: The patient denies having pain in the joints or bones. Objective:     Visit Vitals  BP (!) 197/112   Pulse (!) 119   Temp 98.3 °F (36.8 °C) (Oral)   Resp 16   Ht 5' 9\" (1.753 m)   Wt 91.3 kg (201 lb 3.2 oz)   LMP 06/18/2017   SpO2 99%   BMI 29.71 kg/m²     ECOG 0 Pain 0/10  Physical Exam:   Gen. Appearance: The patient is in no acute distress. Skin: There is no bruise or rash. HEENT: The exam is unremarkable. Neck: Supple without lymphadenopathy or thyromegaly. Lungs: Clear to auscultation and percussion; there are no wheezes or rhonchi. Heart: Regular rate and rhythm; there are no murmurs, gallops, or rubs. Anterior chest wall and breasts: Deferred. Abdomen: Bowel sounds are present and normal.  There is no guarding, tenderness, or hepatosplenomegaly. Extremities: There is no clubbing, cyanosis, or edema. Neurologic: There are no focal neurologic deficits. Lymphatics: There is no palpable peripheral lymphadenopathy. Musculoskeletal: The patient has full range of motion at all joints. There is no evidence of joint deformity or effusions. There is no focal joint tenderness. Psychological/psychiatric: There is no clinical evidence of anxiety, depression, or melancholy. Lab data:      Results for orders placed or performed during the hospital encounter of 12/10/19   CBC WITH 3 PART DIFF     Status: Abnormal   Result Value Ref Range Status    WBC 4.0 (L) 4.5 - 13.0 K/uL Final    RBC 4.15 4.10 - 5.10 M/uL Final    HGB 12.0 12.0 - 16.0 g/dL Final    HCT 38.6 36 - 48 % Final    MCV 93.0 78 - 102 FL Final    MCH 28.9 25.0 - 35.0 PG Final    MCHC 31.1 31 - 37 g/dL Final    RDW 14.1 11.5 - 14.5 % Final    PLATELET 83 (L) 634 - 440 K/uL Final    NEUTROPHILS 80 (H) 40 - 70 % Final    MIXED CELLS 6 0.1 - 17 % Final    LYMPHOCYTES 14 14 - 44 % Final    ABS. NEUTROPHILS 3.1 1.8 - 9.5 K/UL Final    ABS. MIXED CELLS 0.3 0.0 - 2.3 K/uL Final    ABS. LYMPHOCYTES 0.6 (L) 1.1 - 5.9 K/UL Final     Comment: Test performed at [address] [place] [address] or Outpatient Infusion Center Location. Reviewed by Medical Director. DF AUTOMATED   Final         Assessment:     1. Anemia in chronic kidney disease, unspecified CKD stage    2. Thrombocytopenia, unspecified ([facility] [address]      Plan:   Anemia due to blood loss/menorrhagia and chronic kidney disease: Today her CBC shows her hemoglobin is normal at 12.0g/dL with hematocrit of 38.6%. The patient started dialysis 3x a week on Aug 14, 2019. This will be continued as recommended. Iron Profile and Ferritin will be obtained at this time. Thrombocytopenia/ITP: Today her CBC shows her platelet count is 74,587. We will continue to monitor. Follow up in 12 weeks or sooner if indicated.     Orders Placed This Encounter    COMPLETE CBC & AUTO DIFF WBC    InHouse CBC ([facility])     Standing Status:   Future     Number of Occurrences:   1     Standing Expiration Date:   12/17/2019    IRON PROFILE     Standing Status:   Future     Standing Expiration Date:   46/69/4176    METABOLIC PANEL, COMPREHENSIVE Standing Status:   Future     Standing Expiration Date:   12/10/2020    FERRITIN     Standing Status:   Future     Standing Expiration Date:   12/10/2020         Ivin Rubinstein, NP  12/10/2019    I have assessed the patient independently and  agree with the full assessment as outlined.   Mellissa Louie MD, [address]

## 2019-12-11 LAB
A-G RATIO,AGRAT: 0.9 RATIO (ref 1.1–2.6)
ALBUMIN SERPL-MCNC: 3 G/DL (ref 3.5–5)
ALP SERPL-CCNC: 90 U/L (ref 25–115)
ALT SERPL-CCNC: 13 U/L (ref 5–40)
ANION GAP SERPL CALC-SCNC: 16 MMOL/L
AST SERPL W P-5'-P-CCNC: 18 U/L (ref 10–37)
BILIRUB SERPL-MCNC: 0.2 MG/DL (ref 0.2–1.2)
BUN SERPL-MCNC: 32 MG/DL (ref 6–22)
CALCIUM SERPL-MCNC: 8.3 MG/DL (ref 8.4–10.5)
CHLORIDE SERPL-SCNC: 105 MMOL/L (ref 98–110)
CO2 SERPL-SCNC: 23 MMOL/L (ref 20–32)
CREAT SERPL-MCNC: 8.1 MG/DL (ref 0.5–1.2)
FE % SATURATION,PSAT: 42 % (ref 20–50)
FERRITIN SERPL-MCNC: 613 NG/ML (ref 10–291)
GFRAA, 66117: 6.5
GFRNA, 66118: 5.3
GLOBULIN,GLOB: 3.4 G/DL (ref 2–4)
GLUCOSE SERPL-MCNC: 190 MG/DL (ref 70–99)
IRON,IRN: 60 MCG/DL (ref 30–160)
POTASSIUM SERPL-SCNC: 4.4 MMOL/L (ref 3.5–5.5)
PROT SERPL-MCNC: 6.4 G/DL (ref 6.4–8.3)
SODIUM SERPL-SCNC: 144 MMOL/L (ref 133–145)
TIBC,TIBC: 144 MCG/DL (ref 228–428)
UIBC SERPL-MCNC: 84 MCG/DL (ref 110–370)

## 2020-03-11 ENCOUNTER — HOSPITAL ENCOUNTER (INPATIENT)
Age: 55
LOS: 5 days | Discharge: HOME | DRG: 286 | End: 2020-03-16
Attending: EMERGENCY MEDICINE | Admitting: INTERNAL MEDICINE
Payer: COMMERCIAL

## 2020-03-11 ENCOUNTER — APPOINTMENT (OUTPATIENT)
Dept: GENERAL RADIOLOGY | Age: 55
DRG: 286 | End: 2020-03-11
Attending: EMERGENCY MEDICINE
Payer: COMMERCIAL

## 2020-03-11 DIAGNOSIS — N18.6 ESRD ON HEMODIALYSIS (HCC): ICD-10-CM

## 2020-03-11 DIAGNOSIS — Z99.2 ESRD ON HEMODIALYSIS (HCC): ICD-10-CM

## 2020-03-11 DIAGNOSIS — R94.30 EJECTION FRACTION < 50%: ICD-10-CM

## 2020-03-11 DIAGNOSIS — J90 PLEURAL EFFUSION: Primary | ICD-10-CM

## 2020-03-11 DIAGNOSIS — R06.02 SHORTNESS OF BREATH: ICD-10-CM

## 2020-03-11 LAB
ALBUMIN SERPL-MCNC: 3.1 G/DL (ref 3.4–5)
ALBUMIN/GLOB SERPL: 0.7 {RATIO} (ref 0.8–1.7)
ALP SERPL-CCNC: 72 U/L (ref 45–117)
ALT SERPL-CCNC: 16 U/L (ref 13–56)
ANION GAP SERPL CALC-SCNC: 9 MMOL/L (ref 3–18)
AST SERPL-CCNC: 19 U/L (ref 10–38)
ATRIAL RATE: 104 BPM
BASOPHILS # BLD: 0 K/UL (ref 0–0.1)
BASOPHILS NFR BLD: 0 % (ref 0–2)
BILIRUB SERPL-MCNC: 0.3 MG/DL (ref 0.2–1)
BNP SERPL-MCNC: ABNORMAL PG/ML (ref 0–900)
BUN SERPL-MCNC: 20 MG/DL (ref 7–18)
BUN/CREAT SERPL: 4 (ref 12–20)
CALCIUM SERPL-MCNC: 8.9 MG/DL (ref 8.5–10.1)
CALCULATED P AXIS, ECG09: 58 DEGREES
CALCULATED R AXIS, ECG10: 15 DEGREES
CALCULATED T AXIS, ECG11: 90 DEGREES
CHLORIDE SERPL-SCNC: 105 MMOL/L (ref 100–111)
CK MB CFR SERPL CALC: 1.4 % (ref 0–4)
CK MB SERPL-MCNC: 2 NG/ML (ref 5–25)
CK SERPL-CCNC: 142 U/L (ref 26–192)
CO2 SERPL-SCNC: 29 MMOL/L (ref 21–32)
CREAT SERPL-MCNC: 5.5 MG/DL (ref 0.6–1.3)
DIAGNOSIS, 93000: NORMAL
DIFFERENTIAL METHOD BLD: ABNORMAL
EOSINOPHIL # BLD: 0.1 K/UL (ref 0–0.4)
EOSINOPHIL NFR BLD: 2 % (ref 0–5)
ERYTHROCYTE [DISTWIDTH] IN BLOOD BY AUTOMATED COUNT: 14.4 % (ref 11.6–14.5)
GLOBULIN SER CALC-MCNC: 4.5 G/DL (ref 2–4)
GLUCOSE SERPL-MCNC: 85 MG/DL (ref 74–99)
HCT VFR BLD AUTO: 35.2 % (ref 35–45)
HGB BLD-MCNC: 10.9 G/DL (ref 12–16)
LIPASE SERPL-CCNC: 358 U/L (ref 73–393)
LYMPHOCYTES # BLD: 0.7 K/UL (ref 0.9–3.6)
LYMPHOCYTES NFR BLD: 14 % (ref 21–52)
MCH RBC QN AUTO: 29.9 PG (ref 24–34)
MCHC RBC AUTO-ENTMCNC: 31 G/DL (ref 31–37)
MCV RBC AUTO: 96.7 FL (ref 74–97)
MONOCYTES # BLD: 0.2 K/UL (ref 0.05–1.2)
MONOCYTES NFR BLD: 5 % (ref 3–10)
NEUTS SEG # BLD: 3.8 K/UL (ref 1.8–8)
NEUTS SEG NFR BLD: 79 % (ref 40–73)
P-R INTERVAL, ECG05: 158 MS
PLATELET # BLD AUTO: 97 K/UL (ref 135–420)
PLATELET COMMENTS,PCOM: ABNORMAL
PMV BLD AUTO: 12.8 FL (ref 9.2–11.8)
POTASSIUM SERPL-SCNC: 3.7 MMOL/L (ref 3.5–5.5)
PROT SERPL-MCNC: 7.6 G/DL (ref 6.4–8.2)
Q-T INTERVAL, ECG07: 400 MS
QRS DURATION, ECG06: 100 MS
QTC CALCULATION (BEZET), ECG08: 526 MS
RBC # BLD AUTO: 3.64 M/UL (ref 4.2–5.3)
SODIUM SERPL-SCNC: 143 MMOL/L (ref 136–145)
TROPONIN I SERPL-MCNC: 0.44 NG/ML (ref 0–0.04)
TROPONIN I SERPL-MCNC: 0.45 NG/ML (ref 0–0.04)
VENTRICULAR RATE, ECG03: 104 BPM
WBC # BLD AUTO: 4.8 K/UL (ref 4.6–13.2)

## 2020-03-11 PROCEDURE — 74011250637 HC RX REV CODE- 250/637: Performed by: INTERNAL MEDICINE

## 2020-03-11 PROCEDURE — 85025 COMPLETE CBC W/AUTO DIFF WBC: CPT

## 2020-03-11 PROCEDURE — 83690 ASSAY OF LIPASE: CPT

## 2020-03-11 PROCEDURE — 74011000250 HC RX REV CODE- 250: Performed by: INTERNAL MEDICINE

## 2020-03-11 PROCEDURE — 83880 ASSAY OF NATRIURETIC PEPTIDE: CPT

## 2020-03-11 PROCEDURE — 82550 ASSAY OF CK (CPK): CPT

## 2020-03-11 PROCEDURE — 74011250636 HC RX REV CODE- 250/636: Performed by: INTERNAL MEDICINE

## 2020-03-11 PROCEDURE — 65660000000 HC RM CCU STEPDOWN

## 2020-03-11 PROCEDURE — 99285 EMERGENCY DEPT VISIT HI MDM: CPT

## 2020-03-11 PROCEDURE — 71046 X-RAY EXAM CHEST 2 VIEWS: CPT

## 2020-03-11 PROCEDURE — 80053 COMPREHEN METABOLIC PANEL: CPT

## 2020-03-11 PROCEDURE — 93005 ELECTROCARDIOGRAM TRACING: CPT

## 2020-03-11 RX ORDER — DILTIAZEM HYDROCHLORIDE 120 MG/1
120 CAPSULE, COATED, EXTENDED RELEASE ORAL DAILY
Status: DISCONTINUED | OUTPATIENT
Start: 2020-03-12 | End: 2020-03-13

## 2020-03-11 RX ORDER — HYDRALAZINE HYDROCHLORIDE 25 MG/1
25 TABLET, FILM COATED ORAL 3 TIMES DAILY
Status: DISCONTINUED | OUTPATIENT
Start: 2020-03-11 | End: 2020-03-15

## 2020-03-11 RX ORDER — ATORVASTATIN CALCIUM 10 MG/1
TABLET, FILM COATED ORAL DAILY
COMMUNITY
End: 2021-01-07

## 2020-03-11 RX ORDER — DEXTROSE 50 % IN WATER (D50W) INTRAVENOUS SYRINGE
25-50 AS NEEDED
Status: DISCONTINUED | OUTPATIENT
Start: 2020-03-11 | End: 2020-03-17 | Stop reason: HOSPADM

## 2020-03-11 RX ORDER — HYDRALAZINE HYDROCHLORIDE 25 MG/1
25 TABLET, FILM COATED ORAL 3 TIMES DAILY
COMMUNITY
End: 2021-07-22

## 2020-03-11 RX ORDER — INSULIN LISPRO 100 [IU]/ML
INJECTION, SOLUTION INTRAVENOUS; SUBCUTANEOUS
Status: DISCONTINUED | OUTPATIENT
Start: 2020-03-12 | End: 2020-03-17 | Stop reason: HOSPADM

## 2020-03-11 RX ORDER — ATORVASTATIN CALCIUM 20 MG/1
20 TABLET, FILM COATED ORAL DAILY
Status: DISCONTINUED | OUTPATIENT
Start: 2020-03-12 | End: 2020-03-15

## 2020-03-11 RX ORDER — MAGNESIUM SULFATE 100 %
16 CRYSTALS MISCELLANEOUS AS NEEDED
Status: DISCONTINUED | OUTPATIENT
Start: 2020-03-11 | End: 2020-03-17 | Stop reason: HOSPADM

## 2020-03-11 RX ORDER — DILTIAZEM HYDROCHLORIDE 60 MG/1
120 CAPSULE, EXTENDED RELEASE ORAL EVERY 12 HOURS
Status: DISCONTINUED | OUTPATIENT
Start: 2020-03-11 | End: 2020-03-11

## 2020-03-11 RX ORDER — IPRATROPIUM BROMIDE AND ALBUTEROL SULFATE 2.5; .5 MG/3ML; MG/3ML
3 SOLUTION RESPIRATORY (INHALATION)
Status: DISCONTINUED | OUTPATIENT
Start: 2020-03-12 | End: 2020-03-13

## 2020-03-11 RX ORDER — ACETAMINOPHEN 325 MG/1
650 TABLET ORAL
Status: DISCONTINUED | OUTPATIENT
Start: 2020-03-11 | End: 2020-03-17 | Stop reason: HOSPADM

## 2020-03-11 RX ORDER — ONDANSETRON 2 MG/ML
4 INJECTION INTRAMUSCULAR; INTRAVENOUS
Status: DISCONTINUED | OUTPATIENT
Start: 2020-03-11 | End: 2020-03-17 | Stop reason: HOSPADM

## 2020-03-11 RX ORDER — LABETALOL HCL 20 MG/4 ML
10 SYRINGE (ML) INTRAVENOUS ONCE
Status: COMPLETED | OUTPATIENT
Start: 2020-03-11 | End: 2020-03-11

## 2020-03-11 RX ADMIN — IPRATROPIUM BROMIDE AND ALBUTEROL SULFATE 3 ML: .5; 3 SOLUTION RESPIRATORY (INHALATION) at 23:55

## 2020-03-11 RX ADMIN — LABETALOL 20 MG/4 ML (5 MG/ML) INTRAVENOUS SYRINGE 10 MG: at 23:55

## 2020-03-11 NOTE — Clinical Note
Contrast Dose Calculator:   Patient's age: 48.   Patient's sex: Male. Patient weight (kg) = 85.4. Creatinine level (mg/dL) = 9.44. Creatinine clearance (mL/min): 11.   Contrast concentration (mg/mL) = 300. MACD = 45.23 mL. Max Contrast dose per Creatinine Cl calculator = 24.75 mL.

## 2020-03-11 NOTE — Clinical Note
Bilateral groin prepped with ChloraPrep and draped. Wet prep solution applied at: 953. Wet prep solution dried at: 956. Wet prep elapsed drying time: 3 mins.

## 2020-03-11 NOTE — Clinical Note
TRANSFER - IN REPORT:     Verbal report received from: Gene Brewer rn. Report consisted of patient's Situation, Background, Assessment and   Recommendations(SBAR). Opportunity for questions and clarification was provided. Assessment completed upon patient's arrival to unit and care assumed. Patient transported with a Cardiac Cath Tech / Patient Care Tech.

## 2020-03-11 NOTE — ED TRIAGE NOTES
Pt states she is dialysis pt MWF ran full cycle today  However has had several days with r side cp and sob states unable to lie down without pain

## 2020-03-11 NOTE — ED PROVIDER NOTES
24-year-old female history of hypertension, diabetes, and chronic renal failure on Monday, Wednesday, and Friday dialysis presents for evaluation of intermittent dyspnea. Patient dialyzed today getting to just below her target weight. She noted after dialysis that she was feeling more short of breath than prior to dialysis. Symptoms are worse when she tried to lie down to take a nap. She has had intermittent episodes of dyspnea of a similar nature over the last 3 to 4 days. No chest pain. She thought maybe symptoms were secondary to allergies and tried Claritin but found no relief. Notes mild cough which is nonproductive. No fever. Symptoms a little worse when recumbent and with exertion. Normally she is able to walk with no complaints but has noted exertional dyspnea over the last several days.            Past Medical History:   Diagnosis Date    Anemia     Arthritis     Diabetes ([address]     Hypertension     ITP (idiopathic thrombocytopenic purpura)        Past Surgical History:   Procedure Laterality Date    HX GYN      c section 1984    HX HYSTERECTOMY  2017    HX TUBAL LIGATION      IR INSERT TUNL CVC W/O PORT OVER 5 YR  8/12/2019         Family History:   Problem Relation Age of Onset    Hypertension Mother     Cancer Father         Colon       Social History     Socioeconomic History    Marital status:      Spouse name: Not on file    Number of children: Not on file    Years of education: Not on file    Highest education level: Not on file   Occupational History    Not on file   Social Needs    Financial resource strain: Not on file    Food insecurity     Worry: Not on file     Inability: Not on file    Transportation needs     Medical: Not on file     Non-medical: Not on file   Tobacco Use    Smoking status: Never Smoker    Smokeless tobacco: Never Used   Substance and Sexual Activity    Alcohol use: No    Drug use: No    Sexual activity: Not on file   Lifestyle    Physical activity     Days per week: Not on file     Minutes per session: Not on file    Stress: Not on file   Relationships    Social connections     Talks on phone: Not on file     Gets together: Not on file     Attends [employer] service: Not on file     Active member of club or organization: Not on file     Attends meetings of clubs or organizations: Not on file     Relationship status: Not on file    Intimate partner violence     Fear of current or ex partner: Not on file     Emotionally abused: Not on file     Physically abused: Not on file     Forced sexual activity: Not on file   Other Topics Concern    Not on file   Social History Narrative    Not on file         ALLERGIES: Rocephin [ceftriaxone] and Pcn [penicillins]    Review of Systems   Constitutional: Negative for fever. HENT: Positive for congestion. Respiratory: Positive for chest tightness and shortness of breath. Cardiovascular: Negative for chest pain and leg swelling. Gastrointestinal: Negative for abdominal pain. All other systems reviewed and are negative. Vitals:    03/11/20 1604   BP: (!) 184/103   Pulse: (!) 112   Resp: 18   SpO2: 96%   Weight: 90.7 kg (200 lb)   Height: 5' 9\" (1.753 m)            Physical Exam  Vitals signs and nursing note reviewed. Constitutional:       General: She is not in acute distress. Appearance: She is well-developed. She is not diaphoretic. HENT:      Head: Normocephalic and atraumatic. Nose: Nose normal.   Eyes:      General: No scleral icterus. Right eye: No discharge. Left eye: No discharge. Neck:      Musculoskeletal: Neck supple. Vascular: No JVD. Cardiovascular:      Rate and Rhythm: Regular rhythm. Tachycardia present. Heart sounds: Normal heart sounds. No murmur. No friction rub. Pulmonary:      Effort: Pulmonary effort is normal. No respiratory distress. Breath sounds: Normal breath sounds. No wheezing or rales.    Abdominal: Palpations: Abdomen is soft. Tenderness: There is no abdominal tenderness. There is no guarding or rebound. Musculoskeletal:         General: No tenderness. Comments: Trace pedal edema. Skin:     General: Skin is warm and dry. Findings: No rash. Neurological:      General: No focal deficit present. Mental Status: She is alert and oriented to person, place, and time. Motor: No abnormal muscle tone. Coordination: Coordination normal.     EKG interpreted per myself at 1605 hrs. sinus tachycardia, rate 104 no ST segment elevations borderline criteria for LVH in the precordial leads. Compared to prior tracing dated 5/13/2019 tracing is unchanged and tachycardia also present during prior tracing with ventricular rate of 102. XR CHEST PA LAT    (Results Pending)     Chest x-ray per my reading shows progression of cardiomegaly bilateral pleural effusions greater on the right than the left new relative to prior tracing dated 8/9/2019. MDM  Number of Diagnoses or Management Options  ESRD on hemodialysis [facility]):   Pleural effusion:   Shortness of breath:   Diagnosis management comments: Impression: Subjective dyspnea likely multifactorial related to component of congestive heart failure, possible environmental allergies, possible infectious bronchitis. Patient has history of hypertension and is hypertensive in the department. Chest x-ray demonstrates new bilateral pleural effusions with enlarged cardiac silhouette (cannot rule out pericardial effusion). This is a postdialysis chest x-ray and is concerning the patient has developed interval pleural effusions that did not dialyze off during today's session. Patient notes dyspnea. She has elevated troponin level but it is not increasing over time both levels being up 0.45. Case discussed with cardiology, Erna Thompson.  Case discussed with Dr. Raphael Harrell, hospitalist.  Usual and customary discussion.   Heparin will not be started as patient has a history of low platelets syndrome with heparin insensitivity. No active chest pain at this time. Procedures      Diagnosis:   1. Pleural effusion    2. Shortness of breath    3. ESRD on hemodialysis ([address]          Disposition: admit    Follow-up Information    None         Patient's Medications   Start Taking    No medications on file   Continue Taking    ACETAMINOPHEN (TYLENOL) 500 MG TABLET    Take 2 Tabs by mouth every six (6) hours as needed for Pain. ATORVASTATIN (LIPITOR) 10 MG TABLET    Take  by mouth daily. CALCIUM ACETATE (PHOSLO) 667 MG CAP    Take 1 Cap by mouth three (3) times daily (with meals). DILTIAZEM ER (CARDIZEM LA) 120 MG TABLET    Take 120 mg by mouth daily. HYDRALAZINE (APRESOLINE) 25 MG TABLET    Take 25 mg by mouth three (3) times daily. LORATADINE (CLARITIN) 10 MG TABLET    Take 10 mg by mouth daily as needed for Allergies. These Medications have changed    No medications on file   Stop Taking    CALCITRIOL (ROCALTROL) 0.25 MCG CAPSULE    Take 1 Cap by mouth every Monday, Wednesday, Friday. FERROUS SULFATE 325 MG (65 MG IRON) TABLET    Take 325 mg by mouth two (2) times a day. SODIUM BICARBONATE 650 MG TABLET    Take 1 Tab by mouth three (3) times daily.

## 2020-03-11 NOTE — Clinical Note
TRANSFER - OUT REPORT:     Verbal report given to: ludwin Silveira rn. Report consisted of patient's Situation, Background, Assessment and   Recommendations(SBAR). Opportunity for questions and clarification was provided. Patient transported with a Cardiac Cath Tech / Patient Care Tech. Patient transported to: Lukasz Jimenez.

## 2020-03-12 ENCOUNTER — APPOINTMENT (OUTPATIENT)
Dept: NON INVASIVE DIAGNOSTICS | Age: 55
DRG: 286 | End: 2020-03-12
Attending: INTERNAL MEDICINE
Payer: COMMERCIAL

## 2020-03-12 LAB
ALBUMIN SERPL-MCNC: 2.8 G/DL (ref 3.4–5)
ALBUMIN/GLOB SERPL: 0.6 {RATIO} (ref 0.8–1.7)
ALP SERPL-CCNC: 61 U/L (ref 45–117)
ALT SERPL-CCNC: 15 U/L (ref 13–56)
ANION GAP SERPL CALC-SCNC: 10 MMOL/L (ref 3–18)
AST SERPL-CCNC: 16 U/L (ref 10–38)
BASOPHILS # BLD: 0 K/UL (ref 0–0.1)
BASOPHILS NFR BLD: 0 % (ref 0–2)
BILIRUB SERPL-MCNC: 0.5 MG/DL (ref 0.2–1)
BUN SERPL-MCNC: 25 MG/DL (ref 7–18)
BUN/CREAT SERPL: 4 (ref 12–20)
CALCIUM SERPL-MCNC: 8.9 MG/DL (ref 8.5–10.1)
CHLORIDE SERPL-SCNC: 104 MMOL/L (ref 100–111)
CO2 SERPL-SCNC: 27 MMOL/L (ref 21–32)
CREAT SERPL-MCNC: 6.66 MG/DL (ref 0.6–1.3)
DIFFERENTIAL METHOD BLD: ABNORMAL
EOSINOPHIL # BLD: 0.1 K/UL (ref 0–0.4)
EOSINOPHIL NFR BLD: 2 % (ref 0–5)
ERYTHROCYTE [DISTWIDTH] IN BLOOD BY AUTOMATED COUNT: 14.7 % (ref 11.6–14.5)
FLUAV AG NPH QL IA: NEGATIVE
FLUBV AG NOSE QL IA: NEGATIVE
GLOBULIN SER CALC-MCNC: 4.4 G/DL (ref 2–4)
GLUCOSE BLD STRIP.AUTO-MCNC: 144 MG/DL (ref 70–110)
GLUCOSE BLD STRIP.AUTO-MCNC: 91 MG/DL (ref 70–110)
GLUCOSE BLD STRIP.AUTO-MCNC: 93 MG/DL (ref 70–110)
GLUCOSE SERPL-MCNC: 89 MG/DL (ref 74–99)
HBA1C MFR BLD: 4.7 % (ref 4.2–5.6)
HCT VFR BLD AUTO: 32.4 % (ref 35–45)
HGB BLD-MCNC: 10.2 G/DL (ref 12–16)
LYMPHOCYTES # BLD: 0.8 K/UL (ref 0.9–3.6)
LYMPHOCYTES NFR BLD: 16 % (ref 21–52)
MAGNESIUM SERPL-MCNC: 2.1 MG/DL (ref 1.6–2.6)
MCH RBC QN AUTO: 29.7 PG (ref 24–34)
MCHC RBC AUTO-ENTMCNC: 31.5 G/DL (ref 31–37)
MCV RBC AUTO: 94.2 FL (ref 74–97)
MONOCYTES # BLD: 0.2 K/UL (ref 0.05–1.2)
MONOCYTES NFR BLD: 5 % (ref 3–10)
NEUTS SEG # BLD: 3.8 K/UL (ref 1.8–8)
NEUTS SEG NFR BLD: 77 % (ref 40–73)
PHOSPHATE SERPL-MCNC: 3.5 MG/DL (ref 2.5–4.9)
PLATELET # BLD AUTO: 96 K/UL (ref 135–420)
PLATELET COMMENTS,PCOM: ABNORMAL
PMV BLD AUTO: 13.8 FL (ref 9.2–11.8)
POTASSIUM SERPL-SCNC: 3.6 MMOL/L (ref 3.5–5.5)
PREALB SERPL-MCNC: 36.8 MG/DL (ref 20–40)
PROCALCITONIN SERPL-MCNC: 0.5 NG/ML
PROT SERPL-MCNC: 7.2 G/DL (ref 6.4–8.2)
RBC # BLD AUTO: 3.44 M/UL (ref 4.2–5.3)
RBC MORPH BLD: ABNORMAL
SODIUM SERPL-SCNC: 141 MMOL/L (ref 136–145)
TROPONIN I SERPL-MCNC: 0.4 NG/ML (ref 0–0.04)
TSH SERPL DL<=0.05 MIU/L-ACNC: 2.85 UIU/ML (ref 0.36–3.74)
VIT B12 SERPL-MCNC: 298 PG/ML (ref 211–911)
WBC # BLD AUTO: 4.9 K/UL (ref 4.6–13.2)

## 2020-03-12 PROCEDURE — 84484 ASSAY OF TROPONIN QUANT: CPT

## 2020-03-12 PROCEDURE — 83036 HEMOGLOBIN GLYCOSYLATED A1C: CPT

## 2020-03-12 PROCEDURE — 90935 HEMODIALYSIS ONE EVALUATION: CPT

## 2020-03-12 PROCEDURE — 84100 ASSAY OF PHOSPHORUS: CPT

## 2020-03-12 PROCEDURE — 84443 ASSAY THYROID STIM HORMONE: CPT

## 2020-03-12 PROCEDURE — 87804 INFLUENZA ASSAY W/OPTIC: CPT

## 2020-03-12 PROCEDURE — 97165 OT EVAL LOW COMPLEX 30 MIN: CPT

## 2020-03-12 PROCEDURE — 82607 VITAMIN B-12: CPT

## 2020-03-12 PROCEDURE — 74011250637 HC RX REV CODE- 250/637: Performed by: INTERNAL MEDICINE

## 2020-03-12 PROCEDURE — 74011250636 HC RX REV CODE- 250/636

## 2020-03-12 PROCEDURE — 84134 ASSAY OF PREALBUMIN: CPT

## 2020-03-12 PROCEDURE — 94761 N-INVAS EAR/PLS OXIMETRY MLT: CPT

## 2020-03-12 PROCEDURE — 85025 COMPLETE CBC W/AUTO DIFF WBC: CPT

## 2020-03-12 PROCEDURE — 83735 ASSAY OF MAGNESIUM: CPT

## 2020-03-12 PROCEDURE — 36415 COLL VENOUS BLD VENIPUNCTURE: CPT

## 2020-03-12 PROCEDURE — 65660000000 HC RM CCU STEPDOWN

## 2020-03-12 PROCEDURE — 80053 COMPREHEN METABOLIC PANEL: CPT

## 2020-03-12 PROCEDURE — 74011000250 HC RX REV CODE- 250: Performed by: INTERNAL MEDICINE

## 2020-03-12 PROCEDURE — 84145 PROCALCITONIN (PCT): CPT

## 2020-03-12 PROCEDURE — 94640 AIRWAY INHALATION TREATMENT: CPT

## 2020-03-12 PROCEDURE — 82962 GLUCOSE BLOOD TEST: CPT

## 2020-03-12 PROCEDURE — 77030013140 HC MSK NEB VYRM -A

## 2020-03-12 PROCEDURE — 5A1D70Z PERFORMANCE OF URINARY FILTRATION, INTERMITTENT, LESS THAN 6 HOURS PER DAY: ICD-10-PCS | Performed by: INTERNAL MEDICINE

## 2020-03-12 RX ORDER — HEPARIN SODIUM 1000 [USP'U]/ML
INJECTION, SOLUTION INTRAVENOUS; SUBCUTANEOUS
Status: COMPLETED
Start: 2020-03-12 | End: 2020-03-12

## 2020-03-12 RX ORDER — LANOLIN ALCOHOL/MO/W.PET/CERES
1 CREAM (GRAM) TOPICAL 2 TIMES DAILY WITH MEALS
Status: DISCONTINUED | OUTPATIENT
Start: 2020-03-12 | End: 2020-03-16

## 2020-03-12 RX ADMIN — IPRATROPIUM BROMIDE AND ALBUTEROL SULFATE 3 ML: .5; 3 SOLUTION RESPIRATORY (INHALATION) at 09:12

## 2020-03-12 RX ADMIN — HYDRALAZINE HYDROCHLORIDE 25 MG: 25 TABLET, FILM COATED ORAL at 09:03

## 2020-03-12 RX ADMIN — HYDRALAZINE HYDROCHLORIDE 25 MG: 25 TABLET, FILM COATED ORAL at 17:43

## 2020-03-12 RX ADMIN — IPRATROPIUM BROMIDE AND ALBUTEROL SULFATE 3 ML: .5; 3 SOLUTION RESPIRATORY (INHALATION) at 23:18

## 2020-03-12 RX ADMIN — IPRATROPIUM BROMIDE AND ALBUTEROL SULFATE 3 ML: .5; 3 SOLUTION RESPIRATORY (INHALATION) at 19:47

## 2020-03-12 RX ADMIN — IPRATROPIUM BROMIDE AND ALBUTEROL SULFATE 3 ML: .5; 3 SOLUTION RESPIRATORY (INHALATION) at 12:41

## 2020-03-12 RX ADMIN — Medication 325 MG: at 17:43

## 2020-03-12 RX ADMIN — HYDRALAZINE HYDROCHLORIDE 25 MG: 25 TABLET, FILM COATED ORAL at 22:02

## 2020-03-12 RX ADMIN — Medication 325 MG: at 09:00

## 2020-03-12 RX ADMIN — DILTIAZEM HYDROCHLORIDE 120 MG: 120 CAPSULE, COATED, EXTENDED RELEASE ORAL at 09:03

## 2020-03-12 RX ADMIN — ATORVASTATIN CALCIUM 20 MG: 20 TABLET, FILM COATED ORAL at 09:03

## 2020-03-12 RX ADMIN — HEPARIN SODIUM 3200 UNITS: 1000 INJECTION, SOLUTION INTRAVENOUS; SUBCUTANEOUS at 17:23

## 2020-03-12 NOTE — ROUTINE PROCESS
Bedside shift change report given to [facility] (oncoming nurse) by Isabel Glass RN (offgoing nurse). Report given with SBAR, Kardex, Intake/Output, MAR and Recent Results.

## 2020-03-12 NOTE — CONSULTS
[facility] - Consult Note    Consultation request by Maximiliano Barcenas MD for advice/opinion related to evaluating Pleural effusion [J90]  CHF exacerbation [facility]) [I50.9]    Date of  Admission: 3/11/2020  3:56 PM   Primary Care Physician: Veronica Estes MD     Assessment:     Patient Active Problem List   Diagnosis Code    Anemia due to blood loss, chronic D50.0    Accelerated essential hypertension I10    Menorrhagia with regular cycle N92.0    Obesity (BMI 30.0-34. 9) E66.9    DM2 (diabetes mellitus, type 2) ([facility]) E11.9    Thrombocytopenia ([facility]) D69.6    Anemia in chronic kidney disease (CKD) N18.9, D63.1    Menorrhagia N92.0    Bacteremia R78.81    ARF (acute renal failure) ([facility]) N17.9    Acute renal failure superimposed on stage 5 chronic kidney disease, not on chronic dialysis ([facility]) N17.9, N18.5    Anemia I02.6    Metabolic acidosis F08.1    Proteinuria R80.9    Secondary hyperparathyroidism of renal origin ([facility] N25.81    Pleural effusion J90    CHF exacerbation ([facility]) I50.9       -Fluid overload. In setting of ESRD with likely a component of HFpEF. Presented TRIVEDI and orthopnea despite optimal fluid removal via HD Wednesday. CXR concerning for cardiomegaly and bilateral pleural effusions. Suspect this will improve with adjusting her hemodialysis goals.  -Indeterminate troponin, in setting of ESRD, not consistent with ACS, ECG with nonspecific ST wave abnormalities. -HFpEF. EF 56-60% on echo 6/2019.  -ESRD on HD MWF, started in august 2019, reports recent difficulty keeping at goal weight, reports increased fluid intake recently. -Hypertension. Elevated. -Dyslipidemia. On statin. -Diabetes now diet controlled. -Idiopathic thrombocytopenia purpura. No primary cardiologist.       Plan: Will review echocardiogram once complete. Will appreciate nephrology involvement to assist with fluid management.   BP suboptimal, but reportable labile with HD, home Cardizem and hydralazine resumed this Am, will trend after morning medications and adjust if needed. No need for stress test at this time. History of Present Illness: This is a 48 y.o. female admitted for Pleural effusion [J90]  CHF exacerbation ([facility] [I50.9]. Patient complains of:  SOB. Patient is a 48year old female who started HD in August. Patient reports she initially was doing well with HD. Patient reports over the past month she had had difficulty keeping at goal weight. She reports she feels bad when she is under weight. She increased her fluid intake. She reports over the past couple of weeks she has had TRIVEDI and orthopnea. Patient also has right sided chest wall tenderness. Patient denies palpitations, syncope, ALMAZ. Patient reprots she had increased fluid removal yesterday. She went home and felt increased TRIVEDI so she came to ER. Cardiac risk factors: dyslipidemia, diabetes mellitus, hypertension      Review of Symptoms:  Except as stated above include:  Constitutional:  negative  Respiratory:  negative  Cardiovascular:  C/o TRIVEDI, orthopnea. C/o chest wall tenderness. Denies palp, syncope, ALMAZ.   Gastrointestinal: negative  Genitourinary:  negative  Musculoskeletal:  Negative  Neurological:  Negative  Dermatological:  Negative  Endocrinological: Negative  Psychological:  Negative       Past Medical History:     Past Medical History:   Diagnosis Date    Anemia     Arthritis     Diabetes ([facility]     Hypertension     ITP (idiopathic thrombocytopenic purpura)          Social History:     Social History     Socioeconomic History    Marital status:      Spouse name: Not on file    Number of children: Not on file    Years of education: Not on file    Highest education level: Not on file   Tobacco Use    Smoking status: Never Smoker    Smokeless tobacco: Never Used   Substance and Sexual Activity    Alcohol use: No    Drug use: No        Family History:     Family History   Problem Relation Age of Onset    Hypertension Mother     Cancer Father         Colon        Medications: Allergies   Allergen Reactions    Rocephin [Ceftriaxone] Itching    Pcn [Penicillins] Itching        Current Facility-Administered Medications   Medication Dose Route Frequency    atorvastatin (LIPITOR) tablet 20 mg  20 mg Oral DAILY    hydrALAZINE (APRESOLINE) tablet 25 mg  25 mg Oral TID    acetaminophen (TYLENOL) tablet 650 mg  650 mg Oral Q4H PRN    ondansetron (ZOFRAN) injection 4 mg  4 mg IntraVENous Q4H PRN    albuterol-ipratropium (DUO-NEB) 2.5 MG-0.5 MG/3 ML  3 mL Nebulization Q4H RT    insulin lispro (HUMALOG) injection   SubCUTAneous AC&HS    glucose chewable tablet 16 g  16 g Oral PRN    glucagon (GLUCAGEN) injection 1 mg  1 mg IntraMUSCular PRN    dextrose (D50W) injection syrg 12.5-25 g  25-50 mL IntraVENous PRN    dilTIAZem CD (CARDIZEM CD) capsule 120 mg  120 mg Oral DAILY         Physical Exam:     Visit Vitals  /84 (BP 1 Location: Right arm, BP Patient Position: At rest)   Pulse 71   Temp 98.4 °F (36.9 °C)   Resp 20   Ht 5' 9\" (1.753 m)   Wt 88.3 kg (194 lb 9.6 oz)   SpO2 94%   Breastfeeding No   BMI 28.74 kg/m²     BP Readings from Last 3 Encounters:   03/12/20 179/84   12/10/19 (!) 197/112   09/10/19 (!) 174/92     Pulse Readings from Last 3 Encounters:   03/12/20 71   12/10/19 (!) 119   09/10/19 (!) 106     Wt Readings from Last 3 Encounters:   03/12/20 88.3 kg (194 lb 9.6 oz)   12/10/19 91.3 kg (201 lb 3.2 oz)   09/10/19 93.4 kg (205 lb 12.8 oz)       General:  alert, cooperative, no distress, appears stated age  Neck:  no JVD  Lungs:  rales R base, L base  Heart:  regular rate and rhythm, S1, S2 normal, no murmur, click, rub or gallop  Abdomen:  abdomen is soft without significant tenderness, masses, organomegaly or guarding  Extremities:  extremities normal, atraumatic, no cyanosis or edema  Skin: Warm and dry.  no hyperpigmentation, vitiligo, or suspicious lesions  Neuro: alert, oriented x3, affect appropriate  Psych: non focal     Data Review:     Recent Labs     03/12/20  0411 03/11/20  1612   WBC 4.9 4.8   HGB 10.2* 10.9*   HCT 32.4* 35.2   PLT 96* 97*     Recent Labs     03/12/20  0411 03/11/20  1612    143   K 3.6 3.7    105   CO2 27 29   GLU 89 85   BUN 25* 20*   CREA 6.66* 5.50*   CA 8.9 8.9   ALB 2.8* 3.1*   SGOT 16 19   ALT 15 16       Results for orders placed or performed during the hospital encounter of 03/11/20   EKG, 12 LEAD, INITIAL   Result Value Ref Range    Ventricular Rate 104 BPM    Atrial Rate 104 BPM    P-R Interval 158 ms    QRS Duration 100 ms    Q-T Interval 400 ms    QTC Calculation (Bezet) 526 ms    Calculated P Axis 58 degrees    Calculated R Axis 15 degrees    Calculated T Axis 90 degrees    Diagnosis       Sinus tachycardia  Nonspecific T wave abnormality  Abnormal ECG    Confirmed by Mitra Yates MD, Love Hernandez ([id number]) on 3/11/2020 4:09:04 PM         All Cardiac Markers in the last 24 hours:    Lab Results   Component Value Date/Time     03/11/2020 04:12 PM    CKMB 2.0 03/11/2020 04:12 PM    CKND1 1.4 03/11/2020 04:12 PM    TROIQ 0.44 (H) 03/11/2020 07:11 PM    TROIQ 0.45 (H) 03/11/2020 04:12 PM       Last Lipid:    Lab Results   Component Value Date/Time    Cholesterol, total 135 06/01/2019 02:15 AM    HDL Cholesterol 19 (L) 06/01/2019 02:15 AM    LDL, calculated 72.8 06/01/2019 02:15 AM    Triglyceride 216 (H) 06/01/2019 02:15 AM    CHOL/HDL Ratio 7.1 (H) 06/01/2019 02:15 AM       Signed By: MANDY Gary     March 12, 2020      Crys Young MD No

## 2020-03-12 NOTE — CDMP QUERY
Pt admitted with SOB and  H&P has documented \"CHF exacerbation ([address] (3/11/2020)\" Cardiology consult  documents:-Fluid overload. In setting of ESRD with likely a component of HFpEF. -HFpEF. EF 56-60% on echo 6/2019. 
3/13/20  Echo-  Dilated left ventricle. Moderately increased wall thickness. Severe global systolic function. Calculated left ventricular ejection fraction is 20%. Visually measured ejection fraction. Moderate (grade 2) left ventricular diastolic dysfunction. Please further specify type and acuity of CHF in the medical record. ? Acute on Chronic Systolic CHF ? Acute on Chronic Diastolic CHF ? Acute on Chronic Systolic and Diastolic CHF ? Acute Systolic CHF ? Acute Diastolic CHF ? Acute Systolic and Diastolic CHF ? Chronic Systolic CHF ? Chronic Diastolic CHF ? Chronic Systolic and Diastolic CHF 
? Other, please specify ? Clinically unable to determine The medical record reflects the following: 
 
  Risk Factors: dyslipidemia, diabetes mellitus, hypertension Clinical Indicators: TRIVEDI, orthopnea, leg swelling; NT pro-BNP 10,866 CXR- Findings suggest fluid overload or possibly failure. Treatment: Repeat echo, cards cons, HD [place] [address] [facility] [place]

## 2020-03-12 NOTE — PROGRESS NOTES
HEMODIALYSIS ROUNDING NOTE      Patient: Joanne Helm               Sex: female          DOA: 3/11/2020  3:56 PM        YOB: 1965      Age:  48 y.o.        LOS:  LOS: 1 day     Subjective:     Joanne Helm is a 48 y.o.  who presents with Pleural effusion [J90]  CHF exacerbation ([facility] [I50.9]. The patient is dialyzing utilizing the following method:Intermittent Hemodialysis    Chief Complains: Patient was seen on dialysis, denies nausea / vomiting / headache / dizziness / chest pain.   - Reviewed last 24 hrs events     Current Facility-Administered Medications   Medication Dose Route Frequency    ferrous sulfate tablet 325 mg  1 Tab Oral BID WITH MEALS    atorvastatin (LIPITOR) tablet 20 mg  20 mg Oral DAILY    hydrALAZINE (APRESOLINE) tablet 25 mg  25 mg Oral TID    acetaminophen (TYLENOL) tablet 650 mg  650 mg Oral Q4H PRN    ondansetron (ZOFRAN) injection 4 mg  4 mg IntraVENous Q4H PRN    albuterol-ipratropium (DUO-NEB) 2.5 MG-0.5 MG/3 ML  3 mL Nebulization Q4H RT    insulin lispro (HUMALOG) injection   SubCUTAneous AC&HS    glucose chewable tablet 16 g  16 g Oral PRN    glucagon (GLUCAGEN) injection 1 mg  1 mg IntraMUSCular PRN    dextrose (D50W) injection syrg 12.5-25 g  25-50 mL IntraVENous PRN    dilTIAZem CD (CARDIZEM CD) capsule 120 mg  120 mg Oral DAILY       Objective:     Visit Vitals  /74   Pulse 92   Temp 98.3 °F (36.8 °C)   Resp 20   Ht 5' 9\" (1.753 m)   Wt 88.3 kg (194 lb 9.6 oz)   SpO2 98%   Breastfeeding No   BMI 28.74 kg/m²       Intake/Output Summary (Last 24 hours) at 3/12/2020 1545  Last data filed at 3/12/2020 0617  Gross per 24 hour   Intake 240 ml   Output 200 ml   Net 40 ml       Physical Examination:    GEN: AAO X 3, NAD  RS:diminished breath sounds  CVS: S1-S2 heard, RRR  Abdomen: Soft, Non tender, Not distended, Positive bowel sounds  Extremities: No edema, no cyanosis, skin is warm on touch  CNS: Awake   HEENT: Head is atraumatic, PERRLA, conjunctiva pink & non icteric. No JVD or carotid bruit      Data Review:      Labs:     Hematology:   Recent Labs     03/12/20  0411 03/11/20  1612   WBC 4.9 4.8   HGB 10.2* 10.9*   HCT 32.4* 35.2     Chemistry:   Recent Labs     03/12/20  0411 03/11/20  1612   BUN 25* 20*   CREA 6.66* 5.50*   CA 8.9 8.9   ALB 2.8* 3.1*   K 3.6 3.7    143    105   CO2 27 29   PHOS 3.5  --    GLU 89 85        Images:     XR (Most Recent). CXR reviewed by me and compared with previous CXR Results from Hospital Encounter encounter on 03/11/20   XR CHEST PA LAT    Narrative EXAM: CHEST  CPT code: 32496    CLINICAL INDICATION/HISTORY: Shortness of breath, right-sided chest pain. Patient on dialysis. COMPARISON: 9 August 2019. TECHNIQUE: PA and lateral views of the chest.    FINDINGS:      There has been interval placement of a right internal jugular central venous  dual lumen catheter with tip in the distal superior vena cava. There is  increased opacity in both lung bases with obscuration of the hemidiaphragms,  right greater than left, consistent with effusion/atelectasis. The upper lungs  are grossly clear. The mediastinum is not widened but the heart is enlarged. The bones and soft tissues are unremarkable except for some hypertrophic  spondylitic degenerative change at several levels in the mid-to-lower thoracic  spine. There is no other interval change. Impression IMPRESSION:    Interval development of bibasilar opacities consistent with effusion and  possible associated atelectasis, right greater than left. Cardiomegaly. Findings suggest fluid overload or possibly failure. CT (Most Recent) Results from Hospital Encounter encounter on 05/30/19   CT BX RENAL    Narrative PREOPERATIVE DIAGNOSIS: SHAHANA. POSTOPERATIVE DIAGNOSIS: Same    INDICATION: SHAHAAN. Patient is here for random renal biopsy.     ATTENDING: Dr. Ewing Closs, M.D. ASSISTANT: None. PROCEDURES: CT-guided renal random core biopsy. Left lower renal pole. ANESTHESIA: 1% local lidocaine as well as moderate intravenous sedation with  Versed and fentanyl given and monitored per independently trained interventional  radiology nurse under my direct supervision for 20 minutes. Please see nursing  records for detailed medication dosing. CONTRAST: None. COMPLICATIONS: None    DRAIN: No    CATHETER: None. EBL: Minimal.    SPECIMEN: 6 core biopsy specimens of the left lower renal pole were obtained. Specimen was sent to pathology. TECHNIQUE: After detailed explanation of risks and benefits of the procedure  verbal and written consent were obtained. Patient was brought to the CT room and  placed prone on the table. Timeout was performed.  view of the abdomen was  obtained. Target lesion was identified and skin was marked. Left flank region  was prepped and draped in the usual sterile fashion. Maximum sterile barrier  technique was used. 1% lidocaine solution was instilled in the skin superficial  and deep subcutaneous soft tissues overlying the biopsy region. Under direct CT fluoroscopy guidance using 16-gauge Temno core biopsy needle was  advanced down through the guide into the left lower renal pole. 6 passes were  made and core biopsies were sent to pathology. Needle and introducer were removed. Post biopsy imaging was obtained. Hemostasis  was achieved with manual compression and Gelfoam slurry. Sterile dressing was  applied. There were no immediate complications. Patient tolerated procedure fairly well. Patient was transferred to recovery in stable condition. I was present and  performed the procedure. FINDINGS: CT fluoroscopic guidance demonstrated good position of the biopsy  needle in the left lower renal pole. Postbiopsy imaging demonstrated very small  perirenal hematoma posteriorly.       Impression IMPRESSION:    Successful, uncomplicated CT-guided renal random core biopsy. All CT scans at this facility are performed using dose optimization technique as  appropriate to a performed exam, to include automated exposure control,  adjustment of the mA and/or kV according to patient size (including appropriate  matching for site-specific examinations), or use of iterative reconstruction  technique. Plan / Recommendation:         End Stage Renal Disease:  Plan HD today    At 3:45 PM on 3/12/2020, I saw and examined patient during hemodialysis treatment. The patient was receiving hemodialysis for treatment of end stage renal disease. I have also reviewed vital signs, intake and output, lab results and recent events, and agreed with today's dialysis order.     Access: No issue    Anemia:  epo  Plan dialysis tomorrow am    Pippa Hurtado MD  Nephrology  3/12/2020

## 2020-03-12 NOTE — PROGRESS NOTES
PT eval order received and chart reviewed. Spoke with nurse and OT who reports patient is at functional baseline for mobility. Will sign off per protocol and educated patient that if their functional mobility needs should change that they may have MD re-order PT at any time. Thank you for this referral. Jesica Clayton, PT, DPT.

## 2020-03-12 NOTE — PROGRESS NOTES
Reason for Admission:  Pleural effusion [J90]  CHF exacerbation ([facility] [address] [I50.9]                 RRAT Score:    14            Plan for utilizing home health:    no                      Likelihood of Readmission:   LOW                         Transition of Care Plan:              Initial assessment completed with patient. Cognitive status of patient: oriented to time, place, person and situation. Face sheet information confirmed:  yes. The patient designates  Roldan Edmond to participate in her discharge plan and to receive any needed information. This patient lives in a single family home with patient and spouse. Patient is able to navigate steps as needed. Prior to hospitalization, patient was considered to be independent with ADLs/IADLS : yes . Patient has a current ACP document on file: no  The patient and  will be available to transport patient home upon discharge. The patient already has none reported,  medical equipment available in the home. Patient is not currently active with home health. Patient has not stayed in a skilled nursing facility or rehab. This patient is on dialysis :yes    If yes, hemodialysis patient and receives treatment on Monday/Wednesday/Friday at [address] [place] [address] time is 6:00. Pt is transported to/from dialysis by self. Freedom of choice signed: no. Currently, the discharge plan is Home. PATIENT COULD BENEFIT FROM HOSPITAL TO HOME VISIT    The patient states that she can obtain her medications from the pharmacy, and take her medications as directed. Patient's current insurance is [employer]       Care Management Interventions  PCP Verified by CM:  Yes  Mode of Transport at Discharge: Self  Physical Therapy Consult: Yes  Occupational Therapy Consult: Yes  Current Support Network: Lives with Spouse  Confirm Follow Up Transport: Family  The Plan for Transition of Care is Related to the Following Treatment Goals : Home  The Patient and/or Patient Representative was Provided with a Choice of Provider and Agrees with the Discharge Plan?: No  Freedom of Choice List was Provided with Basic Dialogue that Supports the Patient's Individualized Plan of Care/Goals, Treatment Preferences and Shares the Quality Data Associated with the Providers?: No  Discharge Location  Discharge Placement: Home        Karina Longo RN BSN  Care Manager  [phone]

## 2020-03-12 NOTE — ROUTINE PROCESS
Assumed patient care. Bedside shift report received from Melvi Galarza. Patient in bed, awake, alert and oriented x4 with family visiting at the bedside. Denies pain or discomforts at this time. Snacks provided per patient request. Call light placed within reach. Bed in low position.

## 2020-03-12 NOTE — CONSULTS
Consult Note  Consult requested by: dr Ramila Pierce is a 48 y.o. female [address] who is being seen on consult for esrd  Chief Complaint   Patient presents with    Chest Pain    Shortness of Breath     Admission diagnosis: Pleural effusion     HPI: 48 y o  [place] female admitted with sob,asked to evaluate for esrd. was dialyzed yesterday ,but only 500 cc ultrafiltrated. had been losing body wt  Past Medical History:   Diagnosis Date    Anemia     Arthritis     Diabetes ([address]     Hypertension     ITP (idiopathic thrombocytopenic purpura)       Past Surgical History:   Procedure Laterality Date    HX GYN      c section 1984    HX HYSTERECTOMY  2017    HX TUBAL LIGATION      IR INSERT TUNL CVC W/O PORT OVER 5 YR  8/12/2019       Social History     Socioeconomic History    Marital status:      Spouse name: Not on file    Number of children: Not on file    Years of education: Not on file    Highest education level: Not on file   Occupational History    Not on file   Social Needs    Financial resource strain: Not on file    Food insecurity     Worry: Not on file     Inability: Not on file    Transportation needs     Medical: Not on file     Non-medical: Not on file   Tobacco Use    Smoking status: Never Smoker    Smokeless tobacco: Never Used   Substance and Sexual Activity    Alcohol use: No    Drug use: No    Sexual activity: Not on file   Lifestyle    Physical activity     Days per week: Not on file     Minutes per session: Not on file    Stress: Not on file   Relationships    Social connections     Talks on phone: Not on file     Gets together: Not on file     Attends Jewish service: Not on file     Active member of club or organization: Not on file     Attends meetings of clubs or organizations: Not on file     Relationship status: Not on file    Intimate partner violence     Fear of current or ex partner: Not on file     Emotionally abused: Not on file     Physically abused: Not on file     Forced sexual activity: Not on file   Other Topics Concern    Not on file   Social History Narrative    Not on file       Family History   Problem Relation Age of Onset    Hypertension Mother     Cancer Father         Colon     Allergies   Allergen Reactions    Rocephin [Ceftriaxone] Itching    Pcn [Penicillins] Itching        Home Medications:     Prior to Admission Medications   Prescriptions Last Dose Informant Patient Reported? Taking?   acetaminophen (TYLENOL) 500 mg tablet   No Yes   Sig: Take 2 Tabs by mouth every six (6) hours as needed for Pain. atorvastatin (LIPITOR) 10 mg tablet   Yes Yes   Sig: Take  by mouth daily. calcium acetate (PHOSLO) 667 mg cap   No Yes   Sig: Take 1 Cap by mouth three (3) times daily (with meals). dilTIAZem ER (CARDIZEM LA) 120 mg tablet   Yes Yes   Sig: Take 120 mg by mouth daily. hydrALAZINE (APRESOLINE) 25 mg tablet   Yes Yes   Sig: Take 25 mg by mouth three (3) times daily. loratadine (CLARITIN) 10 mg tablet   Yes Yes   Sig: Take 10 mg by mouth daily as needed for Allergies.       Facility-Administered Medications: None       Current Facility-Administered Medications   Medication Dose Route Frequency    ferrous sulfate tablet 325 mg  1 Tab Oral BID WITH MEALS    atorvastatin (LIPITOR) tablet 20 mg  20 mg Oral DAILY    hydrALAZINE (APRESOLINE) tablet 25 mg  25 mg Oral TID    acetaminophen (TYLENOL) tablet 650 mg  650 mg Oral Q4H PRN    ondansetron (ZOFRAN) injection 4 mg  4 mg IntraVENous Q4H PRN    albuterol-ipratropium (DUO-NEB) 2.5 MG-0.5 MG/3 ML  3 mL Nebulization Q4H RT    insulin lispro (HUMALOG) injection   SubCUTAneous AC&HS    glucose chewable tablet 16 g  16 g Oral PRN    glucagon (GLUCAGEN) injection 1 mg  1 mg IntraMUSCular PRN    dextrose (D50W) injection syrg 12.5-25 g  25-50 mL IntraVENous PRN    dilTIAZem CD (CARDIZEM CD) capsule 120 mg  120 mg Oral DAILY       Review of Systems:   A comprehensive review of systems was negative except for that written in the HPI. Data Review:    Labs: Results:       Chemistry Recent Labs     03/12/20  0411 03/11/20  1612   GLU 89 85    143   K 3.6 3.7    105   CO2 27 29   BUN 25* 20*   CREA 6.66* 5.50*   CA 8.9 8.9   AGAP 10 9   BUCR 4* 4*   AP 61 72   TP 7.2 7.6   ALB 2.8* 3.1*   GLOB 4.4* 4.5*   AGRAT 0.6* 0.7*      PTH  Lab Results   Component Value Date/Time    Calcium 8.9 03/12/2020 04:11 AM    Phosphorus 3.5 03/12/2020 04:11 AM    PTH, Intact 363.9 (H) 05/31/2019 02:43 AM      CBC w/Diff Recent Labs     03/12/20 0411 03/11/20  1612   WBC 4.9 4.8   RBC 3.44* 3.64*   HGB 10.2* 10.9*   HCT 32.4* 35.2   PLT 96* 97*   GRANS 77* 79*   LYMPH 16* 14*   EOS 2 2      Coagulation No results for input(s): PTP, INR, APTT, INREXT in the last 72 hours. Iron/Ferritin No results for input(s): IRON in the last 72 hours. No lab exists for component: TIBCCALC   BNP No results for input(s): BNPP in the last 72 hours.    Cardiac Enzymes Recent Labs     03/11/20  1612      CKND1 1.4      Liver Enzymes Recent Labs     03/12/20  0411   TP 7.2   ALB 2.8*   AP 61   SGOT 16      Thyroid Studies Lab Results   Component Value Date/Time    TSH 2.85 03/12/2020 04:11 AM        Urinalysis Lab Results   Component Value Date/Time    Color YELLOW 06/02/2019 12:18 PM    Appearance CLEAR 06/02/2019 12:18 PM    Specific gravity 1.009 06/02/2019 12:18 PM    pH (UA) 8.0 06/02/2019 12:18 PM    Protein 300 (A) 06/02/2019 12:18 PM    Glucose 100 (A) 06/02/2019 12:18 PM    Ketone NEGATIVE  06/02/2019 12:18 PM    Bilirubin NEGATIVE  06/02/2019 12:18 PM    Urobilinogen 0.2 06/02/2019 12:18 PM    Nitrites NEGATIVE  06/02/2019 12:18 PM    Leukocyte Esterase SMALL (A) 06/02/2019 12:18 PM    Epithelial cells FEW 06/02/2019 12:18 PM    Bacteria FEW (A) 06/02/2019 12:18 PM    WBC 1 to 3 06/02/2019 12:18 PM    RBC 0 to 1 06/02/2019 12:18 PM         IMAGES:   XR Results (maximum last 3):  Results from Hospital Encounter encounter on 03/11/20   XR CHEST PA LAT    Narrative EXAM: CHEST  CPT code: 09785    CLINICAL INDICATION/HISTORY: Shortness of breath, right-sided chest pain. Patient on dialysis. COMPARISON: 9 August 2019. TECHNIQUE: PA and lateral views of the chest.    FINDINGS:      There has been interval placement of a right internal jugular central venous  dual lumen catheter with tip in the distal superior vena cava. There is  increased opacity in both lung bases with obscuration of the hemidiaphragms,  right greater than left, consistent with effusion/atelectasis. The upper lungs  are grossly clear. The mediastinum is not widened but the heart is enlarged. The bones and soft tissues are unremarkable except for some hypertrophic  spondylitic degenerative change at several levels in the mid-to-lower thoracic  spine. There is no other interval change. Impression IMPRESSION:    Interval development of bibasilar opacities consistent with effusion and  possible associated atelectasis, right greater than left. Cardiomegaly. Findings suggest fluid overload or possibly failure. Results from [facility] encounter on 08/09/19   XR CHEST PA LAT    Narrative Chest PA and lateral    INDICATION: End-stage renal disease, dialysis, hypertension, diabetes    COMPARISON: 5/19, 2010    FINDINGS:  Two views of the chest were obtained. The lungs are clear. No new nodule. Cardiac silhouette is stable. Pulmonary vasculature is unremarkable. Osseous  structures are intact. Impression IMPRESSION:  No acute cardiopulmonary disease. No significant interval change. Results from Hospital Encounter encounter on 05/30/19   XR CHEST PORT    Narrative EXAM: CHEST  CPT CODE: 89519    CLINICAL INDICATION/HISTORY: Sepsis; no acute kidney failure. COMPARISON: 24 December 2018. TECHNIQUE: Single AP portable view of chest at 1212.      FINDINGS: There are clear lungs and sharp pleural margins. The  cardiomediastinal silhouette is normal although the heart may be at the upper  limits of normal in size. The bones and soft tissues are unremarkable. There  is little change from the prior study. Impression IMPRESSION:    No acute pulmonic disease. Borderline heart size. CT Results (maximum last 3): Results from [facility] encounter on 05/30/19   CT BX RENAL    Narrative PREOPERATIVE DIAGNOSIS: SHAHANA. POSTOPERATIVE DIAGNOSIS: Same    INDICATION: SHAHANA. Patient is here for random renal biopsy. ATTENDING: SOLO Morrow Graft: None. PROCEDURES: CT-guided renal random core biopsy. Left lower renal pole. ANESTHESIA: 1% local lidocaine as well as moderate intravenous sedation with  Versed and fentanyl given and monitored per independently trained interventional  radiology nurse under my direct supervision for 20 minutes. Please see nursing  records for detailed medication dosing. CONTRAST: None. COMPLICATIONS: None    DRAIN: No    CATHETER: None. EBL: Minimal.    SPECIMEN: 6 core biopsy specimens of the left lower renal pole were obtained. Specimen was sent to pathology. TECHNIQUE: After detailed explanation of risks and benefits of the procedure  verbal and written consent were obtained. Patient was brought to the CT room and  placed prone on the table. Timeout was performed.  view of the abdomen was  obtained. Target lesion was identified and skin was marked. Left flank region  was prepped and draped in the usual sterile fashion. Maximum sterile barrier  technique was used. 1% lidocaine solution was instilled in the skin superficial  and deep subcutaneous soft tissues overlying the biopsy region. Under direct CT fluoroscopy guidance using 16-gauge Temno core biopsy needle was  advanced down through the guide into the left lower renal pole. 6 passes were  made and core biopsies were sent to pathology.      Needle and introducer were removed. Post biopsy imaging was obtained. Hemostasis  was achieved with manual compression and Gelfoam slurry. Sterile dressing was  applied. There were no immediate complications. Patient tolerated procedure fairly well. Patient was transferred to recovery in stable condition. I was present and  performed the procedure. FINDINGS: CT fluoroscopic guidance demonstrated good position of the biopsy  needle in the left lower renal pole. Postbiopsy imaging demonstrated very small  perirenal hematoma posteriorly. Impression IMPRESSION:    Successful, uncomplicated CT-guided renal random core biopsy. All CT scans at this facility are performed using dose optimization technique as  appropriate to a performed exam, to include automated exposure control,  adjustment of the mA and/or kV according to patient size (including appropriate  matching for site-specific examinations), or use of iterative reconstruction  technique. Results from [facility] encounter on 03/26/19   CT HEAD WO CONT    Narrative EXAMINATION: CT head without contrast    INDICATION: Headache    COMPARISON: None    TECHNIQUE: CT of the head performed without contrast, with multiplanar  reformations obtained. All CT scans at this facility are performed using dose  optimization technique as appropriate to a performed exam, to include automated  exposure control, adjustment of the mA and/or kV according to patient size  (including appropriate matching first site specific examinations), or use of  iterative reconstruction technique. FINDINGS: No focal mass effect or shift of midline structures. No abnormal  extra-axial collection. Ventricles are normal in size and configuration. No  evidence of acute intracranial hemorrhage. No suspicious parenchymal lesions. Calvarium intact. Imaged paranasal sinuses demonstrates mild patchy mucosal  thickening. Mastoids are well-aerated. Superficial soft tissues unremarkable. Impression IMPRESSION:    No acute intracranial findings. Results from [facility] encounter on 12/23/18   CT ABD PELV WO CONT    Narrative CT Of The Abdomen And Pelvis Without Contrast    CPT CODE 69197,[id number]    CLINICAL HISTORY: Chronic kidney disease with one day of fever, generalized  aching. Abnormal urinalysis. .    TECHNIQUE: 5 mm helical MDCT scan was obtained of the abdomen and pelvis. Sagittal and coronal images created from original data set. All CT scans at  this facility are performed using dose optimization techniques as appropriate to  a performed exam, to include automated exposure control, adjustment of the mA  and/or kV according to patient's size (including appropriate matching for site  specific examinations), or use of iterative reconstruction technique. COMPARISON: None. FINDINGS:  view shows lung bases clear, mild prominence of small bowel  loops in the mid abdomen. CT Of The Abdomen and pelvis:    Lung bases: Linear density left base. No effusions. Heart and pericardium: Normal.    Liver: Normal.    Gallbladder: Normal.    Spleen: Normal.    Pancreas: Normal.    Adrenal glands: Normal.    Kidneys: Left kidney has a 1 mm stones lower pole. No hydroureteronephrosis. On  the right, kidney appears mildly enlarged and is oriented in a horizontal  position compatible with variant of normal. Two tiny 1 mm calcifications are  present. There is mild pelvocaliectasis. No ureterectasis or ureteral stones. Perinephric stranding on this side. Retroperitoneum: Great vessels normal. Small lymph nodes not pathologically  enlarged. The bowel: Distal esophagus, stomach, duodenum, small bowel and the appendix and  the colon are normal. Small bowel loops measure up to 2 cm. No wall thickening. Peritoneal space: Small volume free fluid. Urinary bladder: Normal. GYN: Prior hysterectomy. No adnexal masses.       Impression IMPRESSION:    Mild enlargement right kidney, mild pelvocaliectasis with perinephric stranding  can be seen with recently passed stone (although no stone is seen) or hilar  nephritis. No abscess identified. No obstructing stone. Bilateral tiny kidney stones. Small volume free fluid in the pelvis. Atelectasis left lung base. MRI Results (maximum last 3): No results found for this or any previous visit. Nuclear Medicine Results (maximum last 3): No results found for this or any previous visit. US Results (maximum last 3): Results from [facility] encounter on 05/30/19   US ABD LTD    Narrative ULTRASOUND ABDOMEN LIMITED    HISTORY: Right upper quadrant pain    COMPARISON: 12/26/2018; CT abdomen 12/23/2018    TECHNIQUE:  Sonography of the right upper quadrant was performed. RESULT:   Pancreas:     Portions obscured: Tail     Lesions: None      Liver:        Craniocaudal dimensions: cm       Echotexture:  Normal, homogeneous       Echogenicity:  Normal       Surface contour:  Smooth       Lesions:  None       Portal vein: 9 mm in transverse diameter with appropriate direction of  flow. Biliary:        Intrahepatic Biliary Dilation: Absent        CBD: 5 mm,       Gallbladder: Present             -Cholelithiasis: Absent            -Wall thickening: Absent            No pericholecystic fluid, negative sonographic Ramon's sign. Right Kidney: Increased in echogenicity. Impression IMPRESSION:   Medical renal disease. Results from Hospital Encounter encounter on 12/24/18   US RETROPERITONEUM COMP    Narrative Ultrasound retroperitoneal    INDICATION: Hydronephrosis, recent UTI    COMPARISON: CT 12/18    FINDINGS:    Right kidney measures 10.1 x 4.7 x 4.8 cm. Left kidney measures 11.6 x 5.6 x 5.1  cm. No hydronephrosis or nephrolithiasis. No solid renal mass. 1.3 x 1.2 x 1.3  cm cyst in the upper pole of the left kidney. Visualized spleen and bladder  appear within normal limits. No free fluid.       Impression IMPRESSION:    No hydronephrosis. Tiny left renal cyst.       DEXA Results (maximum last 3): No results found for this or any previous visit. RODOLFO Results (maximum last 3): Results from [facility] encounter on 05/11/17   [facility] MAMMO BI SCREENING INCL CAD       IR Results (maximum last 3): Results from [facility] encounter on 08/12/19   IR INSERT TUNL CVC W/O PORT OVER 5 YR    Narrative DUAL LUMEN right internal jugular 14.5 [place] 19 cm cuff to tip tunneled  hemodialysis catheter. : Zabrina Paniagua PA-C    INDICATION: End-stage renal disease requiring hemodialysis. ATTENDING: Dr. Fiorella Jimenez    Assistant: None    Complications: None    Estimated Blood loss: Minimal.    Anesthesia: 1% lidocaine as well as moderate intravenous sedation with Versed  and fentanyl given and monitored per independently trained interventional  radiology nurse under my direct supervision for 30 minutes. Please see detailed  nursing records for medication dosing and timing of the procedure. Specimens: None. Implants: Right internal jugular, 14.5 [place], double-lumen, 19  cm cuff to tip  [employer]onic Palindrome hemodialysis catheter. Fluoroscopy radiation dose: Cumulative Air KERMA = 5 mGy. Fluoroscopy time: 0.5 minutes    Number of fluoroscopic images: 2    TECHNIQUE: After informed consent was obtained, the right lower neck was prepped  and draped in usual sterile fashion. Maximum sterile barrier technique was  used. Under ultrasound guidance, access into the right internal jugular vein was  achieved using a micropuncture set and 21-gauge needle. Single grayscale static ultrasonographic image of the patent right internal  jugular vein with antegrade flow as well as proper position of the access needle  were obtained and recorded for documentation purposes. Via wire exchange technique a short Amplatz was introduced into inferior vena  cava. Serial dilatations were made.  12 [place] peel-away was placed over wire.  Right chest tunnel was made. Subsequently, the 14.5 [place] dual-lumen tunneled  19 cm cuff to tip Palindrome catheter was brought through the right chest tunnel  and inserted via the peel away sheath, and manipulated under fluoroscopic  guidance, to the level of the right atrium. The catheter was flushed,  heparinized and secured and is ready for use. Neck and subclavian incisions  were closed with 4-0 Vicryl suture. Dermabond and sterile dressing were  applied. There were no immediate complications. Patient tolerated procedure fairly well. Patient was transferred to recovery in stable condition. A spot radiograph of catheter tip position and US image of access vein were  obtained for documentation purposes. Impression IMPRESSION:     Successful placement of the right internal jugular tunneled double-lumen 19 cm  cuff to tip 14.5 [employer] hemodialysis catheter. Catheter is ready for immediate  use. Results from [place] encounter on 03/10/15   IR BX BONE MARROW    Narrative PREOPERATIVE DIAGNOSIS: Thrombocytopenia. POSTOPERATIVE DIAGNOSIS: Same    ATTENDING: SOLO Parmar Ciro Rafael: None. PROCEDURES: Fluoroscopically guided bone marrow biopsy. ANESTHESIA: Local 1% lidocaine as well as moderate intravenous sedation with  Versed and fentanyl given and monitored per independently trained interventional  radiology nurse under my direct supervision for 30 minutes. Please see nursing  records for detailed medication dosing and timing of the procedure. CONTRAST: None. COMPLICATIONS: None    DRAIN: No    CATHETER: None. EBL: Minimal.    SPECIMEN: 2 aspirates and single core biopsy was obtained. Fluoroscopy time: 0.8 minutes. TECHNIQUE: After detailed explanation of risks and benefits of the procedure  verbal and written consent were obtained. Patient was brought to the  interventional radiology room and placed prone on the table.  Timeout was  performed.  view was obtained. Target lesion was identified and skin was  marked overlying left iliac crest.    Left iliac crest region was prepped and draped in the usual sterile fashion. Maximum sterile there are technique was used. 1% lidocaine solution was instilled in the skin superficial and deep  subcutaneous soft tissues overlying the biopsy region. Under direct fluoroscopy guidance using 11-gauge OnControl biopsy needle was  advanced down through left iliac crest periosteum with drill power assistance. Single pass was made. 2 aspirates and single core biopsy were obtained. Given to  pathology on site. Postbiopsy imaging was obtained. FINDINGS: Fluoroscopic guidance demonstrated good position of the biopsy needle. Postbiopsy imaging shows no abnormality. Impression IMPRESSION:    Successful, uncomplicated fluoroscopically guided bone marrow biopsy. VAS/US Results (maximum last 3): No results found for this or any previous visit. PET Results (maximum last 3): No results found for this or any previous visit. No results found for this or any previous visit. @LASTPROCAMB([id number])    CULTURE:   )No results for input(s): SDES, CULT in the last 72 hours. No results for input(s): CULT in the last 72 hours.     Physical Assessment:     Visit Vitals  /78 (BP 1 Location: Right arm, BP Patient Position: At rest)   Pulse (!) 101   Temp 98.3 °F (36.8 °C)   Resp 20   Ht 5' 9\" (1.753 m)   Wt 88.3 kg (194 lb 9.6 oz)   SpO2 98%   Breastfeeding No   BMI 28.74 kg/m²     Last 3 Recorded Weights in this Encounter    03/11/20 1604 03/12/20 0423   Weight: 90.7 kg (200 lb) 88.3 kg (194 lb 9.6 oz)       Intake/Output Summary (Last 24 hours) at 3/12/2020 1322  Last data filed at 3/12/2020 0617  Gross per 24 hour   Intake 240 ml   Output 200 ml   Net 40 ml       Physial Exam:  General appearance: alert, cooperative, no distress, appears stated age  Skin: normal coloration and turgor, no rashes, no suspicious skin lesions noted. HEENT: Head; normocephalic, atraumatic. JUAN RAMON. ENT- ENT exam normal, no neck nodes or sinus tenderness. Lungs: diminished breath sounds bases  Heart: regular rate and rhythm, S1, S2 normal, no murmur, click, rub or gallop  Abdomen: soft, non-tender. Bowel sounds normal. No masses,  no organomegaly  Extremities: extremities normal, atraumatic, no cyanosis or edema    PLAN / RECOMMENDATION:    Esrd,fluid overload. plan dialysis today,decrease tw as she is losing body wt  Anemia,on epo  Sec hyperparathyroidism,on iv hectorol     Thank you for the consultation to participate in patient's care. I have personally discussed my plan with the referring physician.      Barbara Goldberg MD  March 12, 2020

## 2020-03-12 NOTE — H&P
GENERAL GENERIC H&P/CONSULT    Assessment:  Principal Problem:    Pleural effusion (3/11/2020)    Active Problems:    DM2 (diabetes mellitus, type 2) ([facility]) (1/23/2015)      Thrombocytopenia ([facility]) (2/27/2015)      Anemia in chronic kidney disease (CKD) (2/28/2017)      CHF exacerbation ([facility]) (3/11/2020)        Plan:  F/U [facility]ipper with cardiology regarding cardiac recommendations in CHF exacerbation in ESRD with dialysis  F/U Nephrology for evaluation  No IVFs--  Avoid nephrotoxic medications  Consider V/Q scan to rule out PE given only had recent dialysis and may not tolerate contrast unless dialyzed shortly after  Sliding scale insulin ACHS  Hold on heparin drip given thrombocytopenia  Duonebs and incentive spirometry as needed  ABGs PRN as needed if using respiratory muscles  Trend troponins, recheck BNP, check tsh and flu status  Will do CT of chest to evaluate for pleural effusion  Labatelol for HTN: hold BP meds until am    GLOBAL:  Admit to: medical floor with telemetry  Cardiac Diet  DVT PPX:none given thrombocytopenia  Full Code  PT/OT  Tylenol for pain  Optional Inpatient Sleep Regimen  Anticipate DC 3-4 days      CC: SOB    Subjective:  68-year-old -American female presenting for shortness of breath. Past medical history of hypertension type 2 diabetes chronic renal failure on dialysis Monday Wednesday Friday. Patient was recently dialyzed to just below her target weight and felt short of breath afterwards. Symptoms worsened when laying down. Has had intermittent episodes of shortness of breath for the past 3 to 4 days denied any chest pain. Has mild nonproductive cough denies any fever and SOB worsen with recumbency. Patient has been having decreased tolerance for exertion without becoming short of breath     .   Past Medical History:   Diagnosis Date    Anemia     Arthritis     Diabetes ([address]     Hypertension     ITP (idiopathic thrombocytopenic purpura)       Past Surgical History: Procedure Laterality Date    HX GYN      c section 1984    HX HYSTERECTOMY  2017    HX TUBAL LIGATION      IR INSERT TUNL CVC W/O PORT OVER 5 YR  8/12/2019      Prior to Admission medications    Medication Sig Start Date End Date Taking? Authorizing Provider   atorvastatin (LIPITOR) 10 mg tablet Take  by mouth daily. Yes Other, MD Jeff   dilTIAZem ER (CARDIZEM LA) 120 mg tablet Take 120 mg by mouth daily. Yes Other, MD Jeff   hydrALAZINE (APRESOLINE) 25 mg tablet Take 25 mg by mouth three (3) times daily. Yes Other, MD Jeff   calcium acetate (PHOSLO) 667 mg cap Take 1 Cap by mouth three (3) times daily (with meals). 6/4/19  Yes Angelina Rutledge MD   loratadine (CLARITIN) 10 mg tablet Take 10 mg by mouth daily as needed for Allergies. Yes Provider, Jass   acetaminophen (TYLENOL) 500 mg tablet Take 2 Tabs by mouth every six (6) hours as needed for Pain. 12/23/18  Yes Shadi Swanson PA-C     Allergies   Allergen Reactions    Rocephin [Ceftriaxone] Itching    Pcn [Penicillins] Itching      Social History     Tobacco Use    Smoking status: Never Smoker    Smokeless tobacco: Never Used   Substance Use Topics    Alcohol use: No      Family History   Problem Relation Age of Onset    Hypertension Mother     Cancer Father         Colon      Review of Systems   Constitutional: Positive for activity change, fatigue and unexpected weight change. HENT: Positive for congestion. Eyes: Negative for visual disturbance. Respiratory: Positive for shortness of breath. Cardiovascular: Positive for leg swelling. Gastrointestinal: Negative for abdominal pain. Endocrine: Negative for cold intolerance. Genitourinary:        Oliguric   Musculoskeletal: Negative for arthralgias. Neurological: Negative for dizziness. Hematological: Does not bruise/bleed easily. Psychiatric/Behavioral: Negative for agitation. Objective:    No intake/output data recorded.   No intake/output data recorded. Patient Vitals for the past 8 hrs:   BP Temp Pulse Resp SpO2 Height Weight   03/11/20 2126 160/86 98.6 °F (37 °C) (!) 106 20 98 % -- --   03/11/20 2000 (!) 161/91 -- 98 17 95 % -- --   03/11/20 1900 166/89 98.5 °F (36.9 °C) (!) 103 21 96 % -- --   03/11/20 1800 153/78 -- 96 17 95 % -- --   03/11/20 1700 (!) 170/92 -- 95 16 99 % -- --   03/11/20 1604 (!) 184/103 -- (!) 112 18 96 % 5' 9\" (1.753 m) 90.7 kg (200 lb)     Physical Exam  HENT:      Head: Normocephalic. Nose: Nose normal.      Mouth/Throat:      Mouth: Mucous membranes are moist.   Eyes:      Pupils: Pupils are equal, round, and reactive to light. Neck:      Musculoskeletal: No neck rigidity. Cardiovascular:      Rate and Rhythm: Tachycardia present. Pulses: Normal pulses. Pulmonary:      Comments: Decreased breath sounds  Abdominal:      General: There is no distension. Genitourinary:     Comments: deferred  Skin:     General: Skin is warm. Neurological:      General: No focal deficit present. Mental Status: She is alert.    Psychiatric:         Mood and Affect: Mood normal.          Labs:    Recent Results (from the past 24 hour(s))   EKG, 12 LEAD, INITIAL    Collection Time: 03/11/20  4:04 PM   Result Value Ref Range    Ventricular Rate 104 BPM    Atrial Rate 104 BPM    P-R Interval 158 ms    QRS Duration 100 ms    Q-T Interval 400 ms    QTC Calculation (Bezet) 526 ms    Calculated P Axis 58 degrees    Calculated R Axis 15 degrees    Calculated T Axis 90 degrees    Diagnosis       Sinus tachycardia  Nonspecific T wave abnormality  Abnormal ECG    Confirmed by Bryan Nieves MD, [facility] ([id number]) on 3/11/2020 4:09:04 PM     CBC WITH AUTOMATED DIFF    Collection Time: 03/11/20  4:12 PM   Result Value Ref Range    WBC 4.8 4.6 - 13.2 K/uL    RBC 3.64 (L) 4.20 - 5.30 M/uL    HGB 10.9 (L) 12.0 - 16.0 g/dL    HCT 35.2 35.0 - 45.0 %    MCV 96.7 74.0 - 97.0 FL    MCH 29.9 24.0 - 34.0 PG    MCHC 31.0 31.0 - 37.0 g/dL    RDW 14.4 11.6 - 14.5 %    PLATELET 97 (L) 778 - 420 K/uL    MPV 12.8 (H) 9.2 - 11.8 FL    NEUTROPHILS 79 (H) 40 - 73 %    LYMPHOCYTES 14 (L) 21 - 52 %    MONOCYTES 5 3 - 10 %    EOSINOPHILS 2 0 - 5 %    BASOPHILS 0 0 - 2 %    ABS. NEUTROPHILS 3.8 1.8 - 8.0 K/UL    ABS. LYMPHOCYTES 0.7 (L) 0.9 - 3.6 K/UL    ABS. MONOCYTES 0.2 0.05 - 1.2 K/UL    ABS. EOSINOPHILS 0.1 0.0 - 0.4 K/UL    ABS. BASOPHILS 0.0 0.0 - 0.1 K/UL    DF AUTOMATED      PLATELET COMMENTS DECREASED PLATELETS     CARDIAC PANEL,(CK, CKMB & TROPONIN)    Collection Time: 03/11/20  4:12 PM   Result Value Ref Range     26 - 192 U/L    CK - MB 2.0 <3.6 ng/ml    CK-MB Index 1.4 0.0 - 4.0 %    Troponin-I, QT 0.45 (H) 0.0 - 0.045 NG/ML   LIPASE    Collection Time: 03/11/20  4:12 PM   Result Value Ref Range    Lipase 358 73 - 314 U/L   METABOLIC PANEL, COMPREHENSIVE    Collection Time: 03/11/20  4:12 PM   Result Value Ref Range    Sodium 143 136 - 145 mmol/L    Potassium 3.7 3.5 - 5.5 mmol/L    Chloride 105 100 - 111 mmol/L    CO2 29 21 - 32 mmol/L    Anion gap 9 3.0 - 18 mmol/L    Glucose 85 74 - 99 mg/dL    BUN 20 (H) 7.0 - 18 MG/DL    Creatinine 5.50 (H) 0.6 - 1.3 MG/DL    BUN/Creatinine ratio 4 (L) 12 - 20      GFR est AA 10 (L) >60 ml/min/1.73m2    GFR est non-AA 8 (L) >60 ml/min/1.73m2    Calcium 8.9 8.5 - 10.1 MG/DL    Bilirubin, total 0.3 0.2 - 1.0 MG/DL    ALT (SGPT) 16 13 - 56 U/L    AST (SGOT) 19 10 - 38 U/L    Alk.  phosphatase 72 45 - 117 U/L    Protein, total 7.6 6.4 - 8.2 g/dL    Albumin 3.1 (L) 3.4 - 5.0 g/dL    Globulin 4.5 (H) 2.0 - 4.0 g/dL    A-G Ratio 0.7 (L) 0.8 - 1.7     TROPONIN I    Collection Time: 03/11/20  7:11 PM   Result Value Ref Range    Troponin-I, QT 0.44 (H) 0.0 - 0.045 NG/ML   NT-PRO BNP    Collection Time: 03/11/20  7:11 PM   Result Value Ref Range    NT pro-BNP 10,866 (H) 0 - 900 PG/ML       EKG  Sinus tachycardia   Nonspecific T wave abnormality   Abnormal ECG     Chest X-Ray: Pending      Signed:  Qasim Mccullough MD 3/11/2020

## 2020-03-12 NOTE — PROGRESS NOTES
ACUTE HEMODIALYSIS FLOW SHEET    HEMODIALYSIS ORDERS: Physician: Dr. Marisa Howard: Kacy   Duration: 3.5 hr  BFR: 350   DFR: 800   Dialysate:  Temp 36.0   K+   3    Ca+  2.5   Na 138   Bicarb 30   Wt Readings from Last 1 Encounters:   03/12/20 88.3 kg (194 lb 9.6 oz)    Patient Chart [x]   Unable to Obtain []  Dry weight/UF Goal: 3000 ml  Access RU tunneled chest wall CVC     Heparin []  Bolus    Units    [] Hourly    Units    []None      Catheter locking solution Heparin 1:1000   Pre BP:   148/78    Pulse:  98   Respirations: 18    Temperature:  98.2    Tx: NSS   ml/Bolus   [x] N/A   Labs: []  Pre  []  Post:   [x] N/A   Additional Orders(medications, blood products, hypotension management): [] Yes   [x] No     [x]  [facility] Consent Verified     CATHETER ACCESS:  []N/A   [x]Right   []Left   []IJ     []Fem   [x]Chest wall   [] First use X-ray verified     [x]Tunnel    [] Non Tunneled   [x]No S/S infection  []Redness  []Drainage []Cultured []Swelling []Pain   [x]Medical Aseptic Prep Utilized   [x]Dressing Changed  [] Biopatch  Date: 3/12/20   []Clotted   [x]Patent   Flows: [x]Good  []Poor  []Reversed   If access problem,  notified: []Yes    []N/A        GRAFT/FISTULA ACCESS:   [x]N/A     []Right     []Left     []UE     []LE   []AVG   []AVF     []Buttonhole    []Medical Aseptic Prep Utilized   []No S/S infection  []Redness  []Drainage []Cultured  [] Swelling  [] Pain  Bruit:   [] Strong    [] Weak       Thrill :   [] Strong    [] Weak     Needle Gauge: 15   Length: 1 inch   If access problem,  notified: []Yes     [x]N/A     Please describe access if present and not used: N/A       GENERAL ASSESSMENT:    LUNGS:  Rate 18   SaO2%      [] Clear  [x] Coarse  [] Crackles  [] Wheezing                                                [] Diminished     Location : []RLL   []LLL    []RUL  []RAQUEL   Cough: []Productive  []Dry  [x]N/A   Respirations:  [x]Easy  []Labored   Therapy:  [x]RA  []NC l/min    Mask: []NRB  [] Venti    O2%                  []Ventilator  []Intubated  [] Trach  [] BiPaP   CARDIAC: [x]Regular      [] Irregular   [] Pericardial Rub  [] JVD          []  Monitored  [] Bedside  [] Remotely monitored     EDEMA: [] None  [x]Generalized  [] Pitting [] 1    [] 2    [] 3    [] 4                 [] Facial  [] Pedal  []  UE  [] LE   SKIN:   [x] Warm  [] Hot     [] Cold   [x] Dry     [] Pale   [] Diaphoretic                  [] Flushed  [] Jaundiced  [] Cyanotic  [] Rash  [] Weeping   LOC:    [x] Alert      [x]Oriented:    [x] Person     [x] Place  [x]Time               [] Confused  [] Lethargic  [] Medicated  [] Non-responsive   GI / ABDOMEN:                     [] Flat    [] Distended    [x] Soft    [] Firm   []  Obese                   [] Diarrhea  [x] Bowel Sounds  [] Nausea  [] Vomiting     / URINE ASSESSMENT:                   [x] Voiding   [] Oliguria  [] Anuria   []  Griffith                  [] Incontinent  []  Incontinent Brief []  Fecal Management System     PAIN:  [x] 0 []1  []2   []3   []4   []5   []6   []7   []8   []9   []10            Scale 0-10  Action/Follow Up:    MOBILITY:  [x] Bed    [] Stretcher      All Vitals and Treatment Details on Attached [address]: [facility]          Room # 206/01   [] 1st Time Acute      [] Stat       [x] Routine      [] Urgent     [x] Acute Room  []  Bedside  [] ICU/CCU  [] ER   Isolation Precautions:  [x] Dialysis    There are currently no Active Isolations       ALLERGIES:     Allergies   Allergen Reactions    Rocephin [Ceftriaxone] Itching    Pcn [Penicillins] Itching      Code Status:  Full Code     Hepatitis Status   2nd RN check :  SB   Lab Results   Component Value Date/Time    Hepatitis B surface Ag <0.10 06/01/2019 02:15 AM    Hepatitis B surface Ab 38.99 06/01/2019 02:15 AM    Hepatitis C virus Ab 0.03 06/01/2019 02:15 AM        Current Labs:      Lab Results   Component Value Date/Time    WBC 4.9 03/12/2020 04:11 AM    HGB 10.2 (L) 03/12/2020 04:11 AM    HCT 32.4 (L) 03/12/2020 04:11 AM    PLATELET 96 (L) 80/96/3194 04:11 AM    MCV 94.2 03/12/2020 04:11 AM     Lab Results   Component Value Date/Time    Sodium 141 03/12/2020 04:11 AM    Potassium 3.6 03/12/2020 04:11 AM    Chloride 104 03/12/2020 04:11 AM    CO2 27 03/12/2020 04:11 AM    Anion gap 10 03/12/2020 04:11 AM    Glucose 89 03/12/2020 04:11 AM    BUN 25 (H) 03/12/2020 04:11 AM    Creatinine 6.66 (H) 03/12/2020 04:11 AM    BUN/Creatinine ratio 4 (L) 03/12/2020 04:11 AM    GFR est AA 8 (L) 03/12/2020 04:11 AM    GFR est non-AA 7 (L) 03/12/2020 04:11 AM    Calcium 8.9 03/12/2020 04:11 AM          DIET:  DIET CARDIAC      PRIMARY NURSE REPORT:   Pre Dialysis: RADHA Theodore RN     Time: 1250        EDUCATION:    [x] Patient [] Other           Knowledge Basis: []None [x]Minimal [] Substantial   Barriers to learning  [x]N/A   [] Access Care     [] S&S of infection  [] Fluid Management  [] K+   [x] Procedural    []Albumin   [] Medications   [] Tx Options   [] Transplant   [] Diet   [] Other   Teaching Tools:  [x] Explain  [] Demo  [] Handouts [] Video  Patient response: [x] Verbalized understanding  [] Teach back  [] Return demonstration   [] Requires follow up      [x]Time Out/Safety Check  [x] Extracorporeal Circuit Tested for integrity       [address] - Before each treatment:     Machine Number:                   [address]                                    [x] Unit Machine # 7 with centralized RO                                                                                Alarm Test:  Pass time 1200            [x] RO/Machine Log Complete    Machine Temp    36.              Dialysate: pH  7.4    Conductivity: Meter 13.8     HD Machine  13.9      TCD: 13.8  Dialyzer Lot # [id number]     Blood Tubing Lot # [id number]     Saline Lot # [id number]     CHLORINE TESTING-Before each treatment and every 4 hours    Total Chlorine: [x] less than 0.1 ppm  Initial Time Check: 1300       4 Hr/2nd Check Time: 1700   (if greater than 0.1 ppm from Primary then every 30 minutes from Secondary)     TREATMENT INITIATION - with Dialysis Precautions:   [x] All Connections Secured              [x] Saline Line Double Clamped   [x] Venous Parameters Set               [x] Arterial Parameters Set    [x] Prime Given 250ml NSS              [x]Air Foam Detector Engaged      Treatment Initiation Note:  9584  Pt arrived at HD without any complaints. Pt A & O X 3, follows commands, no distress noted. During Treatment Notes:  8673  AP chest wall CVC assessed no abnormalities noted. CVC accessed without any complications, line patent with good flow. Pt tolerated well. Vascular access visible with arterial and venous line connections intact. 1400  Vascular access visible with arterial and venous line connections intact. 1500  Vascular access visible with arterial and venous line connections intact. 1600    Vascular access visible with arterial and venous line connections intact. Medication Dose Volume Route Time [facility] Nurse, Title   none     Diana Meléndez, EITAN     Post Assessment  Dialyzer Cleared:[] Good [x] Fair  [] Poor  Blood processed:  71.2 L  UF Removed:  3500 Ml  Post /78  Pulse  96 Resp  18   Temp 98.0 Lungs: [] Clear [] Course  [] Crackles                 [] Wheezing [] Diminished   Post Tx Vascular Access: [x] N/A       Cardiac:[x] Regular   [] Irregular   Rhythm:  [] Monitored   [] Not Monitored    CVC Catheter: [] N/A  Locking solution: Heparin 1:1000 U  Arterial port 1.6 ml   Venous port 1.6 ml   Edema:  [] None  [x] General [] Facial                   [] Pedal   [] UE [] LE   Skin:[x] Warm  [x] Dry [] Diaphoretic               [] Flushed  [] Pale [] Cyanotic   Pain:  [x]0  []1 []2  []3 []4  []5  []6 []7 []8  []9  []10       Post Treatment Note:   8341  HD completed at this time, pt tolerated well. Dressing clean, dry and intact.      POST TREATMENT PRIMARY NURSE HANDOFF REPORT:   Post Dialysis: RADHA Theodore RN                Time:  6634       Abbreviations: AVG-arterial venous graft, AVF-arterial venous fistula, IJ-Internal Jugular, Subcl-Subclavian, Fem-Femoral, Tx-treatment, AP/HR-apical heart rate, DFR-dialysate flow rate, BFR-blood flow rate, AP-arterial pressure, -venous pressure, UF-ultrafiltrate, TMP-transmembrane pressure, Juan-Venous, Art-Arterial, RO-Reverse Osmosis

## 2020-03-12 NOTE — ROUTINE PROCESS
TRANSFER - IN REPORT:    Verbal report received from Juan Randhawa(name) on Dominga Mcneil  being received from [facility]) for routine progression of care      Report consisted of patients Situation, Background, Assessment and   Recommendations(SBAR). Information from the following report(s) Kardex was reviewed with the receiving nurse. Opportunity for questions and clarification was provided. Assessment completed upon patients arrival to unit and care assumed. 9:13 PM - Received patient from [facility] per [facility], accompanied by EMT's in stable condition. Orientation to room provided, call light and personal items placed in reach. Primary Nurse Vivek Villela RN and Toni Patiño RN performed a dual skin assessment on this patient No impairment noted  Roman score is 22.

## 2020-03-12 NOTE — ROUTINE PROCESS
TRANSFER - OUT REPORT:    Verbal report given to Clive Cardoso RN on Evie Lesch  being transferred to [facility] room 206 for routine progression of care       Report consisted of patients Situation, Background, Assessment and   Recommendations(SBAR). Information from the following report(s) SBAR, ED Summary, [facility] and Cardiac Rhythm NSR was reviewed with the receiving nurse. Lines:   Peripheral IV 03/11/20 Left Antecubital (Active)   Site Assessment Clean, dry, & intact 3/11/2020  4:14 PM   Phlebitis Assessment 0 3/11/2020  4:14 PM   Infiltration Assessment 0 3/11/2020  4:14 PM   Dressing Status Clean, dry, & intact 3/11/2020  4:14 PM   Dressing Type Tape;Transparent 3/11/2020  4:14 PM   Hub Color/Line Status Pink;Flushed 3/11/2020  4:14 PM   Action Taken Blood drawn 3/11/2020  4:14 PM        Opportunity for questions and clarification was provided.       Patient transported with:   Monitor

## 2020-03-12 NOTE — PROGRESS NOTES
Admit Date: 3/11/2020  Date of Service: 3/12/2020    Reason for follow-up: SOB      Assessment:         Fluid overload. In setting of ESRD with likely a component of HFpEF: improving after initial HD  B/L pleural effusions: due to volume overload  ESRD on HD MWF:  Extra HD session today. Indeterminate troponin, in setting of ESRD: Indeterminate troponin, in setting of ESRD  HFpEF. EF 56-60% on echo 2019  HTN  Dyslipidemia  Dm type 2: diet controlled  ITP:  Chronic; followed by H/O  Plan:   F/u TTE  Continue coreg, hydralizine. HD as per nephrology    Current Antibtiocs:   None    Lines:   Peripheral    Case discussed with:  [x]Patient  []Family  [x]Nursing  [x]Case Management  DVT Prophylaxis:  []Lovenox  [x]Hep SQ  []SCDs  []Coumadin   []On Heparin gtt    I have independently examined the patient and reviewed all lab studies and imgaing as well as review of nursing notes and physican notes from the past 24 hours. Janet Fearing D.O. Pager [phone]      Allergies   Allergen Reactions    Rocephin [Ceftriaxone] Itching    Pcn [Penicillins] Itching           Subjective:      Pt seen and examined. In good spirits. No particular complaints. Feeling much better today but still has some SOB. 500 cc fluid removed with HD yesterday.  No fevers, chills, n/v/d or CP    Objective:        Visit Vitals  /84   Pulse 91   Temp 98.3 °F (36.8 °C)   Resp 20   Ht 5' 9\" (1.753 m)   Wt 88.3 kg (194 lb 9.6 oz)   LMP 2017   SpO2 98%   Breastfeeding No   BMI 28.74 kg/m²     Temp (24hrs), Av.4 °F (36.9 °C), Min:98.3 °F (36.8 °C), Max:98.6 °F (37 °C)        General:   awake alert and oriented, non-toxic   Skin:   no rashes or skin lesions noted on limited exam, dry and warm; right SC HD cath   HEENT:  No scleral icterus or pallor; oral mucosa moist, lips moist   Lymph Nodes:   not assessed today   Lungs:   non, labored; occasional fine rale at b/l bases   Heart:  RRR, s1 and s2; no murmurs rubs or gallops; trace edema, + pedal pulses   Abdomen:  soft, obese, non-distended, active bowel sounds, non-tender   Genitourinary:  deferred   Extremities:   average muscle tone; no contractures, no joint effusions   Neurologic:  No gross focal motor or sensory abnormalities; CN 2-12 intact; Follows commands. Psychiatric:   appropriate and interactive. Labs: Results:   Chemistry Recent Labs     03/12/20 0411 03/11/20  1612   GLU 89 85    143   K 3.6 3.7    105   CO2 27 29   BUN 25* 20*   CREA 6.66* 5.50*   CA 8.9 8.9   AGAP 10 9   BUCR 4* 4*   AP 61 72   TP 7.2 7.6   ALB 2.8* 3.1*   GLOB 4.4* 4.5*   AGRAT 0.6* 0.7*      CBC w/Diff Recent Labs     03/12/20 0411 03/11/20  1612   WBC 4.9 4.8   RBC 3.44* 3.64*   HGB 10.2* 10.9*   HCT 32.4* 35.2   PLT 96* 97*   GRANS 77* 79*   LYMPH 16* 14*   EOS 2 2        No results found for: SDES Lab Results   Component Value Date/Time    Culture result: NO GROWTH 6 DAYS 05/30/2019 09:20 PM    Culture result:  05/30/2019 03:45 PM     00123  COLONIES/mL  MIXED GRAM POSITIVE ABNER, PROBABLE SKIN/GENITAL CONTAMINATION. Culture result: <10,000 COLONIES/mL GRAM NEGATIVE RODS (A) 05/30/2019 03:45 PM    Culture result: NO GROWTH 6 DAYS 05/30/2019 11:55 AM    Culture result: NO GROWTH 6 DAYS 05/30/2019 11:40 AM        Results     Procedure Component Value Units Date/Time    INFLUENZA A & B AG (RAPID TEST) [[id number]] Collected:  03/12/20 0800    Order Status:  Completed Specimen:  Nasopharyngeal from Nasal washing Updated:  03/12/20 0930     Influenza A Antigen NEGATIVE         Comment: A negative result does not exclude influenza virus infection, seasonal or H1N1 due to suboptimal sensitivity. If influenza is circulating in your community, a diagnosis of influenza should be considered based on a patients clinical presentation and empiric antiviral treatment should be considered, if indicated.         Influenza B Antigen NEGATIVE              Imaging:     Xr Chest Pa Lat    Result Date: 3/12/2020  IMPRESSION: Interval development of bibasilar opacities consistent with effusion and possible associated atelectasis, right greater than left. Cardiomegaly. Findings suggest fluid overload or possibly failure.

## 2020-03-12 NOTE — PROGRESS NOTES
1314 - Patient departed unit  for dialysis. 1715 - Patient returned back to unit. Alert to person, place, time and situation. No complaints of pain at this time. Call light within reach.

## 2020-03-12 NOTE — PROGRESS NOTES
conducted an initial consultation and Spiritual Assessment for Evie Lesch, who is a 48 y.o.,female. Patients Primary Language is: [place]. According to the patients EMR Sabianism Affiliation is: Nondenominational. The reason the Patient came to the hospital is:   Patient Active Problem List    Diagnosis Date Noted    Thrombocytopenia ([facility] 02/27/2015     Priority: 1 - One    Accelerated essential hypertension 01/23/2015     Priority: 1 - One    Menorrhagia with regular cycle 01/23/2015     Priority: 1 - One    Obesity (BMI 30.0-34.9) 01/23/2015     Priority: 1 - One    DM2 (diabetes mellitus, type 2) ([facility] 01/23/2015     Priority: 1 - One    Pleural effusion 03/11/2020    CHF exacerbation ([facility] 03/11/2020    Secondary hyperparathyroidism of renal origin ([facility] 06/02/2019    ARF (acute renal failure) ([facility] 05/30/2019    Acute renal failure superimposed on stage 5 chronic kidney disease, not on chronic dialysis ([facility] 05/30/2019    Anemia 58/88/3906    Metabolic acidosis 97/97/6614    Proteinuria 05/30/2019    Bacteremia 12/24/2018    Menorrhagia 07/05/2017    Anemia in chronic kidney disease (CKD) 02/28/2017    Anemia due to blood loss, chronic 01/23/2015        The  provided the following Interventions:  Initiated a relationship of care and support. Provided information about Spiritual Care Services. Offered prayer and assurance of continued prayers on patient's behalf. Chart reviewed. The following outcomes where achieved:   confirmed Patient's Sabianism Affiliation. Patient expressed gratitude for 's visit. Assessment:  Patient does not have any Confucianism/cultural needs that will affect patients preferences in health care. There are no spiritual or Confucianism issues which require intervention at this time. Plan:  Chaplains will continue to follow and will provide pastoral care on an as needed/requested basis.    recommends bedside caregivers page  on duty if patient shows signs of acute spiritual or emotional distress.     [address]  [phone])

## 2020-03-12 NOTE — PROGRESS NOTES
Problem: Self Care Deficits Care Plan (Adult)  Goal: *Acute Goals and Plan of Care (Insert Text)  Outcome: Resolved/Met   OCCUPATIONAL THERAPY EVALUATION/DISCHARGE    Patient: Tawanda Markham (38 y.o. female)  Date: 3/12/2020  Primary Diagnosis: Pleural effusion [J90]  CHF exacerbation ([facility] [I50.9]        Precautions:      PLOF: Pt reports she lives with her  in [facility]. Pt was (I) with basic self-care/ADLs and functional mobility without AD PTA. ASSESSMENT AND RECOMMENDATIONS:  Pt cleared to participate in OT evaluation by Rn. Upon entering room, pt supine with HOB elevated, alert, and agreeable to OT eval. Based on the objective data described below, the patient presents she is (I) with basic self-care/ADLs. Pt is able to change gown from home to hospital gown and doff/don new  socks independently. Pt denied dizziness, pain, and SOB. No LOB noted during functional transfers. Pt reports she has a supportive  to assist PRN. As pt presents she is at her baseline with basic self-care, pt does not require further skilled OT at this level of care.     Discharge Recommendations: None  Further Equipment Recommendations for Discharge: N/A     SUBJECTIVE:   Patient stated Adam Juan Diego you so much for untangling me    OBJECTIVE DATA SUMMARY:     Past Medical History:   Diagnosis Date    Anemia     Arthritis     Diabetes ([facility]     Hypertension     ITP (idiopathic thrombocytopenic purpura)      Past Surgical History:   Procedure Laterality Date    HX GYN      c section 1984    HX HYSTERECTOMY  2017    HX TUBAL LIGATION      IR INSERT TUNL CVC W/O PORT OVER 5 YR  8/12/2019     Barriers to Learning/Limitations: None  Compensate with: visual, verbal, tactile, kinesthetic cues/model    Home Situation:   Home Situation  Home Environment: Private residence  One/Two Story Residence: One story  Living Alone: No  Support Systems: Family member(s), Spouse/Significant Other/Partner  Patient Expects to be Discharged to[de-identified] Private residence  Current DME Used/Available at Home: None  []     Right hand dominant   [x]     Left hand dominant    Cognitive/Behavioral Status:  Neurologic State: Alert  Orientation Level: Oriented X4  Cognition: Appropriate decision making; Follows commands  Safety/Judgement: Fall prevention    Skin: Visible skin appeared intact  Edema: None noted      Coordination: BUE  Coordination: Within functional limits  Fine Motor Skills-Upper: Left Intact; Right Intact    Gross Motor Skills-Upper: Left Intact; Right Intact    Balance:  Sitting: Intact  Standing: Intact; Without support    Strength: BUE  Strength: Within functional limits    Tone & Sensation: BUE  Tone: Normal  Sensation: Intact    Range of Motion: BUE  AROM: Within functional limits      Functional Mobility and Transfers for ADLs:  Bed Mobility:  Supine to Sit: Modified independent  Sit to Supine: Modified independent  Transfers:  Sit to Stand: Independent  Stand to Sit: Independent   Toilet Transfer : Independent      ADL Assessment:  Feeding: Independent    Oral Facial Hygiene/Grooming: Independent    Bathing: Independent    Upper Body Dressing: Independent    Lower Body Dressing: Independent    Toileting: Independent      ADL Intervention:  Cognitive Retraining  Safety/Judgement: Fall prevention    Pain:  Pain level pre-treatment: 0/10   Pain level post-treatment: 0/10   Pain Intervention(s): Medication (see MAR); Rest, Ice, Repositioning  Response to intervention: Nurse notified, See doc flow    Activity Tolerance:   Good    Please refer to the flowsheet for vital signs taken during this treatment.   After treatment:   []  Patient left in no apparent distress sitting up in chair  [x]  Patient left in no apparent distress in bed  [x]  Call bell left within reach  [x]  Nursing notified  []  Caregiver present  []  Bed alarm activated    COMMUNICATION/EDUCATION:   [x]      Role of Occupational Therapy in the acute care setting  [x]      Home safety education was provided and the patient/caregiver indicated understanding. [x]      Patient/family have participated as able and agree with findings and recommendations. []      Patient is unable to participate in plan of care at this time. Thank you for this referral.  Jose Bang MS, OTR/L  Time Calculation: 11 mins      Eval Complexity: History: MEDIUM Complexity : Expanded review of history including physical, cognitive and psychosocial  history ; Examination: LOW Complexity : 1-3 performance deficits relating to physical, cognitive , or psychosocial skils that result in activity limitations and / or participation restrictions ;    Decision Making:LOW Complexity : No comorbidities that affect functional and no verbal or physical assistance needed to complete eval tasks

## 2020-03-13 ENCOUNTER — APPOINTMENT (OUTPATIENT)
Dept: NON INVASIVE DIAGNOSTICS | Age: 55
DRG: 286 | End: 2020-03-13
Attending: INTERNAL MEDICINE
Payer: COMMERCIAL

## 2020-03-13 LAB
ANION GAP SERPL CALC-SCNC: 7 MMOL/L (ref 3–18)
BUN SERPL-MCNC: 17 MG/DL (ref 7–18)
BUN/CREAT SERPL: 3 (ref 12–20)
CALCIUM SERPL-MCNC: 8.8 MG/DL (ref 8.5–10.1)
CHLORIDE SERPL-SCNC: 107 MMOL/L (ref 100–111)
CO2 SERPL-SCNC: 26 MMOL/L (ref 21–32)
CREAT SERPL-MCNC: 5.45 MG/DL (ref 0.6–1.3)
ECHO AO ROOT DIAM: 2.94 CM
ECHO LA AREA 4C: 21.1 CM2
ECHO LA VOL 2C: 72.7 ML (ref 22–52)
ECHO LA VOL 4C: 51.57 ML (ref 22–52)
ECHO LA VOL BP: 72.92 ML (ref 22–52)
ECHO LA VOL/BSA BIPLANE: 35.3 ML/M2 (ref 16–28)
ECHO LA VOLUME INDEX A2C: 35.19 ML/M2 (ref 16–28)
ECHO LA VOLUME INDEX A4C: 24.97 ML/M2 (ref 16–28)
ECHO LV EDV A2C: 183.7 ML
ECHO LV EDV A4C: 224.6 ML
ECHO LV EDV BP: 209.6 ML (ref 56–104)
ECHO LV EDV INDEX A4C: 108.7 ML/M2
ECHO LV EDV INDEX BP: 101.5 ML/M2
ECHO LV EDV NDEX A2C: 88.9 ML/M2
ECHO LV EDV TEICHHOLZ: 1.03 ML
ECHO LV EJECTION FRACTION A2C: 23 %
ECHO LV EJECTION FRACTION A4C: 28 %
ECHO LV EJECTION FRACTION BIPLANE: 28 % (ref 55–100)
ECHO LV ESV A2C: 141.1 ML
ECHO LV ESV A4C: 160.8 ML
ECHO LV ESV BP: 151 ML (ref 19–49)
ECHO LV ESV INDEX A2C: 68.3 ML/M2
ECHO LV ESV INDEX A4C: 77.8 ML/M2
ECHO LV ESV INDEX BP: 73.1 ML/M2
ECHO LV ESV TEICHHOLZ: 0.71 ML
ECHO LV INTERNAL DIMENSION DIASTOLIC: 6.01 CM (ref 3.9–5.3)
ECHO LV INTERNAL DIMENSION SYSTOLIC: 5.1 CM
ECHO LV IVSD: 1.31 CM (ref 0.6–0.9)
ECHO LV MASS 2D: 508.5 G (ref 67–162)
ECHO LV MASS INDEX 2D: 246.2 G/M2 (ref 43–95)
ECHO LV POSTERIOR WALL DIASTOLIC: 1.64 CM (ref 0.6–0.9)
ECHO LVOT DIAM: 2.32 CM
ECHO LVOT PEAK GRADIENT: 2.2 MMHG
ECHO LVOT PEAK VELOCITY: 73.53 CM/S
ECHO LVOT VTI: 12.81 CM
ECHO PULMONARY ARTERY SYSTOLIC PRESSURE (PASP): 27 MMHG
ECHO TV REGURGITANT MAX VELOCITY: 245.01 CM/S
ECHO TV REGURGITANT PEAK GRADIENT: 24 MMHG
ERYTHROCYTE [DISTWIDTH] IN BLOOD BY AUTOMATED COUNT: 14.7 % (ref 11.6–14.5)
GLUCOSE BLD STRIP.AUTO-MCNC: 106 MG/DL (ref 70–110)
GLUCOSE BLD STRIP.AUTO-MCNC: 84 MG/DL (ref 70–110)
GLUCOSE BLD STRIP.AUTO-MCNC: 84 MG/DL (ref 70–110)
GLUCOSE SERPL-MCNC: 94 MG/DL (ref 74–99)
HCT VFR BLD AUTO: 32.3 % (ref 35–45)
HGB BLD-MCNC: 10.1 G/DL (ref 12–16)
LVFS 2D: 15.11 %
LVOT MG: 1.1 MMHG
LVOT MV: 0.5 CM/S
LVSV (MOD BI): 27.9 ML
LVSV (MOD SINGLE 4C): 30.34 ML
LVSV (MOD SINGLE): 20.26 ML
LVSV (TEICH): 26.99 ML
MCH RBC QN AUTO: 29.7 PG (ref 24–34)
MCHC RBC AUTO-ENTMCNC: 31.3 G/DL (ref 31–37)
MCV RBC AUTO: 95 FL (ref 74–97)
PLATELET # BLD AUTO: 95 K/UL (ref 135–420)
PMV BLD AUTO: 13.4 FL (ref 9.2–11.8)
POTASSIUM SERPL-SCNC: 3.6 MMOL/L (ref 3.5–5.5)
RBC # BLD AUTO: 3.4 M/UL (ref 4.2–5.3)
SODIUM SERPL-SCNC: 140 MMOL/L (ref 136–145)
WBC # BLD AUTO: 4.4 K/UL (ref 4.6–13.2)

## 2020-03-13 PROCEDURE — 90935 HEMODIALYSIS ONE EVALUATION: CPT

## 2020-03-13 PROCEDURE — 36415 COLL VENOUS BLD VENIPUNCTURE: CPT

## 2020-03-13 PROCEDURE — 94640 AIRWAY INHALATION TREATMENT: CPT

## 2020-03-13 PROCEDURE — 74011000250 HC RX REV CODE- 250: Performed by: INTERNAL MEDICINE

## 2020-03-13 PROCEDURE — 74011250637 HC RX REV CODE- 250/637: Performed by: INTERNAL MEDICINE

## 2020-03-13 PROCEDURE — 65660000000 HC RM CCU STEPDOWN

## 2020-03-13 PROCEDURE — 80048 BASIC METABOLIC PNL TOTAL CA: CPT

## 2020-03-13 PROCEDURE — 93306 TTE W/DOPPLER COMPLETE: CPT

## 2020-03-13 PROCEDURE — 94761 N-INVAS EAR/PLS OXIMETRY MLT: CPT

## 2020-03-13 PROCEDURE — 82962 GLUCOSE BLOOD TEST: CPT

## 2020-03-13 PROCEDURE — 85027 COMPLETE CBC AUTOMATED: CPT

## 2020-03-13 RX ORDER — CARVEDILOL 6.25 MG/1
6.25 TABLET ORAL EVERY 12 HOURS
Status: DISCONTINUED | OUTPATIENT
Start: 2020-03-13 | End: 2020-03-17 | Stop reason: HOSPADM

## 2020-03-13 RX ORDER — LANOLIN ALCOHOL/MO/W.PET/CERES
325 CREAM (GRAM) TOPICAL 2 TIMES DAILY WITH MEALS
Qty: 60 TAB | Refills: 0 | Status: SHIPPED | OUTPATIENT
Start: 2020-03-13 | End: 2021-01-07

## 2020-03-13 RX ORDER — IPRATROPIUM BROMIDE AND ALBUTEROL SULFATE 2.5; .5 MG/3ML; MG/3ML
3 SOLUTION RESPIRATORY (INHALATION)
Status: DISCONTINUED | OUTPATIENT
Start: 2020-03-13 | End: 2020-03-17 | Stop reason: HOSPADM

## 2020-03-13 RX ORDER — GUAIFENESIN 100 MG/5ML
81 LIQUID (ML) ORAL DAILY
Status: DISCONTINUED | OUTPATIENT
Start: 2020-03-14 | End: 2020-03-17 | Stop reason: HOSPADM

## 2020-03-13 RX ORDER — HEPARIN SODIUM 1000 [USP'U]/ML
INJECTION, SOLUTION INTRAVENOUS; SUBCUTANEOUS
Status: DISPENSED
Start: 2020-03-13 | End: 2020-03-14

## 2020-03-13 RX ADMIN — IPRATROPIUM BROMIDE AND ALBUTEROL SULFATE 3 ML: .5; 3 SOLUTION RESPIRATORY (INHALATION) at 04:15

## 2020-03-13 RX ADMIN — IPRATROPIUM BROMIDE AND ALBUTEROL SULFATE 3 ML: .5; 3 SOLUTION RESPIRATORY (INHALATION) at 08:19

## 2020-03-13 RX ADMIN — Medication 325 MG: at 17:17

## 2020-03-13 RX ADMIN — HYDRALAZINE HYDROCHLORIDE 25 MG: 25 TABLET, FILM COATED ORAL at 22:21

## 2020-03-13 RX ADMIN — HYDRALAZINE HYDROCHLORIDE 25 MG: 25 TABLET, FILM COATED ORAL at 17:17

## 2020-03-13 RX ADMIN — Medication 325 MG: at 08:33

## 2020-03-13 RX ADMIN — DILTIAZEM HYDROCHLORIDE 120 MG: 120 CAPSULE, COATED, EXTENDED RELEASE ORAL at 08:33

## 2020-03-13 RX ADMIN — ATORVASTATIN CALCIUM 20 MG: 20 TABLET, FILM COATED ORAL at 08:33

## 2020-03-13 RX ADMIN — CARVEDILOL 6.25 MG: 6.25 TABLET, FILM COATED ORAL at 22:21

## 2020-03-13 NOTE — PROGRESS NOTES
1040 - Patient departed from unit for dialysis and echo.     1600 -  Patient returned to unit. Call bell within reach. No complaints of pain at this time. 1730 - Patient tearful after hearing results of her echo. She was under the impression that she was going home today after dialysis. Encouraged patient to express her feelings. Patient states she has a good support system and has been able to talk to family via phone.

## 2020-03-13 NOTE — DISCHARGE SUMMARY
Discharge Summary Patient: Chong Pang MRN: [id number]  CSN: [id number] YOB: 1965  Age: 48 y.o. Sex: female DOA: 3/11/2020 LOS:  LOS: 2 days   Discharge Date:   
 
Admission Diagnoses: Pleural effusion [J90] CHF exacerbation ([facility] [I50.9] Discharge Diagnoses:   
Problem List as of 3/13/2020 Date Reviewed: 12/12/2019 Codes Class Noted - Resolved Thrombocytopenia ([facility] ICD-10-CM: D69.6 ICD-9-CM: 287.5  2/27/2015 - Present Accelerated essential hypertension ICD-10-CM: I10 
ICD-9-CM: 401.0  1/23/2015 - Present Menorrhagia with regular cycle ICD-10-CM: N92.0 ICD-9-CM: 626.2  1/23/2015 - Present Obesity (BMI 30.0-34.9) ICD-10-CM: T08.0 ICD-9-CM: 278.00  1/23/2015 - Present DM2 (diabetes mellitus, type 2) (HCC) ICD-10-CM: E11.9 ICD-9-CM: 250.00  1/23/2015 - Present * (Principal) Pleural effusion ICD-10-CM: J90 ICD-9-CM: 511.9  3/11/2020 - Present CHF exacerbation (HCC) ICD-10-CM: I50.9 ICD-9-CM: 428.0  3/11/2020 - Present Secondary hyperparathyroidism of renal origin [facility]) ICD-10-CM: N25.81 ICD-9-CM: 588.81  6/2/2019 - Present ARF (acute renal failure) (HCC) ICD-10-CM: N17.9 ICD-9-CM: 584.9  5/30/2019 - Present Acute renal failure superimposed on stage 5 chronic kidney disease, not on chronic dialysis ([facility] ICD-10-CM: N17.9, N18.5 ICD-9-CM: 584.9, 585.5  5/30/2019 - Present Anemia ICD-10-CM: D64.9 ICD-9-CM: 285.9  5/30/2019 - Present Metabolic acidosis [id number]-[id number]: E87.2 ICD-9-CM: 276.2  5/30/2019 - Present Proteinuria ICD-10-CM: R80.9 ICD-9-CM: 791.0  5/30/2019 - Present Bacteremia ICD-10-CM: R78.81 ICD-9-CM: 790.7  12/24/2018 - Present Menorrhagia ICD-10-CM: N92.0 ICD-9-CM: 626.2  7/5/2017 - Present Anemia in chronic kidney disease (CKD) ICD-10-CM: N18.9, D63.1 ICD-9-CM: 285.21  2/28/2017 - Present Anemia due to blood loss, chronic ICD-10-CM: D50.0 ICD-9-CM: 280.0  1/23/2015 - Present Discharge Condition: Stable PHYSICAL EXAM 
Visit Vitals /84 Pulse 95 Temp 98.1 °F (36.7 °C) (Oral) Resp 16 Ht 5' 9\" (1.753 m) Wt 90.7 kg (200 lb) SpO2 99% Breastfeeding No  
BMI 29.53 kg/m² General: Alert, cooperative, no acute distress   
HEENT: NC, Atraumatic. PERRLA, EOMI. Anicteric sclerae. Lungs:  CTA Bilaterally. No Wheezing/Rhonchi/Rales. Heart:  Regular  rhythm,  No murmur, No Rubs, No Gallops Abdomen: Soft, Non distended, Non tender.  +Bowel sounds, no HSM Extremities: No c/c/e Psych:   Good insight. Not anxious or agitated. Neurologic:  CN 2-12 grossly intact, oriented X 3. No acute neurological       
                         Deficits, Hospital Course: 
Fluid overload. In setting of ESRD with likely a component of HFpEF: improving after initial HD; completed 2 extra HD sessions with complete resolution of symptoms; Cardiology to follow TTE. B/L pleural effusions: due to volume overload. No indication for pleurocentesis ESRD on HD MWF:  resume normal session Indeterminate troponin, in setting of ESRD: Indeterminate troponin, in setting of ESRD HFpEF. EF 56-60% on echo 6/2019 HTN Dyslipidemia Dm type 2: diet controlled ITP:  Chronic; followed by H/O Consults:  
Cardiology: Dr. Brandon Nielson Nephrology:  Dr. Jose Berry Significant Diagnostic Studies: Xr Chest Pa Lat Result Date: 3/12/2020 IMPRESSION: Interval development of bibasilar opacities consistent with effusion and possible associated atelectasis, right greater than left. Cardiomegaly. Findings suggest fluid overload or possibly failure. Results Procedure Component Value Units Date/Time INFLUENZA A & B AG (RAPID TEST) [[id number]] Collected:  03/12/20 0800 Order Status:  Completed Specimen:  Nasopharyngeal from Nasal washing Updated:  03/12/20 0930   Influenza A Antigen NEGATIVE      
 Comment: A negative result does not exclude influenza virus infection, seasonal or H1N1 due to suboptimal sensitivity. If influenza is circulating in your community, a diagnosis of influenza should be considered based on a patients clinical presentation and empiric antiviral treatment should be considered, if indicated. Influenza B Antigen NEGATIVE      
  
 
BMP:  
Lab Results Component Value Date/Time  03/13/2020 01:17 AM  
 K 3.6 03/13/2020 01:17 AM  
  03/13/2020 01:17 AM  
 CO2 26 03/13/2020 01:17 AM  
 AGAP 7 03/13/2020 01:17 AM  
 GLU 94 03/13/2020 01:17 AM  
 BUN 17 03/13/2020 01:17 AM  
 CREA 5.45 (H) 03/13/2020 01:17 AM  
 GFRAA 10 (L) 03/13/2020 01:17 AM  
 GFRNA 8 (L) 03/13/2020 01:17 AM  
  
 
 
Discharge Medications:    
Current Discharge Medication List  
  
START taking these medications Details  
ferrous sulfate 325 mg (65 mg iron) tablet Take 1 Tab by mouth two (2) times daily (with meals). Qty: 60 Tab, Refills: 0 CONTINUE these medications which have NOT CHANGED Details  
atorvastatin (LIPITOR) 10 mg tablet Take  by mouth daily. dilTIAZem ER (CARDIZEM LA) 120 mg tablet Take 120 mg by mouth daily. hydrALAZINE (APRESOLINE) 25 mg tablet Take 25 mg by mouth three (3) times daily. calcium acetate (PHOSLO) 667 mg cap Take 1 Cap by mouth three (3) times daily (with meals). Qty: 90 Cap, Refills: 3  
  
loratadine (CLARITIN) 10 mg tablet Take 10 mg by mouth daily as needed for Allergies. acetaminophen (TYLENOL) 500 mg tablet Take 2 Tabs by mouth every six (6) hours as needed for Pain. Qty: 20 Tab, Refills: 0 Activity: activity as tolerated Diet: Renal Diet Follow-up: with PCP, Maynor Asher MD in 7-10days 
 - review echocardiogram from 3/13 at f/u appointment F/u with Cardiology in 3-4 weeks.   
 
Minutes spent on discharge: >30 minutes spent coordinating this discharge (review instructions/follow-up, prescriptions, preparing report for sign off) Dispo:  Home with self care; Health to Home

## 2020-03-13 NOTE — DIALYSIS
[facility]        ACUTE HEMODIALYSIS FLOW SHEET      HEMODIALYSIS ORDERS: Physician: Pete Squires     Dialyzer: revaclear   Duration: 3.5 hr  BFR: 350   DFR: 800   Dialysate:  Temp 36-37*C  K+   3    Ca+  2.5 Na 138 Bicarb 30   Weight:  85.7 kg kg    Patient Chart [x]     Unable to Obtain []  UF Goal: 3000  Access right chest wall TDC    Heparin[]  Bolus      Units    [] Hourly       Units    [x]None    Catheter locking solution: heparin   Pre BP:   140/90    Pulse:     101       Respirations: 16  Temperature:   98.5   Labs: Pre        Post:        [x] N/A   Additional Orders(medications, blood products, hypotension management):      [x] N/A     [x] Bahman Consent Verified     CATHETER ACCESS: []N/A   [x]Right   []Left   [x]IJ     []Fem   []Transhepatic   [] First use X-ray verified     []Permanent                [x] Temporary   [x]No S/S infection  []Redness  []Drainage []Cultured []Swelling []Pain   [x]Medical Aseptic Prep Utilized   []Dressing Changed  [x] Biopatch  Date: 03/12/2020       []Clotted   [x]Patent   Flows: [x]Good  []Poor  []Reversed   If access problem,  notified: []Yes    [x]N/A         GRAFT/FISTULA ACCESS:  [x]N/A     []Right     []Left     []UE     []LE                            GENERAL ASSESSMENT:      LUNGS:  Rate 16 SaO2%      [x] N/A    [x] Clear  [] Coarse  [] Crackles  [] Wheezing        [] Diminished     Location : []RLL   []LLL    []RUL  []RAQUEL     Cough: []Productive  []Dry  [x]N/A   Respirations:  [x]Easy  []Labored     Therapy:   [x]RA  []NC  l/min    Mask: []NRB []Venti       O2%                  []Ventilator  []Intubated  [] Trach  [] BiPaP     CARDIAC: [x]Regular      [] Irregular   [] Pericardial Rub  [] JVD        []  Monitored  [] Bedside  [] Remotely monitored [x] N/A       EDEMA: [x] None  []Generalized  [] Pitting [] 1    [] 2    [] 3    [] 4                 [] Facial  [] Pedal  []  UE  [] LE     SKIN:   [x] Warm  [] Hot     [] Cold   [x] Dry     [] Pale   [] Diaphoretic                  [] Flushed  [] Jaundiced  [] Cyanotic  [] Rash  [] Weeping     LOC:    [x] Alert      [x]Oriented:    [x] Person     [x] Place  [x]Time               [] Confused  [] Lethargic  [] Medicated  [] Non-responsive     GI / ABDOMEN:  [] Flat    [] Distended    [x] Soft    [] Firm   []  Obese                             [] Diarrhea  [x] Bowel Sounds  [] Nausea  [] Vomiting       / URINE ASSESSMENT:[] Voiding   [x] Oliguria  [] Anuria   []  Griffith     [] Incontinent    []  Incontinent Brief      []  Bathroom Privileges       PAIN: [x] 0 []1  []2   []3   []4   []5   []6   []7   []8   []9   []10              Scale 0-10  Action/Follow Up:      MOBILITY: [] Amb    [] Amb/Assist    [x] Bed    [] Wheelchair  [] Stretcher      All Vitals and Treatment Details on Attached [address]: [facility]          Room # 206/01      [] 1st Time Acute  [] Stat  [] Routine  [] Urgent     [x] Acute Room  []  Bedside  [] ICU/CCU  [] ER   Isolation Precautions:   There are currently no Active Isolations      Special Considerations:         [] Blood Consent Verified [x]N/A     ALLERGIES:   Allergies   Allergen Reactions    Rocephin [Ceftriaxone] Itching    Pcn [Penicillins] Itching               Code Status:Full Code        Hepatitis Status:                   Lab Results   Component Value Date/Time    Hepatitis B surface Ag <0.10 06/01/2019 02:15 AM    Hepatitis B surface Ab 38.99 06/01/2019 02:15 AM    Hepatitis C virus Ab 0.03 06/01/2019 02:15 AM                     Current Labs:   Lab Results   Component Value Date/Time    Sodium 140 03/13/2020 01:17 AM    Potassium 3.6 03/13/2020 01:17 AM    Chloride 107 03/13/2020 01:17 AM    CO2 26 03/13/2020 01:17 AM    Anion gap 7 03/13/2020 01:17 AM    Glucose 94 03/13/2020 01:17 AM    BUN 17 03/13/2020 01:17 AM    Creatinine 5.45 (H) 03/13/2020 01:17 AM    BUN/Creatinine ratio 3 (L) 03/13/2020 01:17 AM    GFR est AA 10 (L) 03/13/2020 01:17 AM    GFR est non-AA 8 (L) 03/13/2020 01:17 AM    Calcium 8.8 03/13/2020 01:17 AM      Lab Results   Component Value Date/Time    WBC 4.4 (L) 03/13/2020 01:17 AM    HGB 10.1 (L) 03/13/2020 01:17 AM    HCT 32.3 (L) 03/13/2020 01:17 AM    PLATELET 95 (L) 04/07/1833 01:17 AM    MCV 95.0 03/13/2020 01:17 AM                                                                                     DIET: DIET CARDIAC       PRIMARY NURSE REPORT: First initial/Last name/Title      Pre Dialysis: Scott Smoke RN     Time: 6172      EDUCATION:    [x] Patient [] Other         Knowledge Basis: []None [x]Minimal [] Substantial   Barriers to learning  [x]N/A   [] Access Care     [] S&S of infection     [] Fluid Management     []K+     [x]Procedural    []Albumin     [] Medications     [] Tx Options     [] Transplant     [] Diet     [] Other   Teaching Tools:  [x] Explain  [x] Demo  [] Handouts [] Video  Patient response: [x] Verbalized understanding  [] Teach back  [] Return demonstration [] Requires follow up   Inappropriate due to            [x] Time Out/Safety Check  [x]Extracorporeal Circuit Tested for integrity       [address] - Before each treatment:     Machine Number:                   [facility]                                  [x] Unit Machine # 6 with centralized RO                                   Alarm Test:  Pass time 0940              [x] RO/Machine Log Complete      Temp   36             Dialysate: pH  7.4 Conductivity: Meter   13.7     HD Machine   13.8                  TCD: 14.0  Dialyzer Lot # [id number]            Blood Tubing Lot # [id number]          Saline Lot #  [id number]     CHLORINE TESTING-Before each treatment and every 4 hours    Total Chlorine: [x] less than 0.1 ppm  Time: 0900 4 Hr/2nd Check Time: 1300   (if greater than 0.1 ppm from Primary then every 30 minutes from Secondary)     TREATMENT INITIATION - with Dialysis Precautions:   [x] All Connections Secured                 [x] Saline Line Double Clamped   [x] Venous Parameters Set                  [x] Arterial Parameters Set    [x] Prime Given 250ml                          [x]Air Foam Detector Engaged      Treatment Initiation Note: 7572  Pt arrived via transport in bed, pt is A&O, no S/S of distress on RA, VSS. 1055 Tx started with out complications. Right chest wall IJ no S/S of complications. During Treatment Notes:  4242 Pt awake and alert. Face and access in view. 1145 Lines eversed due to arterial pressures. No S/S of distress, face and access in view. 1215 UF Goal decreased to 2500 due to decreasing BP and HR; c/o cramping. Machine temp decreased to 35.5* C  1245 Pt awake and alert, VSS. Lines and TDC visible. 1315 Pt awake and alert, BP decreasing. UF turned off. Lines and TDC visible. 1330 BP stabilized. UF turned back on. VSS. Lines and TDC visible. 1400  Pt awake and alert, talking on phone. No distress noted. Access visible. Medication Dose Volume Route Time [facility] name Title   none     Elly Rojas RN                   Post Assessment:   Dialyzer Cleared: [x] Good [] Fair  [] Poor  Blood processed:  67.1 L  UF Removed  1500 Ml  POst BP:   132/83       Pulse: 95        Respirations: 16  Temperature: 98.1 Lungs:     [x] Clear      [] Course         [] Crackles    [] Wheezing         [] Diminished   Post Tx Vascular Access:      N/A Cardiac:  [x] Regular   [] Irregular   [] Monitor  [x] N/A      Rhythm:       Catheter:   Locking solution: Heparin 1:1000   Art. 1.6  Juna. 1.6    Skin:   Pain:   [x] Warm  [x] Dry [] Diaphoretic    [] Flushed    [] Pale [] Cyanotic [x]0  []1  []2   []3  []4   []5   []6   []7   []8   []9   []10     Post Treatment Note:    Pt leaving via transport, pt tolerated the tx, pt states they are good, no S/S of distress. POST TREATMENT PRIMARY NURSE HANDOFF REPORT:     First initial/Last name/Title         Post Dialysis: RADHA [place]  Time:  3428     Abbreviations: AVG-arterial venous graft, AVF-arterial venous fistula, IJ-Internal Jugular, Subcl-Subclavian, Fem-Femoral, Tx-treatment, AP/HR-apical heart rate, DFR-dialysate flow rate, BFR-blood flow rate, AP-arterial pressure, -venous pressure, UF-ultrafiltrate, TMP-transmembrane pressure, Juan-Venous, Art-Arterial, RO-Reverse Osmosis

## 2020-03-13 NOTE — PROGRESS NOTES
Admit Date: 3/11/2020  Date of Service: 3/13/2020    Reason for follow-up: SOB      Assessment:         Fluid overload. In setting of ESRD with likely a component of HFpEF: improving after initial HD  HFpEF: repeat TTE with EF of 25%; having occasional PVC's on tele  B/L pleural effusions: due to volume overload  ESRD on HD MWF:  Extra HD session today. Indeterminate troponin, in setting of ESRD: Indeterminate troponin, in setting of ESRD  HFpEF. EF 56-60% on echo 2019  HTN  Dyslipidemia  Dm type 2: diet controlled  ITP:  Chronic; followed by H/O  Plan:   F/u TTE  Continue coreg, lipitor and hydralazine. Add ASA. D/c cardizem  HD as per nephrology  Discussed with Dr. Galvan So this afternoon- will plan for cardiac cath on Monday. Possibly need to arrange Lifevest upon discharge pending cath results. Current Antibtiocs:   None    Lines:   Peripheral    Case discussed with:  [x]Patient  []Family  [x]Nursing  [x]Case Management  DVT Prophylaxis:  []Lovenox  [x]Hep SQ  []SCDs  []Coumadin   []On Heparin gtt    I have independently examined the patient and reviewed all lab studies and imgaing as well as review of nursing notes and physican notes from the past 24 hours. Alice Garcia D.O. Pager [phone]      Allergies   Allergen Reactions    Rocephin [Ceftriaxone] Itching    Pcn [Penicillins] Itching           Subjective:      Pt seen and examined. In good spirits. No particular complaints. Feeling much better today but still has some SOB. 500 cc fluid removed with HD yesterday.  No fevers, chills, n/v/d or CP    Objective:        Visit Vitals  /84   Pulse 95   Temp 98.1 °F (36.7 °C) (Oral)   Resp 16   Ht 5' 9\" (1.753 m)   Wt 90.7 kg (200 lb)   LMP 2017   SpO2 99%   Breastfeeding No   BMI 29.53 kg/m²     Temp (24hrs), Av.2 °F (36.8 °C), Min:97.5 °F (36.4 °C), Max:99 °F (37.2 °C)        General:   awake alert and oriented, non-toxic   Skin:   no rashes or skin lesions noted on limited exam, dry and warm; right SC HD cath   HEENT:  No scleral icterus or pallor; oral mucosa moist, lips moist   Lymph Nodes:   not assessed today   Lungs:   non, labored; occasional fine rale at b/l bases   Heart:  RRR, s1 and s2; no murmurs rubs or gallops; trace edema, + pedal pulses   Abdomen:  soft, obese, non-distended, active bowel sounds, non-tender   Genitourinary:  deferred   Extremities:   average muscle tone; no contractures, no joint effusions   Neurologic:  No gross focal motor or sensory abnormalities; CN 2-12 intact; Follows commands. Psychiatric:   appropriate and interactive. Labs: Results:   Chemistry Recent Labs     03/13/20 0117 03/12/20 0411 03/11/20  1612   GLU 94 89 85    141 143   K 3.6 3.6 3.7    104 105   CO2 26 27 29   BUN 17 25* 20*   CREA 5.45* 6.66* 5.50*   CA 8.8 8.9 8.9   AGAP 7 10 9   BUCR 3* 4* 4*   AP  --  61 72   TP  --  7.2 7.6   ALB  --  2.8* 3.1*   GLOB  --  4.4* 4.5*   AGRAT  --  0.6* 0.7*      CBC w/Diff Recent Labs     03/13/20 0117 03/12/20 0411 03/11/20  1612   WBC 4.4* 4.9 4.8   RBC 3.40* 3.44* 3.64*   HGB 10.1* 10.2* 10.9*   HCT 32.3* 32.4* 35.2   PLT 95* 96* 97*   GRANS  --  77* 79*   LYMPH  --  16* 14*   EOS  --  2 2        No results found for: SDES Lab Results   Component Value Date/Time    Culture result: NO GROWTH 6 DAYS 05/30/2019 09:20 PM    Culture result:  05/30/2019 03:45 PM     [id number]  COLONIES/mL  MIXED GRAM POSITIVE ABNER, PROBABLE SKIN/GENITAL CONTAMINATION.       Culture result: <10,000 COLONIES/mL GRAM NEGATIVE RODS (A) 05/30/2019 03:45 PM    Culture result: NO GROWTH 6 DAYS 05/30/2019 11:55 AM    Culture result: NO GROWTH 6 DAYS 05/30/2019 11:40 AM        Results     Procedure Component Value Units Date/Time    INFLUENZA A & B AG (RAPID TEST) [[id number]] Collected:  03/12/20 0800    Order Status:  Completed Specimen:  Nasopharyngeal from Nasal washing Updated:  03/12/20 0930     Influenza A Antigen NEGATIVE         Comment: A negative result does not exclude influenza virus infection, seasonal or H1N1 due to suboptimal sensitivity. If influenza is circulating in your community, a diagnosis of influenza should be considered based on a patients clinical presentation and empiric antiviral treatment should be considered, if indicated. Influenza B Antigen NEGATIVE              Imaging:     Xr Chest Pa Lat    Result Date: 3/12/2020  IMPRESSION: Interval development of bibasilar opacities consistent with effusion and possible associated atelectasis, right greater than left. Cardiomegaly. Findings suggest fluid overload or possibly failure.

## 2020-03-13 NOTE — PROGRESS NOTES
[facility] - Progress Note  Admit Date: 3/11/2020    Assessment:     Hospital Problems  Date Reviewed: 12/12/2019          Codes Class Noted POA    Thrombocytopenia ([address] ICD-10-CM: D69.6  ICD-9-CM: 287.5  2/27/2015 Yes        DM2 (diabetes mellitus, type 2) (HCC) ICD-10-CM: E11.9  ICD-9-CM: 250.00  1/23/2015 Unknown        * (Principal) Pleural effusion ICD-10-CM: J90  ICD-9-CM: 511.9  3/11/2020 Unknown        CHF exacerbation (HCC) ICD-10-CM: I50.9  ICD-9-CM: 428.0  3/11/2020 Unknown        Anemia in chronic kidney disease (CKD) ICD-10-CM: N18.9, D63.1  ICD-9-CM: 285.21  2/28/2017 Yes                 -Fluid overload. In setting of ESRD with likely a component of HFpEF. Presented TRIVEDI and orthopnea despite optimal fluid removal via HD Wednesday. CXR concerning for cardiomegaly and bilateral pleural effusions. Suspect this will improve with adjusting her hemodialysis goals.  -Indeterminate troponin, in setting of ESRD, not consistent with ACS, ECG with nonspecific ST wave abnormalities. -HFpEF. EF 56-60% on echo 6/2019.  -ESRD on HD MWF, started in august 2019, reports recent difficulty keeping at goal weight, reports increased fluid intake recently. -Hypertension. Elevated. -Dyslipidemia. On statin. -Diabetes now diet controlled. -Idiopathic thrombocytopenia purpura.      No primary cardiologist.      Plan: Will review echocardiogram once complete. Continue volume management per nephrology. Hemodynamics stable. Subjective:     No new complaints.      Objective:      Patient Vitals for the past 8 hrs:   Temp Pulse Resp BP SpO2   03/13/20 0821 -- -- -- -- 99 %   03/13/20 0738 99 °F (37.2 °C) 93 20 136/79 97 %   03/13/20 0415 -- -- -- -- 96 %   03/13/20 0315 97.5 °F (36.4 °C) 94 18 131/71 96 %         Patient Vitals for the past 96 hrs:   Weight   03/13/20 0407 85.7 kg (188 lb 14.4 oz)   03/12/20 0423 88.3 kg (194 lb 9.6 oz)   03/11/20 1604 90.7 kg (200 lb) Intake/Output Summary (Last 24 hours) at 3/13/2020 1100  Last data filed at 3/13/2020 6118  Gross per 24 hour   Intake 720 ml   Output 3225 ml   Net -2505 ml       Physical Exam:  General:  alert, cooperative, no distress, appears stated age  Neck:  no JVD  Lungs:  clear to auscultation bilaterally  Heart:  regular rate and rhythm  Abdomen:  abdomen is soft without significant tenderness, masses, organomegaly or guarding  Extremities:  extremities normal, atraumatic, no cyanosis or edema    Data Review:     Labs: Results:       Chemistry Recent Labs     03/13/20 0117 03/12/20 0411 03/11/20  1612   GLU 94 89 85    141 143   K 3.6 3.6 3.7    104 105   CO2 26 27 29   BUN 17 25* 20*   CREA 5.45* 6.66* 5.50*   CA 8.8 8.9 8.9   MG  --  2.1  --    PHOS  --  3.5  --    AGAP 7 10 9   BUCR 3* 4* 4*   AP  --  61 72   TP  --  7.2 7.6   ALB  --  2.8* 3.1*   GLOB  --  4.4* 4.5*   AGRAT  --  0.6* 0.7*      CBC w/Diff Recent Labs     03/13/20 0117 03/12/20 0411 03/11/20  1612   WBC 4.4* 4.9 4.8   RBC 3.40* 3.44* 3.64*   HGB 10.1* 10.2* 10.9*   HCT 32.3* 32.4* 35.2   PLT 95* 96* 97*   GRANS  --  77* 79*   LYMPH  --  16* 14*   EOS  --  2 2      Cardiac Enzymes No results found for: CPK, CK, CKMMB, CKMB, RCK3, CKMBT, CKNDX, CKND1, CAMPOS, TROPT, TROIQ, SUREKHA, TROPT, TNIPOC, BNP, BNPP   Coagulation No results for input(s): PTP, INR, APTT, INREXT in the last 72 hours.     Lipid Panel Lab Results   Component Value Date/Time    Cholesterol, total 135 06/01/2019 02:15 AM    HDL Cholesterol 19 (L) 06/01/2019 02:15 AM    LDL, calculated 72.8 06/01/2019 02:15 AM    VLDL, calculated 43.2 06/01/2019 02:15 AM    Triglyceride 216 (H) 06/01/2019 02:15 AM    CHOL/HDL Ratio 7.1 (H) 06/01/2019 02:15 AM      BNP No results found for: BNP, BNPP, XBNPT   Liver Enzymes Recent Labs     03/12/20  0411   TP 7.2   ALB 2.8*   AP 61   SGOT 16      Digoxin    Thyroid Studies Lab Results   Component Value Date/Time    TSH 2.85 03/12/2020 04:11 AM          Signed By: MANDY Walker     March 13, 2020

## 2020-03-13 NOTE — PROGRESS NOTES
HEMODIALYSIS ROUNDING NOTE      Patient: Santa Weller               Sex: female          DOA: 3/11/2020  3:56 PM        YOB: 1965      Age:  48 y.o.        LOS:  LOS: 2 days     Subjective:     Santa Weller is a 48 y.o.  who presents with Pleural effusion [J90]  CHF exacerbation ([facility] [address] [I50.9]. The patient is dialyzing utilizing the following method:Intermittent Hemodialysis    Chief Complains: Patient was seen on dialysis, denies nausea / vomiting / headache / dizziness / chest pain.   - Reviewed last 24 hrs events     Current Facility-Administered Medications   Medication Dose Route Frequency    ferrous sulfate tablet 325 mg  1 Tab Oral BID WITH MEALS    atorvastatin (LIPITOR) tablet 20 mg  20 mg Oral DAILY    hydrALAZINE (APRESOLINE) tablet 25 mg  25 mg Oral TID    acetaminophen (TYLENOL) tablet 650 mg  650 mg Oral Q4H PRN    ondansetron (ZOFRAN) injection 4 mg  4 mg IntraVENous Q4H PRN    albuterol-ipratropium (DUO-NEB) 2.5 MG-0.5 MG/3 ML  3 mL Nebulization Q4H RT    insulin lispro (HUMALOG) injection   SubCUTAneous AC&HS    glucose chewable tablet 16 g  16 g Oral PRN    glucagon (GLUCAGEN) injection 1 mg  1 mg IntraMUSCular PRN    dextrose (D50W) injection syrg 12.5-25 g  25-50 mL IntraVENous PRN    dilTIAZem CD (CARDIZEM CD) capsule 120 mg  120 mg Oral DAILY       Objective:     Visit Vitals  /79 (BP 1 Location: Right arm, BP Patient Position: At rest)   Pulse 93   Temp 99 °F (37.2 °C)   Resp 20   Ht 5' 9\" (1.753 m)   Wt 85.7 kg (188 lb 14.4 oz)   SpO2 99%   Breastfeeding No   BMI 27.90 kg/m²       Intake/Output Summary (Last 24 hours) at 3/13/2020 1059  Last data filed at 3/13/2020 9826  Gross per 24 hour   Intake 720 ml   Output 3225 ml   Net -2505 ml       Physical Examination:    GEN: AAO X 3, NAD  RS:diminished breath sounds  CVS: S1-S2 heard, RRR  Abdomen: Soft, Non tender, Not distended, Positive bowel sounds  Extremities: No edema, no cyanosis, skin is warm on touch  CNS: Awake   HEENT: Head is atraumatic, PERRLA, conjunctiva pink & non icteric. No JVD or carotid bruit      Data Review:      Labs:     Hematology:   Recent Labs     03/13/20  0117 03/12/20  0411 03/11/20  1612   WBC 4.4* 4.9 4.8   HGB 10.1* 10.2* 10.9*   HCT 32.3* 32.4* 35.2     Chemistry:   Recent Labs     03/13/20  0117 03/12/20  0411 03/11/20  1612   BUN 17 25* 20*   CREA 5.45* 6.66* 5.50*   CA 8.8 8.9 8.9   ALB  --  2.8* 3.1*   K 3.6 3.6 3.7    141 143    104 105   CO2 26 27 29   PHOS  --  3.5  --    GLU 94 89 85        Images:     XR (Most Recent). CXR reviewed by me and compared with previous CXR Results from Hospital Encounter encounter on 03/11/20   XR CHEST PA LAT    Narrative EXAM: CHEST  CPT code: 50932    CLINICAL INDICATION/HISTORY: Shortness of breath, right-sided chest pain. Patient on dialysis. COMPARISON: 9 August 2019. TECHNIQUE: PA and lateral views of the chest.    FINDINGS:      There has been interval placement of a right internal jugular central venous  dual lumen catheter with tip in the distal superior vena cava. There is  increased opacity in both lung bases with obscuration of the hemidiaphragms,  right greater than left, consistent with effusion/atelectasis. The upper lungs  are grossly clear. The mediastinum is not widened but the heart is enlarged. The bones and soft tissues are unremarkable except for some hypertrophic  spondylitic degenerative change at several levels in the mid-to-lower thoracic  spine. There is no other interval change. Impression IMPRESSION:    Interval development of bibasilar opacities consistent with effusion and  possible associated atelectasis, right greater than left. Cardiomegaly. Findings suggest fluid overload or possibly failure.               CT (Most Recent) Results from Hospital Encounter encounter on 05/30/19   CT BX RENAL    Narrative PREOPERATIVE DIAGNOSIS: SHAHANA. POSTOPERATIVE DIAGNOSIS: Same    INDICATION: SHAHANA. Patient is here for random renal biopsy. ATTENDING: SOLO Neri [place]: None. PROCEDURES: CT-guided renal random core biopsy. Left lower renal pole. ANESTHESIA: 1% local lidocaine as well as moderate intravenous sedation with  Versed and fentanyl given and monitored per independently trained interventional  radiology nurse under my direct supervision for 20 minutes. Please see nursing  records for detailed medication dosing. CONTRAST: None. COMPLICATIONS: None    DRAIN: No    CATHETER: None. EBL: Minimal.    SPECIMEN: 6 core biopsy specimens of the left lower renal pole were obtained. Specimen was sent to pathology. TECHNIQUE: After detailed explanation of risks and benefits of the procedure  verbal and written consent were obtained. Patient was brought to the CT room and  placed prone on the table. Timeout was performed.  view of the abdomen was  obtained. Target lesion was identified and skin was marked. Left flank region  was prepped and draped in the usual sterile fashion. Maximum sterile barrier  technique was used. 1% lidocaine solution was instilled in the skin superficial  and deep subcutaneous soft tissues overlying the biopsy region. Under direct CT fluoroscopy guidance using 16-gauge Temno core biopsy needle was  advanced down through the guide into the left lower renal pole. 6 passes were  made and core biopsies were sent to pathology. Needle and introducer were removed. Post biopsy imaging was obtained. Hemostasis  was achieved with manual compression and Gelfoam slurry. Sterile dressing was  applied. There were no immediate complications. Patient tolerated procedure fairly well. Patient was transferred to recovery in stable condition. I was present and  performed the procedure.       FINDINGS: CT fluoroscopic guidance demonstrated good position of the biopsy  needle in the left lower renal pole. Postbiopsy imaging demonstrated very small  perirenal hematoma posteriorly. Impression IMPRESSION:    Successful, uncomplicated CT-guided renal random core biopsy. All CT scans at this facility are performed using dose optimization technique as  appropriate to a performed exam, to include automated exposure control,  adjustment of the mA and/or kV according to patient size (including appropriate  matching for site-specific examinations), or use of iterative reconstruction  technique. Plan / Recommendation:         End Stage Renal Disease:  Plan HD today    At 11 am on 3/13/2020, I saw and examined patient during hemodialysis treatment. The patient was receiving hemodialysis for treatment of end stage renal disease. I have also reviewed vital signs, intake and output, lab results and recent events, and agreed with today's dialysis order.     Access: No issue    Anemia:  epo  Called her op dialysis unit to decrease tw    Prabhu Ferrer MD  Nephrology  3/13/2020

## 2020-03-13 NOTE — PROGRESS NOTES
Problem: Falls - Risk of  Goal: *Absence of Falls  Description: Document Lucas Thomas Fall Risk and appropriate interventions in the flowsheet.   Outcome: Progressing Towards Goal  Note: Fall Risk Interventions:            Medication Interventions: Evaluate medications/consider consulting pharmacy, Teach patient to arise slowly    Elimination Interventions: Call light in reach              Problem: Anemia Care Plan (Adult and Pediatric)  Goal: *Labs within defined limits  Outcome: Progressing Towards Goal     Problem: Heart Failure: Day 2  Goal: Off Pathway (Use only if patient is Off Pathway)  Outcome: Progressing Towards Goal

## 2020-03-13 NOTE — PROGRESS NOTES
Echo reviewed with EF 25-30%. I discussed with patient, gave option of discharge with outpatient cath. She is very nervous/anxious, will plan cath Monday, start ASA, BB, stop diltiazem. Discussed with Dr. Ally Mcfadden, plan cath on Monday. Will hold off on heparin drip due to ITP. Consider Lifevest as well on Monday pending coronary anatomy.

## 2020-03-14 LAB
GLUCOSE BLD STRIP.AUTO-MCNC: 72 MG/DL (ref 70–110)
GLUCOSE BLD STRIP.AUTO-MCNC: 84 MG/DL (ref 70–110)
GLUCOSE BLD STRIP.AUTO-MCNC: 88 MG/DL (ref 70–110)
GLUCOSE BLD STRIP.AUTO-MCNC: 92 MG/DL (ref 70–110)

## 2020-03-14 PROCEDURE — 65660000000 HC RM CCU STEPDOWN

## 2020-03-14 PROCEDURE — 82962 GLUCOSE BLOOD TEST: CPT

## 2020-03-14 PROCEDURE — 74011250637 HC RX REV CODE- 250/637: Performed by: INTERNAL MEDICINE

## 2020-03-14 RX ADMIN — HYDRALAZINE HYDROCHLORIDE 25 MG: 25 TABLET, FILM COATED ORAL at 16:52

## 2020-03-14 RX ADMIN — ATORVASTATIN CALCIUM 20 MG: 20 TABLET, FILM COATED ORAL at 08:08

## 2020-03-14 RX ADMIN — HYDRALAZINE HYDROCHLORIDE 25 MG: 25 TABLET, FILM COATED ORAL at 08:09

## 2020-03-14 RX ADMIN — HYDRALAZINE HYDROCHLORIDE 25 MG: 25 TABLET, FILM COATED ORAL at 22:34

## 2020-03-14 RX ADMIN — CARVEDILOL 6.25 MG: 6.25 TABLET, FILM COATED ORAL at 22:37

## 2020-03-14 RX ADMIN — Medication 325 MG: at 08:08

## 2020-03-14 RX ADMIN — Medication 325 MG: at 16:52

## 2020-03-14 RX ADMIN — CARVEDILOL 6.25 MG: 6.25 TABLET, FILM COATED ORAL at 08:09

## 2020-03-14 RX ADMIN — ASPIRIN 81 MG 81 MG: 81 TABLET ORAL at 08:26

## 2020-03-14 NOTE — PROGRESS NOTES
RENAL DAILY PROGRESS NOTE    Patient: Samreen Lozada               Sex: female          DOA: 3/11/2020  3:56 PM        YOB: 1965      Age:  48 y.o.        LOS:  LOS: 3 days     Subjective:     Samreen Lozada is a 48 y.o.  who presents with Pleural effusion [J90]  CHF exacerbation ([address] [I50.9]. Asked to evaluate for esrd. admitted with sob  Chief complains: Patient denies nausea, vomiting, chest pain, dizziness, shortness of breath or headache.  - Reviewed last 24 hrs events     Current Facility-Administered Medications   Medication Dose Route Frequency    aspirin chewable tablet 81 mg  81 mg Oral DAILY    carvediloL (COREG) tablet 6.25 mg  6.25 mg Oral Q12H    albuterol-ipratropium (DUO-NEB) 2.5 MG-0.5 MG/3 ML  3 mL Nebulization Q4H PRN    ferrous sulfate tablet 325 mg  1 Tab Oral BID WITH MEALS    atorvastatin (LIPITOR) tablet 20 mg  20 mg Oral DAILY    hydrALAZINE (APRESOLINE) tablet 25 mg  25 mg Oral TID    acetaminophen (TYLENOL) tablet 650 mg  650 mg Oral Q4H PRN    ondansetron (ZOFRAN) injection 4 mg  4 mg IntraVENous Q4H PRN    insulin lispro (HUMALOG) injection   SubCUTAneous AC&HS    glucose chewable tablet 16 g  16 g Oral PRN    glucagon (GLUCAGEN) injection 1 mg  1 mg IntraMUSCular PRN    dextrose (D50W) injection syrg 12.5-25 g  25-50 mL IntraVENous PRN       Objective:     Visit Vitals  /70 (BP 1 Location: Right arm, BP Patient Position: At rest)   Pulse 84   Temp 98.5 °F (36.9 °C)   Resp 16   Ht 5' 9\" (1.753 m)   Wt 90.7 kg (200 lb)   SpO2 97%   Breastfeeding No   BMI 29.53 kg/m²       Intake/Output Summary (Last 24 hours) at 3/14/2020 1334  Last data filed at 3/13/2020 2225  Gross per 24 hour   Intake 240 ml   Output 1500 ml   Net -1260 ml       Physical Examination:     GEN: AAO X 3, NAD  RS: Chest is bilateral equal, no wheezing / rales / crackles  CVS: S1-S2 heard, RRR, No S3 / murmur  Abdomen: Soft, Non tender, Not distended, Positive bowel sounds, no organomegaly, no CVA / supra pubic tenderness  Extremities: No edema, no cyanosis, skin is warm on touch  CNS: Awake & follows commands, CN II-XII are grossly intact. HEENT: Head is atraumatic, PERRLA, conjunctiva pink & non icteric. No JVD or carotid bruit       Data Review:      Labs:     Hematology:   Recent Labs     03/13/20  0117 03/12/20  0411 03/11/20  1612   WBC 4.4* 4.9 4.8   HGB 10.1* 10.2* 10.9*   HCT 32.3* 32.4* 35.2     Chemistry:   Recent Labs     03/13/20  0117 03/12/20  0411 03/11/20  1612   BUN 17 25* 20*   CREA 5.45* 6.66* 5.50*   CA 8.8 8.9 8.9   ALB  --  2.8* 3.1*   K 3.6 3.6 3.7    141 143    104 105   CO2 26 27 29   PHOS  --  3.5  --    GLU 94 89 85        Images:    XR (Most Recent). CXR reviewed by me and compared with previous CXR Results from Hospital Encounter encounter on 03/11/20   XR CHEST PA LAT    Narrative EXAM: CHEST  CPT code: 83763    CLINICAL INDICATION/HISTORY: Shortness of breath, right-sided chest pain. Patient on dialysis. COMPARISON: 9 August 2019. TECHNIQUE: PA and lateral views of the chest.    FINDINGS:      There has been interval placement of a right internal jugular central venous  dual lumen catheter with tip in the distal superior vena cava. There is  increased opacity in both lung bases with obscuration of the hemidiaphragms,  right greater than left, consistent with effusion/atelectasis. The upper lungs  are grossly clear. The mediastinum is not widened but the heart is enlarged. The bones and soft tissues are unremarkable except for some hypertrophic  spondylitic degenerative change at several levels in the mid-to-lower thoracic  spine. There is no other interval change. Impression IMPRESSION:    Interval development of bibasilar opacities consistent with effusion and  possible associated atelectasis, right greater than left. Cardiomegaly. Findings suggest fluid overload or possibly failure.               CT (Most Recent) Results from Hospital Encounter encounter on 05/30/19   CT BX RENAL    Narrative PREOPERATIVE DIAGNOSIS: SHAHANA. POSTOPERATIVE DIAGNOSIS: Same    INDICATION: SHAHANA. Patient is here for random renal biopsy. ATTENDING: Dr. Talib Starks M.D.    Shraddha Smith: None. PROCEDURES: CT-guided renal random core biopsy. Left lower renal pole. ANESTHESIA: 1% local lidocaine as well as moderate intravenous sedation with  Versed and fentanyl given and monitored per independently trained interventional  radiology nurse under my direct supervision for 20 minutes. Please see nursing  records for detailed medication dosing. CONTRAST: None. COMPLICATIONS: None    DRAIN: No    CATHETER: None. EBL: Minimal.    SPECIMEN: 6 core biopsy specimens of the left lower renal pole were obtained. Specimen was sent to pathology. TECHNIQUE: After detailed explanation of risks and benefits of the procedure  verbal and written consent were obtained. Patient was brought to the CT room and  placed prone on the table. Timeout was performed.  view of the abdomen was  obtained. Target lesion was identified and skin was marked. Left flank region  was prepped and draped in the usual sterile fashion. Maximum sterile barrier  technique was used. 1% lidocaine solution was instilled in the skin superficial  and deep subcutaneous soft tissues overlying the biopsy region. Under direct CT fluoroscopy guidance using 16-gauge Temno core biopsy needle was  advanced down through the guide into the left lower renal pole. 6 passes were  made and core biopsies were sent to pathology. Needle and introducer were removed. Post biopsy imaging was obtained. Hemostasis  was achieved with manual compression and Gelfoam slurry. Sterile dressing was  applied. There were no immediate complications. Patient tolerated procedure fairly well. Patient was transferred to recovery in stable condition.  I was present and  performed the procedure. FINDINGS: CT fluoroscopic guidance demonstrated good position of the biopsy  needle in the left lower renal pole. Postbiopsy imaging demonstrated very small  perirenal hematoma posteriorly. Impression IMPRESSION:    Successful, uncomplicated CT-guided renal random core biopsy. All CT scans at this facility are performed using dose optimization technique as  appropriate to a performed exam, to include automated exposure control,  adjustment of the mA and/or kV according to patient size (including appropriate  matching for site-specific examinations), or use of iterative reconstruction  technique. EKG No results found for this or any previous visit. I have personally reviewed the old medical records and labs    Plan / Recommendation:      1. Esrd,on dialysis mon wed Friday. plan dialysis on mon after cardiac cath  2.sob,resolved,decrease tw  3.severe cardiomyopathy.suggest adding acei or arbs instead of hydralazine    D/w Dr. Debby Abdi MD  Nephrology  3/14/2020

## 2020-03-14 NOTE — ROUTINE PROCESS
Verbal bedside shift report given to Zuly Goyal RN oncoming nurse by Kvng Lopez RN off going nurse including [facility], [facility] and [facility]

## 2020-03-14 NOTE — PROGRESS NOTES
Admit Date: 3/11/2020  Date of Service: 3/14/2020    Reason for follow-up: SOB      Assessment:         Fluid overload. In setting of ESRD with likely a component of HFpEF: improving after initial HD  HFpEF: repeat TTE with EF of 25%; having occasional PVC's on tele  B/L pleural effusions: due to volume overload  ESRD on HD MWF:  Extra HD session today. Indeterminate troponin, in setting of ESRD: Indeterminate troponin, in setting of ESRD  HFpEF. EF 56-60% on echo 2019  HTN  Dyslipidemia  Dm type 2: diet controlled  ITP:  Chronic; followed by H/O  Plan:   Continue coreg, lipitor and hydralazine, and ASA. HD as per nephrology  Discussed with Dr. Osorio Arnett- will plan for cardiac cath on Monday. Possibly need to arrange [facility] upon discharge pending cath results. Current Antibtiocs:   None    Lines:   Peripheral    Case discussed with:  [x]Patient  []Family  [x]Nursing  [x]Case Management  DVT Prophylaxis:  []Lovenox  [x]Hep SQ  []SCDs  []Coumadin   []On Heparin gtt    I have independently examined the patient and reviewed all lab studies and imgaing as well as review of nursing notes and physican notes from the past 24 hours. Rocio Herring D.O. Pager [phone]      Allergies   Allergen Reactions    Rocephin [Ceftriaxone] Itching    Pcn [Penicillins] Itching           Subjective:      Pt seen and examined. In good spirits. No particular complaints. Feeling much better today. NO SOB.   No fevers, chills, n/v/d or CP    Objective:        Visit Vitals  /70 (BP 1 Location: Right arm, BP Patient Position: At rest)   Pulse 84   Temp 98.5 °F (36.9 °C)   Resp 16   Ht 5' 9\" (1.753 m)   Wt 90.7 kg (200 lb)   LMP 2017   SpO2 97%   Breastfeeding No   BMI 29.53 kg/m²     Temp (24hrs), Av.3 °F (36.8 °C), Min:98 °F (36.7 °C), Max:98.7 °F (37.1 °C)        General:   awake alert and oriented, non-toxic   Skin:   no rashes or skin lesions noted on limited exam, dry and warm; right SC HD cath   HEENT:  No scleral icterus or pallor; oral mucosa moist, lips moist   Lymph Nodes:   not assessed today   Lungs:   non, labored; b/l clear   Heart:  RRR, s1 and s2; no murmurs rubs or gallops; trace edema, + pedal pulses   Abdomen:  soft, obese, non-distended, active bowel sounds, non-tender   Genitourinary:  deferred   Extremities:   average muscle tone; no contractures, no joint effusions   Neurologic:  No gross focal motor or sensory abnormalities; CN 2-12 intact; Follows commands. Psychiatric:   appropriate and interactive. Labs: Results:   Chemistry Recent Labs     03/13/20 0117 03/12/20 0411 03/11/20  1612   GLU 94 89 85    141 143   K 3.6 3.6 3.7    104 105   CO2 26 27 29   BUN 17 25* 20*   CREA 5.45* 6.66* 5.50*   CA 8.8 8.9 8.9   AGAP 7 10 9   BUCR 3* 4* 4*   AP  --  61 72   TP  --  7.2 7.6   ALB  --  2.8* 3.1*   GLOB  --  4.4* 4.5*   AGRAT  --  0.6* 0.7*      CBC w/Diff Recent Labs     03/13/20 0117 03/12/20 0411 03/11/20  1612   WBC 4.4* 4.9 4.8   RBC 3.40* 3.44* 3.64*   HGB 10.1* 10.2* 10.9*   HCT 32.3* 32.4* 35.2   PLT 95* 96* 97*   GRANS  --  77* 79*   LYMPH  --  16* 14*   EOS  --  2 2        No results found for: SDES Lab Results   Component Value Date/Time    Culture result: NO GROWTH 6 DAYS 05/30/2019 09:20 PM    Culture result:  05/30/2019 03:45 PM     [id number]  COLONIES/mL  MIXED GRAM POSITIVE ABNER, PROBABLE SKIN/GENITAL CONTAMINATION.       Culture result: <10,000 COLONIES/mL GRAM NEGATIVE RODS (A) 05/30/2019 03:45 PM    Culture result: NO GROWTH 6 DAYS 05/30/2019 11:55 AM    Culture result: NO GROWTH 6 DAYS 05/30/2019 11:40 AM        Results     Procedure Component Value Units Date/Time    INFLUENZA A & B AG (RAPID TEST) [[id number]] Collected:  03/12/20 0800    Order Status:  Completed Specimen:  Nasopharyngeal from Nasal washing Updated:  03/12/20 0930     Influenza A Antigen NEGATIVE         Comment: A negative result does not exclude influenza virus infection, seasonal or H1N1 due to suboptimal sensitivity. If influenza is circulating in your community, a diagnosis of influenza should be considered based on a patients clinical presentation and empiric antiviral treatment should be considered, if indicated. Influenza B Antigen NEGATIVE              Imaging:     Xr Chest Pa Lat    Result Date: 3/12/2020  IMPRESSION: Interval development of bibasilar opacities consistent with effusion and possible associated atelectasis, right greater than left. Cardiomegaly. Findings suggest fluid overload or possibly failure.

## 2020-03-14 NOTE — PROGRESS NOTES
Beds time snack administered. Pt resting quietly in bed, no signs of distress noted, call light within pt's reach. Bedside shift change report given to Jerolyn Schaumann, RN (oncoming nurse) by Ozzie Odonnell RN (offgoing nurse). Report included the following information SBAR, Procedure Summary, Intake/Output, MAR and Recent Results.

## 2020-03-14 NOTE — PROGRESS NOTES
RESPIRATORY MEDICATION PROTOCOL ASSESSMENT      Patient  Vivian Lugo     48 y.o.   female     3/13/2020  8:42 PM    Breath Sounds Pre Procedure:  Breath Sounds Bilateral: Clear, Diminished                                            Breath Sounds Post Procedure: Breath Sounds Bilateral: Clear, Diminished                                               Breathing pattern: Pre procedure  Breathing Pattern: Regular          Post procedure  Breathing Pattern: Regular    Cough: Pre procedure  Cough: Non-productive               Post procedure Cough: Non-productive    Heart Rate: Pre procedure Pulse: 98           Post procedure Pulse: 96    Resp Rate: Pre procedure  Respirations: 16           Post procedure          Nebulizer Therapy: Current medications Aerosolized Medications: DuoNeb       Problem List:   Patient Active Problem List   Diagnosis Code    Anemia due to blood loss, chronic D50.0    Accelerated essential hypertension I10    Menorrhagia with regular cycle N92.0    Obesity (BMI 30.0-34. 9) E66.9    DM2 (diabetes mellitus, type 2) ([facility]) E11.9    Thrombocytopenia ([facility]) D69.6    Anemia in chronic kidney disease (CKD) N18.9, D63.1    Menorrhagia N92.0    Bacteremia R78.81    ARF (acute renal failure) ([facility]) N17.9    Acute renal failure superimposed on stage 5 chronic kidney disease, not on chronic dialysis ([facility]) N17.9, N18.5    Anemia J51.0    Metabolic acidosis H82.0    Proteinuria R80.9    Secondary hyperparathyroidism of renal origin ([facility] [address] N25.81    Pleural effusion J90    CHF exacerbation ([facility]) I50.9       Patient alert and cooperative to use MDI: Not applicable    Home Respiratory Therapy Regimen/Frequency:  NO  Medication   Device  Frequency     SEVERITY INDEX:    ITEM 0 1 2 3 4 Score   Respiratory Pattern and or Rate Regular  10-19 Regular  20-24   24-30    30-34 Severe SOB or   Greater than 35 0   Breath Sounds Clear Occasional Wheeze Mild Wheezing Moderate Wheezing  wheezing/Absent breath sounds 0   Shortness of Breath None Dyspnea on Exertion Dyspnea at Rest Moderate Shortness of Breath at Rest Severe Shortness of Breath - Limited Speech 0       Total Score:  0    * Scoring Guidelines  0-4 pts:  PRN-BID   5-7 pts:  BID, TID, QID  8-9 pts:  TID, QID, Q6  10-12 pts:  Q4-Q6  * - Guidelines used with clinical judgement. PRN Treatments can be ordered to supplement scheduled treatments. Regardless of score, frequency should not be less than normal home regimen.     Recommended Order/Frequency:  Q4 PRN    Comments:          Respiratory Therapist: Nigel Seo, RT

## 2020-03-15 LAB
GLUCOSE BLD STRIP.AUTO-MCNC: 100 MG/DL (ref 70–110)
GLUCOSE BLD STRIP.AUTO-MCNC: 84 MG/DL (ref 70–110)
GLUCOSE BLD STRIP.AUTO-MCNC: 86 MG/DL (ref 70–110)
GLUCOSE BLD STRIP.AUTO-MCNC: 94 MG/DL (ref 70–110)

## 2020-03-15 PROCEDURE — 82962 GLUCOSE BLOOD TEST: CPT

## 2020-03-15 PROCEDURE — 74011250637 HC RX REV CODE- 250/637: Performed by: INTERNAL MEDICINE

## 2020-03-15 PROCEDURE — 65660000000 HC RM CCU STEPDOWN

## 2020-03-15 RX ORDER — ATORVASTATIN CALCIUM 20 MG/1
20 TABLET, FILM COATED ORAL DAILY
Status: DISCONTINUED | OUTPATIENT
Start: 2020-03-15 | End: 2020-03-17 | Stop reason: HOSPADM

## 2020-03-15 RX ADMIN — CARVEDILOL 6.25 MG: 6.25 TABLET, FILM COATED ORAL at 21:56

## 2020-03-15 RX ADMIN — Medication 325 MG: at 08:05

## 2020-03-15 RX ADMIN — ATORVASTATIN CALCIUM 20 MG: 20 TABLET, FILM COATED ORAL at 21:56

## 2020-03-15 RX ADMIN — ASPIRIN 81 MG 81 MG: 81 TABLET ORAL at 08:06

## 2020-03-15 RX ADMIN — Medication 325 MG: at 17:03

## 2020-03-15 RX ADMIN — HYDRALAZINE HYDROCHLORIDE 25 MG: 25 TABLET, FILM COATED ORAL at 08:06

## 2020-03-15 RX ADMIN — CARVEDILOL 6.25 MG: 6.25 TABLET, FILM COATED ORAL at 08:05

## 2020-03-15 NOTE — PROGRESS NOTES
Cards Pt Communication Note:    Pt's chart reviewed, ESRD on HD pt c/o atypical symptoms but echo showed new CMP, EF 25-30%  She was given option of outpt cath but was very nervous and wanted to stay until Monday. Labs still pending today, but pt has h/o ITP     Indications and procedure explained to pt today, no further questions. Will keep her NPO after MN with further recs to follow, thanks. Please see Dr. Nikhil Cordero previous notes for details. Lab Results   Component Value Date/Time    Sodium 140 03/13/2020 01:17 AM    Potassium 3.6 03/13/2020 01:17 AM    Chloride 107 03/13/2020 01:17 AM    CO2 26 03/13/2020 01:17 AM    Anion gap 7 03/13/2020 01:17 AM    Glucose 94 03/13/2020 01:17 AM    BUN 17 03/13/2020 01:17 AM    Creatinine 5.45 (H) 03/13/2020 01:17 AM    BUN/Creatinine ratio 3 (L) 03/13/2020 01:17 AM    GFR est AA 10 (L) 03/13/2020 01:17 AM    GFR est non-AA 8 (L) 03/13/2020 01:17 AM    Calcium 8.8 03/13/2020 01:17 AM    Bilirubin, total 0.5 03/12/2020 04:11 AM    AST (SGOT) 16 03/12/2020 04:11 AM    Alk. phosphatase 61 03/12/2020 04:11 AM    Protein, total 7.2 03/12/2020 04:11 AM    Albumin 2.8 (L) 03/12/2020 04:11 AM    Globulin 4.4 (H) 03/12/2020 04:11 AM    A-G Ratio 0.6 (L) 03/12/2020 04:11 AM    ALT (SGPT) 15 03/12/2020 04:11 AM     Lab Results   Component Value Date/Time    WBC 4.4 (L) 03/13/2020 01:17 AM    HGB 10.1 (L) 03/13/2020 01:17 AM    HCT 32.3 (L) 03/13/2020 01:17 AM    PLATELET 95 (L) 00/49/0733 01:17 AM    MCV 95.0 03/13/2020 01:17 AM       03/11/20   ECHO ADULT COMPLETE 03/13/2020 3/13/2020    Narrative · Dilated left ventricle. Moderately increased wall thickness. Severe   global systolic function. Calculated left ventricular ejection fraction is   20%. Visually measured ejection fraction. Moderate (grade 2) left   ventricular diastolic dysfunction. · There is no evidence of pulmonary hypertension. PASP 27mmHg  · Small posterior pericardial effusion.   · Mild mitral valve regurgitation is present.         Signed by: Rosia Hua, MD Dr. Talitha Libman

## 2020-03-15 NOTE — PROGRESS NOTES
Admit Date: 3/11/2020  Date of Service: 3/15/2020    Reason for follow-up: SOB      Assessment:         Fluid overload. In setting of ESRD with likely a component of HFpEF: improving after initial HD  HFpEF: repeat TTE with EF of 25%; having occasional PVC's on tele  B/L pleural effusions: due to volume overload  ESRD on HD MWF:  Extra HD session today. Indeterminate troponin, in setting of ESRD: Indeterminate troponin, in setting of ESRD  HFpEF. EF 56-60% on echo 2019  HTN  Dyslipidemia  Dm type 2: diet controlled  ITP:  Chronic; followed by H/O  Plan:   Continue coreg, lipitor and hydralazine, and ASA. HD as per nephrology  Discussed with Dr. Thompson Police- will plan for cardiac cath tomorrow. Possibly need to arrange Life[facility] upon discharge pending cath results. Current Antibtiocs:   None    Lines:   Peripheral    Case discussed with:  [x]Patient  []Family  [x]Nursing  [x]Case Management  DVT Prophylaxis:  []Lovenox  [x]Hep SQ  []SCDs  []Coumadin   []On Heparin gtt    I have independently examined the patient and reviewed all lab studies and imgaing as well as review of nursing notes and physican notes from the past 24 hours. Charlena Canavan D.O. Pager [phone]      Allergies   Allergen Reactions    Rocephin [Ceftriaxone] Itching    Pcn [Penicillins] Itching           Subjective:      Pt seen and examined. In good spirits.  is at bedside. No particular complaints. NO SOB.   No fevers, chills, n/v/d or CP    Objective:        Visit Vitals  /79 (BP 1 Location: Right arm, BP Patient Position: At rest)   Pulse 84   Temp 98.2 °F (36.8 °C)   Resp 18   Ht 5' 9\" (1.753 m)   Wt 84.6 kg (186 lb 9.6 oz)   LMP 2017   SpO2 99%   Breastfeeding No   BMI 27.56 kg/m²     Temp (24hrs), Av.1 °F (36.7 °C), Min:97.5 °F (36.4 °C), Max:98.6 °F (37 °C)        General:   awake alert and oriented, non-toxic   Skin:   no rashes or skin lesions noted on limited exam, dry and warm; right SC HD cath   HEENT: No scleral icterus or pallor; oral mucosa moist, lips moist   Lymph Nodes:   not assessed today   Lungs:   non, labored; b/l clear   Heart:  RRR, s1 and s2; no murmurs rubs or gallops; trace edema, + pedal pulses   Abdomen:  soft, obese, non-distended, active bowel sounds, non-tender   Genitourinary:  deferred   Extremities:   average muscle tone; no contractures, no joint effusions   Neurologic:  No gross focal motor or sensory abnormalities; CN 2-12 intact; Follows commands. Psychiatric:   appropriate and interactive. Labs: Results:   Chemistry Recent Labs     03/13/20 0117   GLU 94      K 3.6      CO2 26   BUN 17   CREA 5.45*   CA 8.8   AGAP 7   BUCR 3*      CBC w/Diff Recent Labs     03/13/20 0117   WBC 4.4*   RBC 3.40*   HGB 10.1*   HCT 32.3*   PLT 95*        No results found for: SDES Lab Results   Component Value Date/Time    Culture result: NO GROWTH 6 DAYS 05/30/2019 09:20 PM    Culture result:  05/30/2019 03:45 PM     [id number]  COLONIES/mL  MIXED GRAM POSITIVE ABNER, PROBABLE SKIN/GENITAL CONTAMINATION. Culture result: <10,000 COLONIES/mL GRAM NEGATIVE RODS (A) 05/30/2019 03:45 PM    Culture result: NO GROWTH 6 DAYS 05/30/2019 11:55 AM    Culture result: NO GROWTH 6 DAYS 05/30/2019 11:40 AM        Results     Procedure Component Value Units Date/Time    INFLUENZA A & B AG (RAPID TEST) [[id number]] Collected:  03/12/20 0800    Order Status:  Completed Specimen:  Nasopharyngeal from Nasal washing Updated:  03/12/20 0930     Influenza A Antigen NEGATIVE         Comment: A negative result does not exclude influenza virus infection, seasonal or H1N1 due to suboptimal sensitivity. If influenza is circulating in your community, a diagnosis of influenza should be considered based on a patients clinical presentation and empiric antiviral treatment should be considered, if indicated.         Influenza B Antigen NEGATIVE              Imaging:     Xr Chest Pa Lat    Result Date: 3/12/2020  IMPRESSION: Interval development of bibasilar opacities consistent with effusion and possible associated atelectasis, right greater than left. Cardiomegaly. Findings suggest fluid overload or possibly failure.

## 2020-03-15 NOTE — ROUTINE PROCESS
Bedside verbal report received from [facility], reviewed SBAR, MAR, VS and summary of care. Patient awake on rounds, call light in reach. Patient encouraged to call for any assistance.

## 2020-03-15 NOTE — ROUTINE PROCESS
Verbal bedside shift report given to EITAN Collins oncoming nurse by Isael Reese RN off going nurse including KARDEX, SBAR and [facility]

## 2020-03-15 NOTE — ROUTINE PROCESS
Verbal bedside shift report received from [facility] [place]

## 2020-03-15 NOTE — ROUTINE PROCESS
Bedside verbal report received from [facility], reviewed SBAR, MAR, VS and summary of care. Patient awake on rounds watching TV, call light in reach. Patient encouraged to call for any assistance.

## 2020-03-15 NOTE — PROGRESS NOTES
RENAL DAILY PROGRESS NOTE    Patient: Jeremy Bai               Sex: female          DOA: 3/11/2020  3:56 PM        YOB: 1965      Age:  48 y.o.        LOS:  LOS: 4 days     Subjective:     Jeremy Bai is a 48 y.o.  who presents with Pleural effusion [J90]  CHF exacerbation ([facility] [address] [I50.9]. Asked to evaluate for esrd. admitted with sob  Chief complains: Patient denies nausea, vomiting, chest pain, dizziness, shortness of breath or headache.  - Reviewed last 24 hrs events     Current Facility-Administered Medications   Medication Dose Route Frequency    atorvastatin (LIPITOR) tablet 20 mg  20 mg Oral DAILY    aspirin chewable tablet 81 mg  81 mg Oral DAILY    carvediloL (COREG) tablet 6.25 mg  6.25 mg Oral Q12H    albuterol-ipratropium (DUO-NEB) 2.5 MG-0.5 MG/3 ML  3 mL Nebulization Q4H PRN    ferrous sulfate tablet 325 mg  1 Tab Oral BID WITH MEALS    acetaminophen (TYLENOL) tablet 650 mg  650 mg Oral Q4H PRN    ondansetron (ZOFRAN) injection 4 mg  4 mg IntraVENous Q4H PRN    insulin lispro (HUMALOG) injection   SubCUTAneous AC&HS    glucose chewable tablet 16 g  16 g Oral PRN    glucagon (GLUCAGEN) injection 1 mg  1 mg IntraMUSCular PRN    dextrose (D50W) injection syrg 12.5-25 g  25-50 mL IntraVENous PRN       Objective:     Visit Vitals  /79 (BP 1 Location: Right arm, BP Patient Position: At rest)   Pulse 84   Temp 98.2 °F (36.8 °C)   Resp 18   Ht 5' 9\" (1.753 m)   Wt 84.6 kg (186 lb 9.6 oz)   SpO2 99%   Breastfeeding No   BMI 27.56 kg/m²       Intake/Output Summary (Last 24 hours) at 3/15/2020 1446  Last data filed at 3/14/2020 1814  Gross per 24 hour   Intake 960 ml   Output --   Net 960 ml       Physical Examination:     GEN: AAO X 3, NAD  RS: Chest is bilateral equal, no wheezing / rales / crackles  CVS: S1-S2 heard, RRR, No S3 / murmur  Abdomen: Soft, Non tender, Not distended, Positive bowel sounds, no organomegaly, no CVA / supra pubic tenderness  Extremities: No edema, no cyanosis, skin is warm on touch  CNS: Awake & follows commands, CN II-XII are grossly intact. HEENT: Head is atraumatic, PERRLA, conjunctiva pink & non icteric. No JVD or carotid bruit       Data Review:      Labs:     Hematology:   Recent Labs     03/13/20  0117   WBC 4.4*   HGB 10.1*   HCT 32.3*     Chemistry:   Recent Labs     03/13/20 0117   BUN 17   CREA 5.45*   CA 8.8   K 3.6         CO2 26   GLU 94        Images:    XR (Most Recent). CXR reviewed by me and compared with previous CXR Results from Hospital Encounter encounter on 03/11/20   XR CHEST PA LAT    Narrative EXAM: CHEST  CPT code: 34073    CLINICAL INDICATION/HISTORY: Shortness of breath, right-sided chest pain. Patient on dialysis. COMPARISON: 9 August 2019. TECHNIQUE: PA and lateral views of the chest.    FINDINGS:      There has been interval placement of a right internal jugular central venous  dual lumen catheter with tip in the distal superior vena cava. There is  increased opacity in both lung bases with obscuration of the hemidiaphragms,  right greater than left, consistent with effusion/atelectasis. The upper lungs  are grossly clear. The mediastinum is not widened but the heart is enlarged. The bones and soft tissues are unremarkable except for some hypertrophic  spondylitic degenerative change at several levels in the mid-to-lower thoracic  spine. There is no other interval change. Impression IMPRESSION:    Interval development of bibasilar opacities consistent with effusion and  possible associated atelectasis, right greater than left. Cardiomegaly. Findings suggest fluid overload or possibly failure. CT (Most Recent) Results from Hospital Encounter encounter on 05/30/19   CT BX RENAL    Narrative PREOPERATIVE DIAGNOSIS: SHAHANA. POSTOPERATIVE DIAGNOSIS: Same    INDICATION: SHAHANA. Patient is here for random renal biopsy.     ATTENDING: Dr. Carolee Mariscal, M.D. ASSISTANT: None. PROCEDURES: CT-guided renal random core biopsy. Left lower renal pole. ANESTHESIA: 1% local lidocaine as well as moderate intravenous sedation with  Versed and fentanyl given and monitored per independently trained interventional  radiology nurse under my direct supervision for 20 minutes. Please see nursing  records for detailed medication dosing. CONTRAST: None. COMPLICATIONS: None    DRAIN: No    CATHETER: None. EBL: Minimal.    SPECIMEN: 6 core biopsy specimens of the left lower renal pole were obtained. Specimen was sent to pathology. TECHNIQUE: After detailed explanation of risks and benefits of the procedure  verbal and written consent were obtained. Patient was brought to the CT room and  placed prone on the table. Timeout was performed.  view of the abdomen was  obtained. Target lesion was identified and skin was marked. Left flank region  was prepped and draped in the usual sterile fashion. Maximum sterile barrier  technique was used. 1% lidocaine solution was instilled in the skin superficial  and deep subcutaneous soft tissues overlying the biopsy region. Under direct CT fluoroscopy guidance using 16-gauge Temno core biopsy needle was  advanced down through the guide into the left lower renal pole. 6 passes were  made and core biopsies were sent to pathology. Needle and introducer were removed. Post biopsy imaging was obtained. Hemostasis  was achieved with manual compression and Gelfoam slurry. Sterile dressing was  applied. There were no immediate complications. Patient tolerated procedure fairly well. Patient was transferred to recovery in stable condition. I was present and  performed the procedure. FINDINGS: CT fluoroscopic guidance demonstrated good position of the biopsy  needle in the left lower renal pole. Postbiopsy imaging demonstrated very small  perirenal hematoma posteriorly.       Impression IMPRESSION:    Successful, uncomplicated CT-guided renal random core biopsy. All CT scans at this facility are performed using dose optimization technique as  appropriate to a performed exam, to include automated exposure control,  adjustment of the mA and/or kV according to patient size (including appropriate  matching for site-specific examinations), or use of iterative reconstruction  technique. EKG No results found for this or any previous visit. I have personally reviewed the old medical records and labs    Plan / Recommendation:      1. Esrd,on dialysis mon wed Friday. plan dialysis on mon after cardiac cath  2.sob,resolved,decrease tw  3.severe cardiomyopathy.suggest adding acei or arbs instead of hydralazine if bp increases    D/w Dr. Julian Goodwin MD  Nephrology  3/15/2020

## 2020-03-15 NOTE — ROUTINE PROCESS
Bedside and Verbal shift change report given to Amando Levy (oncoming nurse) by Vesta Gaucher (offgoing nurse).  Report included the following information SBAR, Kardex, MAR, Recent Results and Cardiac Rhythm SR.

## 2020-03-16 VITALS
HEIGHT: 69 IN | RESPIRATION RATE: 16 BRPM | TEMPERATURE: 98 F | WEIGHT: 188.2 LBS | SYSTOLIC BLOOD PRESSURE: 133 MMHG | DIASTOLIC BLOOD PRESSURE: 66 MMHG | BODY MASS INDEX: 27.88 KG/M2 | OXYGEN SATURATION: 100 % | HEART RATE: 80 BPM

## 2020-03-16 LAB
ANION GAP SERPL CALC-SCNC: 8 MMOL/L (ref 3–18)
BASOPHILS # BLD: 0 K/UL (ref 0–0.1)
BASOPHILS NFR BLD: 0 % (ref 0–2)
BUN SERPL-MCNC: 50 MG/DL (ref 7–18)
BUN/CREAT SERPL: 5 (ref 12–20)
CALCIUM SERPL-MCNC: 8.7 MG/DL (ref 8.5–10.1)
CALCIUM SERPL-MCNC: 8.7 MG/DL (ref 8.5–10.1)
CHLORIDE SERPL-SCNC: 103 MMOL/L (ref 100–111)
CO2 SERPL-SCNC: 23 MMOL/L (ref 21–32)
CREAT SERPL-MCNC: 9.44 MG/DL (ref 0.6–1.3)
DIFFERENTIAL METHOD BLD: ABNORMAL
EOSINOPHIL # BLD: 0.2 K/UL (ref 0–0.4)
EOSINOPHIL NFR BLD: 4 % (ref 0–5)
ERYTHROCYTE [DISTWIDTH] IN BLOOD BY AUTOMATED COUNT: 14.1 % (ref 11.6–14.5)
GLUCOSE BLD STRIP.AUTO-MCNC: 71 MG/DL (ref 70–110)
GLUCOSE BLD STRIP.AUTO-MCNC: 81 MG/DL (ref 70–110)
GLUCOSE SERPL-MCNC: 77 MG/DL (ref 74–99)
HCT VFR BLD AUTO: 33 % (ref 35–45)
HGB BLD-MCNC: 10.8 G/DL (ref 12–16)
LYMPHOCYTES # BLD: 1 K/UL (ref 0.9–3.6)
LYMPHOCYTES NFR BLD: 24 % (ref 21–52)
MAGNESIUM SERPL-MCNC: 2.5 MG/DL (ref 1.6–2.6)
MCH RBC QN AUTO: 30.1 PG (ref 24–34)
MCHC RBC AUTO-ENTMCNC: 32.7 G/DL (ref 31–37)
MCV RBC AUTO: 91.9 FL (ref 74–97)
MONOCYTES # BLD: 0.3 K/UL (ref 0.05–1.2)
MONOCYTES NFR BLD: 6 % (ref 3–10)
NEUTS SEG # BLD: 2.7 K/UL (ref 1.8–8)
NEUTS SEG NFR BLD: 66 % (ref 40–73)
PHOSPHATE SERPL-MCNC: 6.5 MG/DL (ref 2.5–4.9)
PLATELET # BLD AUTO: 87 K/UL (ref 135–420)
PLATELET COMMENTS,PCOM: ABNORMAL
PMV BLD AUTO: 13.5 FL (ref 9.2–11.8)
POTASSIUM SERPL-SCNC: 4.6 MMOL/L (ref 3.5–5.5)
PTH-INTACT SERPL-MCNC: 86.9 PG/ML (ref 18.4–88)
RBC # BLD AUTO: 3.59 M/UL (ref 4.2–5.3)
RBC MORPH BLD: ABNORMAL
SODIUM SERPL-SCNC: 134 MMOL/L (ref 136–145)
WBC # BLD AUTO: 4.2 K/UL (ref 4.6–13.2)

## 2020-03-16 PROCEDURE — B2111ZZ FLUOROSCOPY OF MULTIPLE CORONARY ARTERIES USING LOW OSMOLAR CONTRAST: ICD-10-PCS | Performed by: INTERNAL MEDICINE

## 2020-03-16 PROCEDURE — 84100 ASSAY OF PHOSPHORUS: CPT

## 2020-03-16 PROCEDURE — 82962 GLUCOSE BLOOD TEST: CPT

## 2020-03-16 PROCEDURE — 74011250636 HC RX REV CODE- 250/636: Performed by: INTERNAL MEDICINE

## 2020-03-16 PROCEDURE — C1760 CLOSURE DEV, VASC: HCPCS | Performed by: INTERNAL MEDICINE

## 2020-03-16 PROCEDURE — 90935 HEMODIALYSIS ONE EVALUATION: CPT

## 2020-03-16 PROCEDURE — 80048 BASIC METABOLIC PNL TOTAL CA: CPT

## 2020-03-16 PROCEDURE — 77030013797 HC KT TRNSDUC PRSSR EDWD -A: Performed by: INTERNAL MEDICINE

## 2020-03-16 PROCEDURE — 74011250637 HC RX REV CODE- 250/637: Performed by: INTERNAL MEDICINE

## 2020-03-16 PROCEDURE — 83735 ASSAY OF MAGNESIUM: CPT

## 2020-03-16 PROCEDURE — C1894 INTRO/SHEATH, NON-LASER: HCPCS | Performed by: INTERNAL MEDICINE

## 2020-03-16 PROCEDURE — 93458 L HRT ARTERY/VENTRICLE ANGIO: CPT | Performed by: INTERNAL MEDICINE

## 2020-03-16 PROCEDURE — 77030004558 HC CATH ANGI DX SUPR TORQ CARD -A: Performed by: INTERNAL MEDICINE

## 2020-03-16 PROCEDURE — 83970 ASSAY OF PARATHORMONE: CPT

## 2020-03-16 PROCEDURE — 74011000250 HC RX REV CODE- 250: Performed by: INTERNAL MEDICINE

## 2020-03-16 PROCEDURE — 36415 COLL VENOUS BLD VENIPUNCTURE: CPT

## 2020-03-16 PROCEDURE — 77030013744: Performed by: INTERNAL MEDICINE

## 2020-03-16 PROCEDURE — 99152 MOD SED SAME PHYS/QHP 5/>YRS: CPT | Performed by: INTERNAL MEDICINE

## 2020-03-16 PROCEDURE — 4A023N7 MEASUREMENT OF CARDIAC SAMPLING AND PRESSURE, LEFT HEART, PERCUTANEOUS APPROACH: ICD-10-PCS | Performed by: INTERNAL MEDICINE

## 2020-03-16 PROCEDURE — 74011000258 HC RX REV CODE- 258: Performed by: INTERNAL MEDICINE

## 2020-03-16 PROCEDURE — 74011636320 HC RX REV CODE- 636/320: Performed by: INTERNAL MEDICINE

## 2020-03-16 PROCEDURE — 85025 COMPLETE CBC W/AUTO DIFF WBC: CPT

## 2020-03-16 RX ORDER — CALCIUM ACETATE 667 MG/1
1 CAPSULE ORAL
Status: DISCONTINUED | OUTPATIENT
Start: 2020-03-16 | End: 2020-03-17 | Stop reason: HOSPADM

## 2020-03-16 RX ORDER — LIDOCAINE HYDROCHLORIDE 10 MG/ML
INJECTION, SOLUTION EPIDURAL; INFILTRATION; INTRACAUDAL; PERINEURAL AS NEEDED
Status: DISCONTINUED | OUTPATIENT
Start: 2020-03-16 | End: 2020-03-16 | Stop reason: HOSPADM

## 2020-03-16 RX ORDER — DOXERCALCIFEROL 4 UG/2ML
3 INJECTION INTRAVENOUS
Status: DISCONTINUED | OUTPATIENT
Start: 2020-03-16 | End: 2020-03-17 | Stop reason: HOSPADM

## 2020-03-16 RX ORDER — CARVEDILOL 6.25 MG/1
6.25 TABLET ORAL EVERY 12 HOURS
Qty: 60 TAB | Refills: 0 | Status: SHIPPED | OUTPATIENT
Start: 2020-03-16

## 2020-03-16 RX ORDER — SODIUM CHLORIDE 0.9 % (FLUSH) 0.9 %
5-40 SYRINGE (ML) INJECTION AS NEEDED
Status: CANCELLED | OUTPATIENT
Start: 2020-03-16

## 2020-03-16 RX ORDER — GUAIFENESIN 100 MG/5ML
81 LIQUID (ML) ORAL DAILY
Qty: 30 TAB | Refills: 0 | Status: SHIPPED | OUTPATIENT
Start: 2020-03-17 | End: 2021-01-07

## 2020-03-16 RX ORDER — SODIUM CHLORIDE 0.9 % (FLUSH) 0.9 %
5-40 SYRINGE (ML) INJECTION EVERY 8 HOURS
Status: CANCELLED | OUTPATIENT
Start: 2020-03-16

## 2020-03-16 RX ORDER — MIDAZOLAM HYDROCHLORIDE 1 MG/ML
INJECTION, SOLUTION INTRAMUSCULAR; INTRAVENOUS AS NEEDED
Status: DISCONTINUED | OUTPATIENT
Start: 2020-03-16 | End: 2020-03-16 | Stop reason: HOSPADM

## 2020-03-16 RX ORDER — ATORVASTATIN CALCIUM 20 MG/1
20 TABLET, FILM COATED ORAL DAILY
Qty: 30 TAB | Refills: 0 | Status: SHIPPED | OUTPATIENT
Start: 2020-03-17 | End: 2020-04-16 | Stop reason: SDUPTHER

## 2020-03-16 RX ORDER — FENTANYL CITRATE 50 UG/ML
INJECTION, SOLUTION INTRAMUSCULAR; INTRAVENOUS AS NEEDED
Status: DISCONTINUED | OUTPATIENT
Start: 2020-03-16 | End: 2020-03-16 | Stop reason: HOSPADM

## 2020-03-16 RX ADMIN — CALCIUM ACETATE 667 MG: 667 CAPSULE ORAL at 12:02

## 2020-03-16 RX ADMIN — Medication 325 MG: at 08:54

## 2020-03-16 RX ADMIN — ASPIRIN 81 MG 81 MG: 81 TABLET ORAL at 08:54

## 2020-03-16 RX ADMIN — DOXERCALCIFEROL 3 MCG: 4 INJECTION, SOLUTION INTRAVENOUS at 17:30

## 2020-03-16 RX ADMIN — ACETAMINOPHEN 650 MG: 325 TABLET ORAL at 13:36

## 2020-03-16 RX ADMIN — CARVEDILOL 6.25 MG: 6.25 TABLET, FILM COATED ORAL at 08:54

## 2020-03-16 RX ADMIN — IRON SUCROSE 50 MG: 20 INJECTION, SOLUTION INTRAVENOUS at 18:18

## 2020-03-16 NOTE — PROGRESS NOTES
TRANSFER - IN REPORT:    Verbal report received from [address] (name) on [place]  being received from Cath lab 2 (unit) for routine post - op      Report consisted of patients Situation, Background, Assessment and   Recommendations(SBAR). Information from the following report(s) SBAR, Procedure Summary and MAR was reviewed with the receiving nurse. Opportunity for questions and clarification was provided. Assessment completed upon patients arrival to unit and care assumed. Patient had a LHC that was diagnostic only with no complications. Patient has a 6F angioseal to the right groin that is clean, dry and intact with no hematoma. See MAR for medications given during procedure.  Last set of vitals are as follows:    BP - 135/85  HR - 85  RR - 16  100% RA

## 2020-03-16 NOTE — PROGRESS NOTES
TRANSFER - OUT REPORT:    Verbal report given to Jonathan Hammonds (name) on [place]  being transferred to Cath Lab 2 (unit) for ordered procedure       Report consisted of patients Situation, Background, Assessment and   Recommendations(SBAR). Information from the following report(s) SBAR, Intake/Output, MAR and Pre Procedure Checklist was reviewed with the receiving nurse. Lines:   Peripheral IV 03/11/20 Left Antecubital (Active)   Site Assessment Clean, dry, & intact 3/15/2020  7:53 PM   Phlebitis Assessment 0 3/15/2020  7:53 PM   Infiltration Assessment 0 3/15/2020  7:53 PM   Dressing Status Clean, dry, & intact 3/15/2020  7:53 PM   Dressing Type Transparent 3/15/2020  8:05 AM   Hub Color/Line Status Pink;Flushed 3/15/2020  7:53 PM   Action Taken Open ports on tubing capped 3/14/2020  4:59 AM   Alcohol Cap Used Yes 3/15/2020  7:53 PM       Peripheral IV 03/15/20 Posterior;Right Hand (Active)        Opportunity for questions and clarification was provided.       Patient transported with:   [email or web address]

## 2020-03-16 NOTE — ROUTINE PROCESS
Patient advise bedrest completed and nurse would assist her to sitting on side of bed.  Patient stated she would let nurse know when she was ready

## 2020-03-16 NOTE — PROGRESS NOTES
Received called from [place] from [facility] requesting patient documents. Elaine Castillo has been ordered. Facesheet, H&P, ECHO, Cardic cath and Cardiology progress note faxed to [phone]. Now awaiting authorization. 1637:  [facility]. Authorization received from [address] [place] [address] representative will be in at around 7pm for fitting and education before patient discharge.      Sven Sherwood, RN BSN  Care Manager  [phone]

## 2020-03-16 NOTE — PROGRESS NOTES
[facility]  -  Progress Note      Patient: Netta Sharma MRN: [id number]  SSN: [id number]    YOB: 1965  Age: 48 y.o. Sex: female      Admit Date: 3/11/2020    Assessment:     Hospital Problems  Date Reviewed: 12/12/2019          Codes Class Noted POA    Thrombocytopenia ([address] ICD-10-CM: D69.6  ICD-9-CM: 287.5  2/27/2015 Yes        DM2 (diabetes mellitus, type 2) (HCC) ICD-10-CM: E11.9  ICD-9-CM: 250.00  1/23/2015 Unknown        * (Principal) Pleural effusion ICD-10-CM: J90  ICD-9-CM: 511.9  3/11/2020 Unknown        CHF exacerbation (HCC) ICD-10-CM: I50.9  ICD-9-CM: 428.0  3/11/2020 Unknown        Anemia in chronic kidney disease (CKD) ICD-10-CM: N18.9, D63.1  ICD-9-CM: 285.21  2/28/2017 Yes            -Fluid overload. In setting of ESRD with likely a component of HFpEF. Presented TRIVEDI and orthopnea despite optimal fluid removal via HD Wednesday. CXR concerning for cardiomegaly and bilateral pleural effusions.  Suspect this will improve with adjusting her hemodialysis goals.  -Indeterminate troponin, in setting of ESRD, not consistent with ACS, ECG with nonspecific ST wave abnormalities.   -HFpEF. EF 56-60% on echo 6/2019.  -ESRD on HD MWF, started in august 2019, reports recent difficulty keeping at goal weight, reports increased fluid intake recently. -Hypertension. Elevated. -Dyslipidemia. On statin. -Diabetes now diet controlled. -Idiopathic thrombocytopenia purpura. Plan:     Proceed with coronary angiography as previously planned. Will document or exclude CAD as etiology for her change in EF. Subjective:     No new complaints.      Objective:      Patient Vitals for the past 8 hrs:   Temp Pulse Resp BP SpO2   03/16/20 0745 98.2 °F (36.8 °C) 81 18 122/77 100 %   03/16/20 0404 97.9 °F (36.6 °C) 88 18 119/76 94 %         Patient Vitals for the past 96 hrs:   Weight   03/16/20 0437 85.4 kg (188 lb 3.2 oz)   03/15/20 0426 84.6 kg (186 lb 9.6 oz)   03/13/20 1455 90.7 kg (200 lb)   03/13/20 0407 85.7 kg (188 lb 14.4 oz)         Intake/Output Summary (Last 24 hours) at 3/16/2020 2170  Last data filed at 3/15/2020 2318  Gross per 24 hour   Intake 840 ml   Output 400 ml   Net 440 ml       Physical Exam:  General:  alert, cooperative, no distress, appears stated age  Neck:  no JVD  Lungs:  clear to auscultation bilaterally  Heart:  regular rate and rhythm  Abdomen:  no guarding or rigidity  Extremities:  no edema    Data Review:     Labs: Results:       Chemistry Recent Labs     03/16/20  0458   GLU 77   *   K 4.6      CO2 23   BUN 50*   CREA 9.44*   CA 8.7  8.7   MG 2.5   PHOS 6.5*   AGAP 8   BUCR 5*      CBC w/Diff Recent Labs     03/16/20  0458   WBC 4.2*   RBC 3.59*   HGB 10.8*   HCT 33.0*   PLT 87*   GRANS 66   LYMPH 24   EOS 4      Cardiac Enzymes No results found for: CPK, CK, CKMMB, CKMB, RCK3, CKMBT, CKNDX, CKND1, CAMPOS, TROPT, TROIQ, SUREKHA, TROPT, TNIPOC, BNP, BNPP   Coagulation No results for input(s): PTP, INR, APTT, INREXT in the last 72 hours. Lipid Panel Lab Results   Component Value Date/Time    Cholesterol, total 135 06/01/2019 02:15 AM    HDL Cholesterol 19 (L) 06/01/2019 02:15 AM    LDL, calculated 72.8 06/01/2019 02:15 AM    VLDL, calculated 43.2 06/01/2019 02:15 AM    Triglyceride 216 (H) 06/01/2019 02:15 AM    CHOL/HDL Ratio 7.1 (H) 06/01/2019 02:15 AM      BNP No results found for: BNP, BNPP, XBNPT   Liver Enzymes No results for input(s): TP, ALB, TBIL, AP, SGOT, GPT in the last 72 hours.     No lab exists for component: DBIL   Digoxin    Thyroid Studies Lab Results   Component Value Date/Time    TSH 2.85 03/12/2020 04:11 AM

## 2020-03-16 NOTE — DIALYSIS
[facility]        ACUTE HEMODIALYSIS FLOW SHEET      HEMODIALYSIS ORDERS: Physician: Paulette Singh     Dialyzer: revaclear   Duration: 3.5 hr  BFR: 350   DFR: 800   Dialysate:  Temp 36-37*C  K+  2    Ca+  2.5 Na 138 Bicarb 30   Weight: 85.4  kg   Patient Chart [x]     Unable to Obtain []   Dry weight/UF Goal: 3000   Access right chest wall IJ TDC     Heparin    [x]None      Catheter locking solution: heparin   Pre BP:   132/87    Pulse:     85       Respirations: 16  Temperature:   98.0   Labs: Pre        Post:        [x] N/A   Additional Orders(medications, blood products, hypotension management:   hectorol 3 mcg; venefer 50 mg     [x] [facility] Consent Verified     CATHETER ACCESS: []N/A   [x]Right   []Left   [x]IJ     []Fem   [x]chest wall   [] First use X-ray verified     [x]Tunnel                [] Non Tunneled   [x]No S/S infection  []Redness  []Drainage []Cultured []Swelling []Pain   [x]Medical Aseptic Prep Utilized   []Dressing Changed  [x] Biopatch  Date:    03/12/2020   []Clotted   [x]Patent   Flows: [x]Good  []Poor  [x]Reversed   If access problem,  notified: []Yes    [x]N/A           GRAFT/FISTULA ACCESS:  [x]N/A     []Right     []Left     []UE     []LE                            GENERAL ASSESSMENT:      LUNGS:  Rate  SaO2% [] N/A    [x] Clear  [] Coarse  [] Crackles  [] Wheezing        [] Diminished     Location : []RLL   []LLL    []RUL  []RAQUEL     Cough: []Productive  []Dry  [x]N/A   Respirations:  [x]Easy  []Labored     Therapy:   [x]RA  []NC l/min    Mask: []NRB []Venti       O2%                  []Ventilator  []Intubated  [] Trach  [] BiPaP     CARDIAC: [x]Regular      [] Irregular   [] Pericardial Rub  [] JVD        []  Monitored  [] Bedside  [] Remotely monitored [x] N/A       EDEMA: [x] None  []Generalized  [] Pitting [] 1    [] 2    [] 3    [] 4                 [] Facial  [] Pedal  []  UE  [] LE     SKIN:   [x] Warm  [] Hot     [] Cold   [x] Dry     [] Pale   [] Diaphoretic                  [] Flushed [] Jaundiced  [] Cyanotic  [] Rash  [] Weeping     LOC:   [x] Alert      [x]Oriented:    [x] Person     [x] Place  [x]Time               [] Confused  [] Lethargic  [] Medicated  [] Non-responsive     GI / ABDOMEN:  [] Flat    [] Distended    [x] Soft    [] Firm   []  Obese                             [] Diarrhea  [x] Bowel Sounds  [] Nausea  [] Vomiting       / URINE ASSESSMENT:[] Voiding   [x] Oliguria  [] Anuria   []  Griffith     [] Incontinent    []  Incontinent Brief      []  Bathroom Privileges       PAIN: [x] 0 []1  []2   []3   []4   []5   []6   []7   []8   []9   []10              Scale 0-10  Action/Follow Up:      MOBILITY:  [] Amb    [] Amb/Assist    [x] Bed    [] Wheelchair  [] Stretcher      All Vitals and Treatment Details on Attached [address]: [facility]          Room # 206/01      [] 1st Time Acute  [] Stat  [x] Routine  [] Urgent     [x] Acute Room  []  Bedside  [] ICU/CCU  [] ER   Isolation Precautions:   There are currently no Active Isolations      Special Considerations:         [] Blood Consent Verified [x]N/A     ALLERGIES:   Allergies   Allergen Reactions    Rocephin [Ceftriaxone] Itching    Pcn [Penicillins] Itching               Code Status:Full Code        Hepatitis Status:                        Lab Results   Component Value Date/Time    Hepatitis B surface Ag <0.10 06/01/2019 02:15 AM    Hepatitis B surface Ab 38.99 06/01/2019 02:15 AM    Hepatitis C virus Ab 0.03 06/01/2019 02:15 AM                     Current Labs:   Lab Results   Component Value Date/Time    Sodium 134 (L) 03/16/2020 04:58 AM    Potassium 4.6 03/16/2020 04:58 AM    Chloride 103 03/16/2020 04:58 AM    CO2 23 03/16/2020 04:58 AM    Anion gap 8 03/16/2020 04:58 AM    Glucose 77 03/16/2020 04:58 AM    BUN 50 (H) 03/16/2020 04:58 AM    Creatinine 9.44 (H) 03/16/2020 04:58 AM    BUN/Creatinine ratio 5 (L) 03/16/2020 04:58 AM    GFR est AA 5 (L) 03/16/2020 04:58 AM    GFR est non-AA 4 (L) 03/16/2020 04:58 AM Calcium 8.7 03/16/2020 04:58 AM    Calcium 8.7 03/16/2020 04:58 AM      Lab Results   Component Value Date/Time    WBC 4.2 (L) 03/16/2020 04:58 AM    HGB 10.8 (L) 03/16/2020 04:58 AM    HCT 33.0 (L) 03/16/2020 04:58 AM    PLATELET 87 (L) 12/97/1537 04:58 AM    MCV 91.9 03/16/2020 04:58 AM                                                                                     DIET: DIET RENAL       PRIMARY NURSE REPORT: First initial/Last name/Title      Pre Dialysis: Carol Galan RN     Time: 1518      EDUCATION:    [x] Patient [] Other         Knowledge Basis: []None [x]Minimal [] Substantial   Barriers to learning [x]N/A   [] Access Care     [] S&S of infection     [] Fluid Management     []K+     [x]Procedural    []Albumin     [] Medications     [] Tx Options     [] Transplant     [] Diet     [] Other   Teaching Tools:  [x] Explain  [] Demo  [] Handouts [] Video  Patient response:   [x] Verbalized understanding  [] Teach back  [] Return demonstration [] Requires follow up   Inappropriate due to            [x] Time Out/Safety Check  [x]Extracorporeal Circuit Tested for integrity       [facility] - Before each treatment:     Machine Number:                   [address]                                   [x] Unit Machine # 6 with centralized RO                                    Alarm Test:  Pass time 1542              [x] RO/Machine Log Complete      Temp    36*-37*             Dialysate: pH  7.4 Conductivity: Meter   14     HD Machine   14                  TCD: 13.8  Dialyzer Lot # [id number]           Blood Tubing Lot # [id number]          Saline Lot #  -[id number]     CHLORINE TESTING-Before each treatment and every 4 hours    Total Chlorine: [x] less than 0.1 ppm  Time: 1500 4 Hr/2nd Check Time: 1900   (if greater than 0.1 ppm from Primary then every 30 minutes from Secondary)     TREATMENT INITIATION - with Dialysis Precautions:   [x] All Connections Secured                 [x] Saline Line Double Clamped   [x] Venous Parameters Set                  [x] Arterial Parameters Set    [x] Prime Given 250ml                          [x]Air Foam Detector Engaged      Treatment Initiation Note: Pt arrived to unit in bed in stable condition. A&Ox4 in no noted distress on RA. Right IJ assessed with no complications. HD initiated without difficulty. During Treatment Notes:   9690 Lines reversed. Pt in no distress. Access and face visible. 1645 UF turned off due to low BP.  200 cc saline bolus given. Pt in no noted distress. Access and face visible. 1715 UF remains off. Pt in no noted distress. Access and face visible. 1745 UF remains off. BP stable but still low. Dr. Karen Proctor notified and gave order to not pull any more fluid. 1815 UF remains off. BP stable. Pt in no noted distress. Access and face visible. 1830 BP stabilized. Pt awake alert and comfortable. Access and face visible. UFG set to 1L net UF  1900 Pt awake and alert in no noted distress. BP stable. Access and face visible. 1930 Pt awake and stable. Access and face visible. Medication Dose Volume Route Time [facility] name Title   hectorol 3 mcg 1.5 ml dialysis 1730 Jenna Bobo RN   venefer 50 mg 100 ml dialysis 1820 Jenna Bobo RN                   Post Assessment:   Dialyzer Cleared: [] Good [x] Fair  [] Poor  Blood processed:  71.3 L  UF Removed  1000 Ml  POst BP:   133/66       Pulse: 80        Respirations: 16  Temperature: 98.0 Lungs:     [x] Clear      [] Course         [] Crackles    [] Wheezing         [] Diminished   Post Tx Vascular Access:      N/A Cardiac:   [x] Regular   [] Irregular   [] Monitor  [x] N/A       Catheter:   Locking solution: Heparin 1:1000   Art. 1.6  Juan. 1.6      Skin:   Pain:    [x] Warm  [x] Dry [] Diaphoretic    [] Flushed    [] Pale [] Cyanotic [x]0  []1  []2   []3  []4   []5   []6   []7   []8   []9   []10     Post Treatment Note:   HD well tolerated. 1 L UF removed.  NAD noted during or post treatment       POST TREATMENT PRIMARY NURSE HANDOFF REPORT:     First initial/Last name/Title         Post Dialysis:  Ulices Torres RNTime:  1954     Abbreviations: AVG-arterial venous graft, AVF-arterial venous fistula, IJ-Internal Jugular, Subcl-Subclavian, Fem-Femoral, Tx-treatment, AP/HR-apical heart rate, DFR-dialysate flow rate, BFR-blood flow rate, AP-arterial pressure, -venous pressure, UF-ultrafiltrate, TMP-transmembrane pressure, Juan-Venous, Art-Arterial, RO-Reverse Osmosis

## 2020-03-16 NOTE — DISCHARGE SUMMARY
Discharge Summary    Patient: Alan Mendiola MRN: [id number]  CSN: [id number]    YOB: 1965  Age: 48 y.o.   Sex: female    DOA: 3/11/2020 LOS:  LOS: 5 days   Discharge Date:      Admission Diagnoses: Pleural effusion [J90]  CHF exacerbation ([place] [address].) [I50.9]    Discharge Diagnoses:    Problem List as of 3/16/2020 Date Reviewed: 12/12/2019          Codes Class Noted - Resolved    Thrombocytopenia ([place] [address].) ICD-10-CM: D69.6  ICD-9-CM: 287.5  2/27/2015 - Present        Accelerated essential hypertension ICD-10-CM: I10  ICD-9-CM: 401.0  1/23/2015 - Present        Menorrhagia with regular cycle ICD-10-CM: N92.0  ICD-9-CM: 626.2  1/23/2015 - Present        Obesity (BMI 30.0-34.9) ICD-10-CM: E66.9  ICD-9-CM: 278.00  1/23/2015 - Present        DM2 (diabetes mellitus, type 2) ([place] [address].) ICD-10-CM: E11.9  ICD-9-CM: 250.00  1/23/2015 - Present        * (Principal) Pleural effusion ICD-10-CM: J90  ICD-9-CM: 511.9  3/11/2020 - Present        CHF exacerbation ([place] [address].) ICD-10-CM: I50.9  ICD-9-CM: 428.0  3/11/2020 - Present        Secondary hyperparathyroidism of renal origin ([place] [address].) ICD-10-CM: N25.81  ICD-9-CM: 588.81  6/2/2019 - Present        ARF (acute renal failure) ([place] [address].) ICD-10-CM: N17.9  ICD-9-CM: 584.9  5/30/2019 - Present        Acute renal failure superimposed on stage 5 chronic kidney disease, not on chronic dialysis [facility]) ICD-10-CM: N17.9, N18.5  ICD-9-CM: 584.9, 585.5  5/30/2019 - Present        Anemia ICD-10-CM: D64.9  ICD-9-CM: 285.9  5/30/2019 - Present        Metabolic acidosis UKL-43-CI: E87.2  ICD-9-CM: 276.2  5/30/2019 - Present        Proteinuria ICD-10-CM: R80.9  ICD-9-CM: 791.0  5/30/2019 - Present        Bacteremia ICD-10-CM: R78.81  ICD-9-CM: 790.7  12/24/2018 - Present        Menorrhagia ICD-10-CM: N92.0  ICD-9-CM: 626.2  7/5/2017 - Present        Anemia in chronic kidney disease (CKD) ICD-10-CM: N18.9, D63.1  ICD-9-CM: 285.21  2/28/2017 - Present        Anemia due to blood loss, chronic ICD-10-CM: D50.0  ICD-9-CM: 280.0  1/23/2015 - Present              Discharge Condition: Stable    PHYSICAL EXAM  Visit Vitals  /72 (BP 1 Location: Left arm, BP Patient Position: At rest)   Pulse 78   Temp 98.2 °F (36.8 °C)   Resp 12   Ht 5' 9\" (1.753 m)   Wt 85.4 kg (188 lb 3.2 oz)   SpO2 100%   Breastfeeding No   BMI 27.79 kg/m²     General:   awake alert and oriented, non-toxic   Skin:   no rashes or skin lesions noted on limited exam, dry and warm; right SC HD cath   HEENT:  No scleral icterus or pallor; oral mucosa moist, lips moist   Lymph Nodes:   not assessed today   Lungs:   non, labored; b/l clear   Heart:  RRR, s1 and s2; no murmurs rubs or gallops; trace edema, + pedal pulses   Abdomen:  soft, obese, non-distended, active bowel sounds, non-tender   Genitourinary:  deferred   Extremities:   average muscle tone; no contractures, no joint effusions   Neurologic:  No gross focal motor or sensory abnormalities; CN 2-12 intact; Follows commands. Psychiatric:   appropriate and interactive. Hospital Course:   Fluid overload. In setting of [place] likely a component of HFpEF: improving after initial HD  Acute HFpEF: repeat TTE with EF of 25%; having occasional PVC's on tele; EF 56-60% on echo 6/2019   - s/p cardiac cath 3/16 with findings supportive of previously reported EF.    - Life[facility]est being arranged prior to discharge by cardiology. - f/u with Dr. Nanci Appiah in 2 weeks   - continue ASA and Coreg. Cardizem stopped.    B/L pleural effusions: due to volume overload; stable  ESRD on HD MWF  Indeterminate troponin, in setting of ESRD: non-ischemic  HTN:  Controlled at present  Dyslipidemia:  lipitor increased to 20  Dm type 2: diet controlled  ITP:  Chronic; followed by H/O    Consults:   Cardiology:  Dr. Nanci Appiah  Nephrology: Dr. Brenda Lima    Significant Diagnostic Studies: Xr Chest Pa Lat    Result Date: 3/12/2020  IMPRESSION: Interval development of bibasilar opacities consistent with effusion and possible associated atelectasis, right greater than left. Cardiomegaly. Findings suggest fluid overload or possibly failure. Results     Procedure Component Value Units Date/Time    INFLUENZA A & B AG (RAPID TEST) [[id number]] Collected:  03/12/20 0800    Order Status:  Completed Specimen:  Nasopharyngeal from Nasal washing Updated:  03/12/20 0930     Influenza A Antigen NEGATIVE         Comment: A negative result does not exclude influenza virus infection, seasonal or H1N1 due to suboptimal sensitivity. If influenza is circulating in your community, a diagnosis of influenza should be considered based on a patients clinical presentation and empiric antiviral treatment should be considered, if indicated. Influenza B Antigen NEGATIVE                CBC WITH AUTOMATED DIFF    Collection Time: 03/16/20  4:58 AM   Result Value Ref Range    WBC 4.2 (L) 4.6 - 13.2 K/uL    RBC 3.59 (L) 4.20 - 5.30 M/uL    HGB 10.8 (L) 12.0 - 16.0 g/dL    HCT 33.0 (L) 35.0 - 45.0 %    MCV 91.9 74.0 - 97.0 FL    MCH 30.1 24.0 - 34.0 PG    MCHC 32.7 31.0 - 37.0 g/dL    RDW 14.1 11.6 - 14.5 %    PLATELET 87 (L) 544 - 420 K/uL    MPV 13.5 (H) 9.2 - 11.8 FL    NEUTROPHILS 66 40 - 73 %    LYMPHOCYTES 24 21 - 52 %    MONOCYTES 6 3 - 10 %    EOSINOPHILS 4 0 - 5 %    BASOPHILS 0 0 - 2 %    ABS. NEUTROPHILS 2.7 1.8 - 8.0 K/UL    ABS. LYMPHOCYTES 1.0 0.9 - 3.6 K/UL    ABS. MONOCYTES 0.3 0.05 - 1.2 K/UL    ABS. EOSINOPHILS 0.2 0.0 - 0.4 K/UL    ABS.  BASOPHILS 0.0 0.0 - 0.1 K/UL    DF AUTOMATED      PLATELET COMMENTS DECREASED PLATELETS      RBC COMMENTS NORMOCYTIC, NORMOCHROMIC       BMP:   Lab Results   Component Value Date/Time     (L) 03/16/2020 04:58 AM    K 4.6 03/16/2020 04:58 AM     03/16/2020 04:58 AM    CO2 23 03/16/2020 04:58 AM    AGAP 8 03/16/2020 04:58 AM    GLU 77 03/16/2020 04:58 AM    BUN 50 (H) 03/16/2020 04:58 AM    CREA 9.44 (H) 03/16/2020 04:58 AM    GFRAA 5 (L) 03/16/2020 04:58 AM    GFRNA 4 (L) 03/16/2020 04:58 AM Discharge Medications:     Current Discharge Medication List      START taking these medications    Details   carvediloL (COREG) 6.25 mg tablet Take 1 Tab by mouth every twelve (12) hours. Qty: 60 Tab, Refills: 0      aspirin 81 mg chewable tablet Take 1 Tab by mouth daily. Qty: 30 Tab, Refills: 0      ferrous sulfate 325 mg (65 mg iron) tablet Take 1 Tab by mouth two (2) times daily (with meals). Qty: 60 Tab, Refills: 0         CONTINUE these medications which have CHANGED    Details   !! atorvastatin (LIPITOR) 20 mg tablet Take 1 Tab by mouth daily. Qty: 30 Tab, Refills: 0       !! - Potential duplicate medications found. Please discuss with provider. CONTINUE these medications which have NOT CHANGED    Details   !! atorvastatin (LIPITOR) 10 mg tablet Take  by mouth daily. hydrALAZINE (APRESOLINE) 25 mg tablet Take 25 mg by mouth three (3) times daily. calcium acetate (PHOSLO) 667 mg cap Take 1 Cap by mouth three (3) times daily (with meals). Qty: 90 Cap, Refills: 3      loratadine (CLARITIN) 10 mg tablet Take 10 mg by mouth daily as needed for Allergies. acetaminophen (TYLENOL) 500 mg tablet Take 2 Tabs by mouth every six (6) hours as needed for Pain. Qty: 20 Tab, Refills: 0       !! - Potential duplicate medications found. Please discuss with provider. STOP taking these medications       dilTIAZem ER (CARDIZEM LA) 120 mg tablet Comments:   Reason for Stopping:               Activity: activity as tolerated    Diet: Cardiac Diet    Follow-up: with PCP, Wynne Hammans, MD in 7-10days   - f/u with Dr. Epifanio Woo in 2 weeks    Minutes spent on discharge: >30 minutes spent coordinating this discharge (review instructions/follow-up, prescriptions, preparing report for sign off)    Dispo:  Home with self care.

## 2020-03-16 NOTE — PROGRESS NOTES
TRANSFER - OUT REPORT:    Verbal report given to EITAN Woodard (name) on Jericho Whitehead  being transferred to [address] [place] [address] ([facility]) for routine progression of care       Report consisted of patients Situation, Background, Assessment and   Recommendations(SBAR). Information from the following report(s) SBAR, Procedure Summary and MAR was reviewed with the receiving nurse. Lines:   Peripheral IV 03/11/20 Left Antecubital (Active)   Site Assessment Clean, dry, & intact 3/15/2020  7:53 PM   Phlebitis Assessment 0 3/15/2020  7:53 PM   Infiltration Assessment 0 3/15/2020  7:53 PM   Dressing Status Clean, dry, & intact 3/15/2020  7:53 PM   Dressing Type Transparent 3/15/2020  8:05 AM   Hub Color/Line Status Pink;Flushed 3/15/2020  7:53 PM   Action Taken Open ports on tubing capped 3/14/2020  4:59 AM   Alcohol Cap Used Yes 3/15/2020  7:53 PM       Peripheral IV 03/15/20 Posterior;Right Hand (Active)        Opportunity for questions and clarification was provided.       Patient transported with:   Patient chart

## 2020-03-16 NOTE — PROGRESS NOTES
D/C orders received. No needs identified by . Chart reviewed. Pt will be transported home by Spouse.  available as needed.     Joetta Severance, RN BSN  Care Manager  [phone]

## 2020-03-16 NOTE — DISCHARGE INSTRUCTIONS
Patient Education        ACE Inhibitors: Care Instructions  Your Care Instructions    ACE (angiotensin-converting enzyme) inhibitors lower blood pressure. They also treat heart failure and prevent heart attacks and strokes. They block an enzyme that makes blood vessels narrow. As a result, the blood vessels relax and widen. This lowers blood pressure. These medicines also put more water and salt into the urine. This lowers blood pressure too. These medicines are a good choice for people with diabetes. They don't affect blood sugar levels, and they may protect the kidneys. Examples include:  · Benazepril (Lotensin). · Lisinopril (Prinivil, Zestril). · Ramipril (Altace). Before you start taking this medicine, make sure your doctor knows if you take other medicines, especially water pills (diuretics) or potassium tablets. And tell your doctor if you use a salt substitute. You should not take an ACE inhibitor if you are pregnant or planning to become pregnant. You may need regular blood tests. Follow-up care is a key part of your treatment and safety. Be sure to make and go to all appointments, and call your doctor if you are having problems. It's also a good idea to know your test results and keep a list of the medicines you take. How can you care for yourself at home? · Take your medicines exactly as prescribed. Be sure to take your medicine every day. Call your doctor if you think you are having a problem with your medicine. · ACE inhibitors can cause a dry cough. If the cough is bad, talk to your doctor. You may need to try a different medicine. · ACE inhibitors can cause swelling of your lips, tongue, or face. If the swelling is severe, you may need treatment right away. Severe swelling can make it hard to breathe, but this is very rare. · Check with your doctor or pharmacist before you use any other medicines. This includes over-the-counter medicines.  Make sure your doctor knows all of the medicines, vitamins, herbal products, and supplements you take. Taking some medicines together can cause problems. When should you call for help? Call your doctor now or seek immediate medical care if:    · You have swelling of your lips, tongue, or face.     · You think you are having problems with your medicine.    Watch closely for changes in your health, and be sure to contact your doctor if you have any problems. Where can you learn more? Go to http://michela-ivan.info/  Enter [id number] in the search box to learn more about \"ACE Inhibitors: Care Instructions. \"  Current as of: December 15, 2019Content Version: 12.4  © 8945-1347 [employer]. Care instructions adapted under license by [employer] (which disclaims liability or warranty for this information). If you have questions about a medical condition or this instruction, always ask your healthcare professional. [address] any warranty or liability for your use of this information. Patient Education        ACE Inhibitors: Care Instructions  Your Care Instructions    ACE (angiotensin-converting enzyme) inhibitors lower blood pressure. They also treat heart failure and prevent heart attacks and strokes. They block an enzyme that makes blood vessels narrow. As a result, the blood vessels relax and widen. This lowers blood pressure. These medicines also put more water and salt into the urine. This lowers blood pressure too. These medicines are a good choice for people with diabetes. They don't affect blood sugar levels, and they may protect the kidneys. Examples include:  · Benazepril (Lotensin). · Lisinopril (Prinivil, Zestril). · Ramipril (Altace). Before you start taking this medicine, make sure your doctor knows if you take other medicines, especially water pills (diuretics) or potassium tablets. And tell your doctor if you use a salt substitute.  You should not take an ACE inhibitor if you are pregnant or planning to become pregnant. You may need regular blood tests. Follow-up care is a key part of your treatment and safety. Be sure to make and go to all appointments, and call your doctor if you are having problems. It's also a good idea to know your test results and keep a list of the medicines you take. How can you care for yourself at home? · Take your medicines exactly as prescribed. Be sure to take your medicine every day. Call your doctor if you think you are having a problem with your medicine. · ACE inhibitors can cause a dry cough. If the cough is bad, talk to your doctor. You may need to try a different medicine. · ACE inhibitors can cause swelling of your lips, tongue, or face. If the swelling is severe, you may need treatment right away. Severe swelling can make it hard to breathe, but this is very rare. · Check with your doctor or pharmacist before you use any other medicines. This includes over-the-counter medicines. Make sure your doctor knows all of the medicines, vitamins, herbal products, and supplements you take. Taking some medicines together can cause problems. When should you call for help? Call your doctor now or seek immediate medical care if:    · You have swelling of your lips, tongue, or face.     · You think you are having problems with your medicine.    Watch closely for changes in your health, and be sure to contact your doctor if you have any problems. Where can you learn more? Go to http://michela-ivan.info/  Enter [id number] in the search box to learn more about \"ACE Inhibitors: Care Instructions. \"  Current as of: December 15, 2019Content Version: 12.4  © 8648-0176 [employer]. Care instructions adapted under license by [employer] (which disclaims liability or warranty for this information).  If you have questions about a medical condition or this instruction, always ask your healthcare professional. [employer] disclaims any warranty or liability for your use of this information. Patient Education        Anemia From Chronic Disease: Care Instructions  Your Care Instructions    Anemia is a low level of red blood cells. Red blood cells carry oxygen from your lungs to the rest of your body. Sometimes when you have a long-term (chronic) disease, such as kidney disease, arthritis, diabetes, cancer, or an infection, your body does not make enough red blood cells. Follow-up care is a key part of your treatment and safety. Be sure to make and go to all appointments, and call your doctor if you are having problems. It's also a good idea to know your test results and keep a list of the medicines you take. How can you care for yourself at home? · Follow your doctor's instructions to treat the chronic condition that is causing the anemia. · Be safe with medicines. Take your medicine to treat your chronic condition exactly as prescribed. Call your doctor if you think you are having a problem with your medicine. · Take your medicine for anemia exactly as prescribed. Call your doctor if you think you are having a problem with your medicine. Medicines to increase the number of red blood cells (such as epoetin or darbepoetin) may be given as an injection. ? If you miss a dose, take it as soon as you can, unless it is almost time for your next dose. In that case, get back on your regular schedule and take only one dose. ? Do not freeze this medicine. Store it in the refrigerator. Do not shake the bottle before you prepare the shot. · Keep all your appointments for blood tests to check on your hemoglobin levels. When should you call for help? Call 911 anytime you think you may need emergency care.  For example, call if:    · You passed out (lost consciousness).    Call your doctor now or seek immediate medical care if:    · You are short of breath.     · You are dizzy or light-headed, or you feel like you may faint.     · You have new or worse bleeding.    Watch closely for changes in your health, and be sure to contact your doctor if:    · You feel weaker or more tired than usual.     · You do not get better as expected. Where can you learn more? Go to http://michela-ivan.info/  Enter E502 in the search box to learn more about \"Anemia From Chronic Disease: Care Instructions. \"  Current as of: November 7, 2019Content Version: 12.4  © 1087-1853 [employer]. Care instructions adapted under license by [employer] (which disclaims liability or warranty for this information). If you have questions about a medical condition or this instruction, always ask your healthcare professional. [address] any warranty or liability for your use of this information. Patient Education        Learning About Saving Energy When You Have a Chronic Condition  Introduction    Everyday tasks can be tiring when you have COPD, heart failure, or another long-term (chronic) condition. You may feel at times that you've lost your ability to live your life. But learning to conserve, or save, your energy can help you be less tired. Conserving your energy means finding ways of doing daily activities with as little effort as possible. With some small changes in the way you do things, you can get your tasks done more easily. Some treatments are available that might help. Pulmonary rehabilitation can teach you ways to breathe easier. Cardiac rehabilitation can help make your heart stronger. You also may want to see an occupational or physical therapist. The therapist can give you more tips on building strength and moving with less effort. What can you do to conserve your energy? Planning  · Make a list of what you have to do every day. Group the tasks by location. · Do all the chores in one part of your house around the same time.   · Go out for errands or do chores at the time of day when you have the most energy. · Plan rest periods into your day. Getting things done  · Sit down as often as you can when you get dressed, do chores, or cook. · Use a cart with wheels to roll items, such as laundry, from one room to another. · Push or slide boxes or other large items instead of lifting them. Reaching and bending  · Put things you use the most on shelves that are at the level of your waist or shoulder. · Use long-handled grabbers or other tools to reach items on a high shelf or to  things off the floor. Use long-handled dusters when you clean the house. · Use a raised toilet seat to avoid bending too far to sit or stand up. Eating  · Eat several small meals instead of three larger meals. · If you get too tired to eat much, try to choose healthy foods that have more calories. Have a yogurt-and-fruit smoothie for breakfast. Put avocado on a sandwich. Or add cheese or peanut butter to snacks. · If you don't feel very hungry, try to eat first and drink water or other fluids later, after a meal. This can help keep you from losing weight. Sip small amounts of fluids if you need to drink while you eat. Having sex  · Choose the time of day when you have more energy. · A [id number] position for sex can be less tiring. Sometimes you may want to focus more on caressing. Watch closely for changes in your health, and be sure to contact your doctor if you have any problems. Where can you learn more? Go to http://michela-ivan.info/  Enter H190 in the search box to learn more about \"Learning About Saving Energy When You Have a Chronic Condition. \"  Current as of: June 9, 2019Content Version: 12.4  © 2140-3941 [employer]wise[employer] Incorporated. Care instructions adapted under license by [employer] (which disclaims liability or warranty for this information).  If you have questions about a medical condition or this instruction, always ask your healthcare professional. [employer], [facility] disclaims any warranty or liability for your use of this information. Patient Education        Heart Rhythm Problems in Heart Failure: Care Instructions  Your Care Instructions    A heart rhythm problem, or arrhythmia, is a change in the normal rhythm of your heart. Your heart may beat too fast or too slow or beat with an irregular or skipping rhythm. A change in the heart's rhythm may feel like a really strong heartbeat or a fluttering in your chest. A severe heart rhythm problem can keep the body from getting the blood it needs. This can cause shortness of breath, lightheadedness, and fainting. A heart rhythm problem can make your heart failure worse and increase your chance of dying suddenly. You may take medicine to treat your condition. Your doctor may recommend a pacemaker, an implantable cardioverter-defibrillator (ICD), or a procedure called catheter ablation to destroy small parts of the heart that are causing a rhythm problem. Follow-up care is a key part of your treatment and safety. Be sure to make and go to all appointments, and call your doctor if you are having problems. It's also a good idea to know your test results and keep a list of the medicines you take. How can you care for yourself at home? · Take your medicines exactly as prescribed. Talk to your doctor if you have any problems with your medicines. · If you received a pacemaker or an implantable cardioverter-defibrillator (ICD), you will get a fact sheet about it. · Wear a medical alert ID bracelet. You can buy one at most drugstores or order it online. · Make sure you go to your follow-up appointments. To change your lifestyle  · Do not smoke. · Eat a heart-healthy diet. · Limit or avoid alcohol. · Stay at a healthy weight. Lose weight if you need to. · Ask your doctor whether you can take over-the-counter medicines (such as decongestants).  These can make your heart beat fast.  Be active  · Start light exercise if your doctor says you can. Even a small amount will help you get stronger, have more energy, and manage your stress. · Walk to get exercise easily. Start by walking a little more than you did the day before. Bit by bit, increase the amount you walk. · When you exercise, watch for signs that your heart is working too hard. You are pushing too hard if you cannot talk while you exercise. If you become short of breath or dizzy or have chest pain, sit down and rest.  · If your doctor has not set you up with a cardiac rehabilitation (rehab) program, talk to him or her about whether that is right for you. Cardiac rehab includes exercise, help with diet and lifestyle changes, and emotional support. It may reduce your risk of future heart problems. · Check your pulse daily. Place two fingers on the artery at the palm side of your wrist, in line with your thumb. If your heartbeat seems uneven, talk to your doctor. When should you call for help? Call 911 anytime you think you may need emergency care. For example, call if:    · You have symptoms of sudden heart failure, such as:  ? Severe trouble breathing. ? Coughing up pink, foamy mucus. ? A new irregular or rapid heartbeat.     · You have symptoms of a heart attack. These may include:  ? Chest pain or pressure, or a strange feeling in the chest.  ? Sweating. ? Shortness of breath. ? Nausea or vomiting. ? Pain, pressure, or a strange feeling in the back, neck, jaw, or upper belly or in one or both shoulders or arms. ? Lightheadedness or sudden weakness. ? A fast or irregular heartbeat.    After you call  911, the  may tell you to chew 1 adult-strength or 2 to 4 low-dose aspirin. Wait for an ambulance.  Do not try to drive yourself.   Call your doctor now or seek immediate medical care if:    · You have new or increased shortness of breath.     · You are dizzy or lightheaded, or you feel like you may faint.     · You have sudden weight gain, such as more than 2 to 3 pounds in a day or 5 pounds in a week. (Your doctor may suggest a different range of weight gain.)     · You have increased swelling in your legs, ankles, or feet.     · You are suddenly so tired or weak that you cannot do your usual activities.    Watch closely for changes in your health, and be sure to contact your doctor if you develop new symptoms. Where can you learn more? Go to http://michela-ivan.info/  Enter T935 in the search box to learn more about \"Heart Rhythm Problems in Heart Failure: Care Instructions. \"  Current as of: December 15, 2019Content Version: 12.4  © 4723-8558 [employer]. Care instructions adapted under license by [employer] (which disclaims liability or warranty for this information). If you have questions about a medical condition or this instruction, always ask your healthcare professional. [place] [address] any warranty or liability for your use of this information. Patient Education        Learning About Heart Failure Zones  What are heart failure zones? Heart failure zones give you an easy way to see changes in your heart failure symptoms. They also tell you when you need to get help. Check every day to see which zone you are in. Green zone. You are doing well. This is where you want to be. · Your weight is stable. This means it is not going up or down. · You breathe easily. · You are sleeping well. You are able to lie flat without shortness of breath. · You can do your usual activities. Yellow zone. Be careful. Your symptoms are changing. Call your doctor. · You have new or increased shortness of breath. · You are dizzy or lightheaded, or you feel like you may faint. · You have sudden weight gain, such as more than 2 to 3 pounds in a day or 5 pounds in a week.  (Your doctor may suggest a different range of weight gain.)  · You have increased swelling in your legs, ankles, or feet.  · You are so tired or weak that you cannot do your usual activities. · You are not sleeping well. Shortness of breath wakes you up at night. You need extra pillows. Your doctor's name: ____________________________________________________________  Your doctor's contact information: _________________________________________________  Red zone. This is an emergency. Call  911. You have symptoms of sudden heart failure, such as:  · You have severe trouble breathing. · You cough up pink, foamy mucus. · You have a new irregular or fast heartbeat. You have symptoms of a heart attack. These may include:  · Chest pain or pressure, or a strange feeling in the chest.  · Sweating. · Shortness of breath. · Nausea or vomiting. · Pain, pressure, or a strange feeling in the back, neck, jaw, or upper belly or in one or both shoulders or arms. · Lightheadedness or sudden weakness. · A fast or irregular heartbeat. If you have symptoms of a heart attack: After you call  911, the  may tell you to chew 1 adult-strength or 2 to 4 low-dose aspirin. Wait for an ambulance. Do not try to drive yourself. Follow-up care is a key part of your treatment and safety. Be sure to make and go to all appointments, and call your doctor if you are having problems. It's also a good idea to know your test results and keep a list of the medicines you take. Where can you learn more? Go to http://michela-ivan.info/  Enter T174 in the search box to learn more about \"Learning About Heart Failure Zones. \"  Current as of: December 15, 2019Content Version: 12.4  © 9738-3478 [employer]wise[employer]. Care instructions adapted under license by [employer] (which disclaims liability or warranty for this information).  If you have questions about a medical condition or this instruction, always ask your healthcare professional. [address] any warranty or liability for your use of this information. Patient Education        Kidney Dialysis: Care Instructions  Your Care Instructions    Dialysis is a process that filters wastes from the blood when your kidneys can no longer do the job. It is not a cure, but it can help you live longer and feel better. It is a lifesaving treatment when you have kidney failure. Normal kidneys work 24 hours a day to clean wastes from your blood. Your kidneys are not able to do this job, so a process called dialysis will do some of the work for your kidneys. You and your doctor will decide which type of dialysis you should have. Peritoneal dialysis uses the lining of your belly (peritoneum) to filter your blood. You can do it at home, on a daily basis. Hemodialysis uses a man-made filter called a dialyzer to clean your blood. Most people need to go to a hospital or clinic 3 days a week for several hours each time. Sometimes hemodialysis can be done at home. It is normal to have questions about your treatment, and you have a right to know what is happening to you. Learning about dialysis can help you take an active role in your treatment. Dialysis does not cure kidney disease, but it can help you live longer and feel better. You will need to follow your diet and treatment schedule carefully. Follow-up care is a key part of your treatment and safety. Be sure to make and go to all appointments, and call your doctor if you are having problems. It's also a good idea to know your test results and keep a list of the medicines you take. What do you need to know about peritoneal dialysis? Peritoneal dialysis uses the lining of your belly (or peritoneal membrane) to filter your blood. Before you can begin peritoneal dialysis, your doctor will need to place a thin tube called a catheter in your belly. This is the dialysis access. · Peritoneal dialysis can be done at home or in any clean place. You may be able to do it while you sleep. · You can do it by yourself.  You do not have to rely on help from others. · You can do it at the times you choose as long as you do the right number of treatments. · It has to be done every day of the week. · Some people find it hard to do all the required steps. · It increases your chance for a serious infection of the lining of the belly (peritoneum). What do you need to know about hemodialysis? Hemodialysis uses a man-made membrane called a dialyzer to clean your blood. You are connected to the dialyzer by tubes attached to your blood vessels. Before you start hemodialysis, your doctor will create a site where the blood can flow in and out of your body during your dialysis sessions. This site is called the vascular access. It may be a fistula, made by connecting an artery and a vein. Or it may be a graft, which is a tube implanted under your skin. · Hemodialysis is done mainly by trained health workers who can watch for any problems. · It allows you to be in contact with other people having dialysis. This can help provide emotional support. · You can schedule your treatments in the evenings so you can keep working. · You may be able to do home hemodialysis, which gives you more control over your schedule. · It usually needs to be done on a set schedule 3 times a week. · It can cause side effects. The most common side effects are low blood pressure and muscle cramps. These can often be treated easily. · It requires needle sticks during every treatment, which bothers some people. Others get used to it and even do the needle sticks themselves. How can you care for yourself at home? · Be sure to have all of your dialysis sessions. Do not try to shorten or skip your sessions. You have a better chance of a longer and healthier life by getting your full treatment. · Your doctor or health care team will show you the steps you need to go through each day before, during, and after dialysis. Be sure to follow these steps.  If you do not understand a step, talk to your team.  · Your doctor and dietitian will help you design menus that follow your diet. Be sure to follow your diet guidelines. ? You will need to limit fluids and certain foods that contain salt (sodium), potassium, and phosphorus. ? You may need to follow a heart-healthy diet to keep the fat (cholesterol) in your blood under control. ? You may need higher levels of protein in your diet. · Your doctor may recommend certain vitamins. But do not take any other medicine, including over-the-counter medicines, vitamins, and herbal products, without talking to your doctor first.  · Do not smoke. Smoking raises your risk of many health problems, including more kidney damage. If you need help quitting, talk to your doctor about stop-smoking programs and medicines. These can increase your chances of quitting for good. · Do not take ibuprofen (Advil, Motrin), naproxen (Aleve), or similar medicines, unless your doctor tells you to. These medicines may make kidney problems worse. When should you call for help? Call your doctor now or seek immediate medical care if:    · You have a fever.     · You are dizzy or lightheaded, or you feel like you may faint.     · You are confused or cannot think clearly.     · You have new or worse nausea or vomiting.     · You have new or more blood in your urine.     · You have new swelling.    Watch closely for changes in your health, and be sure to contact your doctor if:    · You do not get better as expected. Where can you learn more? Go to http://michela-ivan.info/  Enter U003 in the search box to learn more about \"Kidney Dialysis: Care Instructions. \"  Current as of: August 11, 2019Content Version: 12.4  © 8125-4836 Healthwise, Incorporated. Care instructions adapted under license by [employer] (which disclaims liability or warranty for this information).  If you have questions about a medical condition or this instruction, always ask your healthcare professional. [place] [address] any warranty or liability for your use of this information.      Discharge instructions provided to patient with attached teaching  Saline lock remove  All questions answered

## 2020-03-16 NOTE — ROUTINE PROCESS
Verbal bedside shift report given to Ezekiel Novoa, RN oncoming nurse by Hiram Danielson RN off going nurse including KARDEX, SBAR and [facility]

## 2020-03-16 NOTE — PROGRESS NOTES
RENAL DAILY PROGRESS NOTE    Patient: David Owens               Sex: female          DOA: 3/11/2020  3:56 PM        YOB: 1965      Age:  48 y.o.        LOS:  LOS: 5 days     Subjective:     David Owens is a 48 y.o.  who presents with Pleural effusion [J90]  CHF exacerbation ([facility] [address] [I50.9]. Asked to evaluate for esrd. admitted with sob    Chief complains: breathing a lot better, NPO for Cardiac cath.  No CP or SOB    Current Facility-Administered Medications   Medication Dose Route Frequency    atorvastatin (LIPITOR) tablet 20 mg  20 mg Oral DAILY    aspirin chewable tablet 81 mg  81 mg Oral DAILY    carvediloL (COREG) tablet 6.25 mg  6.25 mg Oral Q12H    albuterol-ipratropium (DUO-NEB) 2.5 MG-0.5 MG/3 ML  3 mL Nebulization Q4H PRN    ferrous sulfate tablet 325 mg  1 Tab Oral BID WITH MEALS    acetaminophen (TYLENOL) tablet 650 mg  650 mg Oral Q4H PRN    ondansetron (ZOFRAN) injection 4 mg  4 mg IntraVENous Q4H PRN    insulin lispro (HUMALOG) injection   SubCUTAneous AC&HS    glucose chewable tablet 16 g  16 g Oral PRN    glucagon (GLUCAGEN) injection 1 mg  1 mg IntraMUSCular PRN    dextrose (D50W) injection syrg 12.5-25 g  25-50 mL IntraVENous PRN       Objective:     Visit Vitals  /77 (BP 1 Location: Right arm, BP Patient Position: At rest)   Pulse 81   Temp 98.2 °F (36.8 °C)   Resp 18   Ht 5' 9\" (1.753 m)   Wt 85.4 kg (188 lb 3.2 oz)   SpO2 100%   Breastfeeding No   BMI 27.79 kg/m²       Intake/Output Summary (Last 24 hours) at 3/16/2020 0902  Last data filed at 3/15/2020 2318  Gross per 24 hour   Intake 840 ml   Output 400 ml   Net 440 ml       Physical Examination:     GEN: AAO X 3, NAD, afebrile  HEENT: Non icteric  Neck: Right IJ TDC dressing intact  C/L:  Chest is bilateral equal, no wheezing / rales / crackles  CVS: RRR, no rub  Abdomen: Soft, Non tender, + BS  Extremities: No LE  edema, no cyanosis      Data Review:      Labs:     Hematology: Recent Labs     03/16/20  0458   WBC 4.2*   HGB 10.8*   HCT 33.0*     Chemistry:   Recent Labs     03/16/20  0458   BUN 50*   CREA 9.44*   CA 8.7  8.7   K 4.6   *      CO2 23   PHOS 6.5*   GLU 77        Images:    XR (Most Recent). CXR reviewed by me and compared with previous CXR Results from Hospital Encounter encounter on 03/11/20   XR CHEST PA LAT    Narrative EXAM: CHEST  CPT code: 80005    CLINICAL INDICATION/HISTORY: Shortness of breath, right-sided chest pain. Patient on dialysis. COMPARISON: 9 August 2019. TECHNIQUE: PA and lateral views of the chest.    FINDINGS:      There has been interval placement of a right internal jugular central venous  dual lumen catheter with tip in the distal superior vena cava. There is  increased opacity in both lung bases with obscuration of the hemidiaphragms,  right greater than left, consistent with effusion/atelectasis. The upper lungs  are grossly clear. The mediastinum is not widened but the heart is enlarged. The bones and soft tissues are unremarkable except for some hypertrophic  spondylitic degenerative change at several levels in the mid-to-lower thoracic  spine. There is no other interval change. Impression IMPRESSION:    Interval development of bibasilar opacities consistent with effusion and  possible associated atelectasis, right greater than left. Cardiomegaly. Findings suggest fluid overload or possibly failure. CT (Most Recent) Results from Hospital Encounter encounter on 05/30/19   CT BX RENAL    Narrative PREOPERATIVE DIAGNOSIS: SHAHANA. POSTOPERATIVE DIAGNOSIS: Same    INDICATION: SHAHANA. Patient is here for random renal biopsy. ATTENDING: Dr. Carolee Mariscal M.D.    Robbie Villatoromp: None. PROCEDURES: CT-guided renal random core biopsy. Left lower renal pole.     ANESTHESIA: 1% local lidocaine as well as moderate intravenous sedation with  Versed and fentanyl given and monitored per independently trained interventional  radiology nurse under my direct supervision for 20 minutes. Please see nursing  records for detailed medication dosing. CONTRAST: None. COMPLICATIONS: None    DRAIN: No    CATHETER: None. EBL: Minimal.    SPECIMEN: 6 core biopsy specimens of the left lower renal pole were obtained. Specimen was sent to pathology. TECHNIQUE: After detailed explanation of risks and benefits of the procedure  verbal and written consent were obtained. Patient was brought to the CT room and  placed prone on the table. Timeout was performed.  view of the abdomen was  obtained. Target lesion was identified and skin was marked. Left flank region  was prepped and draped in the usual sterile fashion. Maximum sterile barrier  technique was used. 1% lidocaine solution was instilled in the skin superficial  and deep subcutaneous soft tissues overlying the biopsy region. Under direct CT fluoroscopy guidance using 16-gauge Temno core biopsy needle was  advanced down through the guide into the left lower renal pole. 6 passes were  made and core biopsies were sent to pathology. Needle and introducer were removed. Post biopsy imaging was obtained. Hemostasis  was achieved with manual compression and Gelfoam slurry. Sterile dressing was  applied. There were no immediate complications. Patient tolerated procedure fairly well. Patient was transferred to recovery in stable condition. I was present and  performed the procedure. FINDINGS: CT fluoroscopic guidance demonstrated good position of the biopsy  needle in the left lower renal pole. Postbiopsy imaging demonstrated very small  perirenal hematoma posteriorly. Impression IMPRESSION:    Successful, uncomplicated CT-guided renal random core biopsy.     All CT scans at this facility are performed using dose optimization technique as  appropriate to a performed exam, to include automated exposure control,  adjustment of the mA and/or kV according to patient size (including appropriate  matching for site-specific examinations), or use of iterative reconstruction  technique. EKG No results found for this or any previous visit. I have personally reviewed the old medical records and labs    Plan / Recommendation:      1. ESRD  dialysis  after cardiac cath today. 2. CHF defer to cardio, adjust TW as outpt  3. Cardiomyopathy defer to cardio  4. Anemia from CKD trend Hgb, cont EVELYN/Venofer d/c fe tab  5.  SHPT start Phoslo with meals, Hectorol with HD        Rosalind Kirk MD  Nephrology  3/16/2020

## 2020-03-16 NOTE — ROUTINE PROCESS
Bedside and Verbal shift change report given to Amando Levy (oncoming nurse) by Anthony (offgoing nurse). Report included the following information SBAR, Kardex, MAR, Recent Results and Cardiac Rhythm SR. Patient remains NPO, for Heart cath.

## 2020-03-17 ENCOUNTER — PATIENT OUTREACH (OUTPATIENT)
Dept: FAMILY MEDICINE CLINIC | Age: 55
End: 2020-03-17

## 2020-03-18 NOTE — PROGRESS NOTES
[facility] COVID-19 Screening Initial Follow-up Note    Patient contacted regarding COVID-19  risk. Care Transition Nurse/ Ambulatory Care Manager contacted the patient by telephone to perform post discharge assessment. Verified name and  with patient as identifiers. Provided introduction to self, and explanation of the CTN/ACM role, and reason for call due to risk factors for infection and/or exposure to COVID-19. Symptoms reviewed with patient who verbalized the following symptoms:   Fever no    Fatigue no   Pain or aching joints no  Cough no  Shortness of breath no    Confusion or unusual change in mental status no    Chills or shaking no    Sweating no    Fast heart rate no    Fast breathing no    Dizziness/lightheadedness no    Less urine output no    Cold, clammy, and pale skin no  Low body temperature no       Patient was admitted to [facility] 3/11- for plurel effusion, HD patient 3 x weekly, thrombocytopenia. Patient verbalizing that Dialysis is following strict hygiene precautions . Now all of the patients are wearing mask to enter the facility. Denies SOB,Cough,fever. She is doing very weel    Patient has following risk factors of: Dialysis,, immunocompromised CTN/ACM reviewed discharge instructions, medical action plan and red flags such as increased shortness of breath, increasing fever and signs of decompensation with patient who verbalized understanding. Discussed exposure protocols and quarantine with CDC Guidelines What to do if you are sick with coronavirus disease 2019 Patient who was given an opportunity for questions and concerns. The patient agrees to contact the [facility] exposure line, local health department and PCP office for questions related to their healthcare. CTN provided contact information for future reference.     Reviewed and educated patient on any new and changed medications related to discharge diagnosis     Plan for follow-up call in 14 days based on severity of symptoms and risk factors

## 2020-03-31 ENCOUNTER — PATIENT OUTREACH (OUTPATIENT)
Dept: FAMILY MEDICINE CLINIC | Age: 55
End: 2020-03-31

## 2020-04-02 NOTE — PROGRESS NOTES
.Patient contacted regarding recent discharge and COVID-19 risk   Care Transition Nurse/ Ambulatory Care Manager contacted the patient by telephone to perform post discharge assessment. Verified name and  with patient as identifiers. ACM made 14 day follow up call . No S or S of COVID 19 symptoms todate. Education provided regarding infection prevention, and signs and symptoms of COVID-19 and when to seek medical attention with patient who verbalized understanding. Discussed exposure protocols and quarantine from [address] you at higher risk for severe illness  and given an opportunity for questions and concerns. The patient    agrees to contact the COVID-19 hotline [phone] or PCP office for questions related to their healthcare. CTN/ACM provided contact information for future reference. From CDC: Are you at higher risk for severe illness?  Wash your hands often.  Avoid close contact (6 feet, which is about two arm lengths) with people who are sick.  Put distance between yourself and other people if COVID-19 is spreading in your community.  Clean and disinfect frequently touched surfaces.  Avoid all cruise travel and non-essential air travel.  Call your healthcare professional if you have concerns about COVID-19 and your underlying condition or if you are sick.     For more information on steps you can take to protect yourself, see CDC's How to Protect Yourself

## 2020-04-16 ENCOUNTER — OFFICE VISIT (OUTPATIENT)
Dept: CARDIOLOGY CLINIC | Age: 55
End: 2020-04-16

## 2020-04-16 VITALS
HEART RATE: 93 BPM | WEIGHT: 194 LBS | SYSTOLIC BLOOD PRESSURE: 146 MMHG | OXYGEN SATURATION: 98 % | BODY MASS INDEX: 28.73 KG/M2 | DIASTOLIC BLOOD PRESSURE: 90 MMHG | HEIGHT: 69 IN

## 2020-04-16 DIAGNOSIS — E11.22 TYPE 2 DIABETES MELLITUS WITH DIABETIC CHRONIC KIDNEY DISEASE, UNSPECIFIED CKD STAGE, UNSPECIFIED WHETHER LONG TERM INSULIN USE (HCC): ICD-10-CM

## 2020-04-16 DIAGNOSIS — N18.6 ESRD (END STAGE RENAL DISEASE) (HCC): ICD-10-CM

## 2020-04-16 DIAGNOSIS — I42.0 DILATED CARDIOMYOPATHY (HCC): Primary | ICD-10-CM

## 2020-04-16 DIAGNOSIS — I50.9 ACUTE ON CHRONIC CONGESTIVE HEART FAILURE, UNSPECIFIED HEART FAILURE TYPE (HCC): ICD-10-CM

## 2020-04-16 NOTE — PATIENT INSTRUCTIONS
If you have not heard from the central scheduler to schedule your testing in 48 hours, please call [phone].

## 2020-04-16 NOTE — PROGRESS NOTES
Netta Sharma presents today for No chief complaint on file. Netta Sharma preferred language for health care discussion is english/other. Is someone accompanying this pt? no    Is the patient using any DME equipment during [address]? Life vest    Depression Screening:  3 most recent PHQ Screens 4/16/2020   Little interest or pleasure in doing things Not at all   Feeling down, depressed, irritable, or hopeless Not at all   Total Score PHQ 2 0       Learning Assessment:  No flowsheet data found. Abuse Screening:  Abuse Screening Questionnaire 4/16/2020   Do you ever feel afraid of your partner? N   Are you in a relationship with someone who physically or mentally threatens you? N   Is it safe for you to go home? Y       Fall Risk  Fall Risk Assessment, last 12 mths 4/16/2020   Able to walk? Yes   Fall in past 12 months? No       Pt currently taking Anticoagulant therapy? ASA 81 mg once a day    Coordination of Care:  1. Have you been to the ER, urgent care clinic since your last visit? Hospitalized since your last visit? Yes, 03- to 03- for pleural effusion and CHF exacerbation    2. Have you seen or consulted any other health care providers outside of the [address] [place] [address] since your last visit? Include any pap smears or colon screening.  no

## 2020-06-10 RX ORDER — SODIUM CHLORIDE 0.9 % (FLUSH) 0.9 %
5-40 SYRINGE (ML) INJECTION AS NEEDED
Status: CANCELLED | OUTPATIENT
Start: 2020-06-10

## 2020-06-10 RX ORDER — SODIUM CHLORIDE 0.9 % (FLUSH) 0.9 %
5-40 SYRINGE (ML) INJECTION EVERY 8 HOURS
Status: CANCELLED | OUTPATIENT
Start: 2020-06-10

## 2020-06-11 ENCOUNTER — HOSPITAL ENCOUNTER (OUTPATIENT)
Age: 55
Setting detail: OUTPATIENT SURGERY
Discharge: HOME | End: 2020-06-11
Attending: SURGERY | Admitting: SURGERY
Payer: COMMERCIAL

## 2020-06-11 ENCOUNTER — HOSPITAL ENCOUNTER (OUTPATIENT)
Dept: NON INVASIVE DIAGNOSTICS | Age: 55
Discharge: HOME | End: 2020-06-11
Attending: INTERNAL MEDICINE
Payer: COMMERCIAL

## 2020-06-11 VITALS
HEIGHT: 68 IN | HEART RATE: 72 BPM | RESPIRATION RATE: 10 BRPM | DIASTOLIC BLOOD PRESSURE: 68 MMHG | WEIGHT: 190 LBS | SYSTOLIC BLOOD PRESSURE: 129 MMHG | BODY MASS INDEX: 28.79 KG/M2 | OXYGEN SATURATION: 97 %

## 2020-06-11 VITALS
BODY MASS INDEX: 28.79 KG/M2 | SYSTOLIC BLOOD PRESSURE: 146 MMHG | WEIGHT: 190 LBS | HEIGHT: 68 IN | DIASTOLIC BLOOD PRESSURE: 90 MMHG

## 2020-06-11 DIAGNOSIS — I42.0 DILATED CARDIOMYOPATHY (HCC): ICD-10-CM

## 2020-06-11 DIAGNOSIS — T82.9XXA COMPLICATION OF DIALYSIS ACCESS INSERTION: ICD-10-CM

## 2020-06-11 DIAGNOSIS — N18.6 ESRD (END STAGE RENAL DISEASE) (HCC): ICD-10-CM

## 2020-06-11 LAB
BUN BLD-MCNC: 45 MG/DL (ref 7–18)
CHLORIDE BLD-SCNC: 103 MMOL/L (ref 100–108)
ECHO LV EDV TEICHHOLZ: 1.07 ML
ECHO LV ESV TEICHHOLZ: 0.76 ML
ECHO LV INTERNAL DIMENSION DIASTOLIC: 6.06 CM (ref 3.9–5.3)
ECHO LV INTERNAL DIMENSION SYSTOLIC: 5.22 CM
ECHO LV IVSD: 1.45 CM (ref 0.6–0.9)
ECHO LV MASS 2D: 550.6 G (ref 67–162)
ECHO LV MASS INDEX 2D: 275.3 G/M2 (ref 43–95)
ECHO LV POSTERIOR WALL DIASTOLIC: 1.63 CM (ref 0.6–0.9)
ECHO TV REGURGITANT MAX VELOCITY: 201.64 CM/S
ECHO TV REGURGITANT PEAK GRADIENT: 16.3 MMHG
GLUCOSE BLD STRIP.AUTO-MCNC: 95 MG/DL (ref 74–106)
HCT VFR BLD CALC: 30 % (ref 36–49)
HGB BLD-MCNC: 10.2 G/DL (ref 12–16)
LVFS 2D: 13.92 %
LVSV (TEICH): 26.42 ML
POTASSIUM BLD-SCNC: 3.9 MMOL/L (ref 3.5–5.5)
SODIUM BLD-SCNC: 140 MMOL/L (ref 136–145)

## 2020-06-11 PROCEDURE — 77030018865 HC TRNSDCR PRSS MON EDWD -A: Performed by: SURGERY

## 2020-06-11 PROCEDURE — 77030028790 HC ACC ST CTRL VLV COPLT ABBT -B: Performed by: SURGERY

## 2020-06-11 PROCEDURE — C1750 CATH, HEMODIALYSIS,LONG-TERM: HCPCS | Performed by: SURGERY

## 2020-06-11 PROCEDURE — 36581 REPLACE TUNNELED CV CATH: CPT | Performed by: SURGERY

## 2020-06-11 PROCEDURE — 99152 MOD SED SAME PHYS/QHP 5/>YRS: CPT | Performed by: SURGERY

## 2020-06-11 PROCEDURE — 93325 DOPPLER ECHO COLOR FLOW MAPG: CPT

## 2020-06-11 PROCEDURE — 74011250636 HC RX REV CODE- 250/636: Performed by: SURGERY

## 2020-06-11 PROCEDURE — 74011250637 HC RX REV CODE- 250/637: Performed by: SURGERY

## 2020-06-11 PROCEDURE — 82947 ASSAY GLUCOSE BLOOD QUANT: CPT

## 2020-06-11 PROCEDURE — C1769 GUIDE WIRE: HCPCS | Performed by: SURGERY

## 2020-06-11 PROCEDURE — 77030013744: Performed by: SURGERY

## 2020-06-11 PROCEDURE — 74011636320 HC RX REV CODE- 636/320: Performed by: SURGERY

## 2020-06-11 DEVICE — PRECISION CHRONIC CATHETER SPORT PACK,SYMMETRICAL TIP, TAL VENATRAC STYLET,14.5 FR/CH (4.8 MM) X 19 CM
Type: IMPLANTABLE DEVICE | Site: NECK | Status: FUNCTIONAL
Brand: PALINDROME

## 2020-06-11 RX ORDER — DIPHENHYDRAMINE HYDROCHLORIDE 50 MG/ML
12.5 INJECTION, SOLUTION INTRAMUSCULAR; INTRAVENOUS
Status: DISCONTINUED | OUTPATIENT
Start: 2020-06-11 | End: 2020-06-11 | Stop reason: HOSPADM

## 2020-06-11 RX ORDER — ACETAMINOPHEN 325 MG/1
650 TABLET ORAL
Status: DISCONTINUED | OUTPATIENT
Start: 2020-06-11 | End: 2020-06-11 | Stop reason: HOSPADM

## 2020-06-11 RX ORDER — ONDANSETRON 2 MG/ML
4 INJECTION INTRAMUSCULAR; INTRAVENOUS
Status: DISCONTINUED | OUTPATIENT
Start: 2020-06-11 | End: 2020-06-11 | Stop reason: HOSPADM

## 2020-06-11 RX ORDER — OXYCODONE AND ACETAMINOPHEN 5; 325 MG/1; MG/1
1 TABLET ORAL
Status: DISCONTINUED | OUTPATIENT
Start: 2020-06-11 | End: 2020-06-11 | Stop reason: HOSPADM

## 2020-06-11 RX ORDER — FENTANYL CITRATE 50 UG/ML
INJECTION, SOLUTION INTRAMUSCULAR; INTRAVENOUS AS NEEDED
Status: DISCONTINUED | OUTPATIENT
Start: 2020-06-11 | End: 2020-06-11 | Stop reason: HOSPADM

## 2020-06-11 RX ORDER — MORPHINE SULFATE 10 MG/ML
1 INJECTION, SOLUTION INTRAMUSCULAR; INTRAVENOUS
Status: DISCONTINUED | OUTPATIENT
Start: 2020-06-11 | End: 2020-06-11 | Stop reason: HOSPADM

## 2020-06-11 RX ORDER — MIDAZOLAM HYDROCHLORIDE 1 MG/ML
INJECTION, SOLUTION INTRAMUSCULAR; INTRAVENOUS AS NEEDED
Status: DISCONTINUED | OUTPATIENT
Start: 2020-06-11 | End: 2020-06-11 | Stop reason: HOSPADM

## 2020-06-11 RX ADMIN — OXYCODONE HYDROCHLORIDE AND ACETAMINOPHEN 1 TABLET: 5; 325 TABLET ORAL at 09:36

## 2020-06-11 NOTE — PROGRESS NOTES
Cath holding summary     Patient escorted to cath holding from waiting area ambulatory, alert and oriented x 4, voicing no complaints. Changed into gown and placed on monitor. NPO since [place]. Lab results, med rec and H&P reviewed on chart. PIV x 1 inserted without difficulty. 4072  TRANSFER - OUT REPORT:    Verbal report given to Birdie(name) on Yaneth M Aguayo  being transferred to cath lab(unit) for ordered procedure       Report consisted of patients Situation, Background, Assessment and   Recommendations(SBAR). Information from the following report(s) SBAR, MAR and Pre Procedure Checklist was reviewed with the receiving nurse. Lines:       Opportunity for questions and clarification was provided. Patient transported with:   Tech       9857  TRANSFER - IN REPORT:    Verbal report received from Omaira(name) on Yaneth M Aguayo  being received from cath lab(unit) for routine post - op      Report consisted of patients Situation, Background, Assessment and   Recommendations(SBAR). Information from the following report(s) SBAR, Procedure Summary and MAR was reviewed with the receiving nurse. Opportunity for questions and clarification was provided. Assessment completed upon patients arrival to unit and care assumed. Dressing to right chest clean dry and intact. 0915  Oozing from cath site, dressing changed and reinforced. 0940  Pt provided discharge instructions. All questions answered and pt verbalized understanding. PIV removed. No hematoma or bleeding noted at this time. Procedure site within normal limits. Pt escorted to front of building for discharge.

## 2020-06-11 NOTE — PROGRESS NOTES
Ms. Stacy Kang returned to [address] with minor bleeding from recent [facility] Cath placement. Dressing removed area cleaned and new hemostatic dressing applied. Pressure held for 10 min. Sterile cover applied. Pt. observed for 15 min. No sign of bleeding or swelling. Released to [facility] for transport.

## 2020-06-11 NOTE — Clinical Note
TRANSFER - OUT REPORT:     Verbal report given to: Rene Lunsford RN. Report consisted of patient's Situation, Background, Assessment and   Recommendations(SBAR). Opportunity for questions and clarification was provided. Patient transported with a Cardiac Cath Tech / Patient Care Tech. Patient transported to: [address]

## 2020-06-11 NOTE — Clinical Note
TRANSFER - IN REPORT:     Verbal report received from: Gurdeep Perez RN. Report consisted of patient's Situation, Background, Assessment and   Recommendations(SBAR). Opportunity for questions and clarification was provided. Assessment completed upon patient's arrival to unit and care assumed. Patient transported with a Cardiac Cath Tech / Patient Care Tech.

## 2020-06-11 NOTE — Clinical Note
Right chest and right neck prepped with ChloraPrep and draped. Wet prep solution applied at: 757. Wet prep solution dried at: 800. Wet prep elapsed drying time: 43 mins.

## 2020-06-11 NOTE — PROGRESS NOTES
Outpatient echocardiogram completed. Patient had recent port placed and was actively bleeding from site. Was instructed to return to Cath Lab.

## 2020-06-11 NOTE — H&P
Surgery History and Physical    Subjective:      Didi Hill is a 48 y.o. female who presents with poorly working catheter. Patient Active Problem List    Diagnosis Date Noted    Thrombocytopenia ([facility] [address] 02/27/2015     Priority: 1 - One    Accelerated essential hypertension 01/23/2015     Priority: 1 - One    Menorrhagia with regular cycle 01/23/2015     Priority: 1 - One    Obesity (BMI 30.0-34.9) 01/23/2015     Priority: 1 - One    DM2 (diabetes mellitus, type 2) ([address] 01/23/2015     Priority: 1 - One    Pleural effusion 03/11/2020    CHF exacerbation ([address] 03/11/2020    Secondary hyperparathyroidism of renal origin ([facility] [address] 06/02/2019    ARF (acute renal failure) ([facility] [address] 05/30/2019    Acute renal failure superimposed on stage 5 chronic kidney disease, not on chronic dialysis ([facility] [address] 05/30/2019    Anemia 25/52/0285    Metabolic acidosis 34/37/5172    Proteinuria 05/30/2019    Bacteremia 12/24/2018    Menorrhagia 07/05/2017    Anemia in chronic kidney disease (CKD) 02/28/2017    Anemia due to blood loss, chronic 01/23/2015     Past Medical History:   Diagnosis Date    Anemia     Arthritis     Diabetes ([address]     Hypertension     ITP (idiopathic thrombocytopenic purpura)       Past Surgical History:   Procedure Laterality Date    HX GYN      c section 1984    HX HYSTERECTOMY  2017    HX TUBAL LIGATION      IR INSERT TUNL CVC W/O PORT OVER 5 YR  8/12/2019      Social History     Tobacco Use    Smoking status: Never Smoker    Smokeless tobacco: Never Used   Substance Use Topics    Alcohol use: No      Family History   Problem Relation Age of Onset    Hypertension Mother     Cancer Father         Colon      Prior to Admission medications    Medication Sig Start Date End Date Taking? Authorizing Provider   carvediloL (COREG) 6.25 mg tablet Take 1 Tab by mouth every twelve (12) hours. 3/16/20   Ashlee Fernando MD   aspirin 81 mg chewable tablet Take 1 Tab by mouth daily.  3/17/20   David Presume, MD [place]   ferrous sulfate 325 mg (65 mg iron) tablet Take 1 Tab by mouth two (2) times daily (with meals). 3/13/20   Cecille Duverney, MD   atorvastatin (LIPITOR) 10 mg tablet Take  by mouth daily. Jeff Correa MD   hydrALAZINE (APRESOLINE) 25 mg tablet Take 25 mg by mouth three (3) times daily. Jeff Correa MD   calcium acetate (PHOSLO) 667 mg cap Take 1 Cap by mouth three (3) times daily (with meals). 6/4/19   Roman Rowland MD   loratadine (CLARITIN) 10 mg tablet Take 10 mg by mouth daily as needed for Allergies. ProviderJass   acetaminophen (TYLENOL) 500 mg tablet Take 2 Tabs by mouth every six (6) hours as needed for Pain. 12/23/18   Nj Flores., AMALIA     Allergies   Allergen Reactions    Rocephin [Ceftriaxone] Itching    Pcn [Penicillins] Itching         ROS:  Pertinent items are noted in HPI. Unless otherwise mentioned in the HPI. Objective:     No data found. No data recorded. Physical Exam:  GENERAL: alert, cooperative, no distress, appears stated age, THROAT & NECK: normal and no erythema or exudates noted. , LUNG: clear to auscultation bilaterally, HEART: regular rate and rhythm, S1, S2 normal, no murmur, click, rub or gallop, ABDOMEN: soft, non-tender. Bowel sounds normal. No masses,  no organomegaly    Labs: No results found for this or any previous visit (from the past 24 hour(s)).     Data Review:    CBC:   Lab Results   Component Value Date/Time    WBC 4.2 (L) 03/16/2020 04:58 AM    RBC 3.59 (L) 03/16/2020 04:58 AM    HGB 10.8 (L) 03/16/2020 04:58 AM    HCT 33.0 (L) 03/16/2020 04:58 AM    PLATELET 87 (L) 71/04/1159 04:58 AM      BMP:   Lab Results   Component Value Date/Time    Glucose 77 03/16/2020 04:58 AM    Sodium 134 (L) 03/16/2020 04:58 AM    Potassium 4.6 03/16/2020 04:58 AM    Chloride 103 03/16/2020 04:58 AM    CO2 23 03/16/2020 04:58 AM    BUN 50 (H) 03/16/2020 04:58 AM    Creatinine 9.44 (H) 03/16/2020 04:58 AM    Calcium 8.7 03/16/2020 04:58 AM Calcium 8.7 03/16/2020 04:58 AM     Coagulation:   Lab Results   Component Value Date/Time    Prothrombin time 12.5 08/12/2019 11:20 AM    INR 1.0 08/12/2019 11:20 AM    aPTT 30.5 08/12/2019 11:20 AM       Assessment:     Active Problems:    * No active hospital problems. *      Plan:     New catheter placement.     Signed By: Jorge Veras MD     June 11, 2020

## 2020-06-29 ENCOUNTER — TELEPHONE (OUTPATIENT)
Dept: CARDIOLOGY CLINIC | Age: 55
End: 2020-06-29

## 2020-06-29 NOTE — TELEPHONE ENCOUNTER
----- Message from Cleo Gatica MD sent at 6/15/2020 12:00 PM EDT -----  Echo reviewed, ef still 20's, should consider aicd at this point. I can see her next week to discuss.  Thx  ----- Message -----  From: Gigi Jeffers DO  Sent: 6/11/2020   1:32 PM EDT  To: Cleo Gatica MD

## 2020-07-02 ENCOUNTER — OFFICE VISIT (OUTPATIENT)
Dept: CARDIOLOGY CLINIC | Age: 55
End: 2020-07-02

## 2020-07-02 VITALS
BODY MASS INDEX: 29.7 KG/M2 | HEART RATE: 104 BPM | DIASTOLIC BLOOD PRESSURE: 80 MMHG | HEIGHT: 68 IN | WEIGHT: 196 LBS | SYSTOLIC BLOOD PRESSURE: 170 MMHG | OXYGEN SATURATION: 98 %

## 2020-07-02 DIAGNOSIS — I42.0 DILATED CARDIOMYOPATHY (HCC): Primary | ICD-10-CM

## 2020-07-02 DIAGNOSIS — I50.9 ACUTE ON CHRONIC CONGESTIVE HEART FAILURE, UNSPECIFIED HEART FAILURE TYPE (HCC): ICD-10-CM

## 2020-07-02 DIAGNOSIS — N18.30 CHRONIC KIDNEY DISEASE, STAGE 3 (MODERATE): ICD-10-CM

## 2020-07-02 DIAGNOSIS — E11.22 TYPE 2 DIABETES MELLITUS WITH DIABETIC CHRONIC KIDNEY DISEASE, UNSPECIFIED CKD STAGE, UNSPECIFIED WHETHER LONG TERM INSULIN USE (HCC): ICD-10-CM

## 2020-07-02 NOTE — PROGRESS NOTES
HPI: A 55-year old female here for follow up. She is accompanied by a . She has been feeling much better since I last saw her in April. She has finally gotten an adequate amount of fluid off during dialysis and she feels quite well. She has only minimal dyspnea at times. She has no chest pain or syncope. She has removed the Life Vest. She denies any new PND, orthopnea or edema. Her blood pressure during dialysis is well controlled. She has an element of white coat syndrome. Impression/Plan:  1. Chronic systolic heart failure improved with dialysis and fluid removal.  2. Nonischemic cardiomyopathy with EF 20-25%. Heart catheterization March 2020 without significant epicardial disease. 3. End-stage renal disease now on dialysis. She has a right chest catheter in and contemplating possible peritoneal or AV fistula. 4. Hypertension with white coat syndrome. 5. Diabetes. 6. History of ITP. 7. Dyslipidemia. We had a lengthy discussion about AICD for primary prevention. I discussed the recent findings on her echo and reasons to consider an AICD along with risks associated with her proced. At this point she understands the risk of sudden cardiac death and would like to defer. Shared decision making was made discussing risk. I would like to start her on low dose ACE inhibitor or ARB however, she is worried about it damaging her kidneys. My suspicion is that she is going to be on lifelong dialysis but I will defer to Dr. Giacomo De La O if we can start. I will see her back in six months. All questions answered. Past Medical History:   Diagnosis Date    Anemia     Arthritis     Diabetes ([facility] [address]     Hypertension     ITP (idiopathic thrombocytopenic purpura)        Current Outpatient Medications   Medication Sig Dispense Refill    carvediloL (COREG) 6.25 mg tablet Take 1 Tab by mouth every twelve (12) hours. 60 Tab 0    aspirin 81 mg chewable tablet Take 1 Tab by mouth daily.  30 Tab 0    ferrous sulfate 325 mg (65 mg iron) tablet Take 1 Tab by mouth two (2) times daily (with meals). 60 Tab 0    atorvastatin (LIPITOR) 10 mg tablet Take  by mouth daily.  hydrALAZINE (APRESOLINE) 25 mg tablet Take 25 mg by mouth three (3) times daily.  calcium acetate (PHOSLO) 667 mg cap Take 1 Cap by mouth three (3) times daily (with meals). 90 Cap 3    loratadine (CLARITIN) 10 mg tablet Take 10 mg by mouth daily as needed for Allergies.  acetaminophen (TYLENOL) 500 mg tablet Take 2 Tabs by mouth every six (6) hours as needed for Pain. 20 Tab 0       Social History   reports that she has never smoked. She has never used smokeless tobacco.   reports no history of alcohol use. Family History  family history includes Cancer in her father; Hypertension in her mother. Review of Systems  Except as stated above include:  Constitutional: Negative for fever, chills and malaise/fatigue. HEENT: No congestion or recent URI. Gastrointestinal: No nausea, vomiting, abdominal pain, bloody stools. Pulmonary:  Negative except as stated above. Cardiac:  Negative except as stated above. Musculoskeletal: Negative except as stated above. Neurological:  No localized symptoms. Skin:  Negative except as stated above. Psych:  Negative except as stated above. Endocrine:  Negative except as stated above. PHYSICAL EXAM  BP Readings from Last 3 Encounters:   06/11/20 146/90   06/11/20 129/68   04/16/20 146/90     Pulse Readings from Last 3 Encounters:   06/11/20 72   04/16/20 93   03/16/20 80     Wt Readings from Last 3 Encounters:   06/11/20 86.2 kg (190 lb)   06/11/20 86.2 kg (190 lb)   04/16/20 88 kg (194 lb)     General:   Well developed, well groomed. Head/Neck:   No jugular venous distention     No carotid bruits. No evidence of xanthelasma. Lungs:   No respiratory distress. Clear bilaterally. Heart:    Regular rate and rhythm. Normal S1/S2. Palpation of heart with normal point of maximum impulse.     No significant murmurs, rubs or gallops. Abdomen:   Soft and nontender. No palpable abdominal mass or bruits. Extremities:   Intact peripheral pulses. No significant edema. Neurological:   Alert and oriented to person, place, time. No focal neurological deficit visually. Skin:   No obvious rash    Blood Pressure Metric:  Monitor recommended and adjustments stated if needed.

## 2021-01-07 ENCOUNTER — OFFICE VISIT (OUTPATIENT)
Dept: CARDIOLOGY CLINIC | Age: 56
End: 2021-01-07
Payer: COMMERCIAL

## 2021-01-07 VITALS
OXYGEN SATURATION: 96 % | DIASTOLIC BLOOD PRESSURE: 84 MMHG | HEART RATE: 90 BPM | HEIGHT: 68 IN | WEIGHT: 211 LBS | BODY MASS INDEX: 31.98 KG/M2 | SYSTOLIC BLOOD PRESSURE: 126 MMHG

## 2021-01-07 DIAGNOSIS — I50.9 ACUTE ON CHRONIC CONGESTIVE HEART FAILURE, UNSPECIFIED HEART FAILURE TYPE (HCC): ICD-10-CM

## 2021-01-07 DIAGNOSIS — I42.0 DILATED CARDIOMYOPATHY (HCC): Primary | ICD-10-CM

## 2021-01-07 DIAGNOSIS — E11.22 TYPE 2 DIABETES MELLITUS WITH DIABETIC CHRONIC KIDNEY DISEASE, UNSPECIFIED CKD STAGE, UNSPECIFIED WHETHER LONG TERM INSULIN USE (HCC): ICD-10-CM

## 2021-01-07 DIAGNOSIS — I42.0 DILATED CARDIOMYOPATHY (HCC): ICD-10-CM

## 2021-01-07 DIAGNOSIS — N18.6 ESRD (END STAGE RENAL DISEASE) (HCC): ICD-10-CM

## 2021-01-07 PROCEDURE — 99214 OFFICE O/P EST MOD 30 MIN: CPT | Performed by: INTERNAL MEDICINE

## 2021-01-07 PROCEDURE — 93000 ELECTROCARDIOGRAM COMPLETE: CPT | Performed by: INTERNAL MEDICINE

## 2021-01-07 RX ORDER — UREA 10 %
100 LOTION (ML) TOPICAL DAILY
COMMUNITY

## 2021-01-07 NOTE — PROGRESS NOTES
History of Present Illness:  50year old female here for follow up. Overall she has been doing better since I saw her in July, minimal to no dyspnea, no chest pain, syncope, no PND, orthopnea or significant edema. When I saw her previously I removed the LifeVest.  She is stable on dialysis at this time and there is discussion to consider possible renal transplant. Impression:  1. End stage renal disease, now on hemodialysis, initiated early 2020, followed by Dr. Marty Temple. 2. Nonischemic cardiomyopathy with EF 20-25% June, 2020, as well as March, 2020. Declined from June, 2019. Heart catheterization March, 2020 without any significant epicardial disease. 3. History of hypertension, white coat syndrome, now improved. 4. History of diabetes. 5. Dyslipidemia. 6. Chronic systolic heart failure, improved with fluid removal and dialysis. 7. Previous desire not to be on ACE inhibitor, ARB, as there was worry that it could prolong her dialysis needs. Plan: At this point I am going to obtain an echocardiogram to reevaluate her left ventricular function. If her EF is still depressed, I would recommend we go ahead and start her on Losartan, since at this time I do not suspect her kidney function is going to improve. She is contemplating possible renal transplant. Fortunately, she did have just a heart catheterization March, 2020 without any epicardial disease. Historically she declined AICD and LifeVest was removed last summer. All questions answered. Past Medical History:   Diagnosis Date    Anemia     Arthritis     Diabetes ([facility] [address]     Hypertension     ITP (idiopathic thrombocytopenic purpura)        Current Outpatient Medications   Medication Sig Dispense Refill    cyanocobalamin (VITAMIN B12) 100 mcg tablet Take 100 mcg by mouth daily.  carvediloL (COREG) 6.25 mg tablet Take 1 Tab by mouth every twelve (12) hours.  60 Tab 0    hydrALAZINE (APRESOLINE) 25 mg tablet Take 25 mg by mouth three (3) times daily.  calcium acetate (PHOSLO) 667 mg cap Take 1 Cap by mouth three (3) times daily (with meals). 90 Cap 3    loratadine (CLARITIN) 10 mg tablet Take 10 mg by mouth daily as needed for Allergies.  acetaminophen (TYLENOL) 500 mg tablet Take 2 Tabs by mouth every six (6) hours as needed for Pain. 20 Tab 0       Social History   reports that she has never smoked. She has never used smokeless tobacco.   reports no history of alcohol use. Family History  family history includes Cancer in her father; Hypertension in her mother. Review of Systems  Except as stated above include:  Constitutional: Negative for fever, chills and malaise/fatigue. HEENT: No congestion or recent URI. Gastrointestinal: No nausea, vomiting, abdominal pain, bloody stools. Pulmonary:  Negative except as stated above. Cardiac:  Negative except as stated above. Musculoskeletal: Negative except as stated above. Neurological:  No localized symptoms. Skin:  Negative except as stated above. Psych:  Negative except as stated above. Endocrine:  Negative except as stated above. PHYSICAL EXAM  BP Readings from Last 3 Encounters:   01/07/21 126/84   07/02/20 170/80   06/11/20 146/90     Pulse Readings from Last 3 Encounters:   01/07/21 90   07/02/20 (!) 104   06/11/20 72     Wt Readings from Last 3 Encounters:   01/07/21 95.7 kg (211 lb)   07/02/20 88.9 kg (196 lb)   06/11/20 86.2 kg (190 lb)     General:   Well developed, well groomed. Head/Neck:   No obvious jugular venous distention     No obvious carotid pulsations. No evidence of xanthelasma. Lungs:   No respiratory distress. Clear bilaterally. Heart:  Regular rate and rhythm. Normal S1/S2. Palpation grossly normal.    No significant murmurs, rubs or gallops. Abdomen:   Non-acute abdomen. No obvious pulsations. Extremities:   Intact peripheral pulses. No significant edema.     Neurological:   Alert and oriented to person, place, time. No focal neurological deficit visually. Skin:   No obvious rash    Blood Pressure Metric:  Monitor recommended and adjustments stated if needed.

## 2021-01-07 NOTE — PROGRESS NOTES
Samreen Lozada presents today for   Chief Complaint   Patient presents with    Follow-up     6 month follow up       Samreen Lozada preferred language for health care discussion is english/other. Is someone accompanying this pt? no    Is the patient using any DME equipment during [address]? no    Depression Screening:  3 most recent PHQ Screens 1/7/2021   Little interest or pleasure in doing things Not at all   Feeling down, depressed, irritable, or hopeless Not at all   Total Score PHQ 2 0       Learning Assessment:  Learning Assessment 1/7/2021   PRIMARY LEARNER Patient   PRIMARY LANGUAGE ENGLISH   LEARNER PREFERENCE PRIMARY DEMONSTRATION   ANSWERED BY patient   RELATIONSHIP SELF       Abuse Screening:  Abuse Screening Questionnaire 1/7/2021   Do you ever feel afraid of your partner? N   Are you in a relationship with someone who physically or mentally threatens you? N   Is it safe for you to go home? Y       Fall Risk  Fall Risk Assessment, last 12 mths 1/7/2021   Able to walk? Yes   Fall in past 12 months? 0   Do you feel unsteady? 0   Are you worried about falling 0       Pt currently taking Anticoagulant therapy? no    Coordination of Care:  1. Have you been to the ER, urgent care clinic since your last visit? Hospitalized since your last visit? no    2. Have you seen or consulted any other health care providers outside of the [address] [place] [address] since your last visit? Include any pap smears or colon screening.  no

## 2021-01-08 ENCOUNTER — TRANSCRIBE ORDER (OUTPATIENT)
Dept: SCHEDULING | Age: 56
End: 2021-01-08

## 2021-01-08 DIAGNOSIS — Z12.31 VISIT FOR SCREENING MAMMOGRAM: Primary | ICD-10-CM

## 2021-01-14 ENCOUNTER — HOSPITAL ENCOUNTER (OUTPATIENT)
Dept: LAB | Age: 56
Discharge: HOME | End: 2021-01-14

## 2021-01-14 LAB — SENTARA SPECIMEN COL,SENBCF: NORMAL

## 2021-01-14 PROCEDURE — 99001 SPECIMEN HANDLING PT-LAB: CPT

## 2021-01-15 ENCOUNTER — TELEPHONE (OUTPATIENT)
Dept: VASCULAR SURGERY | Age: 56
End: 2021-01-15

## 2021-01-15 NOTE — TELEPHONE ENCOUNTER
Patient's dialysis unit has notified our office that patient's perm catheter is not working properly and having high venous pressures. Reviewed with MANDY Razo and will set patient up for perm catheter exchange. Will have surgery scheduler contact patient with date and time.

## 2021-01-18 ENCOUNTER — TELEPHONE (OUTPATIENT)
Dept: VASCULAR SURGERY | Age: 56
End: 2021-01-18

## 2021-01-18 NOTE — TELEPHONE ENCOUNTER
Called the pt and advised to arrive at 0730 to the heart center, npo after midnight . Reminded that no one can stay and make sure whoever brings her has a good contact number to be called once she is done. Pt verbalized understanding.

## 2021-01-19 ENCOUNTER — TELEPHONE (OUTPATIENT)
Dept: VASCULAR SURGERY | Age: 56
End: 2021-01-19

## 2021-01-19 ENCOUNTER — HOSPITAL ENCOUNTER (OUTPATIENT)
Age: 56
Setting detail: OUTPATIENT SURGERY
Discharge: HOME | End: 2021-01-19
Attending: SURGERY | Admitting: SURGERY
Payer: COMMERCIAL

## 2021-01-19 VITALS
DIASTOLIC BLOOD PRESSURE: 84 MMHG | HEART RATE: 70 BPM | WEIGHT: 206 LBS | HEIGHT: 69 IN | RESPIRATION RATE: 18 BRPM | SYSTOLIC BLOOD PRESSURE: 164 MMHG | BODY MASS INDEX: 30.51 KG/M2 | OXYGEN SATURATION: 96 %

## 2021-01-19 DIAGNOSIS — N17.9 ACUTE RENAL FAILURE, UNSPECIFIED ACUTE RENAL FAILURE TYPE (HCC): ICD-10-CM

## 2021-01-19 DIAGNOSIS — T82.9XXA COMPLICATION OF DIALYSIS ACCESS INSERTION: ICD-10-CM

## 2021-01-19 DIAGNOSIS — N17.9 ACUTE RENAL FAILURE, UNSPECIFIED ACUTE RENAL FAILURE TYPE (HCC): Primary | ICD-10-CM

## 2021-01-19 DIAGNOSIS — N18.6 ESRD (END STAGE RENAL DISEASE) (HCC): ICD-10-CM

## 2021-01-19 LAB
ANION GAP SERPL CALC-SCNC: 5 MMOL/L (ref 3–18)
BUN SERPL-MCNC: 38 MG/DL (ref 7–18)
BUN/CREAT SERPL: 4 (ref 12–20)
CALCIUM SERPL-MCNC: 9.2 MG/DL (ref 8.5–10.1)
CHLORIDE SERPL-SCNC: 100 MMOL/L (ref 100–111)
CO2 SERPL-SCNC: 29 MMOL/L (ref 21–32)
CREAT SERPL-MCNC: 9.12 MG/DL (ref 0.6–1.3)
ERYTHROCYTE [DISTWIDTH] IN BLOOD BY AUTOMATED COUNT: 14.6 % (ref 11.6–14.5)
GLUCOSE SERPL-MCNC: 128 MG/DL (ref 74–99)
HCT VFR BLD AUTO: 39.2 % (ref 35–45)
HGB BLD-MCNC: 12.7 G/DL (ref 12–16)
INR PPP: 1.1 (ref 0.8–1.2)
MCH RBC QN AUTO: 29.5 PG (ref 24–34)
MCHC RBC AUTO-ENTMCNC: 32.4 G/DL (ref 31–37)
MCV RBC AUTO: 91 FL (ref 74–97)
PLATELET # BLD AUTO: 88 K/UL (ref 135–420)
POTASSIUM SERPL-SCNC: 4.9 MMOL/L (ref 3.5–5.5)
PROTHROMBIN TIME: 14.1 SEC (ref 11.5–15.2)
RBC # BLD AUTO: 4.31 M/UL (ref 4.2–5.3)
SODIUM SERPL-SCNC: 134 MMOL/L (ref 136–145)
WBC # BLD AUTO: 4.9 K/UL (ref 4.6–13.2)

## 2021-01-19 PROCEDURE — 99153 MOD SED SAME PHYS/QHP EA: CPT | Performed by: SURGERY

## 2021-01-19 PROCEDURE — C1894 INTRO/SHEATH, NON-LASER: HCPCS | Performed by: SURGERY

## 2021-01-19 PROCEDURE — 80048 BASIC METABOLIC PNL TOTAL CA: CPT

## 2021-01-19 PROCEDURE — 74011250637 HC RX REV CODE- 250/637: Performed by: SURGERY

## 2021-01-19 PROCEDURE — 99152 MOD SED SAME PHYS/QHP 5/>YRS: CPT | Performed by: SURGERY

## 2021-01-19 PROCEDURE — 85027 COMPLETE CBC AUTOMATED: CPT

## 2021-01-19 PROCEDURE — 74011000636 HC RX REV CODE- 636: Performed by: SURGERY

## 2021-01-19 PROCEDURE — 85610 PROTHROMBIN TIME: CPT

## 2021-01-19 PROCEDURE — 74011000250 HC RX REV CODE- 250: Performed by: SURGERY

## 2021-01-19 PROCEDURE — 74011250636 HC RX REV CODE- 250/636: Performed by: SURGERY

## 2021-01-19 PROCEDURE — 36598 INJ W/FLUOR EVAL CV DEVICE: CPT

## 2021-01-19 PROCEDURE — 77030002916 HC SUT ETHLN J&J -A: Performed by: SURGERY

## 2021-01-19 PROCEDURE — C1769 GUIDE WIRE: HCPCS | Performed by: SURGERY

## 2021-01-19 PROCEDURE — 36581 REPLACE TUNNELED CV CATH: CPT | Performed by: SURGERY

## 2021-01-19 PROCEDURE — C1750 CATH, HEMODIALYSIS,LONG-TERM: HCPCS | Performed by: SURGERY

## 2021-01-19 PROCEDURE — 75774 ARTERY X-RAY EACH VESSEL: CPT | Performed by: SURGERY

## 2021-01-19 RX ORDER — MIDAZOLAM HYDROCHLORIDE 1 MG/ML
INJECTION, SOLUTION INTRAMUSCULAR; INTRAVENOUS AS NEEDED
Status: DISCONTINUED | OUTPATIENT
Start: 2021-01-19 | End: 2021-01-19 | Stop reason: HOSPADM

## 2021-01-19 RX ORDER — HYDRALAZINE HYDROCHLORIDE 20 MG/ML
10 INJECTION INTRAMUSCULAR; INTRAVENOUS ONCE
Status: DISCONTINUED | OUTPATIENT
Start: 2021-01-19 | End: 2021-01-19 | Stop reason: HOSPADM

## 2021-01-19 RX ORDER — HYDROCODONE BITARTRATE AND ACETAMINOPHEN 7.5; 325 MG/1; MG/1
1 TABLET ORAL
Status: COMPLETED | OUTPATIENT
Start: 2021-01-19 | End: 2021-01-19

## 2021-01-19 RX ORDER — FENTANYL CITRATE 50 UG/ML
INJECTION, SOLUTION INTRAMUSCULAR; INTRAVENOUS AS NEEDED
Status: DISCONTINUED | OUTPATIENT
Start: 2021-01-19 | End: 2021-01-19 | Stop reason: HOSPADM

## 2021-01-19 RX ORDER — LIDOCAINE HYDROCHLORIDE 10 MG/ML
INJECTION, SOLUTION EPIDURAL; INFILTRATION; INTRACAUDAL; PERINEURAL AS NEEDED
Status: DISCONTINUED | OUTPATIENT
Start: 2021-01-19 | End: 2021-01-19 | Stop reason: HOSPADM

## 2021-01-19 RX ORDER — HYDRALAZINE HYDROCHLORIDE 20 MG/ML
INJECTION INTRAMUSCULAR; INTRAVENOUS AS NEEDED
Status: DISCONTINUED | OUTPATIENT
Start: 2021-01-19 | End: 2021-01-19 | Stop reason: HOSPADM

## 2021-01-19 RX ORDER — LABETALOL HCL 20 MG/4 ML
20 SYRINGE (ML) INTRAVENOUS ONCE
Status: COMPLETED | OUTPATIENT
Start: 2021-01-19 | End: 2021-01-19

## 2021-01-19 RX ORDER — HYDRALAZINE HYDROCHLORIDE 20 MG/ML
10 INJECTION INTRAMUSCULAR; INTRAVENOUS
Status: COMPLETED | OUTPATIENT
Start: 2021-01-19 | End: 2021-01-19

## 2021-01-19 RX ADMIN — HYDRALAZINE HYDROCHLORIDE 10 MG: 20 INJECTION INTRAMUSCULAR; INTRAVENOUS at 08:25

## 2021-01-19 RX ADMIN — HYDROCODONE BITARTRATE AND ACETAMINOPHEN 1 TABLET: 7.5; 325 TABLET ORAL at 10:33

## 2021-01-19 RX ADMIN — LABETALOL HYDROCHLORIDE 20 MG: 5 INJECTION, SOLUTION INTRAVENOUS at 10:14

## 2021-01-19 NOTE — DISCHARGE INSTRUCTIONS
Patient Education        Short-term Catheter for Hemodialysis: Care Instructions  Overview     To start hemodialysis (also called dialysis) right away, your doctor will insert a soft plastic tube into a vein. This tube will carry your blood to the dialysis machine. The tube is called a central venous catheter, or CV line. It will be your vascular access until your permanent access is ready to use. If you have a kidney injury that can be healed, you may need dialysis only for a short time. But some people will need to have long-term dialysis. This includes people with chronic kidney disease. If you need long-term dialysis, it can take weeks or months for a permanent vascular access to be ready to use. You can use the catheter until a permanent access site is ready. The catheter site will be in a large vein, usually in your chest or neck. Or it may be in your groin. A few stitches will hold the catheter in place. By learning how to care for your access, you will help avoid problems and get the best results from your dialysis treatments. Follow-up care is a key part of your treatment and safety. Be sure to make and go to all appointments, and call your doctor if you are having problems. It's also a good idea to know your test results and keep a list of the medicines you take. How can you care for yourself at home? · Make sure the catheter is secured to your body and and isn't pulling. · Avoid clothes that rub or pull on your catheter. · Never use scissors or other sharp objects around your catheter. · Don't bend or crimp your catheter. · Ask your doctor or dialysis nurse when you can take a bath or shower. You may be able to do these things after the site heals or if you cover the site with a waterproof bandage. · You can stay active while the catheter is in. But talk to your doctor about the kind of activities you want to do.   · Review emergency instructions with your dialysis team so you know what to do if your catheter comes out. · Keep your bandage and exit site dry and clean. Change a dirty or bloody bandage. Check the site every day for signs of infection. · Always wash your hands before you touch your catheter. · Keep the end of the catheter covered when it's not in use. · Wear a mask during your dialysis treatments. It can keep you from breathing germs onto your catheter. When should you call for help? Call  911 anytime you think you may need emergency care. For example, call if:    · The catheter comes out of your body. Call your doctor now or seek immediate medical care if:    · You have signs of infection, such as:  ? Increased pain, swelling, warmth, or redness around the area. ? Red streaks leading from the area. ? Pus draining from the area. ? A fever.     · You have liquid leaking from around the catheter.     · There are cracks or leaks in the tube.     · You have pain or swelling in your neck or arm.     · The line becomes clogged. Watch closely for changes in your health, and be sure to contact your doctor if you have any problems. Where can you learn more? Go to http://www.gray.com/  Enter C210 in the search box to learn more about \"Short-term Catheter for Hemodialysis: Care Instructions. \"  Current as of: April 15, 2020               Content Version: 12.6  © 0520-6176 [employer]. Care instructions adapted under license by [employer] (which disclaims liability or warranty for this information). If you have questions about a medical condition or this instruction, always ask your healthcare professional. [place] [address] any warranty or liability for your use of this information.          DISCHARGE SUMMARY from Nurse    PATIENT INSTRUCTIONS:    After general anesthesia or intravenous sedation, for 24 hours or while taking prescription Narcotics:  · Limit your activities  · Do not drive and operate hazardous machinery  · Do not make important personal or business decisions  · Do  not drink alcoholic beverages  · If you have not urinated within 8 hours after discharge, please contact your surgeon on call. Report the following to your surgeon:  · Excessive pain, swelling, redness or odor of or around the surgical area  · Temperature over 100.5  · Nausea and vomiting lasting longer than 4 hours or if unable to take medications  · Any signs of decreased circulation or nerve impairment to extremity: change in color, persistent  numbness, tingling, coldness or increase pain  · Any questions    What to do at Home:  Recommended activity: Activity as tolerated and no driving for today. If you experience any of the following symptoms pain unrelieved by over the counter painmedications, please follow up with Primary Care Provider. *  Please give a list of your current medications to your Primary Care Provider. *  Please update this list whenever your medications are discontinued, doses are      changed, or new medications (including over-the-counter products) are added. *  Please carry medication information at all times in case of emergency situations. These are general instructions for a healthy lifestyle:    No smoking/ No tobacco products/ Avoid exposure to second hand smoke  Surgeon General's Warning:  Quitting smoking now greatly reduces serious risk to your health. Obesity, smoking, and sedentary lifestyle greatly increases your risk for illness    A healthy diet, regular physical exercise & weight monitoring are important for maintaining a healthy lifestyle    You may be retaining fluid if you have a history of heart failure or if you experience any of the following symptoms:  Weight gain of 3 pounds or more overnight or 5 pounds in a week, increased swelling in our hands or feet or shortness of breath while lying flat in bed.   Please call your doctor as soon as you notice any of these symptoms; do not wait until your next office visit. The discharge information has been reviewed with the patient. The patient verbalized understanding. Discharge medications reviewed with the patient and appropriate educational materials and side effects teaching were provided.   ___________________________________________________________________________________________________________________________________

## 2021-01-19 NOTE — H&P
History and Physical    Patient: Netta Sharma MRN: [id number]  SSN: [id number]    YOB: 1965  Age: 50 y.o. Sex: female      Subjective:      Netta Sharma is a 50 y.o. female who suffers from ESRD and has malfunction of her tunneled dialysis catheter. She does not have any permanent access. She reports no recent illnesses and hospitalizations. .     Past Medical History:   Diagnosis Date    Anemia     Arthritis     Diabetes ([address]     Hypertension     ITP (idiopathic thrombocytopenic purpura)      Past Surgical History:   Procedure Laterality Date    HX GYN      c section 1984    HX HYSTERECTOMY  2017    HX TUBAL LIGATION      IR INSERT TUNL CVC W/O PORT OVER 5 YR  8/12/2019    DC INSJ TUNNELED CVC W/O SUBQ PORT/ AGE 5 YR/> Right 6/11/2020    INSERTION TUNNELED CENTRAL VENOUS CATHETER performed by Harlan Dill MD at [facility] CATH LAB      Family History   Problem Relation Age of Onset    Hypertension Mother     Cancer Father         Colon     Social History     Tobacco Use    Smoking status: Never Smoker    Smokeless tobacco: Never Used   Substance Use Topics    Alcohol use: No      Prior to Admission medications    Medication Sig Start Date End Date Taking? Authorizing Provider   cyanocobalamin (VITAMIN B12) 100 mcg tablet Take 100 mcg by mouth daily. Yes Provider, Historical   carvediloL (COREG) 6.25 mg tablet Take 1 Tab by mouth every twelve (12) hours. 3/16/20  Yes Kajal Jorge MD   hydrALAZINE (APRESOLINE) 25 mg tablet Take 25 mg by mouth three (3) times daily. Yes Jeff Correa MD   calcium acetate (PHOSLO) 667 mg cap Take 1 Cap by mouth three (3) times daily (with meals). 6/4/19  Yes Twin Celeste MD   acetaminophen (TYLENOL) 500 mg tablet Take 2 Tabs by mouth every six (6) hours as needed for Pain. 12/23/18  Yes Molly Chester PA-C   loratadine (CLARITIN) 10 mg tablet Take 10 mg by mouth daily as needed for Allergies.     Provider, Historical Allergies   Allergen Reactions    Rocephin [Ceftriaxone] Itching    Pcn [Penicillins] Itching       Review of Systems:  Pertinent items are noted in the History of Present Illness. Objective:     Vitals:    01/19/21 0744   Weight: 206 lb (93.4 kg)   Height: 5' 9\" (1.753 m)        Physical Exam:  GENERAL: alert, cooperative, no distress, appears stated age  EYE: negative  THROAT & NECK: normal and no erythema or exudates noted. LUNG: clear to auscultation bilaterally, right chest TDC exit site clean and dry  ABDOMEN: soft, non-tender. Bowel sounds normal. No masses,  no organomegaly  EXTREMITIES:  extremities normal, atraumatic, no cyanosis or edema    Assessment:     ESRD with malfunction of right chest tunneled dialysis catheter    Plan:     Tunneled catheter exchange with moderate sedation.      Signed By: Costa Pennington MD     January 19, 2021

## 2021-01-19 NOTE — ROUTINE PROCESS
0053: Cath holding summary     Patient escorted to cath holding from waiting area ambulatory, alert and oriented x 4, voicing no complaints. Changed into gown and placed on monitor. NPO since MN. Lab results, med rec and H&P reviewed on chart. PIV x 1 inserted without difficulty. 8811: TRANSFER - OUT REPORT:    Verbal report given to Darryl Drummond (name) on Didi Hill  being transferred to Cath Lab (unit) for ordered procedure       Report consisted of patients Situation, Background, Assessment and   Recommendations(SBAR). Information from the following report(s) SBAR, Kardex, Intake/Output, MAR, Recent Results and Pre Procedure Checklist was reviewed with the receiving nurse. Lines:   Peripheral IV 01/19/21 Left Antecubital (Active)   Site Assessment Clean, dry, & intact 01/19/21 0811   Phlebitis Assessment 0 01/19/21 0811   Infiltration Assessment 0 01/19/21 0811   Dressing Status Clean, dry, & intact 01/19/21 0811   Dressing Type Transparent;Tape 01/19/21 0811   Hub Color/Line Status Pink 01/19/21 0811   Action Taken Blood drawn 01/19/21 0933        Opportunity for questions and clarification was provided. Patient transported with:   Collette Baller: TRANSFER - IN REPORT:    Verbal report received from Karo (name) on Didi Hill  being received from Cath Lab(unit) for routine post - op      Report consisted of patients Situation, Background, Assessment and   Recommendations(SBAR). Information from the following report(s) SBAR, Kardex, Procedure Summary, Intake/Output and MAR was reviewed with the receiving nurse. Opportunity for questions and clarification was provided. Assessment completed upon patients arrival to unit and care assumed.

## 2021-01-19 NOTE — TELEPHONE ENCOUNTER
Dr. Fernando Steen communicated to the office that pt needs bilateral vein mapping for new access and 2 week follow up appt. Order for upper bilateral vein map placed per verbal order from Dr. Fernando Steen. Gave to isauro to schedule.

## 2021-01-19 NOTE — PROCEDURES
Tunneled dialysis catheter Exchange with venogram of superior vena cava  Date of Surgery: 1/19/2021    Hospital:  [facility]   Surgeon(s): Todd Arreola MD  Assistant(s): None  Pre-operative Diagnosis: ESRD with malfunction of right chest tunneled dialysis catheter  Post-operative Diagnosis: Same  Procedure(s) Performed: Tunneled dialysis catheter exchange with  fluoroscopic guidance. Venogram of superior vena cava. Moderate sedation. Anesthesia:  Local with sedation   Findings:  Catheter flushed and isai well at conclusion of the procedure. Complications: None  Estimated Blood Loss:  Minimal  Tubes and Drains:  None  Specimens: * No specimens in log *    Procedure in Detail:   After informed consent was obtained, the patient taken to the procedure room and  placed supine on the table. A surgical time-out was performed. The patient was identified and the procedure verified. The right chest was prepped with chlorhexidine and draped in a sterile manner. 0.50% Lidocaine with epinephrine and bicarbonate was used to anesthetize the skin and tunnel surrounding the cuff. The cuff was dissected free from the subcutaneous tissue sharply with scissors. An Amplatz wire was placed through the existing catheter into the inferior vena cava under fluoroscopic guidance. The existing catheter was removed and replaced with a 9 Divehi sheath. Contrast was injected into the superior vena cava and no fibrin sheath was demonstrated. This sheath was then exchanged for a 19 cm catheter and the Dacron cuff positioned in the subcutaneous tissue 2 cm from the skin incision. The catheter tip position within the superior vena cava was verified by fluoroscopy. The images were saved and transferred to PACS. The catheter aspirated blood easily, was flushed with saline, and each port locked with 2mL of 1000 units/mL Heparin. The catheter was sutured to the skin withnylon.  Biopatch was applied to the catheter exit site. The surgical sites were covered with gauze and Op-Site. The patient tolerated the procedure well without complications.        Sue Redding MD

## 2021-01-19 NOTE — Clinical Note
TRANSFER - OUT REPORT:     Verbal report given to: [place]. Report consisted of patient's Situation, Background, Assessment and   Recommendations(SBAR). Opportunity for questions and clarification was provided. Patient transported with a Cardiac Cath Tech / Patient Care Tech. Patient transported to: [address].

## 2021-01-19 NOTE — Clinical Note
TRANSFER - IN REPORT:     Verbal report received from: [place]. Report consisted of patient's Situation, Background, Assessment and   Recommendations(SBAR). Opportunity for questions and clarification was provided. Assessment completed upon patient's arrival to unit and care assumed. Patient transported with a Cardiac Cath Tech / Patient Care Tech.

## 2021-03-18 ENCOUNTER — HOSPITAL ENCOUNTER (OUTPATIENT)
Dept: MAMMOGRAPHY | Age: 56
Discharge: HOME | End: 2021-03-18
Attending: INTERNAL MEDICINE
Payer: COMMERCIAL

## 2021-03-18 DIAGNOSIS — Z12.31 VISIT FOR SCREENING MAMMOGRAM: ICD-10-CM

## 2021-03-18 PROCEDURE — 77063 BREAST TOMOSYNTHESIS BI: CPT

## 2021-05-20 ENCOUNTER — HOSPITAL ENCOUNTER (OUTPATIENT)
Age: 56
Setting detail: OUTPATIENT SURGERY
Discharge: HOME | End: 2021-05-20
Attending: SURGERY | Admitting: SURGERY

## 2021-05-20 VITALS — BODY MASS INDEX: 31.4 KG/M2 | HEIGHT: 69 IN | WEIGHT: 212 LBS

## 2021-05-20 DIAGNOSIS — N18.6 ESRD (END STAGE RENAL DISEASE) (HCC): ICD-10-CM

## 2021-05-20 DIAGNOSIS — T82.9XXA COMPLICATION OF DIALYSIS ACCESS INSERTION: ICD-10-CM

## 2021-05-20 NOTE — ROUTINE PROCESS
1155 Cath holding summary     Patient escorted to cath holding from waiting area ambulatory, alert and oriented x 4, voicing no complaints. Changed into gown and placed on monitor. NPO since MN. Lab results, med rec and H&P reviewed on chart. 1224 Patient's case cancelled per Dr. Medina Route.

## 2021-07-08 ENCOUNTER — OFFICE VISIT (OUTPATIENT)
Dept: CARDIOLOGY CLINIC | Age: 56
End: 2021-07-08
Payer: COMMERCIAL

## 2021-07-08 DIAGNOSIS — N18.6 ESRD (END STAGE RENAL DISEASE) (HCC): Primary | ICD-10-CM

## 2021-07-08 PROCEDURE — 99024 POSTOP FOLLOW-UP VISIT: CPT | Performed by: INTERNAL MEDICINE

## 2021-07-22 ENCOUNTER — OFFICE VISIT (OUTPATIENT)
Dept: CARDIOLOGY CLINIC | Age: 56
End: 2021-07-22
Payer: COMMERCIAL

## 2021-07-22 VITALS
HEIGHT: 69 IN | BODY MASS INDEX: 31.7 KG/M2 | SYSTOLIC BLOOD PRESSURE: 132 MMHG | DIASTOLIC BLOOD PRESSURE: 86 MMHG | HEART RATE: 103 BPM | WEIGHT: 214 LBS | OXYGEN SATURATION: 100 %

## 2021-07-22 DIAGNOSIS — E11.22 TYPE 2 DIABETES MELLITUS WITH DIABETIC CHRONIC KIDNEY DISEASE, UNSPECIFIED CKD STAGE, UNSPECIFIED WHETHER LONG TERM INSULIN USE (HCC): ICD-10-CM

## 2021-07-22 DIAGNOSIS — I50.22 CHF (CONGESTIVE HEART FAILURE), NYHA CLASS I, CHRONIC, SYSTOLIC (HCC): ICD-10-CM

## 2021-07-22 DIAGNOSIS — I10 ESSENTIAL HYPERTENSION: ICD-10-CM

## 2021-07-22 DIAGNOSIS — N18.6 ESRD (END STAGE RENAL DISEASE) (HCC): ICD-10-CM

## 2021-07-22 DIAGNOSIS — I42.0 DILATED CARDIOMYOPATHY (HCC): Primary | ICD-10-CM

## 2021-07-22 PROCEDURE — 93000 ELECTROCARDIOGRAM COMPLETE: CPT | Performed by: INTERNAL MEDICINE

## 2021-07-22 PROCEDURE — 99215 OFFICE O/P EST HI 40 MIN: CPT | Performed by: INTERNAL MEDICINE

## 2021-07-22 RX ORDER — DILTIAZEM HYDROCHLORIDE 180 MG/1
CAPSULE, COATED, EXTENDED RELEASE ORAL
COMMUNITY
Start: 2021-06-24

## 2021-07-22 RX ORDER — HYDRALAZINE HYDROCHLORIDE 100 MG/1
100 TABLET, FILM COATED ORAL 3 TIMES DAILY
COMMUNITY

## 2021-07-22 NOTE — PROGRESS NOTES
Zhang Woods presents today for   Chief Complaint   Patient presents with    Follow-up     6 month follow up       Zhang Woods preferred language for health care discussion is english/other. Is someone accompanying this pt? no    Is the patient using any DME equipment during [address]? no    Depression Screening:  3 most recent PHQ Screens 7/22/2021   Little interest or pleasure in doing things Not at all   Feeling down, depressed, irritable, or hopeless Not at all   Total Score PHQ 2 0       Learning Assessment:  Learning Assessment 7/22/2021   PRIMARY LEARNER Patient   PRIMARY LANGUAGE ENGLISH   LEARNER PREFERENCE PRIMARY DEMONSTRATION   ANSWERED BY PATIENT   RELATIONSHIP SELF       Abuse Screening:  Abuse Screening Questionnaire 7/22/2021   Do you ever feel afraid of your partner? N   Are you in a relationship with someone who physically or mentally threatens you? N   Is it safe for you to go home? Y       Fall Risk  Fall Risk Assessment, last 12 mths 1/7/2021   Able to walk? Yes   Fall in past 12 months? 0   Do you feel unsteady? 0   Are you worried about falling 0           Pt currently taking Anticoagulant therapy? no    Pt currently taking Antiplatelet therapy ? no      Coordination of Care:  1. Have you been to the ER, urgent care clinic since your last visit? Hospitalized since your last visit? no    2. Have you seen or consulted any other health care providers outside of the [address] [place] [address] since your last visit? Include any pap smears or colon screening.  no

## 2021-07-22 NOTE — PROGRESS NOTES
History of Present Illness:  49year old female here for follow up. Last time I saw her was January, 2021. At that time we talked about a follow up echocardiogram to reassess left ventricular function, but unfortunately some of the techs fell ill and it was not rescheduled. Overall she is doing quite well. She denies any new chest pain, dyspnea, PND, orthopnea or edema. She holds her blood pressure medicines prior to dialysis due to some hypotension. It is unclear if she is still taking the Cardizem, although it is listed. She is contemplating giving an AV fistula, but is a bit hesitant due to the multiple sticks that are involved. She therefore has a tunnel dialysis catheter, followed closely by vascular. No signs or symptoms of infection. Impression:  1. End stage renal disease, on hemodialysis, initiated early 2020, followed by Dr. Kyra Contreras. 2. History of nonischemic cardiomyopathy with EF of 20-25% June, 2020, declined from June, 2019. Heart cath March, 2020 without any significant epicardial disease. 3. Hypertension and white coat syndrome, stable. 4. Dyslipidemia. 5. Chronic systolic heart failure, compensated. 6. Previous desire not to be on ACE inhibitor or ARB as there was worry it could prolong her dialysis needs. Plan: At this point I would like to confirm if she is truly taking the Cardizem and repeat an echocardiogram.  If her EF is still reduced, I would stop it and then consider an ARB and ACE inhibitor since it appears that her dialysis is going to be long term. Blood pressure is controlled today and her antihypertensives are held prior to her dialysis runs. She still has a central catheter in place and is contemplating AV fistula. There has been discussion about possible workup for transplant as well and she is waiting on a colonoscopy. Historically in regards to her low EF, she had declined AICD and LifeVest was removed last year. All questions answered.       Past Medical History:   Diagnosis Date    Anemia     Arthritis     Diabetes ([address]     Hypertension     ITP (idiopathic thrombocytopenic purpura)        Current Outpatient Medications   Medication Sig Dispense Refill    cyanocobalamin (VITAMIN B12) 100 mcg tablet Take 100 mcg by mouth daily.  carvediloL (COREG) 6.25 mg tablet Take 1 Tab by mouth every twelve (12) hours. 60 Tab 0    hydrALAZINE (APRESOLINE) 25 mg tablet Take 25 mg by mouth three (3) times daily.  calcium acetate (PHOSLO) 667 mg cap Take 1 Cap by mouth three (3) times daily (with meals). 90 Cap 3    loratadine (CLARITIN) 10 mg tablet Take 10 mg by mouth daily as needed for Allergies.  acetaminophen (TYLENOL) 500 mg tablet Take 2 Tabs by mouth every six (6) hours as needed for Pain. 20 Tab 0       Social History   reports that she has never smoked. She has never used smokeless tobacco.   reports no history of alcohol use. Family History  family history includes Cancer in her father; Hypertension in her mother. Review of Systems  Except as stated above include:  Constitutional: Negative for fever, chills and malaise/fatigue. HEENT: No congestion or recent URI. Gastrointestinal: No nausea, vomiting, abdominal pain, bloody stools. Pulmonary:  Negative except as stated above. Cardiac:  Negative except as stated above. Musculoskeletal: Negative except as stated above. Neurological:  No localized symptoms. Skin:  Negative except as stated above. Psych:  Negative except as stated above. Endocrine:  Negative except as stated above. PHYSICAL EXAM  BP Readings from Last 3 Encounters:   01/19/21 (!) 164/84   01/07/21 126/84   07/02/20 170/80     Pulse Readings from Last 3 Encounters:   01/19/21 70   01/07/21 90   07/02/20 (!) 104     Wt Readings from Last 3 Encounters:   05/20/21 96.2 kg (212 lb)   01/19/21 93.4 kg (206 lb)   01/07/21 95.7 kg (211 lb)     General:   Well developed, well groomed.     Head/Neck:   No obvious jugular venous distention     No obvious carotid pulsations. No evidence of xanthelasma. Lungs:   No respiratory distress. Clear bilaterally. Heart:  Regular rate and rhythm. Normal S1/S2. Palpation grossly normal.    No significant murmurs, rubs or gallops. Abdomen:   Non-acute abdomen. No obvious pulsations. Extremities:   Intact peripheral pulses. No significant edema. Neurological:   Alert and oriented to person, place, time. No focal neurological deficit visually. Skin:   No obvious rash    Blood Pressure Metric:  Monitor recommended and adjustments stated if needed.

## 2022-03-16 ENCOUNTER — TRANSCRIBE ORDER (OUTPATIENT)
Dept: SCHEDULING | Age: 57
End: 2022-03-16

## 2022-03-16 DIAGNOSIS — Z12.31 VISIT FOR SCREENING MAMMOGRAM: Primary | ICD-10-CM

## 2022-03-22 ENCOUNTER — HOSPITAL ENCOUNTER (OUTPATIENT)
Dept: MAMMOGRAPHY | Age: 57
Discharge: HOME | End: 2022-03-22
Attending: INTERNAL MEDICINE
Payer: COMMERCIAL

## 2022-03-22 DIAGNOSIS — Z12.31 VISIT FOR SCREENING MAMMOGRAM: ICD-10-CM

## 2022-03-22 PROCEDURE — 77063 BREAST TOMOSYNTHESIS BI: CPT

## 2023-09-27 NOTE — ED NOTES
Patient armband removed and shredded  I have reviewed discharge instructions with the patient. The patient verbalized understanding.
Patient provided with regular diet tray. No other needs at this time.
Pt placed on 2 LPM O2 for comfort
Report received from Johanna, [facility]. Assuming care of patient at this time.
English

## 2024-10-06 NOTE — DISCHARGE INSTRUCTIONS
Patient Education        Hemodialysis Access Surgery: What to Expect at Home  Your Recovery  Hemodialysis is a way to remove wastes from the blood when your kidneys can no longer do the job. It's not a cure, but it can help you live longer and feel better. It's a lifesaving treatment when you have kidney failure. Hemodialysis is often called dialysis. Your doctor created a place (called an access) in your arm for your blood to flow in and out of your body during your dialysis sessions. Your arm will probably be bruised and swollen. It may hurt. The cut (incision) may bleed. The pain and bleeding will get better over several days. You will probably need only over-the-counter pain medicine. You can reduce swelling by propping up your arm on 1 or 2 pillows and keeping your elbow straight. You will have stitches. These may dissolve on their own, or your doctor will tell you when to come in to have them removed. You should also be able to return to work in a few days. You may feel some coolness or numbness in your hand. These feelings usually go away in a few weeks. Your doctor may suggest squeezing a soft object. This will strengthen your access and may make hemodialysis faster and easier. You should always be able to feel blood rushing through the fistula or graft. It feels like a slight vibration when you put your fingers on the skin over the fistula or graft. This feeling is called a thrill or a pulse. This care sheet gives you a general idea about how long it will take for you to recover. But each person recovers at a different pace. Follow the steps below to get better as quickly as possible. How can you care for yourself at home? Activity  · Rest when you feel tired. Getting enough sleep will help you recover. Do not lie on or sleep on the arm with the access. · Avoid activities such as washing windows or gardening that put stress on the arm with the access.   · You may use your arm, but do not lift anything that weighs more than about 15 pounds. This may include a child, heavy grocery bags, a heavy briefcase or backpack, cat litter or dog food bags, or a vacuum . · You can shower, but keep the access dry for the first 2 days. Cover the area with a plastic bag to keep it dry. · Do not soak or scrub the incision until it has healed. · Wear an arm guard to protect the area if you play sports or work with your arms. · You may drive when your doctor says it is okay. This is usually in 1 to 2 days. · Most people are able to return to work about 1 or 2 days after surgery. Diet  · Follow an eating plan that is good for your kidneys. A registered dietitian can help you make a meal plan that is right for you. You may need to limit protein, salt, fluids, and certain foods. Medicines  · Your doctor will tell you if and when you can restart your medicines. He or she will also give you instructions about taking any new medicines. · If you take aspirin or some other blood thinner, ask your doctor if and when to start taking it again. Make sure that you understand exactly what your doctor wants you to do. · Take pain medicines exactly as directed. ? If the doctor gave you a prescription medicine for pain, take it as prescribed. ? If you are not taking a prescription pain medicine, ask your doctor if you can take acetaminophen (Tylenol). Do not take ibuprofen (Advil, Motrin) or naproxen (Aleve), or similar medicines, unless your doctor tells you to. They may make chronic kidney disease worse. ? Do not take two or more pain medicines at the same time unless the doctor told you to. Many pain medicines have acetaminophen, which is Tylenol. Too much acetaminophen (Tylenol) can be harmful. · If you think your pain medicine is making you sick to your stomach:  ? Take your medicine after meals (unless your doctor has told you not to). ? Ask your doctor for a different pain medicine.   · If your doctor prescribed 148 antibiotics, take them as directed. Do not stop taking them just because you feel better. You need to take the full course of antibiotics. Incision care  · Keep the area dry for 2 days. After 2 days, wash the area with soap and water every day, and always before dialysis. · Do not soak or scrub the incision until it has healed. · If you have a bandage, change it every day or as your doctor recommends. Your doctor will tell you when you can remove it. Exercise  · Squeeze a soft ball or other object as your doctor tells you. This will help blood flow through the access and help prevent blood clots. Elevation  · Prop up the sore arm on a pillow anytime you sit or lie down during the next 3 days. Try to keep it above the level of your heart. This will help reduce swelling. Other instructions  · Every day, check your access for a pulse or thrill in the fistula or graft area. A thrill is a vibration. To feel a pulse or thrill, place the first two fingers of your hand over the access. · Do not bump your arm. · Do not wear tight clothing, jewelry, or anything else that may squeeze the access. · Use your other arm to have blood drawn or blood pressure taken. · Do not put cream or lotion on or near the access. · Make sure all doctors you deal with know that you have a vascular access. Follow-up care is a key part of your treatment and safety. Be sure to make and go to all appointments, and call your doctor if you are having problems. It's also a good idea to know your test results and keep a list of the medicines you take. When should you call for help? [id number] anytime you think you may need emergency care. For example, call if:  · You passed out (lost consciousness). · You have chest pain, are short of breath, or cough up blood. Call your doctor now or seek immediate medical care if:  · Your hand or arm is cold or dark-colored. · You have no pulse in your access. · You have nausea or you vomit.   · You have pain that does not get better after you take pain medicine. · You have loose stitches, or your incision comes open. · You are bleeding from the incision. · You have signs of infection, such as:  ? Increased pain, swelling, warmth, or redness. ? Red streaks leading from the area. ? Pus draining from the area. ? A fever. · You have signs of a blood clot in your leg (called a deep vein thrombosis), such as:  ? Pain in your calf, back of the knee, thigh, or groin. ? Redness or swelling in your leg. Watch closely for changes in your health, and be sure to contact your doctor if you have any problems. Where can you learn more? Go to http://michela-ivan.info/  Enter P616 in the search box to learn more about \"Hemodialysis Access Surgery: What to Expect at Home. \"  Current as of: August 12, 2019               Content Version: 12.5  © 1671-8819 [employer]. Care instructions adapted under license by [employer] (which disclaims liability or warranty for this information). If you have questions about a medical condition or this instruction, always ask your healthcare professional. [place] [address] any warranty or liability for your use of this information. DISCHARGE SUMMARY from Nurse    PATIENT INSTRUCTIONS:    After general anesthesia or intravenous sedation, for 24 hours or while taking prescription Narcotics:  · Limit your activities  · Do not drive and operate hazardous machinery  · Do not make important personal or business decisions  · Do  not drink alcoholic beverages  · If you have not urinated within 8 hours after discharge, please contact your surgeon on call.     Report the following to your surgeon:  · Excessive pain, swelling, redness or odor of or around the surgical area  · Temperature over 100.5  · Nausea and vomiting lasting longer than 4 hours or if unable to take medications  · Any signs of decreased circulation or nerve impairment to extremity: change in color, persistent  numbness, tingling, coldness or increase pain  · Any questions    What to do at Home:  Recommended activity: Activity as tolerated and no driving for today. If you experience any of the following symptoms pain not relieved by over the counter medication, please follow up with Dr. Adele Rivas. *  Please give a list of your current medications to your Primary Care Provider. *  Please update this list whenever your medications are discontinued, doses are      changed, or new medications (including over-the-counter products) are added. *  Please carry medication information at all times in case of emergency situations. These are general instructions for a healthy lifestyle:    No smoking/ No tobacco products/ Avoid exposure to second hand smoke  Surgeon General's Warning:  Quitting smoking now greatly reduces serious risk to your health. Obesity, smoking, and sedentary lifestyle greatly increases your risk for illness    A healthy diet, regular physical exercise & weight monitoring are important for maintaining a healthy lifestyle    You may be retaining fluid if you have a history of heart failure or if you experience any of the following symptoms:  Weight gain of 3 pounds or more overnight or 5 pounds in a week, increased swelling in our hands or feet or shortness of breath while lying flat in bed. Please call your doctor as soon as you notice any of these symptoms; do not wait until your next office visit. The discharge information has been reviewed with the patient. The patient verbalized understanding. Discharge medications reviewed with the patient and appropriate educational materials and side effects teaching were provided.   ___________________________________________________________________________________________________________________________________

## 2024-11-20 NOTE — CONSULTS
Consult Note  Consult requested by: dr Starla Trejo is a 48 y.o. female [address] who is being seen on consult for renal failure  No chief complaint on file. Admission diagnosis: <principal problem not specified>     HPI: 48 y o  [place] female admitted with urosepsis,asked to evaluate for worsening renal function. hx of crf stage 3,dm,htn,itp,arthritis. was on nsaids in the past.growing e coli from blood and urine. feels better since admission  Past Medical History:   Diagnosis Date    Anemia     Arthritis     Diabetes ([address]     Hypertension     ITP (idiopathic thrombocytopenic purpura)       Past Surgical History:   Procedure Laterality Date    HX GYN      c section 1984    HX HYSTERECTOMY  2017    HX TUBAL LIGATION         Social History     Socioeconomic History    Marital status: SINGLE     Spouse name: Not on file    Number of children: Not on file    Years of education: Not on file    Highest education level: Not on file   Social Needs    Financial resource strain: Not on file    Food insecurity - worry: Not on file    Food insecurity - inability: Not on file   [employer] needs - medical: Not on file   [employer] needs - non-medical: Not on file   Occupational History    Not on file   Tobacco Use    Smoking status: Never Smoker    Smokeless tobacco: Never Used   Substance and Sexual Activity    Alcohol use: No    Drug use: No    Sexual activity: Not on file   Other Topics Concern    Not on file   Social History Narrative    Not on file       Family History   Problem Relation Age of Onset    Hypertension Mother     Cancer Father         Colon     Allergies   Allergen Reactions    Rocephin [Ceftriaxone] Itching    Pcn [Penicillins] Itching        Home Medications:     Prior to Admission Medications   Prescriptions Last Dose Informant Patient Reported?  Taking?   acetaminophen (TYLENOL) 500 mg tablet 12/24/2018 at Unknown time  No Yes Sig: Take 2 Tabs by mouth every six (6) hours as needed for Pain. amLODIPine (NORVASC) 10 mg tablet 12/24/2018 at Unknown time  Yes Yes   Sig: Take 10 mg by mouth daily. Indications: HYPERTENSION   cefdinir (OMNICEF) 300 mg capsule 12/24/2018 at Unknown time  No Yes   Sig: Take 1 Cap by mouth two (2) times a day for 7 days. ferrous sulfate 325 mg (65 mg iron) tablet 12/24/2018 at Unknown time  Yes Yes   Sig: Take 325 mg by mouth two (2) times a day. lisinopril-hydrochlorothiazide (PRINZIDE, ZESTORETIC) 20-25 mg per tablet 12/24/2018 at Unknown time  Yes Yes   Sig: Take 1 Tab by mouth daily. Indications: HYPERTENSION   metFORMIN (GLUCOPHAGE) 500 mg tablet 12/24/2018 at Unknown time  No Yes   Sig: Take 2 Tabs by mouth two (2) times daily (with meals). Indications: TYPE 2 DIABETES MELLITUS      Facility-Administered Medications: None       Current Facility-Administered Medications   Medication Dose Route Frequency    ciprofloxacin (CIPRO) 200 mg IVPB (premix)  200 mg IntraVENous Q12H    0.9% sodium chloride infusion  75 mL/hr IntraVENous CONTINUOUS    hydrALAZINE (APRESOLINE) 20 mg/mL injection 10 mg  10 mg IntraVENous Q6H PRN    sodium chloride (NS) flush 5-10 mL  5-10 mL IntraVENous PRN    acetaminophen (TYLENOL) tablet 1,000 mg  1,000 mg Oral Q6H PRN    insulin lispro (HUMALOG) injection   SubCUTAneous AC&HS    glucose chewable tablet 16 g  4 Tab Oral PRN    glucagon (GLUCAGEN) injection 1 mg  1 mg IntraMUSCular PRN    dextrose (D50W) injection syrg 12.5-25 g  25-50 mL IntraVENous PRN       Review of Systems:   A comprehensive review of systems was negative except for that written in the HPI.   Data Review:    Labs: Results:       Chemistry Recent Labs     12/26/18  0225 12/24/18  1115 12/23/18  1625   * 239* 288*    138 137   K 4.1 4.2 4.4   * 111* 103   CO2 18* 20* 22   BUN 34* 39* 44*   CREA 3.12* 3.70* 3.57*   CA 7.6* 7.4* 9.0   AGAP 9 7 12   BUCR 11* 11* 12   AP 73 94 118* TP 6.1* 5.9* 8.0   ALB 1.5* 1.9* 2.3*   GLOB 4.6* 4.0 5.7*   AGRAT 0.3* 0.5* 0.4*      PTH  Lab Results   Component Value Date/Time    Calcium 7.6 (L) 12/26/2018 02:25 AM    Phosphorus 3.6 12/26/2018 02:25 AM      CBC w/Diff Recent Labs     12/26/18  0225 12/24/18  1115 12/23/18  1625   WBC 3.2* 7.0 8.8   RBC 2.85* 3.25* 3.77*   HGB 7.7* 8.7* 10.2*   HCT 23.4* 26.6* 31.5*   PLT 58* 65* 63*   GRANS 62 84* 86*   LYMPH 23 8* 7*   EOS 7* 2 2      Coagulation No results for input(s): PTP, INR, APTT in the last 72 hours. No lab exists for component: INREXT    Iron/Ferritin No results for input(s): IRON in the last 72 hours. No lab exists for component: TIBCCALC   BNP No results for input(s): BNPP in the last 72 hours. Cardiac Enzymes No results for input(s): CPK, CKND1, CAMPOS in the last 72 hours. No lab exists for component: CKRMB, TROIP   Liver Enzymes Recent Labs     12/26/18 0225   TP 6.1*   ALB 1.5*   AP 73   SGOT 15      Thyroid Studies Lab Results   Component Value Date/Time    TSH 2.37 07/31/2016 11:50 AM        Urinalysis Lab Results   Component Value Date/Time    Color YELLOW 12/23/2018 06:00 PM    Appearance CLOUDY 12/23/2018 06:00 PM    Specific gravity 1.018 12/23/2018 06:00 PM    pH (UA) 6.0 12/23/2018 06:00 PM    Protein >1,000 (A) 12/23/2018 06:00 PM    Glucose 500 (A) 12/23/2018 06:00 PM    Ketone NEGATIVE  12/23/2018 06:00 PM    Bilirubin NEGATIVE  12/23/2018 06:00 PM    Urobilinogen 0.2 12/23/2018 06:00 PM    Nitrites NEGATIVE  12/23/2018 06:00 PM    Leukocyte Esterase MODERATE (A) 12/23/2018 06:00 PM    Epithelial cells 2+ 12/23/2018 06:00 PM    Bacteria 4+ (A) 12/23/2018 06:00 PM    WBC TOO NUMEROUS TO COUNT 12/23/2018 06:00 PM    RBC 4 to 10 12/23/2018 06:00 PM         IMAGES:   XR Results (maximum last 3):   Results from [facility] encounter on 12/24/18   XR CHEST PORT    Narrative EXAM:  XR CHEST PORT    INDICATION:   meets SIRS criteria    COMPARISON: 12/23/2018. FINDINGS:  The cardiac and mediastinal silhouette are within normal limits. Pulmonary  vasculature is within normal limits. No pneumothorax or pleural effusions. No  air space opacity. No acute osseous abnormality. Impression Impression:    No radiographic evidence of acute cardiopulmonary process. Results from [place] [facility] encounter on 12/23/18   XR CHEST PORT    Narrative EXAM: XR CHEST PORT    CLINICAL INDICATION/HISTORY : Sepsis. Neck pain    COMPARISON: July 31, 2016    TECHNIQUE: 1 VIEWS    FINDINGS:     The lungs are hypoinflated without focal lung consolidation. The heart and mediastinum are unremarkable. No evidence of pleural effusion. Impression IMPRESSION:    1. No acute findings          Results from Hospital Encounter encounter on 07/31/16   XR CHEST PORT    Narrative EXAM:  XR CHEST PORT    INDICATION:  Dysphagia    COMPARISON: December 11, 2010. FINDINGS: A single view of the chest demonstrates clear lungs. The cardiac and  mediastinal contours and pulmonary vascularity are normal.  The bones and soft  tissues are within normal limits. Impression IMPRESSION: Normal chest.                  CT Results (maximum last 3): Results from [facility] encounter on 12/23/18   CT ABD PELV WO CONT    Narrative CT Of The Abdomen And Pelvis Without Contrast    CPT CODE 84012,70312    CLINICAL HISTORY: Chronic kidney disease with one day of fever, generalized  aching. Abnormal urinalysis. .    TECHNIQUE: 5 mm helical MDCT scan was obtained of the abdomen and pelvis. Sagittal and coronal images created from original data set.   All CT scans at  this facility are performed using dose optimization techniques as appropriate to  a performed exam, to include automated exposure control, adjustment of the mA  and/or kV according to patient's size (including appropriate matching for site  specific examinations), or use of iterative reconstruction technique. COMPARISON: None. FINDINGS:  view shows lung bases clear, mild prominence of small bowel  loops in the mid abdomen. CT Of The Abdomen and pelvis:    Lung bases: Linear density left base. No effusions. Heart and pericardium: Normal.    Liver: Normal.    Gallbladder: Normal.    Spleen: Normal.    Pancreas: Normal.    Adrenal glands: Normal.    Kidneys: Left kidney has a 1 mm stones lower pole. No hydroureteronephrosis. On  the right, kidney appears mildly enlarged and is oriented in a horizontal  position compatible with variant of normal. Two tiny 1 mm calcifications are  present. There is mild pelvocaliectasis. No ureterectasis or ureteral stones. Perinephric stranding on this side. Retroperitoneum: Great vessels normal. Small lymph nodes not pathologically  enlarged. The bowel: Distal esophagus, stomach, duodenum, small bowel and the appendix and  the colon are normal. Small bowel loops measure up to 2 cm. No wall thickening. Peritoneal space: Small volume free fluid. Urinary bladder: Normal. GYN: Prior hysterectomy. No adnexal masses. Impression IMPRESSION:    Mild enlargement right kidney, mild pelvocaliectasis with perinephric stranding  can be seen with recently passed stone (although no stone is seen) or hilar  nephritis. No abscess identified. No obstructing stone. Bilateral tiny kidney stones. Small volume free fluid in the pelvis. Atelectasis left lung base. Results from [facility] encounter on 07/31/16   CT NECK SOFT TISSUE WO CONT    Narrative CT NECK WITH CONTRAST    HISTORY: Dysphagia. Fullness in throat. Pain in chest.    COMPARISON: No priors. TECHNIQUE: CT images of the neck were obtained from the skull base to the upper  chest with IV contrast.    FINDINGS:    Visualized brain parenchyma is unremarkable. Orbits appear normal. Sinuses are  well aerated. Parotid glands are symmetrical without focal lesions.  Submandibular glands [FreeTextEntry2] : Eyes:  no discharge and no vision problems.   HEENT:  no earache, no nasal discharge and no sore throat.   Cardiovascular:  no chest pain, no palpitations, no leg claudication, no lower extremity edema and no orthopnea.   Respiratory:  no shortness of breath and no cough.   Gastrointestinal:  no abdominal pain, no constipation, diarrhea, no vomiting, no heartburn and no melena.   Genitourinary:  no dysuria, no hematuria and no frequency.   Integumentary:  no skin rash.   Neurological: no seizures  no headache.   Constitutional review of systems are otherwise negative except as noted in HPI.  are  symmetrical without focal lesions. There is no cervical adenopathy. Nasopharynx, oropharynx, hypopharynx appear normal.    Thyroid gland appear normal.    Lung apices are clear. Skull base is intact. Sinuses are well aerated. There is exuberant ossification  in the anterior longitudinal ligament from C2 down to C6. At C2-3, there is  beaklike projection of the anterior osteophytes. There is indentation of the  posterior aspect of the oropharynx. Impression IMPRESSION:  1. Exuberant ossification of the anterior longitudinal ligament can be seen with  diffuse idiopathic skeletal hyperostosis. 2. At C2-3 level, the anterior osteophytosis has a beaklike projection. This  indents the posterior wall of the oropharynx. This is a chronic finding and  uncertain if contributes to patient's symptoms either chronically or acutely. 3. No other significant findings. MRI Results (maximum last 3): No results found for this or any previous visit. Nuclear Medicine Results (maximum last 3): No results found for this or any previous visit. US Results (maximum last 3): Results from Hospital Encounter encounter on 12/24/18   US RETROPERITONEUM COMP    Narrative Ultrasound retroperitoneal    INDICATION: Hydronephrosis, recent UTI    COMPARISON: CT 12/18    FINDINGS:    Right kidney measures 10.1 x 4.7 x 4.8 cm. Left kidney measures 11.6 x 5.6 x 5.1  cm. No hydronephrosis or nephrolithiasis. No solid renal mass. 1.3 x 1.2 x 1.3  cm cyst in the upper pole of the left kidney. Visualized spleen and bladder  appear within normal limits. No free fluid. Impression IMPRESSION:    No hydronephrosis. Tiny left renal cyst.       DEXA Results (maximum last 3): No results found for this or any previous visit. RODOLFO Results (maximum last 3): Results from [facility] encounter on 05/11/17   RODOLFO MAMMO BI SCREENING INCL CAD       IR Results (maximum last 3):   Results from Hospital Encounter encounter on 03/10/15 IR BX BONE MARROW    Narrative PREOPERATIVE DIAGNOSIS: Thrombocytopenia. POSTOPERATIVE DIAGNOSIS: Same    ATTENDING: Dr. Rasheeda Draper M.D.    [facility] None. PROCEDURES: Fluoroscopically guided bone marrow biopsy. ANESTHESIA: Local 1% lidocaine as well as moderate intravenous sedation with  Versed and fentanyl given and monitored per independently trained interventional  radiology nurse under my direct supervision for 30 minutes. Please see nursing  records for detailed medication dosing and timing of the procedure. CONTRAST: None. COMPLICATIONS: None    DRAIN: No    CATHETER: None. EBL: Minimal.    SPECIMEN: 2 aspirates and single core biopsy was obtained. Fluoroscopy time: 0.8 minutes. TECHNIQUE: After detailed explanation of risks and benefits of the procedure  verbal and written consent were obtained. Patient was brought to the  interventional radiology room and placed prone on the table. Timeout was  performed.  view was obtained. Target lesion was identified and skin was  marked overlying left iliac crest.    Left iliac crest region was prepped and draped in the usual sterile fashion. Maximum sterile there are technique was used. 1% lidocaine solution was instilled in the skin superficial and deep  subcutaneous soft tissues overlying the biopsy region. Under direct fluoroscopy guidance using 11-gauge OnControl biopsy needle was  advanced down through left iliac crest periosteum with drill power assistance. Single pass was made. 2 aspirates and single core biopsy were obtained. Given to  pathology on site. Postbiopsy imaging was obtained. FINDINGS: Fluoroscopic guidance demonstrated good position of the biopsy needle. Postbiopsy imaging shows no abnormality. Impression IMPRESSION:    Successful, uncomplicated fluoroscopically guided bone marrow biopsy. VAS/US Results (maximum last 3): No results found for this or any previous visit.     PET Results [FreeTextEntry5] : Dizziness (maximum last 3): No results found for this or any previous visit. No results found for this or any previous visit. @LASTPROCAMB([id number])    CULTURE:   )  Recent Labs     12/24/18  1158 12/24/18  1115 12/23/18  1800   CULT NO GROWTH 2 DAYS NO GROWTH 2 DAYS >100,000 COLONIES/mL ESCHERICHIA COLI*     Recent Labs     12/24/18  1158 12/24/18  1115 12/23/18  1800 12/23/18  1630 12/23/18  1625   CULT NO GROWTH 2 DAYS NO GROWTH 2 DAYS >100,000 COLONIES/mL ESCHERICHIA COLI* ANAEROBIC BOTTLE ESCHERICHIA COLI For Susceptibility Refer to Culture [id number]* ANAEROBIC BOTTLE ESCHERICHIA COLI*       Physical Assessment:     Visit Vitals  BP (!) 169/93 (BP 1 Location: Right arm, BP Patient Position: At rest)   Pulse 90   Temp 98.6 °F (37 °C)   Resp 18   Wt 96.6 kg (213 lb)   SpO2 100%   Breastfeeding? No   BMI 33.36 kg/m²     Last 3 Recorded Weights in this Encounter    12/24/18 1106 12/25/18 0436 12/26/18 0440   Weight: 92.5 kg (204 lb) 93.6 kg (206 lb 6.4 oz) 96.6 kg (213 lb)       Intake/Output Summary (Last 24 hours) at 12/26/2018 1031  Last data filed at 12/26/2018 0415  Gross per 24 hour   Intake 1240 ml   Output --   Net 1240 ml       Physial Exam:  General appearance: alert, cooperative, no distress, appears stated age  Skin: normal coloration and turgor, no rashes, no suspicious skin lesions noted. HEENT: Head; normocephalic, atraumatic. JUAN RAMON. ENT- ENT exam normal, no neck nodes or sinus tenderness. Lungs: clear to auscultation bilaterally  Heart: regular rate and rhythm, S1, S2 normal, no murmur, click, rub or gallop  Abdomen: soft, non-tender. Bowel sounds normal. No masses,  no organomegaly  Extremities: extremities normal, atraumatic, no cyanosis or edema    PLAN / RECOMMENDATION:    Acute/crf stage 3 related to urosepsis,improving,holding acei . Htn,hold acei,adjust meds. restart norvasc  Proteinuria,will need further evaluation after infection is treated  Dm,stop metformin  Chronic anemia,thrombocytopenia,followed by dr Kerri Ibarra     Thank you for the consultation to participate in patient's care. I have personally discussed my plan with the referring physician.      Aissatou Villaseñor MD  December 26, 2018

## (undated) DEVICE — COLUMN DRAPE

## (undated) DEVICE — PROCEDURE KIT FLUID MGMT 10 FR CUST MAINFOLD

## (undated) DEVICE — ANGIOGRAPHY KIT CUST VASC

## (undated) DEVICE — SUTURE SZ 0 27IN 5/8 CIR UR-6  TAPER PT VIOLET ABSRB VICRYL J603H

## (undated) DEVICE — COVER US PRB W15XL120CM W/ GEL RUBBERBAND TAPE STRP FLD GEN

## (undated) DEVICE — COPILOT KIT INCLUDES BLEEDBACK CONTROL VALVE 20/30 INDEFLATOR INFLATION DEVICE 30 ATM 20 CC / GUIDE WIRE INTRODUCER / TORQUE DEVICE: Brand: INDEFLATOR

## (undated) DEVICE — SUTURE MCRYL SZ 4-0 L18IN ABSRB UD L19MM PS-2 3/8 CIR PRIM Y496G

## (undated) DEVICE — PACK PROCEDURE SURG VASC CATH 161 MMC LF

## (undated) DEVICE — BLLN OCCL PERITONM COLPOPNEUMO --

## (undated) DEVICE — SOLUTION IV 1000ML 0.9% SOD CHL

## (undated) DEVICE — 40580 - THE PINK PAD - ADVANCED TRENDELENBURG POSITIONING KIT: Brand: 40580 - THE PINK PAD - ADVANCED TRENDELENBURG POSITIONING KIT

## (undated) DEVICE — PAD BD CONVOLUTED FOAM

## (undated) DEVICE — Device

## (undated) DEVICE — UNDER BUTTOCKS DRAPE WITH FLUID CONTROL POUCH: Brand: CONVERTORS

## (undated) DEVICE — CATHETER DIAG AD L100CM DIA6FR STD JUDKINS L 4 POLYUR COR

## (undated) DEVICE — DRESSING FOAM DISK DIA1IN H 7MM HYDRPHLC CHG IMPREG IN SL

## (undated) DEVICE — PRESSURE MONITORING SET: Brand: TRUWAVE

## (undated) DEVICE — KENDALL SCD EXPRESS SLEEVES, KNEE LENGTH, MEDIUM: Brand: KENDALL SCD

## (undated) DEVICE — SUTURE ETHLN SZ 2-0 L18IN NONABSORBABLE BLK L26MM FS 3/8 664G

## (undated) DEVICE — GUIDEWIRE VASC L180CM DIA0.035IN TIP L7CM PTFE S STL STR

## (undated) DEVICE — STERILE POLYISOPRENE POWDER-FREE SURGICAL GLOVES: Brand: PROTEXIS

## (undated) DEVICE — ANGIO-SEAL VIP VASCULAR CLOSURE DEVICE: Brand: ANGIO-SEAL

## (undated) DEVICE — REM POLYHESIVE ADULT PATIENT RETURN ELECTRODE: Brand: VALLEYLAB

## (undated) DEVICE — (D)DRSG BORD MPLX SCRM 18X18CM -- DISC BY MFR USE ITEM 346511

## (undated) DEVICE — DRAPE,ANGIO,BRACH,STERILE,38X44: Brand: MEDLINE

## (undated) DEVICE — ELECTRO LUBE IS A SINGLE PATIENT USE DEVICE THAT IS INTENDED TO BE USED ON ELECTROSURGICAL ELECTRODES TO REDUCE STICKING.: Brand: KEY SURGICAL ELECTRO LUBE

## (undated) DEVICE — FLEX ADVANTAGE 3000CC: Brand: FLEX ADVANTAGE

## (undated) DEVICE — BLADELESS OBTURATOR

## (undated) DEVICE — SEAL UNIV 5-8MM DISP BX/10 -- DA VINCI XI - SNGL USE

## (undated) DEVICE — (D)PREP SKN CHLRAPRP APPL 26ML -- CONVERT TO ITEM 371833

## (undated) DEVICE — 3M™ BAIR PAWS FLEX™ WARMING GOWN, STANDARD, 20 PER CASE 81003: Brand: BAIR PAWS™

## (undated) DEVICE — CATHETER HAD L36CM INSRT L19CM PALINDROME

## (undated) DEVICE — INTRODUCER SHTH 6FR CANN L11CM DIL TIP 35MM GRN TUNGSTEN

## (undated) DEVICE — DRAPE TWL SURG 16X26IN BLU ORB04] ALLCARE INC]

## (undated) DEVICE — INTRODUCER SHTH 9FR CANN L23CM DIL TIP 35MM BLK W/O MINI

## (undated) DEVICE — TRAY CATH OD16FR SIL URIN M STATLOK STBL DEV SURSTP

## (undated) DEVICE — KIT CLN UP BON SECOURS MARYV

## (undated) DEVICE — SET FLD ADMIN 3 W STPCOCK FIX FEM L BOR 1IN

## (undated) DEVICE — CATHETER ANGIO JR4 STD 0.038 IN 6 FRX100 CM SUPER TORQUE +

## (undated) DEVICE — WINGED PERI-PAD,MODERATE: Brand: CURITY

## (undated) DEVICE — SOL ANTI-FOG 6ML MEDC -- MEDICHOICE - CONVERT TO 358427

## (undated) DEVICE — SUTURE V-LOC 180 SZ 0 L9IN ABSRB GRN GS-21 L37MM 1/2 CIR VLOCL0346

## (undated) DEVICE — ARM DRAPE

## (undated) DEVICE — TIP IU L8CM DIA6.7MM BLU SIL FLX DISP RUMI II

## (undated) DEVICE — COVER MPLR TIP CRV SCIS ACC DA VINCI

## (undated) DEVICE — SOLUTION LACTATED RINGERS INJECTION USP